# Patient Record
Sex: FEMALE | Race: WHITE | URBAN - METROPOLITAN AREA
[De-identification: names, ages, dates, MRNs, and addresses within clinical notes are randomized per-mention and may not be internally consistent; named-entity substitution may affect disease eponyms.]

---

## 2017-01-16 DIAGNOSIS — C81.90 HODGKIN DISEASE (H): Primary | ICD-10-CM

## 2017-01-16 DIAGNOSIS — Z86.2 PERSONAL HISTORY OF DISEASES OF BLOOD AND BLOOD-FORMING ORGANS: ICD-10-CM

## 2017-02-01 ENCOUNTER — APPOINTMENT (OUTPATIENT)
Dept: LAB | Facility: CLINIC | Age: 42
End: 2017-02-01
Attending: INTERNAL MEDICINE
Payer: COMMERCIAL

## 2017-02-01 ENCOUNTER — OFFICE VISIT (OUTPATIENT)
Dept: INTERVENTIONAL RADIOLOGY/VASCULAR | Facility: CLINIC | Age: 42
End: 2017-02-01
Attending: INTERNAL MEDICINE

## 2017-02-01 ENCOUNTER — HOSPITAL ENCOUNTER (OUTPATIENT)
Dept: NUCLEAR MEDICINE | Facility: CLINIC | Age: 42
Setting detail: NUCLEAR MEDICINE
End: 2017-02-01
Attending: INTERNAL MEDICINE
Payer: COMMERCIAL

## 2017-02-01 ENCOUNTER — HOSPITAL ENCOUNTER (OUTPATIENT)
Dept: GENERAL RADIOLOGY | Facility: CLINIC | Age: 42
Discharge: HOME | End: 2017-02-01
Attending: INTERNAL MEDICINE | Admitting: INTERNAL MEDICINE
Payer: COMMERCIAL

## 2017-02-01 ENCOUNTER — OFFICE VISIT (OUTPATIENT)
Dept: TRANSPLANT | Facility: CLINIC | Age: 42
End: 2017-02-01
Attending: INTERNAL MEDICINE
Payer: COMMERCIAL

## 2017-02-01 VITALS
SYSTOLIC BLOOD PRESSURE: 117 MMHG | TEMPERATURE: 98 F | DIASTOLIC BLOOD PRESSURE: 62 MMHG | WEIGHT: 179.68 LBS | OXYGEN SATURATION: 97 % | RESPIRATION RATE: 16 BRPM | BODY MASS INDEX: 31.84 KG/M2 | HEART RATE: 77 BPM | HEIGHT: 63 IN

## 2017-02-01 VITALS — OXYGEN SATURATION: 99 % | SYSTOLIC BLOOD PRESSURE: 124 MMHG | HEART RATE: 94 BPM | DIASTOLIC BLOOD PRESSURE: 86 MMHG

## 2017-02-01 DIAGNOSIS — C81.90 HODGKIN DISEASE (H): ICD-10-CM

## 2017-02-01 DIAGNOSIS — Z86.2 PERSONAL HISTORY OF DISEASES OF BLOOD AND BLOOD-FORMING ORGANS: ICD-10-CM

## 2017-02-01 DIAGNOSIS — C81.10 NODULAR SCLEROSING HODGKIN'S LYMPHOMA, UNSPECIFIED BODY REGION (H): Primary | ICD-10-CM

## 2017-02-01 DIAGNOSIS — C81.70 OTHER CLASSICAL HODGKIN LYMPHOMA: Primary | ICD-10-CM

## 2017-02-01 LAB
ABO + RH BLD: NORMAL
ABO + RH BLD: NORMAL
ALBUMIN SERPL-MCNC: 3.2 G/DL (ref 3.4–5)
ALP SERPL-CCNC: 138 U/L (ref 40–150)
ALT SERPL W P-5'-P-CCNC: 38 U/L (ref 0–50)
ANION GAP SERPL CALCULATED.3IONS-SCNC: 8 MMOL/L (ref 3–14)
APTT PPP: 34 SEC (ref 22–37)
AST SERPL W P-5'-P-CCNC: 31 U/L (ref 0–45)
B2 MICROGLOB SERPL-MCNC: 2 MG/L
BASOPHILS # BLD AUTO: 0 10E9/L (ref 0–0.2)
BASOPHILS NFR BLD AUTO: 1.3 %
BILIRUB SERPL-MCNC: 0.5 MG/DL (ref 0.2–1.3)
BLD GP AB SCN SERPL QL: NORMAL
BLOOD BANK CMNT PATIENT-IMP: NORMAL
BUN SERPL-MCNC: 8 MG/DL (ref 7–30)
CALCIUM SERPL-MCNC: 8.4 MG/DL (ref 8.5–10.1)
CHLORIDE SERPL-SCNC: 108 MMOL/L (ref 94–109)
CO2 SERPL-SCNC: 26 MMOL/L (ref 20–32)
CREAT SERPL-MCNC: 0.66 MG/DL (ref 0.52–1.04)
DIFFERENTIAL METHOD BLD: ABNORMAL
EBV VCA IGG SER QL IA: ABNORMAL AI (ref 0–0.8)
EOSINOPHIL # BLD AUTO: 0.1 10E9/L (ref 0–0.7)
EOSINOPHIL NFR BLD AUTO: 7.2 %
ERYTHROCYTE [DISTWIDTH] IN BLOOD BY AUTOMATED COUNT: 15.9 % (ref 10–15)
GFR SERPL CREATININE-BSD FRML MDRD: ABNORMAL ML/MIN/1.7M2
GLUCOSE SERPL-MCNC: 79 MG/DL (ref 70–99)
HCG SERPL QL: NEGATIVE
HCT VFR BLD AUTO: 34.2 % (ref 35–47)
HGB BLD-MCNC: 11.2 G/DL (ref 11.7–15.7)
HSV1 IGG SERPL QL IA: ABNORMAL AI (ref 0–0.8)
HSV2 IGG SERPL QL IA: 5.8 AI (ref 0–0.8)
IMM GRANULOCYTES # BLD: 0 10E9/L (ref 0–0.4)
IMM GRANULOCYTES NFR BLD: 0.7 %
INR PPP: 0.87 (ref 0.86–1.14)
LDH SERPL L TO P-CCNC: 215 U/L (ref 81–234)
LYMPHOCYTES # BLD AUTO: 0.4 10E9/L (ref 0.8–5.3)
LYMPHOCYTES NFR BLD AUTO: 27.5 %
MCH RBC QN AUTO: 32.9 PG (ref 26.5–33)
MCHC RBC AUTO-ENTMCNC: 32.7 G/DL (ref 31.5–36.5)
MCV RBC AUTO: 101 FL (ref 78–100)
MONOCYTES # BLD AUTO: 0.3 10E9/L (ref 0–1.3)
MONOCYTES NFR BLD AUTO: 18.3 %
NEUTROPHILS # BLD AUTO: 0.7 10E9/L (ref 1.6–8.3)
NEUTROPHILS NFR BLD AUTO: 45 %
NRBC # BLD AUTO: 0 10*3/UL
NRBC BLD AUTO-RTO: 0 /100
PHOSPHATE SERPL-MCNC: 2.8 MG/DL (ref 2.5–4.5)
PLATELET # BLD AUTO: 219 10E9/L (ref 150–450)
POTASSIUM SERPL-SCNC: 4.2 MMOL/L (ref 3.4–5.3)
PROT SERPL-MCNC: 7 G/DL (ref 6.8–8.8)
RBC # BLD AUTO: 3.4 10E12/L (ref 3.8–5.2)
SODIUM SERPL-SCNC: 142 MMOL/L (ref 133–144)
SPECIMEN EXP DATE BLD: NORMAL
T3FREE SERPL-MCNC: 2.4 PG/ML (ref 2.3–4.2)
T4 FREE SERPL-MCNC: 0.74 NG/DL (ref 0.76–1.46)
TSH SERPL DL<=0.05 MIU/L-ACNC: 2.07 MU/L (ref 0.4–4)
URATE SERPL-MCNC: 3.5 MG/DL (ref 2.6–6)
WBC # BLD AUTO: 1.5 10E9/L (ref 4–11)

## 2017-02-01 PROCEDURE — A9560 TC99M LABELED RBC: HCPCS | Performed by: INTERNAL MEDICINE

## 2017-02-01 PROCEDURE — 34300033 ZZH RX 343: Performed by: INTERNAL MEDICINE

## 2017-02-01 PROCEDURE — 78472 GATED HEART PLANAR SINGLE: CPT

## 2017-02-01 PROCEDURE — 25000128 H RX IP 250 OP 636: Performed by: INTERNAL MEDICINE

## 2017-02-01 RX ORDER — HEPARIN SODIUM (PORCINE) LOCK FLUSH IV SOLN 100 UNIT/ML 100 UNIT/ML
500 SOLUTION INTRAVENOUS ONCE
Status: COMPLETED | OUTPATIENT
Start: 2017-02-01 | End: 2017-02-01

## 2017-02-01 RX ADMIN — Medication 25.4 MCI.: at 14:30

## 2017-02-01 RX ADMIN — SODIUM CHLORIDE, PRESERVATIVE FREE 500 UNITS: 5 INJECTION INTRAVENOUS at 15:24

## 2017-02-01 NOTE — MR AVS SNAPSHOT
After Visit Summary   2/1/2017    Steph Agee    MRN: [id number]           Patient Information     Date Of Birth          1975        Visit Information        Provider Department      2/1/2017 11:00 AM Clair Collazo, MARIE [facility] Interventional Radiology        Today's Diagnoses     Hodgkin disease (H)         Personal history of diseases of blood and blood-forming organs           Care Instructions    Tunneled catheter placement instructions    1. Arrive in the Gold Waiting Room on the day of your procedure, 2nd floor of the hospital, located down the ng from the main lobby as instructed by the BMT clinic.  You will be coming 1.5 hours prior to the actual procedure time    2. No solid foods or milk products for 6 hours prior to the procedure    3. May have clear liquids up to 2 hours prior to the procedure (water, apple juice, broth, coffee or tea without milk or sugar, jell-o, white grape juice)    4. Anti-bacterial scrub  -Two times the day before your procedure, and once the day of your procedure  -Think of a big square:  mid chest to ear lobes, both sides of the chest and neck  -Use a clean washcloth each time.  Wet the washcloth.  Place a capful of the scrub on the wet washcloth and rub it in, wash the  area and leave on for 5 minutes    -After 5 minutes, rinse off the washcloth, then rinse off the skin and pat the skin dry.with a clean towel  -Or if you prefer, you may wash the scrub off in the shower  -No lotion or powders on the neck or chest area..  But you may use deodorant.        Follow-ups after your visit        Your next 10 appointments already scheduled     Feb 01, 2017  2:00 PM   NM HEART MUGA REST with UUNM1   [facility] [place], Nuclear Medicine ([facility])    [address] [address] [phone]           Please bring a list of your medicines to the exam. (Include vitamins, minerals and  over-the-counter drugs.) You should wear comfortable clothes. Leave your valuables at home. Please bring related prior results and films.  Tell your doctor:   If you are breastfeeding or may be pregnant.   If you have had a barium test within the past few days. Barium may change the results of certain exams.   If you think you may need sedation (medicine to help you relax).  You may eat and drink as normal.  Please call your Imaging Department at your exam site with any questions.            Feb 01, 2017  3:00 PM   XR CHEST 2 VIEWS with UUXR1   [facility],  Radiology ([facility])    [address] [address] [phone]           Please bring a list of your current medicines to your exam. (Include vitamins, minerals and over-thecounter medicines.) Leave your valuables at home.  Tell your doctor if there is a chance you may be pregnant.  You do not need to do anything special for this exam.            Feb 02, 2017  9:00 AM   PET ONCOLOGY WHOLE BODY with UUPET1   [facility] PET CT ([facility])    [address] [address] [phone]           Tell your doctor:   If there is any chance you may be pregnant or if you are breastfeeding.   If you have problems lying in small spaces (claustrophobia). If you do, your doctor may give you medicine to help you relax. If you have diabetes:   Have your exam early in the morning. Your blood glucose will go up as the day goes by.   Your glucose level must be 180 or less at the start of the exam. Please take any medicines you need to ensure this blood glucose level. 24 hours before your scan: Don t do any heavy exercise. (No jogging, aerobics or other workouts.) Exercise will make your pictures less accurate. 6 hours before your scan:   Stop all food and liquids (except water).   Do not chew gum or suck on mints.   If you need to  take medicine with food, you may take it with a few crackers.  Please call your Imaging Department at your exam site with any questions.            Feb 02, 2017  9:15 AM   CT SOFT TISSUE NECK W CONTRAST with UUCT3   [facility] [place] PET CT ([facility] [place])    [address] [address] [phone]           Please bring any scans or X-rays taken at other hospitals, if similar tests were done. Also bring a list of your medicines, including vitamins, minerals and over-the-counter drugs. It is safest to leave personal items at home.  Be sure to tell your doctor:   If you have any allergies.   If there s any chance you are pregnant.   If you are breastfeeding.   If you have any special needs.  You will have contrast for this exam. To prepare:   Do not eat or drink for 2 hours before your exam. If you need to take medicine, you may take it with small sips of water. (We may ask you to take liquid medicine as well.)   The day before your exam, drink extra fluids at least six 8-ounce glasses (unless your doctor tells you to restrict your fluids).  Patients over 70 or patients with diabetes or kidney problems:   If you haven t had a blood test (creatinine test) within the last 30 days, go to your clinic or Diagnostic Imaging Department for this test.  If you have diabetes:   If your kidney function is normal, continue taking your metformin (Avandamet, Glucophage, Glucovance, Metaglip) on the day of your exam.   If your kidney function is abnormal, wait 48 hours before restarting this medicine.  Please wear loose clothing, such as a sweat suit or jogging clothes. Avoid snaps, zippers and other metal. We may ask you to undress and put on a hospital gown.  If you have any questions, please call the Imaging Department where you will have your exam.            Feb 02, 2017  1:30 PM   CONSULT with Gregorio Serrano MD   Radiation Oncology Clinic ([facility])     [facility]  1st Floor  [address]  [facility] [address]   [phone]            Feb 02, 2017  3:30 PM   Central Venous Catheter - [address] with Katie Salcido, RN   [facility] [place], Patient Learning Center ([facility] [place])    3rd Floor  [address]  [address]  [facility] [address]              Appointment is located at [address] [place] [address]            Feb 03, 2017  1:00 PM   (Arrive by 12:45 PM)   BMT SW PSA with Kymberly Salmon  2 116 CONSULT RM   [facility] Blood and Marrow Transplant ([facility])    [address]  2nd Floor  [facility] [id number] [phone]            Feb 03, 2017  2:30 PM   Masonic Lab Draw with  MASONIC LAB DRAW   [facility] Masonic Lab Draw ([facility])    [address]  2nd Floor  [facility] [id number] [phone]            Feb 03, 2017  3:00 PM   Bone Marrow Biopsy with  BMT CECILIA #4   [facility]row Trans[facility] ([facility])    [address]  2nd Floor  [facility] [id number] [phone]              Future tests that were ordered for you today     Open Future Orders        Priority Expected Expires Ordered    CT Soft Tissue Neck w Contrast Routine  2/1/2018 2/1/2017            Who to contact     Please call your clinic at [phone] to:    Ask questions about your health    Make or cancel appointments    Discuss your medicines    Learn about your test results    Speak to your doctor   If you have compliments or concerns about an experience at your clinic, or if you wish to file a complaint, please contact [facility] Patient Relations at [phone] or email us at [email or web address]         Additional Information About Your Visit        MyChart Information     [employer] gives you  secure access to your electronic health record. If you see a primary care provider, you can also send messages to your care team and make appointments. If you have questions, please call your primary care clinic.  If you do not have a primary care provider, please call [phone] and they will assist you.      [employer] is an electronic gateway that provides easy, online access to your medical records. With [employer], you can request a clinic appointment, read your test results, renew a prescription or communicate with your care team.     To access your existing account, please contact your [facility] or call [phone] for assistance.        Care EveryWhere ID     This is your Care EveryWhere ID. This could be used by other organizations to access your [place] medical records  [id number]        Your Vitals Were     Pulse Pulse Oximetry                94 99%           Blood Pressure from Last 3 Encounters:   02/01/17 124/86   02/01/17 117/62   10/12/16 116/64    Weight from Last 3 Encounters:   02/01/17 81.5 kg (179 lb 10.8 oz)   10/12/16 79.425 kg (175 lb 1.6 oz)   10/10/16 79.3 kg (174 lb 13.2 oz)              We Performed the Following     Consult to [facility] for line placement        Primary Care Provider    Physician No Ref-Primary       No address on file        Thank you!     Thank you for choosing [facility] INTERVENTIONAL RADIOLOGY  for your care. Our goal is always to provide you with excellent care. Hearing back from our patients is one way we can continue to improve our services. Please take a few minutes to complete the written survey that you may receive in the mail after your visit with us. Thank you!             Your Updated Medication List - Protect others around you: Learn how to safely use, store and throw away your medicines at www.disposemymeds.org.          This list is accurate as of: 2/1/17 11:27 AM.  Always use your most recent med list.                    Brand Name Dispense Instructions for use    EFFEXOR PO      Take 150 mg by mouth daily       LORAZEPAM PO      Take 0.25 mg by mouth every 4 hours as needed for anxiety

## 2017-02-01 NOTE — PROGRESS NOTES
First Name: Steph  Age: 41 year old   Referring Physician: Dr. Chambers   REASON FOR REFERRAL: Education and evaluation for tunneled catheter placement  Patient is undergoing w/u for autologous BMT, using her own stem cells as the donor     HPI:  This is a patient who presented in 11/2014 with 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  She was found to have adenopathy confined above the diaphragm.  She had also lost 30 pounds of weight, and had night sweats.  She was staged at [facility].  A diagnostic biopsy was performed on a left axillary node.  She subsequently had 6 cycles of ABVD followed by external beam radiation to the areas of known disease in the chest, approximately 4000 cGy.  She did then well until late 05/2016, when she developed some heaviness in her chest and had recurrence of her night sweats.  A repeat PET/CT scan showed recurrence of disease.  It showed 4 areas of progressive lymphoma, most prominently involving destruction of manubrium and sternum, and extension superficially into the overlying soft tissue of the pectoralis muscles and internally into the anterosuperior mediastinum, and was associated with a small pleural effusion.  The anterosuperior mediastinal mass was 8 cm with a maximum SUV of 22.  There was also involvement along the lateral aspect of the aortic node with a mass that was 3.5 cm in diameter with an SUV of 14, and pericardium and medial pleural surface at the left lung overlying the left ventricular free wall, which was 3.3 cm with an SUV of 10.69, as well as the fat nestled between the intercostal space of the left 5th and 6th ribs anteriorly, and just adjacent to the left diaphragmatic leaflets, which was 1.6 cm in size with an SUV of 7.  A confirmatory fine needle aspirate was performed of a mass to the left of the manubrium, which showed cells compatible with recurrent Hodgkin's lymphoma.  She has subsequently gone on to receive 4 cycles of ICE chemotherapy, which she  has in general tolerated well.  She has numerous superficial vessels on her chest.  She has not had an central venous catheter placed.  They have only used her peripheral veins.  She underwent BMT work-up in October 2016 and was found to have a recurrence, biopsy proven. The patient received 3 cycles of brentuximab and repeated the PET/CT scan.  According to the patient all is good.    LINE HX:  1. October 2016:  Right internal jugular vein single lumen power port placed.  No problems with the functioning of the port.  PAST MEDICAL HISTORY:   Past Medical History   Diagnosis Date     Depression with anxiety      SVC syndrome 2014     Hodgkin disease (H) 2014, 2016     PAST SURGICAL HISTORY:   Past Surgical History   Procedure Laterality Date     Biopsy/excision lymph node(s), needle, superficial (cervical/inguinal/axillary) Left 2014     axillary     Biopsy of mass in the manubrium Left 2016     FAMILY HISTORY: No family history on file.  SOCIAL HISTORY:   Social History   Substance Use Topics     Smoking status: Former Smoker -- 1.00 packs/day     Types: Cigarettes     Start date: 01/01/1995     Quit date: 11/20/2015     Smokeless tobacco: Never Used     Alcohol Use: 0.0 oz/week     0 Standard drinks or equivalent per week      Comment: occasionally      PROBLEM LIST:   Patient Active Problem List    Diagnosis Date Noted     Hodgkin lymphoma, nodular sclerosis (H) 10/11/2016     Priority: Medium     Hodgkin's disease (H) 10/10/2016     Priority: Medium     MEDICATIONS:   Prescription Medications as of 2/1/2017             Venlafaxine HCl (EFFEXOR PO) Take 150 mg by mouth daily    LORAZEPAM PO Take 0.25 mg by mouth every 4 hours as needed for anxiety      Facility Administered Medications as of 2/1/2017             midazolam (VERSED) injection 0.5-1 mg Inject 0.5-1 mLs (0.5-1 mg) into the vein every 4 minutes as needed for sedation (If inadequate response may repeat 0.5 mg IV slowly Q 4 minutes PRN sedation until  desired response.)    fentaNYL Citrate (PF) (SUBLIMAZE) injection 25-50 mcg Inject 0.5-1 mLs (25-50 mcg) into the vein every 5 minutes as needed for severe pain (If inadequate response may repeat 25 mcg IV slowly Q 5 min PRN severe pain)        ALLERGIES:  No Known Allergies  VITALS: /86 mmHg  Pulse 94  SpO2 99%    ROS:  Skin: negative  Musculoskeletal: negative  Neurologic: negative  Psychiatric: negative    Physical Examination: Vital signs are reviewed and they are stable  Constitutional:  Pleasant, middle aged woman, in no acute physical distress, came with her cousin to the appt  Neck: Neck supple. No adenopathy.  Musculoskeletal: extremities normal- no gross deformities noted, gait normal and normal muscle tone  Skin: no suspicious lesions or rashes  Neurologic: negative  Psychiatric: affect normal/bright and mentation appears normal.    RESULTS:  BMP RESULTS:  Lab Results   Component Value Date     02/01/2017    POTASSIUM 4.2 02/01/2017    CHLORIDE 108 02/01/2017    CO2 26 02/01/2017    ANIONGAP 8 02/01/2017    GLC 79 02/01/2017    BUN 8 02/01/2017    CR 0.66 02/01/2017    GFRESTIMATED >90  Non  GFR Calc   02/01/2017    GFRESTBLACK >90   GFR Calc   02/01/2017    VALERIA 8.4* 02/01/2017        CBC RESULTS:  Lab Results   Component Value Date    WBC 1.5* 02/01/2017    RBC 3.40* 02/01/2017    HGB 11.2* 02/01/2017    HCT 34.2* 02/01/2017    * 02/01/2017    MCH 32.9 02/01/2017    MCHC 32.7 02/01/2017    RDW 15.9* 02/01/2017     02/01/2017       INR/PTT:  Lab Results   Component Value Date    INR 0.87 02/01/2017    PTT 34 02/01/2017         ASSESSMENT/PLAN:  NOTE:  Steph is scheduled for a bilat. Upper extremity venous duplex on Friday, February 3, 2017.  Type of catheter: Large bore double lumen tunneled apheresis capable catheter     Preferred Location: Left  Internal Jugular Vein     Platelet count is PLT      219   2/1/2017 , and coagulation factors are  INR     0.87   2/1/2017 ,  PTT       34   2/1/2017. The patient is at low risk for bleeding during the procedure.    PROVIDER NOTE:  The tunneled catheter placement procedure and its risks including but not limited to bleeding, infection, fibrin sheath formation and blood clots was explained to Steph and her cousin..    CONSENT: Affirmation of informed written consent was not obtained.     INSTRUCTIONS/GUIDELINES:   Eating and drinking restriction guidelines were reviewed., Anti-bacterial scrub was given and instructions for its use were reviewed to decrease the risk of infection on the day of the procedure., I explained the expected length of time the tunneled catheter could remain in place., I explained that when they went to the Patient Learning Center they would be taught about exit site dressing care, flushing of the lumens and how to care for the catheter when bathing., I explained that when the port a cath is now in use that it will need to be flushed once a month., The role of Interventional Radiology was reviewed. and The principles of infection control were reviewed.     25 minutes was spent with Steph and her cousin.  15 minutes was spent in counseling.  Clair Collazo MS, APRN, CNS  Clinical Nurse Specialist  Interventional Radiology  [phone] (voice mail)  [phone] (pager)  CC  Patient Care Team:  No Ref-Primary, Physician as PCP - Jd Barkley as Referring Physician (Hematology)  Koko Escamilla MD as MD (Internal Medicine-Hematology & Oncology)  MANUEL PICKETT

## 2017-02-01 NOTE — PROGRESS NOTES
BMT Teaching Flowsheet    Steph Agee is a 41 year old female  Diagnoses of Hodgkin disease (H) and Personal history of diseases of blood and blood-forming organs were pertinent to this visit.    Teaching Topic: Work Up Consents    Person(s) involved in teaching: Patient  Motivation Level  Asks Questions: Yes  Eager to Learn: Yes  Cooperative: Yes  Receptive (willing/able to accept information): Yes  Any cultural factors/Mormonism beliefs that may influence understanding or compliance? No    Patient demonstrates understanding of the following:  - Reason for the appointment, diagnosis and treatment plan: Yes  - Knowledge of proper use of medications and conditions for which they are ordered (with special attention to potential side effects or drug interactions): NA  - Which situations necessitate calling provider and whom to contact: Yes    Teaching concerns addressed: Vital signs taken, O2 sat taken, weight and height done, consents signed and explained, calendar reviewed.  Patient asks questions and care giver asks questions.  Patient and care giver both verbalize understanding of issues addressed.  Labs drawn through accessing Port a cath, placed in October 2016 at outside facility.  Patient tolerated well.  Port was de accessed prior to leaving.  Patient will need UA next visit.    Time spent with patient: 60 minutes

## 2017-02-01 NOTE — PATIENT INSTRUCTIONS
Tunneled catheter placement instructions    1. Arrive in the Gold Waiting Room on the day of your procedure, 2nd floor of the hospital, located down the ng from the main lobby as instructed by the BMT clinic.  You will be coming 1.5 hours prior to the actual procedure time    2. No solid foods or milk products for 6 hours prior to the procedure    3. May have clear liquids up to 2 hours prior to the procedure (water, apple juice, broth, coffee or tea without milk or sugar, jell-o, white grape juice)    4. Anti-bacterial scrub  -Two times the day before your procedure, and once the day of your procedure  -Think of a big square:  mid chest to ear lobes, both sides of the chest and neck  -Use a clean washcloth each time.  Wet the washcloth.  Place a capful of the scrub on the wet washcloth and rub it in, wash the  area and leave on for 5 minutes    -After 5 minutes, rinse off the washcloth, then rinse off the skin and pat the skin dry.with a clean towel  -Or if you prefer, you may wash the scrub off in the shower  -No lotion or powders on the neck or chest area..  But you may use deodorant.

## 2017-02-01 NOTE — Clinical Note
2/1/2017       RE: Steph Agee  [address] [facility] [address]     Dear Colleague,    Thank you for referring your patient, Steph Agee, to the [facility] INTERVENTIONAL RADIOLOGY at [facility] acute hospital. Please see a copy of my visit note below.    First Name: Steph  Age: 41 year old   Referring Physician: Dr. Chambers   REASON FOR REFERRAL: Education and evaluation for tunneled catheter placement  Patient is undergoing w/u for autologous BMT, using her own stem cells as the donor     HPI:  This is a patient who presented in 11/2014 with 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  She was found to have adenopathy confined above the diaphragm.  She had also lost 30 pounds of weight, and had night sweats.  She was staged at IIB.  A diagnostic biopsy was performed on a left axillary node.  She subsequently had 6 cycles of ABVD followed by external beam radiation to the areas of known disease in the chest, approximately 4000 cGy.  She did then well until late 05/2016, when she developed some heaviness in her chest and had recurrence of her night sweats.  A repeat PET/CT scan showed recurrence of disease.  It showed 4 areas of progressive lymphoma, most prominently involving destruction of manubrium and sternum, and extension superficially into the overlying soft tissue of the pectoralis muscles and internally into the anterosuperior mediastinum, and was associated with a small pleural effusion.  The anterosuperior mediastinal mass was 8 cm with a maximum SUV of 22.  There was also involvement along the lateral aspect of the aortic node with a mass that was 3.5 cm in diameter with an SUV of 14, and pericardium and medial pleural surface at the left lung overlying the left ventricular free wall, which was 3.3 cm with an SUV of 10.69, as well as the fat nestled between the intercostal space of the left 5th and 6th ribs anteriorly, and just adjacent to the left diaphragmatic  leaflets, which was 1.6 cm in size with an SUV of 7.  A confirmatory fine needle aspirate was performed of a mass to the left of the manubrium, which showed cells compatible with recurrent Hodgkin's lymphoma.  She has subsequently gone on to receive 4 cycles of ICE chemotherapy, which she has in general tolerated well.  She has numerous superficial vessels on her chest.  She has not had an central venous catheter placed.  They have only used her peripheral veins.  She underwent BMT work-up in October 2016 and was found to have a recurrence, biopsy proven. The patient received 3 cycles of brentuximab and repeated the PET/CT scan.  According to the patient all is good.    LINE HX:  1. October 2016:  Right internal jugular vein single lumen power port placed.  No problems with the functioning of the port.  PAST MEDICAL HISTORY:   Past Medical History   Diagnosis Date     Depression with anxiety      SVC syndrome 2014     Hodgkin disease (H) 2014, 2016     PAST SURGICAL HISTORY:   Past Surgical History   Procedure Laterality Date     Biopsy/excision lymph node(s), needle, superficial (cervical/inguinal/axillary) Left 2014     axillary     Biopsy of mass in the manubrium Left 2016     FAMILY HISTORY: No family history on file.  SOCIAL HISTORY:   Social History   Substance Use Topics     Smoking status: Former Smoker -- 1.00 packs/day     Types: Cigarettes     Start date: 01/01/1995     Quit date: 11/20/2015     Smokeless tobacco: Never Used     Alcohol Use: 0.0 oz/week     0 Standard drinks or equivalent per week      Comment: occasionally      PROBLEM LIST:   Patient Active Problem List    Diagnosis Date Noted     Hodgkin lymphoma, nodular sclerosis (H) 10/11/2016     Priority: Medium     Hodgkin's disease (H) 10/10/2016     Priority: Medium     MEDICATIONS:   Prescription Medications as of 2/1/2017             Venlafaxine HCl (EFFEXOR PO) Take 150 mg by mouth daily    LORAZEPAM PO Take 0.25 mg by mouth every 4 hours as  needed for anxiety      Facility Administered Medications as of 2/1/2017             midazolam (VERSED) injection 0.5-1 mg Inject 0.5-1 mLs (0.5-1 mg) into the vein every 4 minutes as needed for sedation (If inadequate response may repeat 0.5 mg IV slowly Q 4 minutes PRN sedation until desired response.)    fentaNYL Citrate (PF) (SUBLIMAZE) injection 25-50 mcg Inject 0.5-1 mLs (25-50 mcg) into the vein every 5 minutes as needed for severe pain (If inadequate response may repeat 25 mcg IV slowly Q 5 min PRN severe pain)        ALLERGIES:  No Known Allergies  VITALS: /86 mmHg  Pulse 94  SpO2 99%    ROS:  Skin: negative  Musculoskeletal: negative  Neurologic: negative  Psychiatric: negative    Physical Examination: Vital signs are reviewed and they are stable  Constitutional:  Pleasant, middle aged woman, in no acute physical distress, came with her cousin to the appt  Neck: Neck supple. No adenopathy.  Musculoskeletal: extremities normal- no gross deformities noted, gait normal and normal muscle tone  Skin: no suspicious lesions or rashes  Neurologic: negative  Psychiatric: affect normal/bright and mentation appears normal.    RESULTS:  BMP RESULTS:  Lab Results   Component Value Date     02/01/2017    POTASSIUM 4.2 02/01/2017    CHLORIDE 108 02/01/2017    CO2 26 02/01/2017    ANIONGAP 8 02/01/2017    GLC 79 02/01/2017    BUN 8 02/01/2017    CR 0.66 02/01/2017    GFRESTIMATED >90  Non  GFR Calc   02/01/2017    GFRESTBLACK >90   GFR Calc   02/01/2017    VALERIA 8.4* 02/01/2017        CBC RESULTS:  Lab Results   Component Value Date    WBC 1.5* 02/01/2017    RBC 3.40* 02/01/2017    HGB 11.2* 02/01/2017    HCT 34.2* 02/01/2017    * 02/01/2017    MCH 32.9 02/01/2017    MCHC 32.7 02/01/2017    RDW 15.9* 02/01/2017     02/01/2017       INR/PTT:  Lab Results   Component Value Date    INR 0.87 02/01/2017    PTT 34 02/01/2017         ASSESSMENT/PLAN:  NOTE:  Steph skinner  scheduled for a bilat. Upper extremity venous duplex on Friday, February 3, 2017.  Type of catheter: Large bore double lumen tunneled apheresis capable catheter     Preferred Location: Left  Internal Jugular Vein     Platelet count is PLT      219   2/1/2017 , and coagulation factors are INR     0.87   2/1/2017 ,  PTT       34   2/1/2017. The patient is at low risk for bleeding during the procedure.    PROVIDER NOTE:  The tunneled catheter placement procedure and its risks including but not limited to bleeding, infection, fibrin sheath formation and blood clots was explained to Steph and her cousin..    CONSENT: Affirmation of informed written consent was not obtained.     INSTRUCTIONS/GUIDELINES:   Eating and drinking restriction guidelines were reviewed., Anti-bacterial scrub was given and instructions for its use were reviewed to decrease the risk of infection on the day of the procedure., I explained the expected length of time the tunneled catheter could remain in place., I explained that when they went to the [facility] they would be taught about exit site dressing care, flushing of the lumens and how to care for the catheter when bathing., I explained that when the port a cath is now in use that it will need to be flushed once a month., The role of Interventional Radiology was reviewed. and The principles of infection control were reviewed.     25 minutes was spent with Steph and her cousin.  15 minutes was spent in counseling.  Clair Collazo MS, APRN, CNS  Clinical Nurse Specialist  Interventional Radiology  [phone] (voice mail)  [phone] (pager)    CC  Patient Care Team:  Jd Dukes as Referring Physician (Hematology)  Koko Escamilla MD as MD (Internal Medicine-Hematology & Oncology)  MANUEL PICKETT

## 2017-02-02 ENCOUNTER — HOSPITAL ENCOUNTER (OUTPATIENT)
Dept: EDUCATION SERVICES | Facility: CLINIC | Age: 42
End: 2017-02-02
Attending: INTERNAL MEDICINE
Payer: COMMERCIAL

## 2017-02-02 ENCOUNTER — MEDICAL CORRESPONDENCE (OUTPATIENT)
Dept: TRANSPLANT | Facility: CLINIC | Age: 42
End: 2017-02-02

## 2017-02-02 ENCOUNTER — HOSPITAL ENCOUNTER (OUTPATIENT)
Dept: PET IMAGING | Facility: CLINIC | Age: 42
End: 2017-02-02
Attending: INTERNAL MEDICINE
Payer: COMMERCIAL

## 2017-02-02 ENCOUNTER — TELEPHONE (OUTPATIENT)
Dept: CARDIOLOGY | Facility: CLINIC | Age: 42
End: 2017-02-02

## 2017-02-02 ENCOUNTER — OFFICE VISIT (OUTPATIENT)
Dept: RADIATION ONCOLOGY | Facility: CLINIC | Age: 42
End: 2017-02-02
Attending: RADIOLOGY
Payer: COMMERCIAL

## 2017-02-02 ENCOUNTER — HOSPITAL ENCOUNTER (OUTPATIENT)
Dept: PET IMAGING | Facility: CLINIC | Age: 42
Discharge: HOME | End: 2017-02-02
Attending: INTERNAL MEDICINE | Admitting: INTERNAL MEDICINE
Payer: COMMERCIAL

## 2017-02-02 VITALS
WEIGHT: 175 LBS | DIASTOLIC BLOOD PRESSURE: 60 MMHG | HEART RATE: 73 BPM | SYSTOLIC BLOOD PRESSURE: 98 MMHG | BODY MASS INDEX: 31.01 KG/M2

## 2017-02-02 DIAGNOSIS — Z86.2 PERSONAL HISTORY OF DISEASES OF BLOOD AND BLOOD-FORMING ORGANS: ICD-10-CM

## 2017-02-02 DIAGNOSIS — C81.70 OTHER CLASSICAL HODGKIN LYMPHOMA: Primary | ICD-10-CM

## 2017-02-02 DIAGNOSIS — C81.90 HODGKIN DISEASE (H): ICD-10-CM

## 2017-02-02 DIAGNOSIS — C81.12 NODULAR SCLEROSIS HODGKIN LYMPHOMA OF INTRATHORACIC LYMPH NODES (H): Primary | ICD-10-CM

## 2017-02-02 LAB
ALBUMIN SERPL ELPH-MCNC: 3.6 G/DL (ref 3.7–5.1)
ALPHA1 GLOB SERPL ELPH-MCNC: 0.3 G/DL (ref 0.2–0.4)
ALPHA2 GLOB SERPL ELPH-MCNC: 0.9 G/DL (ref 0.5–0.9)
B-GLOBULIN SERPL ELPH-MCNC: 0.9 G/DL (ref 0.6–1)
DONOR CYTOMEGALOVIRUS ABY: POSITIVE
DONOR HEP B CORE ABY: NONREACTIVE
DONOR HEP B SURF AGN: NONREACTIVE
DONOR HEPATITIS C ABY: NONREACTIVE
DONOR HTLV 1&2 ANTIBODY: NONREACTIVE
DONOR TREPONEMA PAL ABY: NONREACTIVE
GAMMA GLOB SERPL ELPH-MCNC: 1.1 G/DL (ref 0.7–1.6)
GLUCOSE BLDC GLUCOMTR-MCNC: 79 MG/DL (ref 70–99)
HIV1+2 AB SERPL QL IA: NONREACTIVE
IGA SERPL-MCNC: 294 MG/DL (ref 70–380)
IGG SERPL-MCNC: 1060 MG/DL (ref 695–1620)
IGM SERPL-MCNC: 37 MG/DL (ref 60–265)
IMMUNOFIXATION ELP: ABNORMAL
LAB SCANNED RESULT: NORMAL
M PROTEIN SERPL ELPH-MCNC: 0.1 G/DL
MPX SERIES: NORMAL
PROT PATTERN SERPL ELPH-IMP: ABNORMAL
T CRUZI AB SER DONR QL: ABNORMAL
WNV RNA SPEC QL NAA+PROBE: NORMAL

## 2017-02-02 PROCEDURE — 70491 CT SOFT TISSUE NECK W/DYE: CPT

## 2017-02-02 PROCEDURE — 82962 GLUCOSE BLOOD TEST: CPT

## 2017-02-02 PROCEDURE — 99212 OFFICE O/P EST SF 10 MIN: CPT | Performed by: RADIOLOGY

## 2017-02-02 PROCEDURE — 74177 CT ABD & PELVIS W/CONTRAST: CPT

## 2017-02-02 PROCEDURE — 34300033 ZZH RX 343: Performed by: INTERNAL MEDICINE

## 2017-02-02 PROCEDURE — 25000128 H RX IP 250 OP 636: Performed by: INTERNAL MEDICINE

## 2017-02-02 PROCEDURE — 71260 CT THORAX DX C+: CPT

## 2017-02-02 PROCEDURE — 25500064 ZZH RX 255 OP 636: Performed by: INTERNAL MEDICINE

## 2017-02-02 PROCEDURE — A9552 F18 FDG: HCPCS | Performed by: INTERNAL MEDICINE

## 2017-02-02 RX ORDER — IOPAMIDOL 755 MG/ML
45-150 INJECTION, SOLUTION INTRAVASCULAR ONCE
Status: COMPLETED | OUTPATIENT
Start: 2017-02-02 | End: 2017-02-02

## 2017-02-02 RX ORDER — HEPARIN SODIUM (PORCINE) LOCK FLUSH IV SOLN 100 UNIT/ML 100 UNIT/ML
500 SOLUTION INTRAVENOUS ONCE
Status: COMPLETED | OUTPATIENT
Start: 2017-02-02 | End: 2017-02-02

## 2017-02-02 RX ORDER — GABAPENTIN 600 MG/1
TABLET, FILM COATED ORAL
COMMUNITY
End: 2017-02-07

## 2017-02-02 RX ADMIN — FLUDEOXYGLUCOSE F-18 11.04 MCI.: 500 INJECTION, SOLUTION INTRAVENOUS at 09:15

## 2017-02-02 RX ADMIN — IOPAMIDOL 96 ML: 755 INJECTION, SOLUTION INTRAVENOUS at 09:54

## 2017-02-02 RX ADMIN — SODIUM CHLORIDE, PRESERVATIVE FREE 500 UNITS: 5 INJECTION INTRAVENOUS at 09:54

## 2017-02-02 NOTE — Clinical Note
2/2/2017       RE: Steph Agee  [address] [facility] [address]     Dear Colleague,    Thank you for referring your patient, Stpeh Agee, to the RADIATION ONCOLOGY CLINIC. Please see a copy of my visit note below.    Radiation Oncology Consult  Patient comes in for initial consultation in the Radiation Oncology Department at the request of Dr. Chambers.    History of Present Illness:  Steph is a pleasant 41 year old female in no acute distress and is accompanied by her cousin.  Patient was diagnosed in November 2014 when she presented with shortness of breath, chest pressure, superior vena cava syndrome and B symptoms (20lb weight loss over 3 month, night sweats, and severe alcohol intolerance).  She was found to have adenopathy and underwent axillary node biopsy on 11/14/16 showing classical Hodgkin's lymphoma.  CT scan on 11/13/14 demonstrated a 13 x 12 cm mass involving the anterior and superior mediastinum.  The mass invaded the sternum with some soft tissue evident in the pre-pectoral soft tissues of the anterior chest wall.  There was left axillary, left supraclavicular and possible right supraclavicular adenopathy, encompassing with right pulmonary arteries and narrowing of superior vena cava was noted.  PET on 11/18/14 showed uptake in areas of CT abnormalities with additional areas in left axilla and lymph nodes in left neck.  No disease below the diaphragm.  Bone marrow biopsy 11/19/14 was negative.  She was stage at IIB.    She had 4 rounds of ABVD and PET scan on 3/7/15 demonstrated residual uptake in 3 areas within the mediastinal mass with decrease in overall size of the mass.  She received 2 more cycles for a total of 6 ABVD with CT scan 5/8/15 showing further regression of the mass.  She then had radiation to mantle field to 3960 cGy completed 7/2/15.  She had esophagitis related to the radiation.  In May 2016, almost 1 year after radiation, she had night sweats but contributed that to  menopause type symptoms. 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  Biopsy of sternum showed recurrent Hodgkin's lymphoma.  PET on 6/14/16 showed disease in manubrium and sternum and into pectoral muscle.  She received 4 cycles of ICE starting 6/17/16.  PET scan in August showed a good response to treatment.    In October of 2016 she came here for transplant evaluation when she began having night sweats and PET scan confirmed new disease in left cardiophrenic lymph node and continued but decreased uptake on left mediastinum.  On 10/13/16 biopsy showed cardiophrenic node was + for Hodgkin's lymphoma.  She returned home and has now had 5 cycles of Brentuximab starting 10/19/16 and her last cycles was 1/11/17.   She no longer has night sweats.  PET scan on 1/10/17 showed resolution of previous areas of hypermetabolic focus, but there was an area in right lung that was hypermetabolic and presumed to be infection.  She was treated with antibiotics and improved. PET scan today 2/2/17 is just preliminary report, but on our review there is resolution of previous area of hypermetabolism and no new areas.  Patient appears to have a complete response.      Previous Radiation:  3960 cGy in 28 fractions to mantle field between 6/3/15-7/2/15 by Dr. Jimenez in [place], [place].  Records have been reviewed and are in our radiation system.    Previous Chemotherapy:  As above per HPI.    Implantable Cardiac Device (ICD):  NONE    Medications:  Current Outpatient Prescriptions   Medication     Venlafaxine HCl (EFFEXOR PO)     LORAZEPAM PO     No current facility-administered medications for this visit.     Facility-Administered Medications Ordered in Other Visits   Medication     midazolam (VERSED) injection  0.5-1 mg     fentaNYL Citrate (PF) (SUBLIMAZE) injection 25-50 mcg       Allergies:   No Known Allergies    Past Medical History:  Past Medical History   Diagnosis Date     Depression with anxiety      SVC syndrome 2014     Hodgkin disease (H) 2014, 2016       Past Surgical History:  Past Surgical History   Procedure Laterality Date     Biopsy/excision lymph node(s), needle, superficial (cervical/inguinal/axillary) Left 2014     axillary     Biopsy of mass in the manubrium Left 2016       Family History:  family history includes Substance Abuse in her mother.        Social History:  Patient is single and lives in [place], [place].  Employed as a .  Has 1 son.    Pain Assessment 0-10:  Patient rates pain at a 0.    Review of Symptoms:  Constitutional: Negative for fever, chills, weight loss and malaise/fatigue.   Overall patient feels well.  HENT: Negative for nosebleeds, congestion, sore throat and neck pain.   Respiratory: Negative for cough, hemoptysis, sputum production, shortness of breath and wheezing.   Cardiovascular: Negative for chest pain, palpitations, claudication, leg swelling and PND.   Gastrointestinal: Negative for heartburn, nausea, vomiting, abdominal pain, diarrhea, constipation and blood in stool.   Genitourinary: Negative for dysuria.   Musculoskeletal: Negative for myalgias and joint pain.   Skin: Negative for itching and rash.   Neurological: She does have neuropathy in her hands and slightly in her feet.  Endo/Heme/Allergies: Does not bruise/bleed easily.   Psychiatric/Behavioral: Negative for depression and suicidal ideas. The patient is not nervous/anxious.     Physical Exam:  GENERAL: Well-developed, well-nourished, globally oriented.   HEENT: Normocephalic, atraumatic. Oral cavity and oropharynx without mucosal lesions.   NECK: Supple, full range of motion, no palpable cervical or supraclavicular lymphadenopathy.   LUNGS: Clear to auscultation bilaterally.   CARDIOVASCULAR:  Regular rate and rhythm without murmur.   ABDOMEN: Soft, nondistended, nontender, no hepatosplenomegaly.  EXTREMITIES: Without cyanosis, clubbing or edema. .   LYMPHATICS: No palpable supraclavicular, axillary, inguinal, femoral adenopathy.   NEUROLOGIC: Alert and oriented.  Gait normal.     Labs:  CBC RESULTS:   Recent Labs   Lab Test  02/01/17   0952   WBC  1.5*   RBC  3.40*   HGB  11.2*   HCT  34.2*   MCV  101*   MCH  32.9   MCHC  32.7   RDW  15.9*   PLT  219       Pregnancy status:  No results found for: HCGQUANT    All pertinent labs, scans, and test results have been reviewed.      Assessment/Plan:      This is a 41 year old female with original presentation with Classical Hodgkin's Lymphoma, IIB. The most of her disease was above the diaphragm although she did have disease along the diaphragmatic lymph nodes.  She recurred in the chest area, very small and responded with salvage chemo. She preciously received consolidation radiation to mantle field to 3960 cGy completed 7/2/15. As of current disease status, there is no particular site to consider radiation. She can be re evaluated after BMT and on her follow up.    Thank you for letting us to participate on this patient care.    Gregorio Serrano MD  Pager [phone]

## 2017-02-02 NOTE — TELEPHONE ENCOUNTER
Type of call: Sooner Appointment     Sooner Appointment  New or Return patient: New  Does a prior appointment exist: No  Time frame requested: Sooner than 02/11/2017  Reason caller wants sooner appointment: Per BMT Clinic  Who to call back and call back number: please call patient at [phone]  [email or web address]

## 2017-02-02 NOTE — PROGRESS NOTES
Radiation Oncology Consult  Patient comes in for initial consultation in the Radiation Oncology Department at the request of Dr. Chambers.    History of Present Illness:  Steph is a pleasant 41 year old female in no acute distress and is accompanied by her cousin.  Patient was diagnosed in November 2014 when she presented with shortness of breath, chest pressure, superior vena cava syndrome and B symptoms (20lb weight loss over 3 month, night sweats, and severe alcohol intolerance).  She was found to have adenopathy and underwent axillary node biopsy on 11/14/16 showing classical Hodgkin's lymphoma.  CT scan on 11/13/14 demonstrated a 13 x 12 cm mass involving the anterior and superior mediastinum.  The mass invaded the sternum with some soft tissue evident in the pre-pectoral soft tissues of the anterior chest wall.  There was left axillary, left supraclavicular and possible right supraclavicular adenopathy, encompassing with right pulmonary arteries and narrowing of superior vena cava was noted.  PET on 11/18/14 showed uptake in areas of CT abnormalities with additional areas in left axilla and lymph nodes in left neck.  No disease below the diaphragm.  Bone marrow biopsy 11/19/14 was negative.  She was stage at IIB.    She had 4 rounds of ABVD and PET scan on 3/7/15 demonstrated residual uptake in 3 areas within the mediastinal mass with decrease in overall size of the mass.  She received 2 more cycles for a total of 6 ABVD with CT scan 5/8/15 showing further regression of the mass.  She then had radiation to mantle field to 3960 cGy completed 7/2/15.  She had esophagitis related to the radiation.  In May 2016, almost 1 year after radiation, she had night sweats but contributed that to menopause type symptoms. 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  Biopsy of sternum showed recurrent Hodgkin's lymphoma.  PET on 6/14/16 showed disease in manubrium and sternum and into pectoral muscle.  She received 4 cycles of ICE starting 6/17/16.  PET scan in August showed a good response to treatment.    In October of 2016 she came here for transplant evaluation when she began having night sweats and PET scan confirmed new disease in left cardiophrenic lymph node and continued but decreased uptake on left mediastinum.  On 10/13/16 biopsy showed cardiophrenic node was + for Hodgkin's lymphoma.  She returned home and has now had 5 cycles of Brentuximab starting 10/19/16 and her last cycles was 1/11/17.   She no longer has night sweats.  PET scan on 1/10/17 showed resolution of previous areas of hypermetabolic focus, but there was an area in right lung that was hypermetabolic and presumed to be infection.  She was treated with antibiotics and improved. PET scan today 2/2/17 is just preliminary report, but on our review there is resolution of previous area of hypermetabolism and no new areas.  Patient appears to have a complete response.      Previous Radiation:  3960 cGy in 28 fractions to mantle field between 6/3/15-7/2/15 by Dr. Jimenez in [place], [place].  Records have been reviewed and are in our radiation system.    Previous Chemotherapy:  As above per HPI.    Implantable Cardiac Device (ICD):  NONE    Medications:  Current Outpatient Prescriptions   Medication     Venlafaxine HCl (EFFEXOR PO)     LORAZEPAM PO     No current facility-administered medications for this visit.     Facility-Administered Medications Ordered in Other Visits   Medication     midazolam (VERSED) injection 0.5-1 mg     fentaNYL Citrate (PF) (SUBLIMAZE) injection 25-50 mcg       Allergies:   No Known Allergies    Past Medical History:  Past Medical History   Diagnosis Date     Depression with anxiety      SVC syndrome 2014      Hodgkin disease (H) 2014, 2016       Past Surgical History:  Past Surgical History   Procedure Laterality Date     Biopsy/excision lymph node(s), needle, superficial (cervical/inguinal/axillary) Left 2014     axillary     Biopsy of mass in the manubrium Left 2016       Family History:  family history includes Substance Abuse in her mother.        Social History:  Patient is single and lives in [place], [place].  Employed as a .  Has 1 son.    Pain Assessment 0-10:  Patient rates pain at a 0.    Review of Symptoms:  Constitutional: Negative for fever, chills, weight loss and malaise/fatigue.   Overall patient feels well.  HENT: Negative for nosebleeds, congestion, sore throat and neck pain.   Respiratory: Negative for cough, hemoptysis, sputum production, shortness of breath and wheezing.   Cardiovascular: Negative for chest pain, palpitations, claudication, leg swelling and PND.   Gastrointestinal: Negative for heartburn, nausea, vomiting, abdominal pain, diarrhea, constipation and blood in stool.   Genitourinary: Negative for dysuria.   Musculoskeletal: Negative for myalgias and joint pain.   Skin: Negative for itching and rash.   Neurological: She does have neuropathy in her hands and slightly in her feet.  Endo/Heme/Allergies: Does not bruise/bleed easily.   Psychiatric/Behavioral: Negative for depression and suicidal ideas. The patient is not nervous/anxious.     Physical Exam:  GENERAL: Well-developed, well-nourished, globally oriented.   HEENT: Normocephalic, atraumatic. Oral cavity and oropharynx without mucosal lesions.   NECK: Supple, full range of motion, no palpable cervical or supraclavicular lymphadenopathy.   LUNGS: Clear to auscultation bilaterally.   CARDIOVASCULAR: Regular rate and rhythm without murmur.   ABDOMEN: Soft, nondistended, nontender, no hepatosplenomegaly.  EXTREMITIES: Without cyanosis, clubbing or edema. .   LYMPHATICS: No palpable supraclavicular, axillary, inguinal,  femoral adenopathy.   NEUROLOGIC: Alert and oriented.  Gait normal.     Labs:  CBC RESULTS:   Recent Labs   Lab Test  02/01/17   0952   WBC  1.5*   RBC  3.40*   HGB  11.2*   HCT  34.2*   MCV  101*   MCH  32.9   MCHC  32.7   RDW  15.9*   PLT  219       Pregnancy status:  No results found for: HCGQUANT    All pertinent labs, scans, and test results have been reviewed.      Assessment/Plan:      This is a 41 year old female with original presentation with Classical Hodgkin's Lymphoma, IIB. The most of her disease was above the diaphragm although she did have disease along the diaphragmatic lymph nodes.  She recurred in the chest area, very small and responded with salvage chemo. She preciously received consolidation radiation to mantle field to 3960 cGy completed 7/2/15. As of current disease status, there is no particular site to consider radiation. She can be re evaluated after BMT and on her follow up.    Thank you for letting us to participate on this patient care.    Gregorio Serrano MD  Pager [phone]

## 2017-02-03 ENCOUNTER — OFFICE VISIT (OUTPATIENT)
Dept: TRANSPLANT | Facility: CLINIC | Age: 42
End: 2017-02-03
Attending: INTERNAL MEDICINE
Payer: COMMERCIAL

## 2017-02-03 ENCOUNTER — PRE VISIT (OUTPATIENT)
Dept: CARDIOLOGY | Facility: CLINIC | Age: 42
End: 2017-02-03

## 2017-02-03 VITALS
OXYGEN SATURATION: 99 % | HEART RATE: 85 BPM | TEMPERATURE: 97.6 F | BODY MASS INDEX: 32.46 KG/M2 | DIASTOLIC BLOOD PRESSURE: 71 MMHG | SYSTOLIC BLOOD PRESSURE: 104 MMHG | RESPIRATION RATE: 16 BRPM | WEIGHT: 183.2 LBS

## 2017-02-03 DIAGNOSIS — C81.90 HODGKIN DISEASE (H): ICD-10-CM

## 2017-02-03 DIAGNOSIS — C81.10 NODULAR SCLEROSING HODGKIN'S LYMPHOMA, UNSPECIFIED BODY REGION (H): Primary | ICD-10-CM

## 2017-02-03 DIAGNOSIS — Z86.2 PERSONAL HISTORY OF DISEASES OF BLOOD AND BLOOD-FORMING ORGANS: ICD-10-CM

## 2017-02-03 DIAGNOSIS — Z71.9 VISIT FOR COUNSELING: Primary | ICD-10-CM

## 2017-02-03 DIAGNOSIS — C81.90 HODGKIN'S DISEASE (H): Primary | ICD-10-CM

## 2017-02-03 LAB
BASOPHILS # BLD AUTO: 0 10E9/L (ref 0–0.2)
BASOPHILS NFR BLD AUTO: 0.9 %
DIFFERENTIAL METHOD BLD: ABNORMAL
DLCOCOR-%PRED-PRE: 78 %
DLCOCOR-%PRED-PRE: 78 %
DLCOCOR-PRE: 17.91 ML/MIN/MMHG
DLCOCOR-PRE: 17.91 ML/MIN/MMHG
DLCOUNC-%PRED-PRE: 72 %
DLCOUNC-%PRED-PRE: 72 %
DLCOUNC-PRE: 16.57 ML/MIN/MMHG
DLCOUNC-PRE: 16.57 ML/MIN/MMHG
DLCOUNC-PRED: 22.8 ML/MIN/MMHG
DLCOUNC-PRED: 22.8 ML/MIN/MMHG
EOSINOPHIL # BLD AUTO: 0.1 10E9/L (ref 0–0.7)
EOSINOPHIL NFR BLD AUTO: 4.4 %
ERV-%PRED-PRE: 85 %
ERV-%PRED-PRE: 85 %
ERV-PRE: 0.62 L
ERV-PRE: 0.62 L
ERV-PRED: 0.72 L
ERV-PRED: 0.72 L
ERYTHROCYTE [DISTWIDTH] IN BLOOD BY AUTOMATED COUNT: 16.1 % (ref 10–15)
EXPTIME-PRE: 6.68 SEC
EXPTIME-PRE: 6.68 SEC
FEF2575-%PRED-PRE: 80 %
FEF2575-%PRED-PRE: 80 %
FEF2575-PRE: 2.23 L/SEC
FEF2575-PRE: 2.23 L/SEC
FEF2575-PRED: 2.76 L/SEC
FEF2575-PRED: 2.76 L/SEC
FEFMAX-%PRED-PRE: 78 %
FEFMAX-%PRED-PRE: 78 %
FEFMAX-PRE: 5.23 L/SEC
FEFMAX-PRE: 5.23 L/SEC
FEFMAX-PRED: 6.67 L/SEC
FEFMAX-PRED: 6.67 L/SEC
FEV1-%PRED-PRE: 75 %
FEV1-%PRED-PRE: 75 %
FEV1-PRE: 1.95 L
FEV1-PRE: 1.95 L
FEV1FEV6-PRE: 85 %
FEV1FEV6-PRE: 85 %
FEV1FEV6-PRED: 83 %
FEV1FEV6-PRED: 83 %
FEV1FVC-PRE: 85 %
FEV1FVC-PRE: 85 %
FEV1FVC-PRED: 83 %
FEV1FVC-PRED: 83 %
FEV1SVC-PRE: 79 %
FEV1SVC-PRE: 79 %
FEV1SVC-PRED: 76 %
FEV1SVC-PRED: 76 %
FIFMAX-PRE: 4.49 L/SEC
FIFMAX-PRE: 4.49 L/SEC
FVC-%PRED-PRE: 73 %
FVC-%PRED-PRE: 73 %
FVC-PRE: 2.3 L
FVC-PRE: 2.3 L
FVC-PRED: 3.14 L
FVC-PRED: 3.14 L
HCT VFR BLD AUTO: 34.1 % (ref 35–47)
HGB BLD-MCNC: 11.2 G/DL (ref 11.7–15.7)
IC-%PRED-PRE: 68 %
IC-%PRED-PRE: 68 %
IC-PRE: 1.84 L
IC-PRE: 1.84 L
IC-PRED: 2.7 L
IC-PRED: 2.7 L
IMM GRANULOCYTES # BLD: 0 10E9/L (ref 0–0.4)
IMM GRANULOCYTES NFR BLD: 0 %
LYMPHOCYTES # BLD AUTO: 0.6 10E9/L (ref 0.8–5.3)
LYMPHOCYTES NFR BLD AUTO: 24.9 %
MCH RBC QN AUTO: 32.9 PG (ref 26.5–33)
MCHC RBC AUTO-ENTMCNC: 32.8 G/DL (ref 31.5–36.5)
MCV RBC AUTO: 100 FL (ref 78–100)
MONOCYTES # BLD AUTO: 0.4 10E9/L (ref 0–1.3)
MONOCYTES NFR BLD AUTO: 19.1 %
NEUTROPHILS # BLD AUTO: 1.1 10E9/L (ref 1.6–8.3)
NEUTROPHILS NFR BLD AUTO: 50.7 %
NRBC # BLD AUTO: 0 10*3/UL
NRBC BLD AUTO-RTO: 0 /100
PLATELET # BLD AUTO: 232 10E9/L (ref 150–450)
RBC # BLD AUTO: 3.4 10E12/L (ref 3.8–5.2)
VA-%PRED-PRE: 70 %
VA-%PRED-PRE: 70 %
VA-PRE: 3.4 L
VA-PRE: 3.4 L
VC-%PRED-PRE: 71 %
VC-%PRED-PRE: 71 %
VC-PRE: 2.46 L
VC-PRE: 2.46 L
VC-PRED: 3.42 L
VC-PRED: 3.42 L
WBC # BLD AUTO: 2.3 10E9/L (ref 4–11)

## 2017-02-03 PROCEDURE — 93010 ELECTROCARDIOGRAM REPORT: CPT | Mod: ZP | Performed by: INTERNAL MEDICINE

## 2017-02-03 PROCEDURE — 25000128 H RX IP 250 OP 636: Mod: ZF | Performed by: STUDENT IN AN ORGANIZED HEALTH CARE EDUCATION/TRAINING PROGRAM

## 2017-02-03 PROCEDURE — 36416 COLLJ CAPILLARY BLOOD SPEC: CPT | Performed by: INTERNAL MEDICINE

## 2017-02-03 PROCEDURE — 88305 TISSUE EXAM BY PATHOLOGIST: CPT | Performed by: INTERNAL MEDICINE

## 2017-02-03 PROCEDURE — G0364 BONE MARROW ASPIRATE &BIOPSY: HCPCS | Mod: ZF

## 2017-02-03 PROCEDURE — 00000381: Performed by: INTERNAL MEDICINE

## 2017-02-03 PROCEDURE — 40000611 ZZHCL STATISTIC MORPHOLOGY W/INTERP HEMEPATH TC 85060: Performed by: INTERNAL MEDICINE

## 2017-02-03 PROCEDURE — 88237 TISSUE CULTURE BONE MARROW: CPT | Performed by: INTERNAL MEDICINE

## 2017-02-03 PROCEDURE — 88271 CYTOGENETICS DNA PROBE: CPT | Performed by: INTERNAL MEDICINE

## 2017-02-03 PROCEDURE — 88275 CYTOGENETICS 100-300: CPT | Performed by: INTERNAL MEDICINE

## 2017-02-03 PROCEDURE — 40000951 ZZHCL STATISTIC BONE MARROW INTERP TC 85097: Performed by: INTERNAL MEDICINE

## 2017-02-03 PROCEDURE — 88311 DECALCIFY TISSUE: CPT | Performed by: INTERNAL MEDICINE

## 2017-02-03 PROCEDURE — 88280 CHROMOSOME KARYOTYPE STUDY: CPT | Performed by: INTERNAL MEDICINE

## 2017-02-03 PROCEDURE — 93005 ELECTROCARDIOGRAM TRACING: CPT

## 2017-02-03 PROCEDURE — 00000161 ZZHCL STATISTIC H-SPHEME PROCESS B/S: Performed by: INTERNAL MEDICINE

## 2017-02-03 PROCEDURE — 38221 DX BONE MARROW BIOPSIES: CPT | Mod: 50,ZF

## 2017-02-03 PROCEDURE — 00000058 ZZHCL STATISTIC BONE MARROW ASP PERF TC 38220: Performed by: INTERNAL MEDICINE

## 2017-02-03 PROCEDURE — 85025 COMPLETE CBC W/AUTO DIFF WBC: CPT | Performed by: INTERNAL MEDICINE

## 2017-02-03 PROCEDURE — 40000425 ZZHCL STATISTIC ADDL BM COR PERF TC 38221: Performed by: INTERNAL MEDICINE

## 2017-02-03 PROCEDURE — 40000424 ZZHCL STATISTIC BONE MARROW CORE PERF TC 38221: Performed by: INTERNAL MEDICINE

## 2017-02-03 PROCEDURE — 40000795 ZZHCL STATISTIC DNA PROCESS AND HOLD: Performed by: INTERNAL MEDICINE

## 2017-02-03 PROCEDURE — 88161 CYTOPATH SMEAR OTHER SOURCE: CPT | Performed by: INTERNAL MEDICINE

## 2017-02-03 PROCEDURE — 88264 CHROMOSOME ANALYSIS 20-25: CPT | Performed by: INTERNAL MEDICINE

## 2017-02-03 PROCEDURE — 25000128 H RX IP 250 OP 636: Mod: ZF | Performed by: INTERNAL MEDICINE

## 2017-02-03 RX ORDER — MORPHINE SULFATE 2 MG/ML
2 INJECTION, SOLUTION INTRAMUSCULAR; INTRAVENOUS ONCE
Status: COMPLETED | OUTPATIENT
Start: 2017-02-03 | End: 2017-02-03

## 2017-02-03 RX ORDER — HEPARIN SODIUM (PORCINE) LOCK FLUSH IV SOLN 100 UNIT/ML 100 UNIT/ML
5 SOLUTION INTRAVENOUS
Status: DISCONTINUED | OUTPATIENT
Start: 2017-02-03 | End: 2017-02-06 | Stop reason: HOSPADM

## 2017-02-03 RX ADMIN — MORPHINE SULFATE 2 MG: 2 INJECTION, SOLUTION INTRAMUSCULAR; INTRAVENOUS at 14:57

## 2017-02-03 RX ADMIN — SODIUM CHLORIDE, PRESERVATIVE FREE 5 ML: 5 INJECTION INTRAVENOUS at 15:57

## 2017-02-03 ASSESSMENT — PAIN SCALES - GENERAL: PAINLEVEL: NO PAIN (0)

## 2017-02-03 NOTE — NURSING NOTE
Patient monitored in supine position for 30 minutes following biopsy. Patients vitals are okay and dressing is dry and intact. Patient discharged ambulatory from clinic in the care of a family member.  MACIE ANDUJAR CMA

## 2017-02-03 NOTE — NURSING NOTE
EKG performed on patient without incident.  MACIE ANDUJAR CMA ([facility])

## 2017-02-03 NOTE — NURSING NOTE
BMT Heme Malignancy Rooming Note    Steph Agee - 2/3/2017 2:47 PM     Chief Complaint   Patient presents with     RECHECK     Pt here for Labs, EKG, and Workup BMBX.  Pt is Pre-BMT for Hodgkin's Lymphoma.          BP 98/68 mmHg  Pulse 77  Temp(Src) 97.6  F (36.4  C) (Oral)  Resp 16  Wt 83.1 kg (183 lb 3.2 oz)  SpO2 100%    No Pain (0)     Medications reviewed: Yes    Labs drawn: Yes    Drawn by: Clinic Staff  Via: [email or web address]  See Doc Flowsheet for details.      Dressing changed: Not applicable     Medications given: Yes - See MAR    Staff time:0    Additional information if applicable: Pt would like IV Premeds with BMBX today.  Pt has a  here with her.  Pt not taking any blood thinners.      Lu Hahn RN

## 2017-02-03 NOTE — TELEPHONE ENCOUNTER
Previsit nursing summary    HPI: Referral from Dr. Greco for cardiology evaluation of decreased EF.    Oncology history: She was originally diagnosed in 11/2014 with chest pressure and superior vena cava syndrome and when she was found to have adenopathy.  She had B symptoms.  She was staged at [facility].  The diagnostic biopsy was performed of the left axillary node.  She subsequently had 6 cycles of ABVD followed by external beam radiation to areas of known disease.  She then did well until 05/2016, when she developed heaviness in the chest and recurrence of night sweats.  A repeat PET/CT showed recurrence of disease predominantly in the manubrium and sternum with extension into the pectoralis muscle.  She subsequently had a confirmatory fine needle aspirate performed of the mass to the left of the manubrium which showed recurrent Hodgkin's disease.  She subsequently received a total of 4 cycles of ICE chemotherapy.  I saw her after 2 cycles which she tolerated well and recommended that we proceed to autologous transplant.  She has in general tolerated the ICE chemotherapy quite well.  She now is here for pre-transplant workup.       Procedures:    MUGA (2/1/2017)  IMPRESSION:  1. Low normal left ventricular ejection fraction at 52%.  2. Interval reduction in LV ejection fraction from 67% to 52%.    MUGA (10/5/2016)  IMPRESSION:   1. Normal left ventricular ejection fraction at 67%.  2. Normal chamber size and wall motion

## 2017-02-06 ENCOUNTER — OFFICE VISIT (OUTPATIENT)
Dept: CARDIOLOGY | Facility: CLINIC | Age: 42
End: 2017-02-06
Attending: INTERNAL MEDICINE
Payer: COMMERCIAL

## 2017-02-06 VITALS
HEIGHT: 64 IN | WEIGHT: 182.4 LBS | OXYGEN SATURATION: 99 % | HEART RATE: 79 BPM | SYSTOLIC BLOOD PRESSURE: 106 MMHG | BODY MASS INDEX: 31.14 KG/M2 | DIASTOLIC BLOOD PRESSURE: 71 MMHG

## 2017-02-06 DIAGNOSIS — Z91.89 AT RISK FOR CARDIOMYOPATHY: Primary | ICD-10-CM

## 2017-02-06 DIAGNOSIS — I50.9 CHF (NYHA CLASS I, ACC/AHA STAGE B) (H): ICD-10-CM

## 2017-02-06 DIAGNOSIS — C81.10 NODULAR SCLEROSING HODGKIN'S LYMPHOMA, UNSPECIFIED BODY REGION (H): ICD-10-CM

## 2017-02-06 LAB
COPATH REPORT: NORMAL
COPATH REPORT: NORMAL
INTERPRETATION ECG - MUSE: NORMAL

## 2017-02-06 PROCEDURE — 99213 OFFICE O/P EST LOW 20 MIN: CPT | Mod: ZF

## 2017-02-06 PROCEDURE — 99204 OFFICE O/P NEW MOD 45 MIN: CPT | Mod: GC | Performed by: INTERNAL MEDICINE

## 2017-02-06 ASSESSMENT — PAIN SCALES - GENERAL: PAINLEVEL: NO PAIN (0)

## 2017-02-06 NOTE — MR AVS SNAPSHOT
After Visit Summary   2/6/2017    Steph Agee    MRN: [id number]           Patient Information     Date Of Birth          1975        Visit Information        Provider Department      2/6/2017 9:00 AM Sharyn Mitchell MD [facility]        Today's Diagnoses     At risk for cardiomyopathy    -  1       Care Instructions    PATIENT INSTRUCTIONS:  1. Follow up with Dr. Mitchell with a phone call  2. Maintain a cardiac healthy diet  3. No new medication changes  4. ECHO or cardiac MRI       Debra Hoang RN  Nurse Coordinator  Phone number [phone]  Please use JoopLoop to communicate with your HealthCare Provider      If you have an urgent need after hours (8:00 am to 4:30 pm) please call [phone] and ask for the cardiology fellow on call.              Follow-ups after your visit        Follow-up notes from your care team     Return if symptoms worsen or fail to improve, for Dr. Mitchell for at risk for cardiomyopathy.      Your next 10 appointments already scheduled     Feb 07, 2017 10:00 AM   MR MYOCARDIUM W CONTRAST with UUMR4, UU CV MR NURSE   [facility], [place], [facility] ([facility], [facility])    [address] [place] [id number] [phone]           Take your medicines as usual, unless your doctor tells you not to. Bring a list of your current medicines to your exam (including vitamins, minerals and over-the-counter drugs).  You will be given intravenous contrast for this exam. To prepare:   The day before your exam, drink extra fluids at least six 8-ounce glasses (unless your doctor tells you to restrict your fluids).   Have a blood test (creatinine test) within 30 days of your exam. Go to your clinic or Diagnostic Imaging Department for this test.  The MRI machine uses a strong magnet. Please wear clothes without metal (snaps, zippers). A sweatsuit works well, or we may give you a hospital gown.  Please remove any body piercings and  hair extensions before you arrive. You will also remove watches, jewelry, hairpins, wallets, dentures, partial dental plates and hearing aids. You may wear contact lenses, and you may be able to wear your rings. We have a safe place to keep your personal items, but it is safer to leave them at home.   **IMPORTANT** THE INSTRUCTIONS BELOW ARE ONLY FOR THOSE PATIENTS WHO HAVE BEEN TOLD THEY WILL RECEIVE SEDATION OR GENERAL ANESTHESIA DURING THEIR MRI PROCEDURE:  IF YOU WILL RECEIVE SEDATION (take medicine to help you relax during your exam):   You must get the medicine from your doctor before you arrive. Bring the medicine to the exam. Do not take it at home.   Arrive one hour early. Bring someone who can take you home after the test. Your medicine will make you sleepy. After the exam, you may not drive, take a bus or take a taxi by yourself.   No eating 8 hours before your exam. You may have clear liquids up until 4 hours before your exam. (Clear liquids include water, clear tea, black coffee and fruit juice without pulp.)  IF YOU WILL RECEIVE ANESTHESIA (be asleep for your exam):   Arrive 1 1/2 hours early. Bring someone who can take you home after the test. You may not drive, take a bus or take a taxi by yourself.   No eating 8 hours before your exam. You may have clear liquids up until 4 hours before your exam. (Clear liquids include water, clear tea, black coffee and fruit juice without pulp.)  Please call the Imaging Department at your exam site with any questions.            Feb 07, 2017 12:30 PM   BMT NURSE COORD WITH ROOM with  Bmt Nurse Coordinator,  2 119 CONSULT [facility] and [facility]row Trans[facility] ([facility])    [address] [place] [id number] [phone]            Feb 07, 2017  2:00 PM   PHARM D CONSULT WITH ROOM with  Bmt Pharm D, CEDRIC,  2 119 CONSULT [facility]row [facility] ([facility])     [address] [address]   [phone]            Feb 07, 2017  3:00 PM   (Arrive by 2:45 PM)   Autologous Consultation with FRANCHESKA APHERESIS RN8, FRANCHESKA 2 161 APHERESIS CON   [facility] ([facility])    [address] [place] [id number]   [phone]            Feb 07, 2017  5:00 PM   (Arrive by 4:45 PM)   NEW LUNG NODULE with Clyde Montoya MD   [facility] ([facility])    [address] [place] [id number]   [phone]            Feb 08, 2017  9:30 AM   RESEARCH WITH ROOM with  Bmt Research Coordinator   [facility] Blood and Marrow Transplant ([facility])    [address] [place] [id number]   [phone]              Future tests that were ordered for you today     Open Future Orders        Priority Expected Expires Ordered    MRI Cardiac w/contrast Routine 2/7/2017 2/6/2018 2/6/2017    Echocardiogram Complete Routine  2/6/2018 2/6/2017            Who to contact     If you have questions or need follow up information about today's clinic visit or your schedule please contact [facility] directly at [phone].  Normal or non-critical lab and imaging results will be communicated to you by [employer], letter or phone within 4 business days after the clinic has received the results. If you do not hear from us within 7 days, please contact the clinic through [employer] or phone. If you have a critical or abnormal lab result, we will notify you by phone as soon as possible.  Submit refill requests through [employer] or call your pharmacy and they will forward the refill request to us. Please allow 3 business days for your refill to be completed.          Additional Information About Your Visit        [employer] Information     [employer] gives you secure access to your electronic health  "record. If you see a primary care provider, you can also send messages to your care team and make appointments. If you have questions, please call your primary care clinic.  If you do not have a primary care provider, please call [phone] and they will assist you.        Care EveryWhere ID     This is your Care EveryWhere ID. This could be used by other organizations to access your [facility] medical records  [id number]        Your Vitals Were     Pulse Height BMI (Body Mass Index) Pulse Oximetry          79 1.626 m (5' 4\") 31.29 kg/m2 99%         Blood Pressure from Last 3 Encounters:   02/06/17 106/71   02/03/17 104/71   02/02/17 98/60    Weight from Last 3 Encounters:   02/06/17 82.736 kg (182 lb 6.4 oz)   02/03/17 83.1 kg (183 lb 3.2 oz)   02/02/17 79.379 kg (175 lb)               Primary Care Provider    Physician No Ref-Primary       No address on file        Thank you!     Thank you for choosing [facility]  for your care. Our goal is always to provide you with excellent care. Hearing back from our patients is one way we can continue to improve our services. Please take a few minutes to complete the written survey that you may receive in the mail after your visit with us. Thank you!             Your Updated Medication List - Protect others around you: Learn how to safely use, store and throw away your medicines at www.disposemymeds.org.          This list is accurate as of: 2/6/17 10:02 AM.  Always use your most recent med list.                   Brand Name Dispense Instructions for use    EFFEXOR PO      Take 150 mg by mouth daily       gabapentin 600 MG 24 hr tablet    GRALISE     Take by mouth daily (with dinner)       LORAZEPAM PO      Take 0.25 mg by mouth every 4 hours as needed for anxiety         "

## 2017-02-06 NOTE — PROGRESS NOTES
BMT ONC Adult Bone Marrow Biopsy Procedure Note  February 6, 2017  /71 mmHg  Pulse 85  Temp(Src) 97.6  F (36.4  C) (Oral)  Resp 16  Wt 83.1 kg (183 lb 3.2 oz)  SpO2 99%     Learning needs assessment complete within 12 months? YES    DIAGNOSIS: Hodgkin's lymphoma      PROCEDURE: Bilateral Bone Marrow Biopsy and Unilateral Aspirate    LOCATION: [facility]    Patient s identification was positively verified by verbal identification and invasive procedure safety checklist was completed. Informed consent was obtained. Following the administration of Morphine as pre-medication, patient was placed in the prone position and prepped and draped in a sterile manner. Approximately 20 cc of 1% Lidocaine was used over the bilateral posterior iliac spine. Following this a 3 mm incision was made. Trephine bone marrow core(s) was (were) obtained from the [facility]. Bone marrow aspirates were obtained from the [facility]. Aspirates were sent for morphology, immunophenotyping, cytogenetics and molecular diagnostics RFLP. A total of approximately 20 ml of marrow was aspirated. Following this procedure a sterile dressing was applied to the bone marrow biopsy site(s). The patient was placed in the supine position to maintain pressure on the biopsy site. Post-procedure wound care instructions were given.     Complications: NO    Pre-procedural pain: 0 out of 10 on the numeric pain rating scale.     Procedural pain: 4 out of 10 on the numeric pain rating scale.     Post-procedural pain assessment: 0 out of 10 on the numeric pain rating scale.     Interventions: NO    Length of procedure:20 minutes or less      Procedure performed by: Viviane FARNSWORTH

## 2017-02-06 NOTE — NURSING NOTE
Chief Complaint   Patient presents with     New Patient     referral from BMT for cardiac evaluation/EKG

## 2017-02-06 NOTE — PATIENT INSTRUCTIONS
PATIENT INSTRUCTIONS:  1. Follow up with Dr. Mitchell with a phone call  2. Maintain a cardiac healthy diet  3. No new medication changes  4. ECHO or cardiac MRI       Debra Hoang RN  Nurse Coordinator  Phone number [phone]  Please use Tutamee to communicate with your HealthCare Provider      If you have an urgent need after hours (8:00 am to 4:30 pm) please call [phone] and ask for the cardiology fellow on call.

## 2017-02-06 NOTE — NURSING NOTE
Chief Complaint   Patient presents with     New Patient     referral from BMT for cardiac evaluation/EKG     1. Follow up with Dr. Mitchell with a phone call  2. Maintain a cardiac healthy diet  3. No new medication changes  4. ECHO or cardiac MRI

## 2017-02-06 NOTE — PROGRESS NOTES
Blood and Marrow Transplant   Psychosocial Assessment with   Clinical     Assessment completed on 2/3/17 of living situation, support system, financial status, functional status, coping, stressors and need for resources.   Social work intervention provided as needed.  Information for this assessment was provided by pt and caregiver (cousin, Chaparrita Donohue) report in addition to medical chart review and consultation with medical team.     Present at assessment: Patient, Steph Agee and her cousin, Chaparrita Donohue, were present for this assessment conducted by Kymberly Salmon, BMT .     Diagnosis: Hodgkin's Lymphoma    Date of Diagnosis: 11/14, relapse 5/2016    Transplant type: Autologous    Donor: Autologous     Physician: Obed Chambers MD    Nurse Coordinator: Mary Krishnamurthy RN    Permanent Address:   [address] [place] [id number]    Local Address:  Pt's cousin, Chaparrita Donohue's home:  [address] [place] [address]    Contact Information:  Pt Cell Phone: [phone]  Pt Mail: [email or web address]   Pt's caregiver Chaparrita Xiong cell:  [phone]    Presenting Information:  Steph is a 41 year old woman diagnosed with Hodgkin's Lymphoma who presents for evaluation for potential autologous transplant at the [facility] ([facility]). Pt was accompanied to today's visit by her cousin, Chaparrita Donohue.      Decision Making: Self    Health Care Directive:  Yes. Pt has completed a health care directive and will bring it when is admitted or when she returns to clinic.  The form needs to be notarized.      Relationship Status: Pt and pt's Awais WINTER, live together, they have been in a relationship for almost twelve years. Pt described their relationship as stable and supportive.     Special Needs: Pt will stay with her cousin, in [place], [place],  post-transplant.    Family/Support System: Pt endorsed a strong support system including family (her Awais WINTER Randy's mother and  family, pt's son) and close friends (who live in the [place] area) who will be available to support pt throughout transplant process.     SO: Awais  Children: Pt's son, Kamran, 22 y/o  Awais's children/pt's step-children:  Lo, 24 y/o and Marshall, 26 y/o  All of the children live in [place]    Grandchildren: 3 (on Awais's side) ages 8, 2 and 1   Parents: Mother, .  Father, living, 61 y/o  Siblings: one full sibling:  Earl, lives in [place], [place] and 2 half-brothers, Jian, lives in [place], [place] and Kayode, lives in [place], [place]   Friends: yes, many friends in [place], [place]     Caregiver: MILLY discussed with pt the caregiver role and expectation at length. Pt is agreeable to having a full time caregiver for a minimum of 30 days until cleared by the BMT physician. Pt and pt's cousin, April, confirmed understanding the caregiver requirement. Pt shared that  April will be her main caregiver, April's son is available as a back-up caregiver. April's neighbor and other mutual friends can also assist as needed.  Pt signed the caregiver contract which will be scanned into the EMR. Caregiver education and resources provided. No caregiver concerns identified.     Name & Numbers  Chaparrita Donohue, [phone]    Permanent Living Situation: Pt lives with her SOAwais. No concerns identified at this time.     Temporary Living Situation:  Pt will be staying with her cousin, April, in [place], [place].      Transportation Mode: Pt's cousin, April, will provide transportation to clinic. Pt is aware of driving restrictions post-BMT and the need for the caregiver is to drive until cleared to drive by the BMT physician. MILLY provided information on parking info and monthly parking pass options.     Insurance: Pt has employer group health insurance through [employer]. Pt denied specific insurance concerns at this time. MILLY reiterated information about the [employer]  should specific insurance questions arise as pt  moves through transplant process. Future Insurance questions referred to [employer] .  Pt reports that she has in , Nicholas Stratton, through [employer].      Sources of Income: Pt is supported by short-term disability (which began 2/1/17, she is paid 80% of her income and states that she also has LTD coverage, paid at 60% of her income). Pt denied anticipation of financial hardship related to BMT at this time. SW provided information on adalid options and encouraged pt to contact this SW for support should financial situation change.     Employment: Pt works full-time as a  with [employer].  She has worked for [employer] for nine months.  Her last day of work was 1/31/2017. Pt states that her job is very flexible and sometimes she is able to work from home.  She enjoys her work and hopes to get back to work as soon as possible.     Pt reports that her SO is not working at this time due to injuries.  He fell off a roof and broke his back in October, 2015 (he was working as a  at the time).  Pt reports that he was also electrocuted on the job and is still healing from both those injuries.  Pt states that Awais was not eligible for worker's comp as he was a contract employee at the time.      Mental Health: Pt reported a hx of anxiety and depression. Pt is currently taking medication (Effexor) and pt feels the medication is working well. She previously took Prozac but reports that it caused menopause symptoms so she was changed to Effexor.  She also takes Lorazepam, for sleep, as needed.  SW explained that it's not uncommon for patients going through transplant to experience symptoms of depression/anxiety. Pt believes that she would be able to identify symptoms of increased depression/anxiety throughout the transplant process. SW completed a PHQ 9 Assessment with the pt and pt scored a 0 which indicates no issues with depression.    Chemical Use: Pt uses marijuana  "daily, she primary use is in the morning.  She estimates using a quantity of one joint per day, she has used edible marijuana in the past.  Pt states that she has never had a prescription for medical marijuana but has used marijuana from [place] or [place].  Pt finds that marijuana use helps her combat chemotherapy related nausea and improves her appetite.  Pt plans to talk with Dr. Chambers, during her closing appointment, about other options.    Based on the information provided, there appear to be no specific risks or concerns identified at this time.     Trauma/Loss/Abuse History: Multiple losses associated with cancer diagnosis and treatment, including health, employment, changes to physical appearance, etc.     Spirituality: Pt identified as having no Voodoo affiliation. SW explained that there are Chaplains on the unit and pt can request to meet with a  at anytime.    Coping: Pt noted that she is currently feeling \"positive, prepared and ready to begin\". Pt shared that her main coping mechanisms are talking with family and friends, exercise, gathering educational information about her cancer diagnosis and keeping busy at work. Pt noted that she enjoys dance, camping, fishing, outdoor activities, spending time with her grand kids and social media. SW and pt discussed additional positive coping mechanisms that pt can utilize while in the hospital. While hospitalized, pt plans to relax, watch tv ([employer] and [employer]) and spend time coloring in coloring books using colored pencils. Pt said that her grandchildren have given her presents to open while she is in the hospital and she got a gift basket from her co-workers full of things that she can use to pass the time while she is hospitalized.      Pt said that she is worried that when she has her closing appointment with Dr. Chambers that she will not be able to move forward with transplant.  She noted that during her previous work-up week (in " "October, 2016) when she was told that her cancer had come back and that she wouldn't be able to proceed to transplant right away that she felt worse that when she was originally diagnosed (since she was not expecting that news).  She is happy that Dr. Chambers will have a plan for her next week and she is hopeful that the plan will include actually beginning treatment.     Caregiver Coping: Pt's caregiver noted that she is feeling \"happy, excited and ready to get things going\" at this time. Pt's caregiver noted that she juliane by talking with friends and family, exercise, taking naps, meditation and guided imagery, talking with a mental health professional and gathering information about cancer.     Education Provided: Transplant process expectations, Caregiver requirements, Caregiver self-care, Financial issues related to transplant, Financial resources/grants available, Common psychosocial stressors pre/post transplant, Support group(s) available, Hopsital resources available, Web site information and the Social work role.    Interventions Provided: Psychosocial support and education     Assessment and Recommendations for Team:  Pt is a 41 year old female diagnosed with Hodgkin's Lymophoma who is here undergoing preparation for a planned autologous transplant.     Pt is very pleasant, calm and able to articulate concerns/coping mechanisms in an appropriate manner. Pt feels comfortable communicating with the medical team. Pt has a strong supportive network of family and friends who are involved.     Pt has developed strong coping mechanisms, she may benefit from assistance in developing coping mechanisms.       Pt and pt's caregiver will benefit from ongoing psychosocial support in regards to coping with the adjustment to the BMT process.     Pt has a strong support system and a solid caregiver plan. Pt verbalizes understanding of the transplant process and wanting to proceed. SW provided contact information and " encouraged pt to contact SW with questions, concerns, resources and for support.      Important Information:     Pt is interested in having exercise equipment in her room.      Pt will need to have her advance directive notarized.     Pt's anxiety and depression are well controlled with medication.     Pt currently uses marijuana (daily) for relief from nausea and to help her with appetite.      Follow up Planned:   Psychosocial support  Support group information  Parking arrangements    VENKATA Schmitt, [facility]  Phone [phone]  Pager [phone]

## 2017-02-06 NOTE — Clinical Note
2/6/2017      RE: Steph Agee  [address] [facility]– [place] [address]       Dear Colleague,    Thank you for the opportunity to participate in the care of your patient, Steph Agee, at the [facility]. Please see a copy of my visit note below.    HPI:     Ms. Steph Agee is a 41-year old lady with a past medical history of Hodgkin's lymphoma who presented for evaluation of a decline in LVEF on MUGA.    Briefly, Mrs. Agee had her diagnosis in 11/2014. She then underwent ABVD x 6 cycles and mantle cell radiation x 3960 cGy. Ms. Agee had a good disease response until recurrence in 05/2016, which prompted initiation of  ICE chemotherapy (ifosfamide, carboplatin, etoposide). She has undergone two MUGA scans, first was done in October 2016 in preparation for SCT but since the PET scan showed residual disease, she did not undergo SCT. She then underwent more chemo and is now getting ready for her SCT and underwent her second MUGA scan in February 2017 which showed a mild decline in her LVEF from 67 to 52%. This prompted Cardiology referral prior to proposed autologous stem cell transplant.     In the interim, Ms. Agee has been functioning at a high level. She notes that she continued working as a  till six days ago. Ms. Agee is also able to walk over one mile on level ground without stopping. She does not have any chest pain on exertion, pre-syncope, syncope, palpitations or any PND/orthopnea.     PAST MEDICAL HISTORY:  Past Medical History   Diagnosis Date     Depression with anxiety      SVC syndrome 2014     Hodgkin disease (H) 2014, 2016       CURRENT MEDICATIONS:  Current Outpatient Prescriptions   Medication Sig Dispense Refill     gabapentin (GRALISE) 600 MG 24 hr tablet Take by mouth daily (with dinner)       Venlafaxine HCl (EFFEXOR PO) Take 150 mg by mouth daily       LORAZEPAM PO Take 0.25 mg by mouth every 4 hours as needed for  "anxiety         PAST SURGICAL HISTORY:  Past Surgical History   Procedure Laterality Date     Biopsy/excision lymph node(s), needle, superficial (cervical/inguinal/axillary) Left 2014     axillary     Biopsy of mass in the manubrium Left 2016       ALLERGIES   No Known Allergies    FAMILY HISTORY:  Family History   Problem Relation Age of Onset     Substance Abuse Mother        SOCIAL HISTORY:  Social History     Social History     Marital Status: Single     Spouse Name: N/A     Number of Children: N/A     Years of Education: N/A     Social History Main Topics     Smoking status: Former Smoker -- 1.00 packs/day     Types: Cigarettes     Start date: 01/01/1995     Quit date: 11/20/2015     Smokeless tobacco: Never Used     Alcohol Use: 0.0 oz/week     0 Standard drinks or equivalent per week      Comment: occasionally      Drug Use: Yes      Comment: Daily marijuana use. 1 joint daily      Sexual Activity: Not on file     Other Topics Concern     Not on file     Social History Narrative       ROS:   Constitutional: No fever, chills, or sweats. No weight gain/loss   ENT: No visual disturbance, ear ache, epistaxis, sore throat  Allergies/Immunologic: Negative.   Respiratory: No cough, hemoptysia  Cardiovascular: As per HPI  GI: No nausea, vomiting, hematemesis, melena, or hematochezia  : No urinary frequency, dysuria, or hematuria  Integument: Negative  Psychiatric: Negative  Neuro: Negative  Endocrinology: Negative   Musculoskeletal: Negative    EXAM:  /71 mmHg  Pulse 79  Ht 1.626 m (5' 4\")  Wt 82.736 kg (182 lb 6.4 oz)  BMI 31.29 kg/m2  SpO2 99%  In general, the patient is a pleasant female in no apparent distress.    HEENT: NC/AT.  PERRLA.  EOMI.  Sclerae white, not injected.  Nares clear.  Pharynx without erythema or exudate.  Dentition intact.    Neck: No adenopathy.  No thyromegaly. Carotids 2+ bilaterally without bruits.  No jugular venous distension.   Heart: RRR. Normal S1, S2. No murmur, rub, " click, or gallop. The PMI is in the 5th ICS in the midclavicular line. There is no heave.    Lungs: CTA.  No ronchi, wheezes, rales.  No dullness to percussion.   Abdomen: Soft, nontender, nondistended. No organomegaly.  No bruits.   Extremities: No clubbing, cyanosis, or edema.  The pulses are 2+ at the radial, DP sites bilaterally.  No bruits are noted.  Neurologic: Alert and oriented to person/place/time, normal speech, gait and affect  Skin: No petechiae, purpura or rash.    Labs:  LIPID RESULTS:  No results found for: CHOL, HDL, LDL, TRIG, CHOLHDLRATIO, NHDL    LIVER ENZYME RESULTS:  Lab Results   Component Value Date    AST 31 02/01/2017    ALT 38 02/01/2017       CBC RESULTS:  Lab Results   Component Value Date    WBC 2.3* 02/03/2017    RBC 3.40* 02/03/2017    HGB 11.2* 02/03/2017    HCT 34.1* 02/03/2017     02/03/2017    MCH 32.9 02/03/2017    MCHC 32.8 02/03/2017    RDW 16.1* 02/03/2017     02/03/2017       BMP RESULTS:  Lab Results   Component Value Date     02/01/2017    POTASSIUM 4.2 02/01/2017    CHLORIDE 108 02/01/2017    CO2 26 02/01/2017    ANIONGAP 8 02/01/2017    GLC 79 02/01/2017    BUN 8 02/01/2017    CR 0.66 02/01/2017    GFRESTIMATED >90  Non  GFR Calc   02/01/2017    GFRESTBLACK >90   GFR Calc   02/01/2017    VALERIA 8.4* 02/01/2017        A1C RESULTS:  No results found for: A1C    INR RESULTS:  Lab Results   Component Value Date    INR 0.87 02/01/2017    INR  10/13/2016     Canceled: Specimen improperly labeled. Laboratory value report is not on this   patient.  NOTIFIED MARQUITA INGRAM 10/13/16 EDEL  CORRECTED ON 10/13 AT 1450: PREVIOUSLY REPORTED AS 1.17         Procedures:  MUGA (2/1/2017)  1. Low normal left ventricular ejection fraction at 52%.  2. Interval reduction in LV ejection fraction from 67% to 52%.    MUGA (10/5/2016)   1. Normal left ventricular ejection fraction at 67%.  2. Normal chamber size and wall motion    EKG 02/06/2017  Rate  approx 80 bpm, NSR, nl axis, intervals. Has Qw in IIII. No ST changes or TW changes.     Assessment and Plan:   Mrs. Steph Agee is a 41-year old lady with a PMHx of Hodgkin's lymphoma which was originally diagnosed in 2014 and then relapsed in 2016. Following relapse, she is being worked up for a autologous stem cell transplant. Cardiology was consulted when this work-up showed a drop in her MUGA EF from 67-52%.     - Pre-stem cell transplant cardiac risk stratification  - AHA/ACC Stage A/B heart faliure   - We will confirm LV function with TTE or cMR     - We will also evaluate for pericardial and valvular heart disease given her prior radiation exposure   - If EF is confirmed to be low, we will consider starting lisinopril 2.5 mg q24h +/- a low dose beta blocker (if BP allows)   - We will follow up with patient on the results of LV function and arrange for follow up as needed   - patient currently has no clinical signs or symptoms of CHF or angina    Staffed with Dr. Mitchell.    Ede Marsh  Cardiology Fellow      ATTENDING ATTESTATION:  This patient has been seen and examined by me February 6, 2017 with Dr. Marsh. I have reviewed the vitals, laboratory and imaging data relevant to this patient's care. I have edited this note to reflect our joint assessment and plan, and discussed the plan with the patient.    Sharyn Mitchell MD, MS  Staff Cardiologist  Pager: [phone]      CC  Patient Care Team:  No Ref-Primary, Physician as PCP - Razia Barkleyn as Referring Physician (Hematology)  Koko Escamilla MD as MD (Internal Medicine-Hematology & Oncology)

## 2017-02-06 NOTE — PROGRESS NOTES
HPI:     Ms. Steph Agee is a 41-year old lady with a past medical history of Hodgkin's lymphoma who presented for evaluation of a decline in LVEF on MUGA.    Briefly, Mrs. Agee had her diagnosis in 11/2014. She then underwent ABVD x 6 cycles and mantle cell radiation x 3960 cGy. Ms. Agee had a good disease response until recurrence in 05/2016, which prompted initiation of  ICE chemotherapy (ifosfamide, carboplatin, etoposide). She has undergone two MUGA scans, first was done in October 2016 in preparation for SCT but since the PET scan showed residual disease, she did not undergo SCT. She then underwent more chemo and is now getting ready for her SCT and underwent her second MUGA scan in February 2017 which showed a mild decline in her LVEF from 67 to 52%. This prompted Cardiology referral prior to proposed autologous stem cell transplant.     In the interim, Ms. Agee has been functioning at a high level. She notes that she continued working as a  till six days ago. Ms. Agee is also able to walk over one mile on level ground without stopping. She does not have any chest pain on exertion, pre-syncope, syncope, palpitations or any PND/orthopnea.     PAST MEDICAL HISTORY:  Past Medical History   Diagnosis Date     Depression with anxiety      SVC syndrome 2014     Hodgkin disease (H) 2014, 2016       CURRENT MEDICATIONS:  Current Outpatient Prescriptions   Medication Sig Dispense Refill     gabapentin (GRALISE) 600 MG 24 hr tablet Take by mouth daily (with dinner)       Venlafaxine HCl (EFFEXOR PO) Take 150 mg by mouth daily       LORAZEPAM PO Take 0.25 mg by mouth every 4 hours as needed for anxiety         PAST SURGICAL HISTORY:  Past Surgical History   Procedure Laterality Date     Biopsy/excision lymph node(s), needle, superficial (cervical/inguinal/axillary) Left 2014     axillary     Biopsy of mass in the manubrium Left 2016       ALLERGIES   No Known Allergies    FAMILY HISTORY:  Family  "History   Problem Relation Age of Onset     Substance Abuse Mother        SOCIAL HISTORY:  Social History     Social History     Marital Status: Single     Spouse Name: N/A     Number of Children: N/A     Years of Education: N/A     Social History Main Topics     Smoking status: Former Smoker -- 1.00 packs/day     Types: Cigarettes     Start date: 01/01/1995     Quit date: 11/20/2015     Smokeless tobacco: Never Used     Alcohol Use: 0.0 oz/week     0 Standard drinks or equivalent per week      Comment: occasionally      Drug Use: Yes      Comment: Daily marijuana use. 1 joint daily      Sexual Activity: Not on file     Other Topics Concern     Not on file     Social History Narrative       ROS:   Constitutional: No fever, chills, or sweats. No weight gain/loss   ENT: No visual disturbance, ear ache, epistaxis, sore throat  Allergies/Immunologic: Negative.   Respiratory: No cough, hemoptysia  Cardiovascular: As per HPI  GI: No nausea, vomiting, hematemesis, melena, or hematochezia  : No urinary frequency, dysuria, or hematuria  Integument: Negative  Psychiatric: Negative  Neuro: Negative  Endocrinology: Negative   Musculoskeletal: Negative    EXAM:  /71 mmHg  Pulse 79  Ht 1.626 m (5' 4\")  Wt 82.736 kg (182 lb 6.4 oz)  BMI 31.29 kg/m2  SpO2 99%  In general, the patient is a pleasant female in no apparent distress.    HEENT: NC/AT.  PERRLA.  EOMI.  Sclerae white, not injected.  Nares clear.  Pharynx without erythema or exudate.  Dentition intact.    Neck: No adenopathy.  No thyromegaly. Carotids 2+ bilaterally without bruits.  No jugular venous distension.   Heart: RRR. Normal S1, S2. No murmur, rub, click, or gallop. The PMI is in the 5th ICS in the midclavicular line. There is no heave.    Lungs: CTA.  No ronchi, wheezes, rales.  No dullness to percussion.   Abdomen: Soft, nontender, nondistended. No organomegaly.  No bruits.   Extremities: No clubbing, cyanosis, or edema.  The pulses are 2+ at the " radial, DP sites bilaterally.  No bruits are noted.  Neurologic: Alert and oriented to person/place/time, normal speech, gait and affect  Skin: No petechiae, purpura or rash.    Labs:  LIPID RESULTS:  No results found for: CHOL, HDL, LDL, TRIG, CHOLHDLRATIO, NHDL    LIVER ENZYME RESULTS:  Lab Results   Component Value Date    AST 31 02/01/2017    ALT 38 02/01/2017       CBC RESULTS:  Lab Results   Component Value Date    WBC 2.3* 02/03/2017    RBC 3.40* 02/03/2017    HGB 11.2* 02/03/2017    HCT 34.1* 02/03/2017     02/03/2017    MCH 32.9 02/03/2017    MCHC 32.8 02/03/2017    RDW 16.1* 02/03/2017     02/03/2017       BMP RESULTS:  Lab Results   Component Value Date     02/01/2017    POTASSIUM 4.2 02/01/2017    CHLORIDE 108 02/01/2017    CO2 26 02/01/2017    ANIONGAP 8 02/01/2017    GLC 79 02/01/2017    BUN 8 02/01/2017    CR 0.66 02/01/2017    GFRESTIMATED >90  Non  GFR Calc   02/01/2017    GFRESTBLACK >90   GFR Calc   02/01/2017    VALERIA 8.4* 02/01/2017        A1C RESULTS:  No results found for: A1C    INR RESULTS:  Lab Results   Component Value Date    INR 0.87 02/01/2017    INR  10/13/2016     Canceled: Specimen improperly labeled. Laboratory value report is not on this   patient.  NOTIFIED MARQUITA INGRAM 10/13/16 RJ  CORRECTED ON 10/13 AT 1450: PREVIOUSLY REPORTED AS 1.17         Procedures:  MUGA (2/1/2017)  1. Low normal left ventricular ejection fraction at 52%.  2. Interval reduction in LV ejection fraction from 67% to 52%.    MUGA (10/5/2016)   1. Normal left ventricular ejection fraction at 67%.  2. Normal chamber size and wall motion    EKG 02/06/2017  Rate approx 80 bpm, NSR, nl axis, intervals. Has Qw in IIII. No ST changes or TW changes.     Assessment and Plan:   Mrs. Steph Agee is a 41-year old lady with a PMHx of Hodgkin's lymphoma which was originally diagnosed in 2014 and then relapsed in 2016. Following relapse, she is being worked up for a  autologous stem cell transplant. Cardiology was consulted when this work-up showed a drop in her MUGA EF from 67-52%.     - Pre-stem cell transplant cardiac risk stratification  - AHA/ACC Stage A/B heart faliure   - We will confirm LV function with TTE or cMR     - We will also evaluate for pericardial and valvular heart disease given her prior radiation exposure   - If EF is confirmed to be low, we will consider starting lisinopril 2.5 mg q24h +/- a low dose beta blocker (if BP allows)   - We will follow up with patient on the results of LV function and arrange for follow up as needed   - patient currently has no clinical signs or symptoms of CHF or angina    Staffed with Dr. Mitchell.    Ede Marsh  Cardiology Fellow      ATTENDING ATTESTATION:  This patient has been seen and examined by me February 6, 2017 with Dr. Marsh. I have reviewed the vitals, laboratory and imaging data relevant to this patient's care. I have edited this note to reflect our joint assessment and plan, and discussed the plan with the patient.    Sharyn Mitchell MD, MS  Staff Cardiologist  Pager: [phone]      CC  Patient Care Team:  No Ref-Primary, Physician as PCP - Jd Barkley as Referring Physician (Hematology)  Koko Escamilla MD as MD (Internal Medicine-Hematology & Oncology)  MELI SANCHEZ MD

## 2017-02-07 ENCOUNTER — HOSPITAL ENCOUNTER (OUTPATIENT)
Dept: LAB | Facility: CLINIC | Age: 42
Discharge: HOME | End: 2017-02-07
Attending: INTERNAL MEDICINE | Admitting: INTERNAL MEDICINE
Payer: COMMERCIAL

## 2017-02-07 ENCOUNTER — HOSPITAL ENCOUNTER (OUTPATIENT)
Dept: MRI IMAGING | Facility: CLINIC | Age: 42
End: 2017-02-07
Attending: INTERNAL MEDICINE
Payer: COMMERCIAL

## 2017-02-07 ENCOUNTER — OFFICE VISIT (OUTPATIENT)
Dept: TRANSPLANT | Facility: CLINIC | Age: 42
End: 2017-02-07
Attending: INTERNAL MEDICINE
Payer: COMMERCIAL

## 2017-02-07 ENCOUNTER — OFFICE VISIT (OUTPATIENT)
Dept: PULMONOLOGY | Facility: CLINIC | Age: 42
End: 2017-02-07
Attending: INTERNAL MEDICINE
Payer: COMMERCIAL

## 2017-02-07 VITALS
BODY MASS INDEX: 30.94 KG/M2 | RESPIRATION RATE: 16 BRPM | WEIGHT: 180.34 LBS | TEMPERATURE: 98.2 F | HEART RATE: 79 BPM | SYSTOLIC BLOOD PRESSURE: 108 MMHG | DIASTOLIC BLOOD PRESSURE: 72 MMHG

## 2017-02-07 VITALS
TEMPERATURE: 98.2 F | RESPIRATION RATE: 16 BRPM | DIASTOLIC BLOOD PRESSURE: 72 MMHG | SYSTOLIC BLOOD PRESSURE: 108 MMHG | HEART RATE: 79 BPM | WEIGHT: 180.34 LBS | BODY MASS INDEX: 30.94 KG/M2

## 2017-02-07 DIAGNOSIS — Z86.2 PERSONAL HISTORY OF DISEASES OF BLOOD AND BLOOD-FORMING ORGANS: ICD-10-CM

## 2017-02-07 DIAGNOSIS — C81.90 HODGKIN DISEASE (H): ICD-10-CM

## 2017-02-07 DIAGNOSIS — Z91.89 AT RISK FOR CARDIOMYOPATHY: ICD-10-CM

## 2017-02-07 DIAGNOSIS — R05.9 COUGH: Primary | ICD-10-CM

## 2017-02-07 PROCEDURE — 99213 OFFICE O/P EST LOW 20 MIN: CPT | Mod: ZF

## 2017-02-07 PROCEDURE — A9585 GADOBUTROL INJECTION: HCPCS | Performed by: INTERNAL MEDICINE

## 2017-02-07 PROCEDURE — 99213 OFFICE O/P EST LOW 20 MIN: CPT | Mod: 25

## 2017-02-07 PROCEDURE — 75561 CARDIAC MRI FOR MORPH W/DYE: CPT

## 2017-02-07 PROCEDURE — 25500064 ZZH RX 255 OP 636: Performed by: INTERNAL MEDICINE

## 2017-02-07 PROCEDURE — 99212 OFFICE O/P EST SF 10 MIN: CPT | Mod: ZF

## 2017-02-07 PROCEDURE — 99212 OFFICE O/P EST SF 10 MIN: CPT | Mod: 25,27

## 2017-02-07 PROCEDURE — 75561 CARDIAC MRI FOR MORPH W/DYE: CPT | Mod: 26 | Performed by: INTERNAL MEDICINE

## 2017-02-07 RX ORDER — GADOBUTROL 604.72 MG/ML
10 INJECTION INTRAVENOUS ONCE
Status: COMPLETED | OUTPATIENT
Start: 2017-02-07 | End: 2017-02-07

## 2017-02-07 RX ORDER — GABAPENTIN 100 MG/1
200 CAPSULE ORAL 3 TIMES DAILY
Qty: 180 CAPSULE | Refills: 0 | COMMUNITY
Start: 2017-02-07 | End: 2017-02-08

## 2017-02-07 RX ADMIN — GADOBUTROL 10 ML: 604.72 INJECTION INTRAVENOUS at 10:33

## 2017-02-07 ASSESSMENT — ENCOUNTER SYMPTOMS
TROUBLE SWALLOWING: 0
POSTURAL DYSPNEA: 0
NECK MASS: 0
HOARSE VOICE: 0
FEVER: 0
POLYDIPSIA: 0
ALTERED TEMPERATURE REGULATION: 0
WHEEZING: 0
SINUS CONGESTION: 1
INCREASED ENERGY: 0
SNORES LOUDLY: 0
COUGH DISTURBING SLEEP: 0
DECREASED APPETITE: 0
SMELL DISTURBANCE: 0
WEIGHT LOSS: 0
POLYPHAGIA: 0
SPUTUM PRODUCTION: 0
NIGHT SWEATS: 1
FATIGUE: 1
COUGH: 0
DYSPNEA ON EXERTION: 0
HEMOPTYSIS: 0
WEIGHT GAIN: 0
SINUS PAIN: 0
RESPIRATORY PAIN: 0
HALLUCINATIONS: 0
CHILLS: 0
SHORTNESS OF BREATH: 0
TASTE DISTURBANCE: 0
SORE THROAT: 0

## 2017-02-07 ASSESSMENT — PATIENT HEALTH QUESTIONNAIRE - PHQ9: SUM OF ALL RESPONSES TO PHQ QUESTIONS 1-9: 0

## 2017-02-07 NOTE — PROGRESS NOTES
Reason for Visit  Steph Agee is a 41 year old year old female who is being seen for New Patient    Pulmonary HPI  42 YO with history of Hodgkin's disease referred to the Pulmonary BMT clinic by Dr. Chambers for evaluation of an abnormal chest CT.  Diagnosed with Hodgkin's disease in 11-14 and treated with 6 cycles of ABVD. Tolerated chemotherapy well without any difficulties from a Pulmonary perspective.  Also underwent XRT to left shoulder, axilla and she thinks chest.  Had recurrence of disease in 5-16 in sternum and manubrium.  Underwent treatment with ICE x 4; no pulmonary complications.  Was being evaluated for autologous transplant when re-developed B symptoms.  Then treated with 3 cycles of Brituximab.  Had left sided thoracentesis for pleural efussion.  Developed flu like illness in January 2017 with cough and purulent sputum production, saw Oncologist, treated with Levofloxacin for 10 days with complete resolution of symptoms.  Recent PET-CT with RUL nodular infiltrate, per my reading this is improved compared to her study in January.  Episode of pneumonia 20 years ago.  No pulmonary illness as a child.  No tobacco x 6 years, 20 pack year history.      The patient was seen and examined by Clyde Montoya           Current Outpatient Prescriptions   Medication     gabapentin (NEURONTIN) 100 MG capsule     Venlafaxine HCl (EFFEXOR PO)     LORAZEPAM PO     No current facility-administered medications for this visit.     Facility-Administered Medications Ordered in Other Visits   Medication     midazolam (VERSED) injection 0.5-1 mg     fentaNYL Citrate (PF) (SUBLIMAZE) injection 25-50 mcg     No Known Allergies  Past Medical History   Diagnosis Date     Depression with anxiety      SVC syndrome 2014     Hodgkin disease (H) 2014, 2016       Past Surgical History   Procedure Laterality Date     Biopsy/excision lymph node(s), needle, superficial (cervical/inguinal/axillary) Left 2014     axillary     Biopsy  of mass in the manubrium Left 2016       Social History     Social History     Marital Status: Single     Spouse Name: N/A     Number of Children: N/A     Years of Education: N/A     Occupational History     Not on file.     Social History Main Topics     Smoking status: Former Smoker -- 1.00 packs/day     Types: Cigarettes     Start date: 01/01/1995     Quit date: 11/20/2015     Smokeless tobacco: Never Used     Alcohol Use: 0.0 oz/week     0 Standard drinks or equivalent per week      Comment: occasionally      Drug Use: Yes      Comment: Daily marijuana use. 1 joint daily      Sexual Activity: Not on file     Other Topics Concern     Not on file     Social History Narrative       FH:  MGM- Heart disease; Mother- EtOHic liver disease       ROS Pulmonary  A complete ROS was performed.  /72 mmHg  Pulse 79  Temp(Src) 98.2  F (36.8  C) (Oral)  Resp 16  Wt 81.8 kg (180 lb 5.4 oz)  Exam:   GENERAL APPEARANCE: Well developed, well nourished, alert, and in no apparent distress.  EYES: PERRL, EOMI  HENT: Nasal mucosa with no edema and no hyperemia. No nasal polyps.  EARS: Canals clear, TMs normal  MOUTH: Oral mucosa is moist, without any lesions, no tonsillar enlargement, no oropharyngeal exudate.  NECK: supple, no masses, no thyromegaly.  LYMPHATICS: No significant axillary, cervical, or supraclavicular nodes.  RESP: Good air flow throughout.  No crackles. No rhonchi. No wheezes.  CV: Normal S1, S2, regular rhythm, normal rate. No murmur.  No rub. No gallop. No LE edema.   ABDOMEN:  Bowel sounds normal, soft, nontender, no HSM or masses.   MS: extremities normal. No clubbing. No cyanosis.  SKIN: no rash on limited exam  NEURO: Mentation intact, speech normal, normal strength and tone, normal gait and stance  PSYCH: mentation appears normal. and affect normal/bright  Results:  Recent Results (from the past 168 hour(s))   INR    Collection Time: 02/01/17  9:52 AM   Result Value Ref Range    INR 0.87 0.86 - 1.14    Partial thromboplastin time    Collection Time: 02/01/17  9:52 AM   Result Value Ref Range    PTT 34 22 - 37 sec   Hemoglobin S    Collection Time: 02/01/17  9:52 AM   Result Value Ref Range    Lab Scanned Result HEMOGLOBIN S-Scanned    CBC with platelets differential    Collection Time: 02/01/17  9:52 AM   Result Value Ref Range    WBC 1.5 (L) 4.0 - 11.0 10e9/L    RBC Count 3.40 (L) 3.8 - 5.2 10e12/L    Hemoglobin 11.2 (L) 11.7 - 15.7 g/dL    Hematocrit 34.2 (L) 35.0 - 47.0 %     (H) 78 - 100 fl    MCH 32.9 26.5 - 33.0 pg    MCHC 32.7 31.5 - 36.5 g/dL    RDW 15.9 (H) 10.0 - 15.0 %    Platelet Count 219 150 - 450 10e9/L    Diff Method Automated Method     % Neutrophils 45.0 %    % Lymphocytes 27.5 %    % Monocytes 18.3 %    % Eosinophils 7.2 %    % Basophils 1.3 %    % Immature Granulocytes 0.7 %    Nucleated RBCs 0 0 /100    Absolute Neutrophil 0.7 (L) 1.6 - 8.3 10e9/L    Absolute Lymphocytes 0.4 (L) 0.8 - 5.3 10e9/L    Absolute Monocytes 0.3 0.0 - 1.3 10e9/L    Absolute Eosinophils 0.1 0.0 - 0.7 10e9/L    Absolute Basophils 0.0 0.0 - 0.2 10e9/L    Abs Immature Granulocytes 0.0 0 - 0.4 10e9/L    Absolute Nucleated RBC 0.0    Comprehensive metabolic panel    Collection Time: 02/01/17  9:52 AM   Result Value Ref Range    Sodium 142 133 - 144 mmol/L    Potassium 4.2 3.4 - 5.3 mmol/L    Chloride 108 94 - 109 mmol/L    Carbon Dioxide 26 20 - 32 mmol/L    Anion Gap 8 3 - 14 mmol/L    Glucose 79 70 - 99 mg/dL    Urea Nitrogen 8 7 - 30 mg/dL    Creatinine 0.66 0.52 - 1.04 mg/dL    GFR Estimate >90  Non  GFR Calc   >60 mL/min/1.7m2    GFR Estimate If Black >90   GFR Calc   >60 mL/min/1.7m2    Calcium 8.4 (L) 8.5 - 10.1 mg/dL    Bilirubin Total 0.5 0.2 - 1.3 mg/dL    Albumin 3.2 (L) 3.4 - 5.0 g/dL    Protein Total 7.0 6.8 - 8.8 g/dL    Alkaline Phosphatase 138 40 - 150 U/L    ALT 38 0 - 50 U/L    AST 31 0 - 45 U/L   Protein Immunofixation Serum    Collection Time: 02/01/17  9:52 AM    Result Value Ref Range    Immunofixation ELP       (Note)  One and possibly a second very small monoclonal IgG immunoglobulins  of kappa light chain type. Monoclonal antibody therapeutics  (e.g. Daratumumab) may appear as monoclonal proteins on serum  electrophoresis and immunofixation. Results should be interpreted  with caution if the patient is receiving monoclonal antibody therapy.  Pathological significance requires clinical correlation.  SHAY Jaimes M.D., Ph.D., Pathologist ([phone])        IGG 1060 695 - 1620 mg/dL     70 - 380 mg/dL    IGM 37 (L) 60 - 265 mg/dL   Uric acid    Collection Time: 02/01/17  9:52 AM   Result Value Ref Range    Uric Acid 3.5 2.6 - 6.0 mg/dL   Order if  female with child bearing potential: HCG qualitative    Collection Time: 02/01/17  9:52 AM   Result Value Ref Range    HCG Qualitative Serum Negative NEG   ABO/Rh type and screen    Collection Time: 02/01/17  9:52 AM   Result Value Ref Range    ABO O     RH(D)  Pos     Antibody Screen Neg     Test Valid Only At       [facility]    Specimen Expires 02/04/2017    Lactate Dehydrogenase    Collection Time: 02/01/17  9:52 AM   Result Value Ref Range    Lactate Dehydrogenase 215 81 - 234 U/L   Phosphorus    Collection Time: 02/01/17  9:52 AM   Result Value Ref Range    Phosphorus 2.8 2.5 - 4.5 mg/dL   TSH    Collection Time: 02/01/17  9:52 AM   Result Value Ref Range    TSH 2.07 0.40 - 4.00 mU/L   Beta 2 microglobuline    Collection Time: 02/01/17  9:52 AM   Result Value Ref Range    Beta-2-Microglobulin 2.0 <2.3 mg/L   T3 Free    Collection Time: 02/01/17  9:52 AM   Result Value Ref Range    Free T3 2.4 2.3 - 4.2 pg/mL   T4 free    Collection Time: 02/01/17  9:52 AM   Result Value Ref Range    T4 Free 0.74 (L) 0.76 - 1.46 ng/dL   EBV Capsid Antibody IgG    Collection Time: 02/01/17  9:52 AM   Result Value Ref Range    EBV Capsid Antibody IgG (H) 0.0 - 0.8 AI      >8.0  Positive, suggests recent or past exposure   Antibody index (AI) values reflect qualitative changes in antibody   concentration that cannot be directly associated with clinical condition or   disease state.     Herpes Simplex Virus 1 and 2 IgG    Collection Time: 02/01/17  9:52 AM   Result Value Ref Range    Herpes Simplex Virus Type 1 IgG  0.0 - 0.8 AI     <0.2  No HSV-1 IgG antibodies detected.   Antibody index (AI) values reflect qualitative changes in antibody   concentration that cannot be directly associated with clinical condition or   disease state.      Herpes Simplex Virus Type 2 IgG 5.8 (H) 0.0 - 0.8 AI   Order if 2004-24: Protein electrophoresis    Collection Time: 02/01/17  9:52 AM   Result Value Ref Range    Albumin Fraction 3.6 (L) 3.7 - 5.1 g/dL    Alpha 1 Fraction 0.3 0.2 - 0.4 g/dL    Alpha 2 Fraction 0.9 0.5 - 0.9 g/dL    Beta Fraction 0.9 0.6 - 1.0 g/dL    Gamma Fraction 1.1 0.7 - 1.6 g/dL    Monoclonal Peak 0.1 (H) 0.0 g/dL    ELP Interpretation:       One or possibly two small monoclonal proteins (each about 0.1 g/dL or less) seen   in the gamma fraction.  See immunofixation report on same specimen. Slight   hypoalbuminemia. Pathologic significance requires clinical correlation.  SHAY Jaimes M.D., Ph.D., Pathologist ().     HBV HCV HIV WNV by DIANA- SEND TO [facility]    Collection Time: 02/01/17  9:53 AM   Result Value Ref Range    MPX Series       Nonreactive   MPX Series: Failure to detect HIV, HCV or HBV by PCR does not rule out the   possibility of infection. This result should be evaluated in the context of the   individual's risk factors and clinical findings.      West Nile Virus by PCR       Nonreactive   Tests performed at [facility] [address]     BMT Infectious Disease Donor Panel- SEND TO [facility]    Collection Time: 02/01/17  9:53 AM   Result Value Ref Range    Donor Hep B Surf Agn Nonreactive NR    Donor Hep B Core  Citlalli Nonreactive NR    Donor Hepatitis C Citlalli Nonreactive NR    Donor HIV 1&2 Antibody Nonreactive NR    Donor HTLV 1&2 Antibody Nonreactive NR    Donor Cytomegalovirus Citlalli Positive (A) NR    Donor Treponema PAL CITLALLI Nonreactive     Trypanosoma Cruzi  NR     Nonreactive   [place] [address]     Glucose by meter    Collection Time: 02/02/17  8:56 AM   Result Value Ref Range    Glucose 79 70 - 99 mg/dL   PFT: If 2001-02, 2001-10, 2005-02, 2005-10, 2007-02, 2010-22, 2011-05, include DLCO, FEV1, and FVC or O2 Saturation    Collection Time: 02/03/17 11:00 AM   Result Value Ref Range    FVC-Pred 3.14 L    FVC-Pre 2.30 L    FVC-%Pred-Pre 73 %    FEV1-Pre 1.95 L    FEV1-%Pred-Pre 75 %    FEV1FVC-Pred 83 %    FEV1FVC-Pre 85 %    FEFMax-Pred 6.67 L/sec    FEFMax-Pre 5.23 L/sec    FEFMax-%Pred-Pre 78 %    FEF2575-Pred 2.76 L/sec    FEF2575-Pre 2.23 L/sec    [id number]-%Pred-Pre 80 %    ExpTime-Pre 6.68 sec    FIFMax-Pre 4.49 L/sec    VC-Pred 3.42 L    VC-Pre 2.46 L    VC-%Pred-Pre 71 %    IC-Pred 2.70 L    IC-Pre 1.84 L    IC-%Pred-Pre 68 %    ERV-Pred 0.72 L    ERV-Pre 0.62 L    ERV-%Pred-Pre 85 %    FEV1FEV6-Pred 83 %    FEV1FEV6-Pre 85 %    DLCOunc-Pred 22.80 ml/min/mmHg    DLCOunc-Pre 16.57 ml/min/mmHg    DLCOunc-%Pred-Pre 72 %    DLCOcor-Pre 17.91 ml/min/mmHg    DLCOcor-%Pred-Pre 78 %    VA-Pre 3.40 L    VA-%Pred-Pre 70 %    FEV1SVC-Pred 76 %    FEV1SVC-Pre 79 %   PFT: If 2001-02, 2001-10, 2005-02, 2005-10, 2007-02, 2010-22, 2011-05, include DLCO, FEV1, and FVC or O2 Saturation    Collection Time: 02/03/17 11:00 AM   Result Value Ref Range    FVC-Pred 3.14 L    FVC-Pre 2.30 L    FVC-%Pred-Pre 73 %    FEV1-Pre 1.95 L    FEV1-%Pred-Pre 75 %    FEV1FVC-Pred 83 %    FEV1FVC-Pre 85 %    FEFMax-Pred 6.67 L/sec    FEFMax-Pre 5.23 L/sec    FEFMax-%Pred-Pre 78 %    FEF2575-Pred 2.76 L/sec    FEF2575-Pre 2.23 L/sec    [id number]-%Pred-Pre 80 %    ExpTime-Pre 6.68 sec    FIFMax-Pre 4.49 L/sec     VC-Pred 3.42 L    VC-Pre 2.46 L    VC-%Pred-Pre 71 %    IC-Pred 2.70 L    IC-Pre 1.84 L    IC-%Pred-Pre 68 %    ERV-Pred 0.72 L    ERV-Pre 0.62 L    ERV-%Pred-Pre 85 %    FEV1FEV6-Pred 83 %    FEV1FEV6-Pre 85 %    DLCOunc-Pred 22.80 ml/min/mmHg    DLCOunc-Pre 16.57 ml/min/mmHg    DLCOunc-%Pred-Pre 72 %    DLCOcor-Pre 17.91 ml/min/mmHg    DLCOcor-%Pred-Pre 78 %    VA-Pre 3.40 L    VA-%Pred-Pre 70 %    FEV1SVC-Pred 76 %    FEV1SVC-Pre 79 %   CBC with platelets differential    Collection Time: 02/03/17  2:38 PM   Result Value Ref Range    WBC 2.3 (L) 4.0 - 11.0 10e9/L    RBC Count 3.40 (L) 3.8 - 5.2 10e12/L    Hemoglobin 11.2 (L) 11.7 - 15.7 g/dL    Hematocrit 34.1 (L) 35.0 - 47.0 %     78 - 100 fl    MCH 32.9 26.5 - 33.0 pg    MCHC 32.8 31.5 - 36.5 g/dL    RDW 16.1 (H) 10.0 - 15.0 %    Platelet Count 232 150 - 450 10e9/L    Diff Method Automated Method     % Neutrophils 50.7 %    % Lymphocytes 24.9 %    % Monocytes 19.1 %    % Eosinophils 4.4 %    % Basophils 0.9 %    % Immature Granulocytes 0.0 %    Nucleated RBCs 0 0 /100    Absolute Neutrophil 1.1 (L) 1.6 - 8.3 10e9/L    Absolute Lymphocytes 0.6 (L) 0.8 - 5.3 10e9/L    Absolute Monocytes 0.4 0.0 - 1.3 10e9/L    Absolute Eosinophils 0.1 0.0 - 0.7 10e9/L    Absolute Basophils 0.0 0.0 - 0.2 10e9/L    Abs Immature Granulocytes 0.0 0 - 0.4 10e9/L    Absolute Nucleated RBC 0.0    Bone marrow biopsy    Collection Time: 02/03/17  3:15 PM   Result Value Ref Range    Copath Report       Patient Name: DENIS LUNA  MR#: [id number]  Specimen #: [id number]  Collected: 2/3/2017  Received: 2/3/2017  Reported: 2/6/2017 16:24  Ordering Phy(s): MANUEL PICKETT  Additional Phy(s): Copy to Cytogenetics    For improved result formatting, select 'View Enhanced Report Format'  under Linked Documents section.    TEST(S):  A: Right Bone Marrow Biopsy  B: Left Bone Marrow Biopsy/Aspirate    FINAL DIAGNOSIS:  Bone marrow, posterior iliac crest, bilateral decalcified trephine  biopsy  and touch imprint; left aspirate clot, particle crush, direct  aspirate smear, and concentrated aspirate smear; and peripheral blood  smear:      - Slightly hypocellular bone marrow (20-30%) with trilineage  hematopoietic maturation    - No morphologic evidence of Hodgkin lymphoma    - See comment    - Peripheral blood showing slight normochromic, normocytic anemia;  moderate leukopenia with neutropenia and lymphopenia    I have personally reviewed all specimens and/or slides, including the  listed specia l stains, and used them with my medical judgment to  determine the final diagnosis.    Electronically signed out by:  Ramírez Willoughby M.D.,[facility]    Technical testing/processing performed at [place], [place]    CLINICAL HISTORY:  Per EPIC electronic medical records: 41-year-old  female with a history  of classical Hodgkin lymphoma (11/2014; [id number]) status post six  cycles of ABVD and radiation with recurrence, most recently in October 2016 ([id number]). She is currently post 3 cycles of brentuximab therapy.    REPORT:  Procedure/Gross Description  Aspirate(s) and trephine(s) procured by MARILY Quintanilla    Specimen sent for Special Studies:       Flow Cytometry (left aspirate)       Cytogenetics (left aspirate)       Molecular Diagnostics (left aspirate)    Biopsy aspiration site: Left posterior iliac crest                 (Reference Range)         Amount of aspirate              3      mL       Fat and P.V. cell layer            1      %     (1 - 3)       Particles                        1      %       Myeloid-erythroid layer          2      %     (5 - 8)         Clot Section: No    Trephine biopsy site: Bilateral    Designated left (B) posterior iliac crest is 1 cylinder of gritty  tissue, labeled with the patient's name and hospital number, having a  diameter of 2 mm and a length of 14 mm; entirely submitted in 1  cassette; acetic zinc formalin  fixed, decalcified, processed, and  stained for hematoxylin and eosin per laboratory protocol.    Designated right (A) posterior iliac crest is 1 cylinder of gritty  tissue, labeled with the patient's name and hospital number, having a  diameter of 2 mm and a length of 13 mm; entirely submitted in 1  cassette; acetic zinc formalin fixed, decalcified, processed, and  stained for hematoxylin and eosin per laboratory protocol.    MICROSCOPIC DESCRIPTION:  PERIPHERAL BLOOD DATA (Date: 02/03/2017)  Patient Value (Reference Range >18 year old female)  2.3     WBC (4 .0-11.0 x 10*9/L)  3.40     RBC (3.8-5.2 x 10*12/L)  11.2     HGB (11.7-15.7 g/dL)  34.1     HCT (35.0-47.0 %)  100     MCV (78-100fL)  32.9     MCH (26.5-33.0 pg)  32.8     MCHC (31.5-36.5 g/dL)  16.1     RDW (10.0-15.0 %)  232     PLT (150-450 x 10*9/L)    PERIPHERAL BLOOD DIFFERENTIAL (Automated)  (Reference ranges >18 year old)    Percent  50.7  Neutrophils, segmented and bands  24.9  Lymphocytes  19.1  Monocytes  4.4  Eosinophils  0.9  Basophils    Absolute  1.1   Neutrophils, segmented and bands  (1.6 - 8.3 x 10*9/L)  0.6   Lymphocytes  (0.8 - 5.3 x 10*9/L)  0.4   Monocytes  (0 -1.3 x 10*9/L)  0.1   Eosinophils  (0 - 0.7 x 10*9/L)  0.0   Basophils  (0 - 0.2 x 10*9/L)    The red cells appear normochromic.  Poikilocytosis includes occasional  codocytes. Polychromasia is not increased. Rouleaux formation is not  increased. The morphology of the platelets is normal. Lymphocytes are  polymorphous and neutrophils display normal cytoplasmic granularity and  unremarkable nuclear morpholo gy.    Bone marrow aspirates and touch imprints of bone biopsy are reviewed.    BONE MARROW DIFFERENTIAL (500 cells on direct aspirate smears):    Percent  Cell (reference range)  1.6       Blasts (0 - 1)  7.0       Neutrophil promyelocytes (2 - 4)  50.4       Neutrophils and precursors (54 - 63)  28.8       Erythroid precursors (18 - 24)  2.6       Monocytes (1 - 1.5)  1.0        Eosinophils (1 - 3)  0.4       Basophils (0 - 1)  7.8       Lymphocytes (8 - 12)  0.4       Plasma cells  (0 - 1.5)    Neutrophil maturation is complete and orderly. Erythroid maturation is  complete and orderly. Megakaryocytes are present.  There is no  morphologic evidence of lymphoma.    TREPHINE SECTIONS:  The marrow cellularity is 20-30%. The cellular composition reflects the  aspirate differential. Megakaryocytes are present. The number of  megakaryocytes appears consistent with the degree of marrow cellularity.  Atypical infiltrates are not seen.  A tiny reactive lymphoid aggregate  is present on one le wil.    Reporting fellow: Mercedes Salas DO    CPT Codes:  A: 33292-APUE.T, 87828-PGFNB, 63083-EAPR, HBM, 26076-OMAB  B: 41860-YHQOQ.T, 79342-ICZB, 12856-KDDHSB, HBM, 28437-MUVB,  90008-WUHYF, 80407-HGSQ.T, 06445-SXCSQ, 09499-SEU    TESTING LAB LOCATION:  [facility], [address]   [id number] [phone]    COLLECTION SITE:  Client:  [facility]  Location:  [facility] (B)     Leukemia Lymphoma Evaluation (Flow Cytometry)    Collection Time: 02/03/17  3:20 PM   Result Value Ref Range    Copath Report       Patient Name: DENIS LUNA  MR#: [id number]  Specimen #: [id number]  Collected: 2/3/2017 15:20  Received: 2/3/2017 16:23  Reported: 2/6/2017 18:52  Ordering Phy(s): MANUEL PICKETT    For improved result formatting, select 'View Enhanced Report Format'  under Linked Documents section.  _________________________________________    SPECIMEN(S):  Bone marrow, right, Canceled Test    INTERPRETATION:  Bone marrow, right  Canceled-Test Credited. The physician, Dr. Willoughby, notified/approved on  2/6/2017 at 18:00.    COMMENT:  ##    RESULTS:  ##    ANTIBODIES:    Eight and nine color analyses are performed for the following markers:  CD2, CD3, CD4, CD5, CD7, CD8, CD10, CD19, CD20, CD34, CD38,  CD45, CD56,  and kappa and lambda immunoglobulin light chains. Cells are gated to  isolate populations (CD45 versus side scatter and forward scatter versus  side scatter), to exclude debris (forward scatter versus side scatter)  and to exclude cell doublets (forward scatter height versus forward  scatter  width and side scatter height versus side scatter width).  Forward scatter varies with cell size. Side scatter varies with the  amount of cytoplasmic granules. Intensity for CD45 usually increases as  hematolymphoid cells mature.    CLINICAL HISTORY:  41 year old female with ##    I have personally reviewed all specimens and/or slides, including the  listed special stains, and used them with my medical judgment to  determine the final diagnosis.    Electronically signed out by:  [facility]     Analyte Specific Reagents are used in many laboratory tests necessary  for standard medical care and generally do not require FDA approval.  This test was developed and its performance characteristics determined  by [facility].  It has not been cleared or approved by the U.S. Food and  Drug Administration.    CPT Codes:   IFPREP    TESTING LAB LOCATION:  [place] [id number] [address] [id number] [phone]    COLLECTION SITE:  Client:  [facility]  Location:  [facility] (B)     EKG 12-lead complete w/read - Clinics    Collection Time: 02/03/17  3:41 PM   Result Value Ref Range    Interpretation ECG Click View Image link to view waveform and result        Assessment and plan:    I will review the CT images with Radiology in the morning.  Since Jan 2017 the WAYLON nodular infiltrate has resolved however the RUL nodular peribronchovasculzr infiltrate is difficult to determine if it is improved due to differences in imaging techniques.  Upon review with  Radiology, there was a consensus that the WAYLON and lingular nodular infiltrates were resolved and that the RUL infiltrates were improved. I would favor holding off on bronchoscopy at this point and repeat short interval CT chest to determine continued resolution of post-infection findings.  If abnormalities are still present, we can proceed with bronchoscopy at that time.    RTC prn based on symptoms and future imaging

## 2017-02-07 NOTE — CONSULTS
Transfusion Medicine Consultation    Steph Agee [id number]   YOB: 1975 Age: 41 year old   Date of Consult: 2/7/2017     Reason for consult: Autologous Hematopoietic Progenitor Cell (HPC) Collection           Assessment and Plan:   41 year old female presents for consultation for autologous HPC collection for Hodgkin's lymphoma.  The plan is to collect for 1 to 3 days or until the target goal is met.   A central line will be placed and will be used for access for the procedure.  Due to previous neuropathy we will also order a pre- iCa level.           Chief Complaint:   Transfusion medicine consultation.         History of Present Illness:   41 year old female presents for consultation for autologous HPC collection.  Her past medical history includes a diagnosis of classical Hodgkin's lymphoma, stage IV 11/2014 status post chemotherapy and radiation with relapse in 05/2016 and 10/2016.  She has received multiple cycles of chemotherapy with her most recent one 01/11/17.  She is currently well.  She does not have any pain, no bleeding, and no recent fever or infections.  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  The patient denies any back pain that would prevent her from tolerating the procedure and she has no allergies to latex.  The patient has not gotten a flu vaccination yet for this year.  The travel history is negative.  The patient has no identifiable risk factors for infectious disease.  The procedure, risks/benefits were discussed with the patient and all of her questions were answered.             Past Medical History:     Past Medical History   Diagnosis Date     Depression with anxiety      SVC syndrome 2014     Hodgkin disease (H) 2014, 2016             Past Surgical History:     Past Surgical History   Procedure Laterality Date     Biopsy/excision lymph node(s), needle, superficial (cervical/inguinal/axillary) Left 2014     axillary     Biopsy of mass in  the manubrium Left 2016              Social History:     Social History   Substance Use Topics     Smoking status: Former Smoker -- 1.00 packs/day     Types: Cigarettes     Start date: 01/01/1995     Quit date: 11/20/2015     Smokeless tobacco: Never Used     Alcohol Use: 0.0 oz/week     0 Standard drinks or equivalent per week      Comment: occasionally              Family History:     Family History   Problem Relation Age of Onset     Substance Abuse Mother              Immunizations:     There is no immunization history on file for this patient.          Allergies:   No Known Allergies          Medications:     Current Outpatient Prescriptions   Medication Sig     gabapentin (NEURONTIN) 100 MG capsule Take 2 capsules (200 mg) by mouth 3 times daily     Venlafaxine HCl (EFFEXOR PO) Take 150 mg by mouth daily     LORAZEPAM PO Take 0.25 mg by mouth every 4 hours as needed for anxiety     No current facility-administered medications for this encounter.     Facility-Administered Medications Ordered in Other Encounters   Medication     midazolam (VERSED) injection 0.5-1 mg     fentaNYL Citrate (PF) (SUBLIMAZE) injection 25-50 mcg             Review of Systems:   The Review of Systems is negative other than noted in the HPI           Vital Signs:   /72 mmHg  Pulse 79  Temp(Src) 98.2  F (36.8  C) (Oral)  Resp 16  Ht   Wt 81.8 kg (180 lb 5.4 oz)            Data:     CBC RESULTS:   Recent Labs   Lab Test  02/03/17   1438   WBC  2.3*   RBC  3.40*   HGB  11.2*   HCT  34.1*   MCV  100   MCH  32.9   MCHC  32.8   RDW  16.1*   PLT  232       Aurelia Oconnell MD  Transfusion Medicine Fellow  [phone]

## 2017-02-07 NOTE — NURSING NOTE
"Steph Agee is a 41 year old female who presents for:  Chief Complaint   Patient presents with     New Patient     patient here for provider visit - pre bmt work up with history of lung infections        Initial Vitals:  /72 mmHg  Pulse 79  Temp(Src) 98.2  F (36.8  C) (Oral)  Resp 16  Wt 81.8 kg (180 lb 5.4 oz) Estimated body mass index is 30.94 kg/(m^2) as calculated from the following:    Height as of 2/6/17: 1.626 m (5' 4\").    Weight as of this encounter: 81.8 kg (180 lb 5.4 oz).. There is no height on file to calculate BSA. BP completed using cuff size: NA (Not Taken)  Data Unavailable No LMP recorded. Allergies and medications reviewed.     Medications: Medication refills not needed today.  Pharmacy name entered into EPIC: Data Unavailable    Comments: vitals taken at other appointment    8 minutes for nursing intake (face to face time)   Vani Lopez MA          "

## 2017-02-07 NOTE — LETTER
2/7/2017       RE: Steph Agee  [address] [facility] [address]     Dear Colleague,    Thank you for referring your patient, Steph Agee, to the [facility] at [facility]. Please see a copy of my visit note below.    Reason for Visit  Steph Agee is a 41 year old year old female who is being seen for New Patient    Pulmonary HPI  40 YO with history of Hodgkin's disease referred to the Pulmonary BMT clinic by Dr. Chambers for evaluation of an abnormal chest CT.  Diagnosed with Hodgkin's disease in 11-14 and treated with 6 cycles of ABVD. Tolerated chemotherapy well without any difficulties from a Pulmonary perspective.  Also underwent XRT to left shoulder, axilla and she thinks chest.  Had recurrence of disease in 5-16 in sternum and manubrium.  Underwent treatment with ICE x 4; no pulmonary complications.  Was being evaluated for autologous transplant when re-developed B symptoms.  Then treated with 3 cycles of Brituximab.  Had left sided thoracentesis for pleural efussion.  Developed flu like illness in January 2017 with cough and purulent sputum production, saw Oncologist, treated with Levofloxacin for 10 days with complete resolution of symptoms.  Recent PET-CT with RUL nodular infiltrate, per my reading this is improved compared to her study in January.  Episode of pneumonia 20 years ago.  No pulmonary illness as a child.  No tobacco x 6 years, 20 pack year history.      The patient was seen and examined by Clyde Montoya           Current Outpatient Prescriptions   Medication     gabapentin (NEURONTIN) 100 MG capsule     Venlafaxine HCl (EFFEXOR PO)     LORAZEPAM PO     No current facility-administered medications for this visit.     Facility-Administered Medications Ordered in Other Visits   Medication     midazolam (VERSED) injection 0.5-1 mg     fentaNYL Citrate (PF) (SUBLIMAZE) injection 25-50 mcg     No Known Allergies  Past Medical  History   Diagnosis Date     Depression with anxiety      SVC syndrome 2014     Hodgkin disease (H) 2014, 2016       Past Surgical History   Procedure Laterality Date     Biopsy/excision lymph node(s), needle, superficial (cervical/inguinal/axillary) Left 2014     axillary     Biopsy of mass in the manubrium Left 2016       Social History     Social History     Marital Status: Single     Spouse Name: N/A     Number of Children: N/A     Years of Education: N/A     Occupational History     Not on file.     Social History Main Topics     Smoking status: Former Smoker -- 1.00 packs/day     Types: Cigarettes     Start date: 01/01/1995     Quit date: 11/20/2015     Smokeless tobacco: Never Used     Alcohol Use: 0.0 oz/week     0 Standard drinks or equivalent per week      Comment: occasionally      Drug Use: Yes      Comment: Daily marijuana use. 1 joint daily      Sexual Activity: Not on file     Other Topics Concern     Not on file     Social History Narrative       FH:  MGM- Heart disease; Mother- EtOHic liver disease       ROS Pulmonary  A complete ROS was performed.  /72 mmHg  Pulse 79  Temp(Src) 98.2  F (36.8  C) (Oral)  Resp 16  Wt 81.8 kg (180 lb 5.4 oz)  Exam:   GENERAL APPEARANCE: Well developed, well nourished, alert, and in no apparent distress.  EYES: PERRL, EOMI  HENT: Nasal mucosa with no edema and no hyperemia. No nasal polyps.  EARS: Canals clear, TMs normal  MOUTH: Oral mucosa is moist, without any lesions, no tonsillar enlargement, no oropharyngeal exudate.  NECK: supple, no masses, no thyromegaly.  LYMPHATICS: No significant axillary, cervical, or supraclavicular nodes.  RESP: Good air flow throughout.  No crackles. No rhonchi. No wheezes.  CV: Normal S1, S2, regular rhythm, normal rate. No murmur.  No rub. No gallop. No LE edema.   ABDOMEN:  Bowel sounds normal, soft, nontender, no HSM or masses.   MS: extremities normal. No clubbing. No cyanosis.  SKIN: no rash on limited exam  NEURO:  Mentation intact, speech normal, normal strength and tone, normal gait and stance  PSYCH: mentation appears normal. and affect normal/bright  Results:  Recent Results (from the past 168 hour(s))   INR    Collection Time: 02/01/17  9:52 AM   Result Value Ref Range    INR 0.87 0.86 - 1.14   Partial thromboplastin time    Collection Time: 02/01/17  9:52 AM   Result Value Ref Range    PTT 34 22 - 37 sec   Hemoglobin S    Collection Time: 02/01/17  9:52 AM   Result Value Ref Range    Lab Scanned Result HEMOGLOBIN S-Scanned    CBC with platelets differential    Collection Time: 02/01/17  9:52 AM   Result Value Ref Range    WBC 1.5 (L) 4.0 - 11.0 10e9/L    RBC Count 3.40 (L) 3.8 - 5.2 10e12/L    Hemoglobin 11.2 (L) 11.7 - 15.7 g/dL    Hematocrit 34.2 (L) 35.0 - 47.0 %     (H) 78 - 100 fl    MCH 32.9 26.5 - 33.0 pg    MCHC 32.7 31.5 - 36.5 g/dL    RDW 15.9 (H) 10.0 - 15.0 %    Platelet Count 219 150 - 450 10e9/L    Diff Method Automated Method     % Neutrophils 45.0 %    % Lymphocytes 27.5 %    % Monocytes 18.3 %    % Eosinophils 7.2 %    % Basophils 1.3 %    % Immature Granulocytes 0.7 %    Nucleated RBCs 0 0 /100    Absolute Neutrophil 0.7 (L) 1.6 - 8.3 10e9/L    Absolute Lymphocytes 0.4 (L) 0.8 - 5.3 10e9/L    Absolute Monocytes 0.3 0.0 - 1.3 10e9/L    Absolute Eosinophils 0.1 0.0 - 0.7 10e9/L    Absolute Basophils 0.0 0.0 - 0.2 10e9/L    Abs Immature Granulocytes 0.0 0 - 0.4 10e9/L    Absolute Nucleated RBC 0.0    Comprehensive metabolic panel    Collection Time: 02/01/17  9:52 AM   Result Value Ref Range    Sodium 142 133 - 144 mmol/L    Potassium 4.2 3.4 - 5.3 mmol/L    Chloride 108 94 - 109 mmol/L    Carbon Dioxide 26 20 - 32 mmol/L    Anion Gap 8 3 - 14 mmol/L    Glucose 79 70 - 99 mg/dL    Urea Nitrogen 8 7 - 30 mg/dL    Creatinine 0.66 0.52 - 1.04 mg/dL    GFR Estimate >90  Non  GFR Calc   >60 mL/min/1.7m2    GFR Estimate If Black >90   GFR Calc   >60 mL/min/1.7m2    Calcium  8.4 (L) 8.5 - 10.1 mg/dL    Bilirubin Total 0.5 0.2 - 1.3 mg/dL    Albumin 3.2 (L) 3.4 - 5.0 g/dL    Protein Total 7.0 6.8 - 8.8 g/dL    Alkaline Phosphatase 138 40 - 150 U/L    ALT 38 0 - 50 U/L    AST 31 0 - 45 U/L   Protein Immunofixation Serum    Collection Time: 02/01/17  9:52 AM   Result Value Ref Range    Immunofixation ELP       (Note)  One and possibly a second very small monoclonal IgG immunoglobulins  of kappa light chain type. Monoclonal antibody therapeutics  (e.g. Daratumumab) may appear as monoclonal proteins on serum  electrophoresis and immunofixation. Results should be interpreted  with caution if the patient is receiving monoclonal antibody therapy.  Pathological significance requires clinical correlation.  SHAY Jaimes M.D., Ph.D., Pathologist ([phone])        IGG 1060 695 - 1620 mg/dL     70 - 380 mg/dL    IGM 37 (L) 60 - 265 mg/dL   Uric acid    Collection Time: 02/01/17  9:52 AM   Result Value Ref Range    Uric Acid 3.5 2.6 - 6.0 mg/dL   Order if  female with child bearing potential: HCG qualitative    Collection Time: 02/01/17  9:52 AM   Result Value Ref Range    HCG Qualitative Serum Negative NEG   ABO/Rh type and screen    Collection Time: 02/01/17  9:52 AM   Result Value Ref Range    ABO O     RH(D)  Pos     Antibody Screen Neg     Test Valid Only At       [facility]    Specimen Expires 02/04/2017    Lactate Dehydrogenase    Collection Time: 02/01/17  9:52 AM   Result Value Ref Range    Lactate Dehydrogenase 215 81 - 234 U/L   Phosphorus    Collection Time: 02/01/17  9:52 AM   Result Value Ref Range    Phosphorus 2.8 2.5 - 4.5 mg/dL   TSH    Collection Time: 02/01/17  9:52 AM   Result Value Ref Range    TSH 2.07 0.40 - 4.00 mU/L   Beta 2 microglobuline    Collection Time: 02/01/17  9:52 AM   Result Value Ref Range    Beta-2-Microglobulin 2.0 <2.3 mg/L   T3 Free    Collection Time: 02/01/17  9:52 AM   Result Value Ref Range    Free  T3 2.4 2.3 - 4.2 pg/mL   T4 free    Collection Time: 02/01/17  9:52 AM   Result Value Ref Range    T4 Free 0.74 (L) 0.76 - 1.46 ng/dL   EBV Capsid Antibody IgG    Collection Time: 02/01/17  9:52 AM   Result Value Ref Range    EBV Capsid Antibody IgG (H) 0.0 - 0.8 AI     >8.0  Positive, suggests recent or past exposure   Antibody index (AI) values reflect qualitative changes in antibody   concentration that cannot be directly associated with clinical condition or   disease state.     Herpes Simplex Virus 1 and 2 IgG    Collection Time: 02/01/17  9:52 AM   Result Value Ref Range    Herpes Simplex Virus Type 1 IgG  0.0 - 0.8 AI     <0.2  No HSV-1 IgG antibodies detected.   Antibody index (AI) values reflect qualitative changes in antibody   concentration that cannot be directly associated with clinical condition or   disease state.      Herpes Simplex Virus Type 2 IgG 5.8 (H) 0.0 - 0.8 AI   Order if 2004-24: Protein electrophoresis    Collection Time: 02/01/17  9:52 AM   Result Value Ref Range    Albumin Fraction 3.6 (L) 3.7 - 5.1 g/dL    Alpha 1 Fraction 0.3 0.2 - 0.4 g/dL    Alpha 2 Fraction 0.9 0.5 - 0.9 g/dL    Beta Fraction 0.9 0.6 - 1.0 g/dL    Gamma Fraction 1.1 0.7 - 1.6 g/dL    Monoclonal Peak 0.1 (H) 0.0 g/dL    ELP Interpretation:       One or possibly two small monoclonal proteins (each about 0.1 g/dL or less) seen   in the gamma fraction.  See immunofixation report on same specimen. Slight   hypoalbuminemia. Pathologic significance requires clinical correlation.  SHAY Jaimes M.D., Ph.D., Pathologist ().     HBV HCV HIV WNV by DIANA- SEND TO [facility]    Collection Time: 02/01/17  9:53 AM   Result Value Ref Range    MPX Series       Nonreactive   MPX Series: Failure to detect HIV, HCV or HBV by PCR does not rule out the   possibility of infection. This result should be evaluated in the context of the   individual's risk factors and clinical findings.      West Nile Virus by PCR        Nonreactive   [place] [address]     BMT Infectious Disease Donor Panel- SEND TO [facility]    Collection Time: 02/01/17  9:53 AM   Result Value Ref Range    Donor Hep B Surf Agn Nonreactive NR    Donor Hep B Core Citlalli Nonreactive NR    Donor Hepatitis C Citlalli Nonreactive NR    Donor HIV 1&2 Antibody Nonreactive NR    Donor HTLV 1&2 Antibody Nonreactive NR    Donor Cytomegalovirus Citlalli Positive (A) NR    Donor Treponema PAL CITLALLI Nonreactive     Trypanosoma Cruzi  NR     Nonreactive   [place] [address]     Glucose by meter    Collection Time: 02/02/17  8:56 AM   Result Value Ref Range    Glucose 79 70 - 99 mg/dL   PFT: If 2001-02, 2001-10, 2005-02, 2005-10, 2007-02, 2010-22, 2011-05, include DLCO, FEV1, and FVC or O2 Saturation    Collection Time: 02/03/17 11:00 AM   Result Value Ref Range    FVC-Pred 3.14 L    FVC-Pre 2.30 L    FVC-%Pred-Pre 73 %    FEV1-Pre 1.95 L    FEV1-%Pred-Pre 75 %    FEV1FVC-Pred 83 %    FEV1FVC-Pre 85 %    FEFMax-Pred 6.67 L/sec    FEFMax-Pre 5.23 L/sec    FEFMax-%Pred-Pre 78 %    FEF2575-Pred 2.76 L/sec    FEF2575-Pre 2.23 L/sec    YIS4575-%Pred-Pre 80 %    ExpTime-Pre 6.68 sec    FIFMax-Pre 4.49 L/sec    VC-Pred 3.42 L    VC-Pre 2.46 L    VC-%Pred-Pre 71 %    IC-Pred 2.70 L    IC-Pre 1.84 L    IC-%Pred-Pre 68 %    ERV-Pred 0.72 L    ERV-Pre 0.62 L    ERV-%Pred-Pre 85 %    FEV1FEV6-Pred 83 %    FEV1FEV6-Pre 85 %    DLCOunc-Pred 22.80 ml/min/mmHg    DLCOunc-Pre 16.57 ml/min/mmHg    DLCOunc-%Pred-Pre 72 %    DLCOcor-Pre 17.91 ml/min/mmHg    DLCOcor-%Pred-Pre 78 %    VA-Pre 3.40 L    VA-%Pred-Pre 70 %    FEV1SVC-Pred 76 %    FEV1SVC-Pre 79 %   PFT: If 2001-02, 2001-10, 2005-02, 2005-10, 2007-02, 2010-22, 2011-05, include DLCO, FEV1, and FVC or O2 Saturation    Collection Time: 02/03/17 11:00 AM   Result Value Ref Range    FVC-Pred 3.14 L    FVC-Pre 2.30 L    FVC-%Pred-Pre 73 %    FEV1-Pre 1.95 L     FEV1-%Pred-Pre 75 %    FEV1FVC-Pred 83 %    FEV1FVC-Pre 85 %    FEFMax-Pred 6.67 L/sec    FEFMax-Pre 5.23 L/sec    FEFMax-%Pred-Pre 78 %    FEF2575-Pred 2.76 L/sec    FEF2575-Pre 2.23 L/sec    QHU1359-%Pred-Pre 80 %    ExpTime-Pre 6.68 sec    FIFMax-Pre 4.49 L/sec    VC-Pred 3.42 L    VC-Pre 2.46 L    VC-%Pred-Pre 71 %    IC-Pred 2.70 L    IC-Pre 1.84 L    IC-%Pred-Pre 68 %    ERV-Pred 0.72 L    ERV-Pre 0.62 L    ERV-%Pred-Pre 85 %    FEV1FEV6-Pred 83 %    FEV1FEV6-Pre 85 %    DLCOunc-Pred 22.80 ml/min/mmHg    DLCOunc-Pre 16.57 ml/min/mmHg    DLCOunc-%Pred-Pre 72 %    DLCOcor-Pre 17.91 ml/min/mmHg    DLCOcor-%Pred-Pre 78 %    VA-Pre 3.40 L    VA-%Pred-Pre 70 %    FEV1SVC-Pred 76 %    FEV1SVC-Pre 79 %   CBC with platelets differential    Collection Time: 02/03/17  2:38 PM   Result Value Ref Range    WBC 2.3 (L) 4.0 - 11.0 10e9/L    RBC Count 3.40 (L) 3.8 - 5.2 10e12/L    Hemoglobin 11.2 (L) 11.7 - 15.7 g/dL    Hematocrit 34.1 (L) 35.0 - 47.0 %     78 - 100 fl    MCH 32.9 26.5 - 33.0 pg    MCHC 32.8 31.5 - 36.5 g/dL    RDW 16.1 (H) 10.0 - 15.0 %    Platelet Count 232 150 - 450 10e9/L    Diff Method Automated Method     % Neutrophils 50.7 %    % Lymphocytes 24.9 %    % Monocytes 19.1 %    % Eosinophils 4.4 %    % Basophils 0.9 %    % Immature Granulocytes 0.0 %    Nucleated RBCs 0 0 /100    Absolute Neutrophil 1.1 (L) 1.6 - 8.3 10e9/L    Absolute Lymphocytes 0.6 (L) 0.8 - 5.3 10e9/L    Absolute Monocytes 0.4 0.0 - 1.3 10e9/L    Absolute Eosinophils 0.1 0.0 - 0.7 10e9/L    Absolute Basophils 0.0 0.0 - 0.2 10e9/L    Abs Immature Granulocytes 0.0 0 - 0.4 10e9/L    Absolute Nucleated RBC 0.0    Bone marrow biopsy    Collection Time: 02/03/17  3:15 PM   Result Value Ref Range    Copath Report       Patient Name: DENIS LUNA  MR#: [id number]  Specimen #: [id number]  Collected: 2/3/2017  Received: 2/3/2017  Reported: 2/6/2017 16:24  Ordering Phy(s): MANUEL PICKETT  Additional Phy(s): Copy to Cytogenetics    For  improved result formatting, select 'View Enhanced Report Format'  under Linked Documents section.    TEST(S):  A: Right Bone Marrow Biopsy  B: Left Bone Marrow Biopsy/Aspirate    FINAL DIAGNOSIS:  Bone marrow, posterior iliac crest, bilateral decalcified trephine  biopsy and touch imprint; left aspirate clot, particle crush, direct  aspirate smear, and concentrated aspirate smear; and peripheral blood  smear:      - Slightly hypocellular bone marrow (20-30%) with trilineage  hematopoietic maturation    - No morphologic evidence of Hodgkin lymphoma    - See comment    - Peripheral blood showing slight normochromic, normocytic anemia;  moderate leukopenia with neutropenia and lymphopenia    I have personally reviewed all specimens and/or slides, including the  listed specia l stains, and used them with my medical judgment to  determine the final diagnosis.    Electronically signed out by:  Ramírez Willoughby M.D.,[facility]    Technical testing/processing performed at [place], [place]    CLINICAL HISTORY:  Per EPIC electronic medical records: 41-year-old  female with a history  of classical Hodgkin lymphoma (11/2014; [id number]) status post six  cycles of ABVD and radiation with recurrence, most recently in October 2016 ([id number]). She is currently post 3 cycles of brentuximab therapy.    REPORT:  Procedure/Gross Description  Aspirate(s) and trephine(s) procured by MARILY Quintanilla    Specimen sent for Special Studies:       Flow Cytometry (left aspirate)       Cytogenetics (left aspirate)       Molecular Diagnostics (left aspirate)    Biopsy aspiration site: Left posterior iliac crest                 (Reference Range)         Amount of aspirate              3      mL       Fat and P.V. cell layer            1      %     (1 - 3)       Particles                        1      %       Myeloid-erythroid layer          2      %     (5 - 8)         Clot Section:  No    Trephine biopsy site: Bilateral    Designated left (B) posterior iliac crest is 1 cylinder of gritty  tissue, labeled with the patient's name and hospital number, having a  diameter of 2 mm and a length of 14 mm; entirely submitted in 1  cassette; acetic zinc formalin fixed, decalcified, processed, and  stained for hematoxylin and eosin per laboratory protocol.    Designated right (A) posterior iliac crest is 1 cylinder of gritty  tissue, labeled with the patient's name and hospital number, having a  diameter of 2 mm and a length of 13 mm; entirely submitted in 1  cassette; acetic zinc formalin fixed, decalcified, processed, and  stained for hematoxylin and eosin per laboratory protocol.    MICROSCOPIC DESCRIPTION:  PERIPHERAL BLOOD DATA (Date: 02/03/2017)  Patient Value (Reference Range >18 year old female)  2.3     WBC (4 .0-11.0 x 10*9/L)  3.40     RBC (3.8-5.2 x 10*12/L)  11.2     HGB (11.7-15.7 g/dL)  34.1     HCT (35.0-47.0 %)  100     MCV (78-100fL)  32.9     MCH (26.5-33.0 pg)  32.8     MCHC (31.5-36.5 g/dL)  16.1     RDW (10.0-15.0 %)  232     PLT (150-450 x 10*9/L)    PERIPHERAL BLOOD DIFFERENTIAL (Automated)  (Reference ranges >18 year old)    Percent  50.7  Neutrophils, segmented and bands  24.9  Lymphocytes  19.1  Monocytes  4.4  Eosinophils  0.9  Basophils    Absolute  1.1   Neutrophils, segmented and bands  (1.6 - 8.3 x 10*9/L)  0.6   Lymphocytes  (0.8 - 5.3 x 10*9/L)  0.4   Monocytes  (0 -1.3 x 10*9/L)  0.1   Eosinophils  (0 - 0.7 x 10*9/L)  0.0   Basophils  (0 - 0.2 x 10*9/L)    The red cells appear normochromic.  Poikilocytosis includes occasional  codocytes. Polychromasia is not increased. Rouleaux formation is not  increased. The morphology of the platelets is normal. Lymphocytes are  polymorphous and neutrophils display normal cytoplasmic granularity and  unremarkable nuclear morpholo gy.    Bone marrow aspirates and touch imprints of bone biopsy are reviewed.    BONE MARROW DIFFERENTIAL  (500 cells on direct aspirate smears):    Percent  Cell (reference range)  1.6       Blasts (0 - 1)  7.0       Neutrophil promyelocytes (2 - 4)  50.4       Neutrophils and precursors (54 - 63)  28.8       Erythroid precursors (18 - 24)  2.6       Monocytes (1 - 1.5)  1.0       Eosinophils (1 - 3)  0.4       Basophils (0 - 1)  7.8       Lymphocytes (8 - 12)  0.4       Plasma cells  (0 - 1.5)    Neutrophil maturation is complete and orderly. Erythroid maturation is  complete and orderly. Megakaryocytes are present.  There is no  morphologic evidence of lymphoma.    TREPHINE SECTIONS:  The marrow cellularity is 20-30%. The cellular composition reflects the  aspirate differential. Megakaryocytes are present. The number of  megakaryocytes appears consistent with the degree of marrow cellularity.  Atypical infiltrates are not seen.  A tiny reactive lymphoid aggregate  is present on one le wil.    Reporting fellow: Mercedes Salas DO    CPT Codes:  A: 37118-XUNF.T, 39091-GDQIW, 53416-OQPF, HBM, 27446-WVCP  B: 06418-SRQOV.T, 29399-PYCA, 20276-OMQUOV, HBM, 20624-BRQG,  99339-KPDAN, 08684-KUMJ.T, 68595-SNVMH, 66390-WIT    TESTING LAB LOCATION:  [facility], [address]   [address] [phone]    COLLECTION SITE:  Client:  [facility]  Location:  [facility] (B)     Leukemia Lymphoma Evaluation (Flow Cytometry)    Collection Time: 02/03/17  3:20 PM   Result Value Ref Range    Copath Report       Patient Name: DENIS LUNA  MR#: [id number]  Specimen #: [id number]  Collected: 2/3/2017 15:20  Received: 2/3/2017 16:23  Reported: 2/6/2017 18:52  Ordering Phy(s): MANUEL PICKETT    For improved result formatting, select 'View Enhanced Report Format'  under Linked Documents section.  _________________________________________    SPECIMEN(S):  Bone marrow, right, Canceled Test    INTERPRETATION:  Bone marrow,  right  Canceled-Test Credited. The physician, Dr. Willoughby, notified/approved on  2/6/2017 at 18:00.    COMMENT:  ##    RESULTS:  ##    ANTIBODIES:    Eight and nine color analyses are performed for the following markers:  CD2, CD3, CD4, CD5, CD7, CD8, CD10, CD19, CD20, CD34, CD38, CD45, CD56,  and kappa and lambda immunoglobulin light chains. Cells are gated to  isolate populations (CD45 versus side scatter and forward scatter versus  side scatter), to exclude debris (forward scatter versus side scatter)  and to exclude cell doublets (forward scatter height versus forward  scatter  width and side scatter height versus side scatter width).  Forward scatter varies with cell size. Side scatter varies with the  amount of cytoplasmic granules. Intensity for CD45 usually increases as  hematolymphoid cells mature.    CLINICAL HISTORY:  41 year old female with ##    I have personally reviewed all specimens and/or slides, including the  listed special stains, and used them with my medical judgment to  determine the final diagnosis.    Electronically signed out by:  IFC     Analyte Specific Reagents are used in many laboratory tests necessary  for standard medical care and generally do not require FDA approval.  This test was developed and its performance characteristics determined  by [facility].  It has not been cleared or approved by the U.S. Food and  Drug Administration.    CPT Codes:   IFPREP    TESTING LAB LOCATION:  [place] [id number] [address]  [phone]    COLLECTION SITE:  Client:  [facility]  Location:  [facility] (B)     EKG 12-lead complete w/read - Clinics    Collection Time: 02/03/17  3:41 PM   Result Value Ref Range    Interpretation ECG Click View Image link to view waveform and result        Assessment and plan:     I will review the CT images with Radiology in the morning.  Since Jan 2017 the WAYLON nodular infiltrate has resolved however the RUL nodular peribronchovasculzr infiltrate is difficult to determine if it is improved due to differences in imaging techniques.  Upon review with Radiology, there was a consensus that the WAYLON and lingular nodular infiltrates were resolved and that the RUL infiltrates were improved. I would favor holding off on bronchoscopy at this point and repeat short interval CT chest to determine continued resolution of post-infection findings.  If abnormalities are still present, we can proceed with bronchoscopy at that time.    RTC prn based on symptoms and future imaging            Again, thank you for allowing me to participate in the care of your patient.      Sincerely,    Clyde Montoya MD

## 2017-02-07 NOTE — MR AVS SNAPSHOT
After Visit Summary   2/7/2017    Steph Agee    MRN: [id number]           Patient Information     Date Of Birth          1975        Visit Information        Provider Department      2/7/2017 12:30 PM Coordinator,  Bmt Nurse;  2 119 CONSULT RM [facility] Blood and Marrow Transplant        Today's Diagnoses     Hodgkin disease (H)        Personal history of diseases of blood and blood-forming organs              [facility] ([facility])  [address] [place] [address]  Phone: [phone]  Clinic Hours:   Monday-Friday:    8am to 4:30pm   Weekends and holidays:    8am to noon (in general)  If your fever is 100.5  or greater,   call the clinic.  After hours call the   hospital at [phone] or   [phone]. Ask for the BMT   fellow for pediatric or adult patients            Follow-ups after your visit        Your next 10 appointments already scheduled     Aug 09, 2017 10:30 AM CDT   Masonic Lab Draw with  MASONIC LAB DRAW   [facility] Masonic Lab Draw ([facility])    [address] [place] [address] [phone]            Aug 09, 2017 11:00 AM CDT   Return with Obed Chambers MD   [facility] Blood and Marrow Transplant ([facility])    [address] [place] [address] [phone]              Who to contact     If you have questions or need follow up information about today's clinic visit or your schedule please contact [facility] BLOOD AND MARROW TRANSPLANT directly at [phone].  Normal or non-critical lab and imaging results will be communicated to you by MyChart, letter or phone within 4 business days after the clinic has received the results. If you do not hear from us within 7 days, please contact the clinic through MyChart or phone. If you have a critical or abnormal lab result, we will notify you by phone as soon as possible.  Submit refill requests  through Revokom or call your pharmacy and they will forward the refill request to us. Please allow 3 business days for your refill to be completed.          Additional Information About Your Visit        Revokom Information     Revokom gives you secure access to your electronic health record. If you see a primary care provider, you can also send messages to your care team and make appointments. If you have questions, please call your primary care clinic.  If you do not have a primary care provider, please call [phone] and they will assist you.        Care EveryWhere ID     This is your Care EveryWhere ID. This could be used by other organizations to access your [facility] medical records  [id number]         Blood Pressure from Last 3 Encounters:   02/20/17 (!) 99/34   02/19/17 103/60   02/18/17 108/59    Weight from Last 3 Encounters:   02/20/17 81.8 kg (180 lb 6.4 oz)   02/19/17 81.2 kg (179 lb 0.2 oz)   02/18/17 81.2 kg (179 lb 0.2 oz)              We Performed the Following     BMT Coordinator          Today's Medication Changes          These changes are accurate as of: 2/7/17 11:59 PM.  If you have any questions, ask your nurse or doctor.               Stop taking these medicines if you haven't already. Please contact your care team if you have questions.     gabapentin 600 MG 24 hr tablet   Commonly known as:  GRALISE   Stopped by:  Liane AGUSTIN Bmt Pharm, [facility]                    Recent Review Flowsheet Data     BMT Recent Results Latest Ref Rng & Units 2/3/2017 2/14/2017 2/15/2017 2/16/2017 2/17/2017 2/18/2017 2/19/2017    WBC 4.0 - 11.0 10e9/L 2.3(L) 22.6(H) 20.4(H) 40.8(H) 45.6(H) 45.8(H) 38.6(H)    Hemoglobin 11.7 - 15.7 g/dL 11.2(L) 11.2(L) 11.4(L) 10.8(L) 10.5(L) 9.6(L) 9.5(L)    Platelet Count 150 - 450 10e9/L 232 178 181 156 132(L) 106(L) 81(L)    Neutrophils (Absolute) 1.6 - 8.3 10e9/L 1.1(L) - 18.4(H) 36.5(H) 41.6(H) 41.6(H) 35.2(H)    INR 0.86 - 1.14 - 0.94 - - - - -    Sodium 133 - 144 mmol/L - - 142  141 - 143 144    Potassium 3.4 - 5.3 mmol/L - - 4.1 3.9 - 3.6 3.4    Chloride 94 - 109 mmol/L - - 108 107 - 106 106    Glucose 70 - 99 mg/dL - - 91 92 - 82 93    Urea Nitrogen 7 - 30 mg/dL - - 16 15 - 14 10    Creatinine 0.52 - 1.04 mg/dL - - 0.66 0.61 - 0.61 0.61    Calcium (Total) 8.5 - 10.1 mg/dL - - 8.2(L) 8.7 - 7.8(L) 8.0(L)    Protein (Total) 6.8 - 8.8 g/dL - - - - - - -    Albumin 3.4 - 5.0 g/dL - - - - - - -    Alkaline Phosphatase 40 - 150 U/L - - - - - - -    AST 0 - 45 U/L - - - - - - -    ALT 0 - 50 U/L - - - - - - -    MCV 78 - 100 fl 100 102(H) 102(H) 100 101(H) 102(H) 100               Primary Care Provider    Physician No Ref-Primary       No address on file        Thank you!     Thank you for choosing [facility] BLOOD AND MARROW TRANSPLANT  for your care. Our goal is always to provide you with excellent care. Hearing back from our patients is one way we can continue to improve our services. Please take a few minutes to complete the written survey that you may receive in the mail after your visit with us. Thank you!             Your Updated Medication List - Protect others around you: Learn how to safely use, store and throw away your medicines at www.disposemymeds.org.          This list is accurate as of: 2/7/17 11:59 PM.  Always use your most recent med list.                   Brand Name Dispense Instructions for use    LORAZEPAM PO      Take 0.25 mg by mouth every 4 hours as needed for anxiety

## 2017-02-07 NOTE — MR AVS SNAPSHOT
After Visit Summary   2/7/2017    Steph Agee    MRN: [id number]           Patient Information     Date Of Birth          1975        Visit Information        Provider Department      2/7/2017 5:00 PM Clyde Montoya MD [facility]        Today's Diagnoses     Cough    -  1       Follow-ups after your visit        Your next 10 appointments already scheduled     Feb 14, 2017 11:30 AM CST   Masonic Lab Draw with  MASONIC LAB DRAW   [facility] Masonic Lab Draw ([facility])    [address] [place] [address]   [phone]            Feb 14, 2017 12:00 PM CST   BMT Injection with  Bmt Nurse 1   [facility] Blood and Marrow Transplant ([facility])    [address] [place] [address]   [phone]            Feb 14, 2017   Procedure with Michael Sy PA-C   [facility] ([facility])    [address] [place] [address]   [phone]           Located in the Clinics and Surgery Center at [address] [place] [address].   parking is very convenient and highly recommended.  is a $6 flat rate fee; no tips accepted.  Both  and self parkers should enter the main arrival plaza from [facility]; parking attendants will direct you based on your parking preference.            Feb 14, 2017  2:30 PM CST   Return with UC BMT CECILIA #4   [facility] Blood and Marrow Transplant ([facility])    [address] [place] [address]   [phone]            Feb 15, 2017  8:00 AM CST   Return with  BMT CECILIA #1   [facility] Blood and Marrow Transplant ([facility])    [address] [place] [address]   [phone]            Feb 15, 2017  9:00 AM CST   (Arrive by 8:45 AM)   MNC Collection with   APHERESIS RN7,  37 ATC   [facility] Apheresis ([facility])    [address] [facility] [address] [phone]            Feb 16, 2017  8:00 AM CST   Return with  BMT CECILIA #1   [facility] Blood and Marrow Transplant ([facility])    [address]  [address] [facility] [address] [phone]            Feb 16, 2017  9:00 AM CST   (Arrive by 8:45 AM)   MNC Collection with  APHERESIS RN6,  37 ATC   [facility] Apheresis ([facility])    [address]  [address] [facility] [id number] [phone]              Future tests that were ordered for you today     Open Future Orders        Priority Expected Expires Ordered    CBC with platelets Routine  2/13/2018 2/13/2017    INR Routine  2/13/2018 2/13/2017    HCG, qualitative Routine  2/13/2018 2/13/2017            Who to contact     If you have questions or need follow up information about today's clinic visit or your schedule please contact [facility] directly at [phone].  Normal or non-critical lab and imaging results will be communicated to you by Codekkohart, letter or phone within 4 business days after the clinic has received the results. If you do not hear from us within 7 days, please contact the clinic through Codekkohart or phone. If you have a critical or abnormal lab result, we will notify you by phone as soon as possible.  Submit refill requests through StoredIQ or call your pharmacy and they will forward the refill request to us. Please allow 3 business days for your refill to be completed.          Additional Information About Your Visit        Codekkohart Information     StoredIQ gives you secure access to your electronic health record. If you see a primary care provider, you can also send messages to your care team and make appointments. If you have questions, please call  your primary care clinic.  If you do not have a primary care provider, please call [phone] and they will assist you.        Care EveryWhere ID     This is your Care EveryWhere ID. This could be used by other organizations to access your [place] medical records  [id number]        Your Vitals Were     Pulse Temperature Respirations BMI (Body Mass Index)          79 98.2  F (36.8  C) (Oral) 16 30.95 kg/m2         Blood Pressure from Last 3 Encounters:   02/13/17 106/62   02/12/17 102/68   02/11/17 102/47    Weight from Last 3 Encounters:   02/13/17 81.1 kg (178 lb 12.8 oz)   02/12/17 82.1 kg (180 lb 14.4 oz)   02/11/17 82.8 kg (182 lb 8 oz)              Today, you had the following     No orders found for display         Today's Medication Changes          These changes are accurate as of: 2/7/17 11:59 PM.  If you have any questions, ask your nurse or doctor.               Stop taking these medicines if you haven't already. Please contact your care team if you have questions.     gabapentin 600 MG 24 hr tablet   Commonly known as:  GRALISE   Stopped by:  Liane AGUSTIN Bmt Pharm, [facility]                    Primary Care Provider    Physician No Ref-Primary       No address on file        Thank you!     Thank you for choosing [facility]  for your care. Our goal is always to provide you with excellent care. Hearing back from our patients is one way we can continue to improve our services. Please take a few minutes to complete the written survey that you may receive in the mail after your visit with us. Thank you!             Your Updated Medication List - Protect others around you: Learn how to safely use, store and throw away your medicines at www.disposemymeds.org.          This list is accurate as of: 2/7/17 11:59 PM.  Always use your most recent med list.                   Brand Name Dispense Instructions for use    LORAZEPAM PO      Take 0.25 mg by mouth every 4 hours as needed for anxiety

## 2017-02-07 NOTE — CONSULTS
APHERESIS INITIAL CONSULT CHECKLIST    Current Encounter Information  Current Encounter Information: Reason for Visit, Allergies and Current Meds  Procedure Requested: MNC/PBSC Collection  History of: (Reason for Apheresis): hodkins lymphoma    Access Assessment  Access Assessment  Vein Assessment:  Veins are adequate: No  Needs a catheter placed for Apheresis?: Yes, transfusion medicine physician informed.    Vital Signs  Vital Signs  BP: 108/72 mmHg  Pulse: 79  Temp: 98.2  F (36.8  C)  Temp src: Oral  Resp: 16  Height:  (162.6cm)  Weight: 81.8 kg (180 lb 5.4 oz)    Reviewed   Review With Patient  Have you read the brochure Getting ready for Apheresis?: Yes  Have you had any invasive procedures, surgery, biopsy, bleeding in the last month?: No  Review medications and allergies: Yes  Have you ever been transfused?: Yes  Do you require pre-medication for blood products?: Yes (tylenol and benadryl)  Patient given tour of the unit: Yes    Additional Information  Notes, needs and time spent with patient  Patient has special need: no  Time spent: 20 minutes spent face to face for medical history review.

## 2017-02-07 NOTE — PROGRESS NOTES
Pharmacy Assessment - Pre-Stem Cell Transplant    Assessments & Recommendations:  1) Dosing weight adjustment calculations for the cyclophosphamide.  2) Long history of mariajuana use for chemotherapy related nausea. Consider voriconazole prophylaxis post chemotherapy until return of immune system, infection in immunocompromised hosts due to marijuana use has been described in case reports  3) Claritin for filgrastim induced bone pain  4) States having significant constipation with ondansetron. Would benefit from senna prophylaxis while on the antiemetics.       History of Present Illness:  Steph Agee is a 41 year old year old female diagnosed with HL.  She has been treated with ABVD x 6, ICE x 4, and brentuximab.  She is now being work up for autologous Stem Cell Transplant on protocol 2004-24, which utilizes BCV as a conditioning regimen.    Pertinent labs/tests:  Viral Testing:  CMV(+) / HSV(+) / EBV(+)  Ejection Fraction: 52% (2/1)  QTc: 439msec (2/3)    Weights:   Wt Readings from Last 3 Encounters:   02/06/17 82.736 kg (182 lb 6.4 oz)   02/03/17 83.1 kg (183 lb 3.2 oz)   02/02/17 79.379 kg (175 lb)   Ideal body weight: 52.382 kg (115 lb 7.7 oz)  Adjusted ideal body weight: 64.669 kg (142 lb 9.1 oz)    Primary BMT Physician: Dr Chambers  BMT RN Coordinator:  Mary Krishnamurthy    Past Medical History:  Past Medical History   Diagnosis Date     Depression with anxiety      SVC syndrome 2014     Hodgkin disease (H) 2014, 2016       Medication Allergies:  No Known Allergies    Current Medications (pre-admit):  Current Outpatient Prescriptions   Medication Sig Dispense Refill     gabapentin (GRALISE) 600 MG 24 hr tablet Take by mouth daily (with dinner)       Venlafaxine HCl (EFFEXOR PO) Take 150 mg by mouth daily       LORAZEPAM PO Take 0.25 mg by mouth every 4 hours as needed for anxiety         Herbal Medication/Nutritional Supplements:  Denies    Smoking/Past Drug Use:  Denies, quit years ago after long smoking  history    Nausea/Vomiting, Pain, or other issues:  States ondansetron, prochlorperazine , lorazepam and dexamethasone have worked well during chemotherapy. States marajuana works well for post chemotherapy nausea. C bands and meño may also help with her nausea issues. No pain issues but states Vicodin and oxycodone have worked well with mucositis pain. She does experience bone pain from filgrastim but has not tried Claritin prophylaxis    Summary:  I met with Steph Agee for approximately 20 minutes, she requested a brief meeting as she has met with a pharmacist during her previous workup.  We discussed her current and upcoming transplant medication list including the conditioning chemotherapy and immunosuppression, antiemetics, prophylactic antibiotics and vaccinations. Steph participated in our conversation and voiced her understanding of the topics discussed. Thank you for allowing me to participate in the care of this patient.    Alex Ly, PharmD

## 2017-02-08 ENCOUNTER — APPOINTMENT (OUTPATIENT)
Dept: TRANSPLANT | Facility: CLINIC | Age: 42
End: 2017-02-08
Attending: INTERNAL MEDICINE
Payer: COMMERCIAL

## 2017-02-08 VITALS
WEIGHT: 181.2 LBS | BODY MASS INDEX: 31.09 KG/M2 | DIASTOLIC BLOOD PRESSURE: 66 MMHG | SYSTOLIC BLOOD PRESSURE: 108 MMHG | OXYGEN SATURATION: 98 % | RESPIRATION RATE: 18 BRPM | TEMPERATURE: 97.8 F | HEART RATE: 72 BPM

## 2017-02-08 DIAGNOSIS — C81.98 HODGKIN'S DISEASE OF LYMPH NODES OF MULTIPLE SITES (H): Primary | ICD-10-CM

## 2017-02-08 DIAGNOSIS — C81.70 OTHER CLASSICAL HODGKIN LYMPHOMA: Primary | ICD-10-CM

## 2017-02-08 DIAGNOSIS — Z86.2 PERSONAL HISTORY OF DISEASES OF BLOOD AND BLOOD-FORMING ORGANS: ICD-10-CM

## 2017-02-08 PROBLEM — Z29.9 NEED FOR PROPHYLACTIC MEASURE: Status: ACTIVE | Noted: 2017-02-08

## 2017-02-08 LAB
COPATH REPORT: NORMAL
COPATH REPORT: NORMAL

## 2017-02-08 PROCEDURE — 99212 OFFICE O/P EST SF 10 MIN: CPT | Mod: ZF

## 2017-02-08 RX ORDER — VENLAFAXINE HYDROCHLORIDE 150 MG/1
150 TABLET, EXTENDED RELEASE ORAL DAILY
Qty: 30 TABLET | Refills: 3 | Status: SHIPPED | OUTPATIENT
Start: 2017-02-08 | End: 2017-02-10

## 2017-02-08 RX ORDER — LISINOPRIL 2.5 MG/1
2.5 TABLET ORAL DAILY
Qty: 30 TABLET | Refills: 3 | Status: SHIPPED | OUTPATIENT
Start: 2017-02-08 | End: 2017-02-14

## 2017-02-08 RX ORDER — GABAPENTIN 100 MG/1
200 CAPSULE ORAL 3 TIMES DAILY
Qty: 90 CAPSULE | Refills: 3 | Status: SHIPPED | OUTPATIENT
Start: 2017-02-08

## 2017-02-08 RX ORDER — VENLAFAXINE 50 MG/1
150 TABLET ORAL DAILY
Qty: 90 TABLET | Refills: 3 | Status: SHIPPED | OUTPATIENT
Start: 2017-02-08 | End: 2017-02-09 | Stop reason: DRUGHIGH

## 2017-02-08 ASSESSMENT — PAIN SCALES - GENERAL: PAINLEVEL: NO PAIN (0)

## 2017-02-08 NOTE — Clinical Note
2/8/2017      RE: Steph Agee  [address] [facility] [address]        Dictation on: 02/08/2017 11:20 AM by: MANUEL PICKETT [[id number]]         REASON FOR VISIT:  I saw Steph Agee today for a summary of the pre-transplant workup prior to planned autologous peripheral blood stem cell transplant for relapsed chemotherapy-responsive Hodgkin's disease.      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  She staged out as IIB with disease above the diaphragm.  Biopsy of a left axillary node confirmed that this was classical Hodgkin's.  She subsequently had 6 cycles of ABVD followed by external beam radiation.  She then did well until 05/2016, when she again developed heaviness in the chest and recurrence of night sweats.  PET/CT showed recurrence of disease predominantly in the manubrium and sternum with extension into the left pectoralis muscle.  A biopsy confirmed relapse.  She then underwent a total of 4 cycles of ICE chemotherapy, which she tolerated well.  She then came for pre-transplant evaluation in 10/2016.  Unfortunately, restaging labs showed a progression with a new 1-cm, hypermetabolic, left cardiophrenic lymph node.  This was biopsied by Interventional Radiology, and the biopsy confirmed recurrence of her Hodgkin's disease.  She subsequently has had 4 cycles of brentuximab as a bridge to transplant.  She now presents for restaging and pre-transplant evaluation.       REVIEW OF SYSTEMS:  Steph continues to have some mild night sweats that she attributes to having developed menopause secondary to the chemotherapy.  She has no fevers, chills, nausea, vomiting, diarrhea, cough, hemoptysis, shortness of breath, hematuria, dysuria, bruising or bleeding.  She does have some neuropathy involving predominantly the fingers and toes.  She takes gabapentin with good relief of these symptoms.  She has no problems with balance.  The remainder of the review  of systems is negative.       PHYSICAL EXAMINATION:   GENERAL:  She looked fit.   VITAL SIGNS:  Unremarkable.   HEENT:  Sclerae were anicteric and non-injected.  Buccal mucosa was intact.   LYMPH:  No supraclavicular, cervical, axillary or inguinal adenopathy.   LUNGS:  Clear to auscultation.   CARDIAC:  Without S3, rub or murmur.   ABDOMEN:  Nondistended, active bowel sounds, no organomegaly.   EXTREMITIES:  Trace pedal edema.   SKIN:  No skin rash.      LABORATORY DATA:  Her restaging labs include a PET/CT scan which showed no evidence of active disease but did show some infiltrates.  The patient did have influenza-like symptoms in January and developed a left pleural effusion at that time and required a 2-week course of antibiotics.  Her symptoms have now completely resolved.  She was seen by Dr. Montoya in Pulmonary and he is in the process of reviewing the CT scan before making a final recommendation about possible bronchoscopy.  She had a bone marrow aspirate and biopsy that had no morphologic evidence of Hodgkin's disease.  MUGA showed a 52% ejection fraction.  She was seen by Cardiology, who performed an MRI cardiac study which showed an ejection fraction of 47%.  They recommended starting lisinopril 2.5 mg.  A chest x-ray was normal.  Pulmonary function tests showed about 70% FEV1 and FVC.  DLCO was 78% of predicted value.  Thyroid function tests were normal.  White count was 1,500, hemoglobin 11.7 and platelets 219,000.  Electrolyte panel was normal.  Liver function tests were normal.  LDH was 215.  PTT and INR were within the normal range.       ASSESSMENT AND PLAN:  Chemotherapy-responsive classical Hodgkin's disease, now in apparent complete remission.  Pending resolution of the issue of whether she needs bronchoscopy prior to transplant or not, we will plan on having her doing growth factor mobilization with G-CSF and Mozobil given her extensive pre-treatment history.  I spent a total of 70 minutes  face-to-face with the patient, 60 of which were in counseling regarding the upcoming transplant.  We went through a typical transplant calendar.  I told her she would have roughly a week of chemotherapy during which the major complications would be nausea and vomiting.  Subsequently she will develop severe neutropenia sometime during the second week after transplant and there would be a 3-5% chance of dying from an overwhelming infection during this critical period.  It is highly likely she will also develop mucositis.  The major risk of the transplant is over when white count recovers.  There is an approximately 1% chance of toxicity to some other critical organ occurring such as the heart, liver or lungs.  There is, unfortunately, an approximately 50% chance of relapse given her chemo-refractory nature.  Because she is at high risk, I would recommend post-transplant maintenance with brentuximab for 16 cycles if possible, the neuropathy permitting.  She and her daughter asked a number of questions which I answered to the best of my abilities.  I am left with the impression that Steph has a good understanding of the risks and benefits of this procedure and is eager to get started.        Obed Chambers MD

## 2017-02-08 NOTE — NURSING NOTE
Chief Complaint   Patient presents with     RECHECK     Pre BMT for HL here for provider       Chayito Quiros CMA February 8, 2017 10:37 AM  Medications and allergies reviewed. Vitals done see flow sheets. Face to face time 6 minutes.

## 2017-02-08 NOTE — PROGRESS NOTES
REASON FOR VISIT:  I saw Steph Agee today for a summary of the pre-transplant workup prior to planned autologous peripheral blood stem cell transplant for relapsed chemotherapy-responsive Hodgkin's disease.      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  She staged out as IIB with disease above the diaphragm.  Biopsy of a left axillary node confirmed that this was classical Hodgkin's.  She subsequently had 6 cycles of ABVD followed by external beam radiation.  She then did well until 05/2016, when she again developed heaviness in the chest and recurrence of night sweats.  PET/CT showed recurrence of disease predominantly in the manubrium and sternum with extension into the left pectoralis muscle.  A biopsy confirmed relapse.  She then underwent a total of 4 cycles of ICE chemotherapy, which she tolerated well.  She then came for pre-transplant evaluation in 10/2016.  Unfortunately, restaging labs showed a progression with a new 1-cm, hypermetabolic, left cardiophrenic lymph node.  This was biopsied by Interventional Radiology, and the biopsy confirmed recurrence of her Hodgkin's disease.  She subsequently has had 4 cycles of brentuximab as a bridge to transplant.  She now presents for restaging and pre-transplant evaluation.       REVIEW OF SYSTEMS:  Steph continues to have some mild night sweats that she attributes to having developed menopause secondary to the chemotherapy.  She has no fevers, chills, nausea, vomiting, diarrhea, cough, hemoptysis, shortness of breath, hematuria, dysuria, bruising or bleeding.  She does have some neuropathy involving predominantly the fingers and toes.  She takes gabapentin with good relief of these symptoms.  She has no problems with balance.  The remainder of the review of systems is negative.       PHYSICAL EXAMINATION:   GENERAL:  She looked fit.   VITAL SIGNS:  Unremarkable.   HEENT:  Sclerae were anicteric  and non-injected.  Buccal mucosa was intact.   LYMPH:  No supraclavicular, cervical, axillary or inguinal adenopathy.   LUNGS:  Clear to auscultation.   CARDIAC:  Without S3, rub or murmur.   ABDOMEN:  Nondistended, active bowel sounds, no organomegaly.   EXTREMITIES:  Trace pedal edema.   SKIN:  No skin rash.      LABORATORY DATA:  Her restaging labs include a PET/CT scan which showed no evidence of active disease but did show some infiltrates.  The patient did have influenza-like symptoms in January and developed a left pleural effusion at that time and required a 2-week course of antibiotics.  Her symptoms have now completely resolved.  She was seen by Dr. Montoya in Pulmonary and he is in the process of reviewing the CT scan before making a final recommendation about possible bronchoscopy.  She had a bone marrow aspirate and biopsy that had no morphologic evidence of Hodgkin's disease.  MUGA showed a 52% ejection fraction.  She was seen by Cardiology, who performed an MRI cardiac study which showed an ejection fraction of 47%.  They recommended starting lisinopril 2.5 mg.  A chest x-ray was normal.  Pulmonary function tests showed about 70% FEV1 and FVC.  DLCO was 78% of predicted value.  Thyroid function tests were normal.  White count was 1,500, hemoglobin 11.7 and platelets 219,000.  Electrolyte panel was normal.  Liver function tests were normal.  LDH was 215.  PTT and INR were within the normal range.       ASSESSMENT AND PLAN:  Chemotherapy-responsive classical Hodgkin's disease, now in apparent complete remission.  Pending resolution of the issue of whether she needs bronchoscopy prior to transplant or not, we will plan on having her doing growth factor mobilization with G-CSF and Mozobil given her extensive pre-treatment history.  I spent a total of 70 minutes face-to-face with the patient, 60 of which were in counseling regarding the upcoming transplant.  We went through a typical transplant calendar.  I  told her she would have roughly a week of chemotherapy during which the major complications would be nausea and vomiting.  Subsequently she will develop severe neutropenia sometime during the second week after transplant and there would be a 3-5% chance of dying from an overwhelming infection during this critical period.  It is highly likely she will also develop mucositis.  The major risk of the transplant is over when white count recovers.  There is an approximately 1% chance of toxicity to some other critical organ occurring such as the heart, liver or lungs.  There is, unfortunately, an approximately 50% chance of relapse given her chemo-refractory nature.  Because she is at high risk, I would recommend post-transplant maintenance with brentuximab for 16 cycles if possible, the neuropathy permitting.  She and her daughter asked a number of questions which I answered to the best of my abilities.  I am left with the impression that Steph has a good understanding of the risks and benefits of this procedure and is eager to get started.

## 2017-02-08 NOTE — LETTER
2/8/2017       RE: Steph Agee  [address] [facility] [address]     Dear Colleague,    Thank you for referring your patient, Steph Agee, to the [facility] BLOOD AND MARROW TRANSPLANT at [facility]. Please see a copy of my visit note below.    REASON FOR VISIT:  I saw Steph Agee today for a summary of the pre-transplant workup prior to planned autologous peripheral blood stem cell transplant for relapsed chemotherapy-responsive Hodgkin's disease.      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  She staged out as IIB with disease above the diaphragm.  Biopsy of a left axillary node confirmed that this was classical Hodgkin's.  She subsequently had 6 cycles of ABVD followed by external beam radiation.  She then did well until 05/2016, when she again developed heaviness in the chest and recurrence of night sweats.  PET/CT showed recurrence of disease predominantly in the manubrium and sternum with extension into the left pectoralis muscle.  A biopsy confirmed relapse.  She then underwent a total of 4 cycles of ICE chemotherapy, which she tolerated well.  She then came for pre-transplant evaluation in 10/2016.  Unfortunately, restaging labs showed a progression with a new 1-cm, hypermetabolic, left cardiophrenic lymph node.  This was biopsied by Interventional Radiology, and the biopsy confirmed recurrence of her Hodgkin's disease.  She subsequently has had 4 cycles of brentuximab as a bridge to transplant.  She now presents for restaging and pre-transplant evaluation.       REVIEW OF SYSTEMS:  Steph continues to have some mild night sweats that she attributes to having developed menopause secondary to the chemotherapy.  She has no fevers, chills, nausea, vomiting, diarrhea, cough, hemoptysis, shortness of breath, hematuria, dysuria, bruising or bleeding.  She does have some neuropathy involving predominantly  the fingers and toes.  She takes gabapentin with good relief of these symptoms.  She has no problems with balance.  The remainder of the review of systems is negative.       PHYSICAL EXAMINATION:   GENERAL:  She looked fit.   VITAL SIGNS:  Unremarkable.   HEENT:  Sclerae were anicteric and non-injected.  Buccal mucosa was intact.   LYMPH:  No supraclavicular, cervical, axillary or inguinal adenopathy.   LUNGS:  Clear to auscultation.   CARDIAC:  Without S3, rub or murmur.   ABDOMEN:  Nondistended, active bowel sounds, no organomegaly.   EXTREMITIES:  Trace pedal edema.   SKIN:  No skin rash.      LABORATORY DATA:  Her restaging labs include a PET/CT scan which showed no evidence of active disease but did show some infiltrates.  The patient did have influenza-like symptoms in January and developed a left pleural effusion at that time and required a 2-week course of antibiotics.  Her symptoms have now completely resolved.  She was seen by Dr. Montoya in Pulmonary and he is in the process of reviewing the CT scan before making a final recommendation about possible bronchoscopy.  She had a bone marrow aspirate and biopsy that had no morphologic evidence of Hodgkin's disease.  MUGA showed a 52% ejection fraction.  She was seen by Cardiology, who performed an MRI cardiac study which showed an ejection fraction of 47%.  They recommended starting lisinopril 2.5 mg.  A chest x-ray was normal.  Pulmonary function tests showed about 70% FEV1 and FVC.  DLCO was 78% of predicted value.  Thyroid function tests were normal.  White count was 1,500, hemoglobin 11.7 and platelets 219,000.  Electrolyte panel was normal.  Liver function tests were normal.  LDH was 215.  PTT and INR were within the normal range.       ASSESSMENT AND PLAN:  Chemotherapy-responsive classical Hodgkin's disease, now in apparent complete remission.  Pending resolution of the issue of whether she needs bronchoscopy prior to transplant or not, we will plan on  having her doing growth factor mobilization with G-CSF and Mozobil given her extensive pre-treatment history.  I spent a total of 70 minutes face-to-face with the patient, 60 of which were in counseling regarding the upcoming transplant.  We went through a typical transplant calendar.  I told her she would have roughly a week of chemotherapy during which the major complications would be nausea and vomiting.  Subsequently she will develop severe neutropenia sometime during the second week after transplant and there would be a 3-5% chance of dying from an overwhelming infection during this critical period.  It is highly likely she will also develop mucositis.  The major risk of the transplant is over when white count recovers.  There is an approximately 1% chance of toxicity to some other critical organ occurring such as the heart, liver or lungs.  There is, unfortunately, an approximately 50% chance of relapse given her chemo-refractory nature.  Because she is at high risk, I would recommend post-transplant maintenance with brentuximab for 16 cycles if possible, the neuropathy permitting.  She and her daughter asked a number of questions which I answered to the best of my abilities.  I am left with the impression that Steph has a good understanding of the risks and benefits of this procedure and is eager to get started.         Again, thank you for allowing me to participate in the care of your patient.      Sincerely,    Obed Chambers MD

## 2017-02-09 DIAGNOSIS — Z86.2 PERSONAL HISTORY OF DISEASES OF BLOOD AND BLOOD-FORMING ORGANS: ICD-10-CM

## 2017-02-09 DIAGNOSIS — C81.90 HODGKIN DISEASE (H): Primary | ICD-10-CM

## 2017-02-09 RX ORDER — VENLAFAXINE HYDROCHLORIDE 75 MG/1
75 CAPSULE, EXTENDED RELEASE ORAL DAILY
Qty: 30 CAPSULE | Refills: 3 | Status: ON HOLD | OUTPATIENT
Start: 2017-02-09 | End: 2017-03-23

## 2017-02-09 NOTE — PROGRESS NOTES
Spoke with patient regarding upcoming appointments. Instructed patient to check in on the 2nd floor [facility] for growth factor at 9:15 on 2/11, 9:30 on 2/12, 9:45 on 2/13 and 11:30 on 2/14. Patient will have central line placed on 2/14 at 12:30 on 5th floor, instructed pt to remain NPO after midnight and wash with hibacleanse twice on 2/13 and once on 2/14. Patient will need a  on 2/14. Collections/growth factor will be at 8:00 am 2/15 until pt is done collecting. Reminded patient to take claritin for bone pain associated with growth factor. Admission for transplant will be sometime the week of 2/21. Patient verbalized understanding and had no further questions.

## 2017-02-11 ENCOUNTER — OFFICE VISIT (OUTPATIENT)
Dept: TRANSPLANT | Facility: CLINIC | Age: 42
End: 2017-02-11
Attending: INTERNAL MEDICINE
Payer: COMMERCIAL

## 2017-02-11 VITALS
OXYGEN SATURATION: 99 % | WEIGHT: 182.5 LBS | RESPIRATION RATE: 16 BRPM | TEMPERATURE: 96.5 F | HEART RATE: 78 BPM | BODY MASS INDEX: 31.31 KG/M2 | SYSTOLIC BLOOD PRESSURE: 102 MMHG | DIASTOLIC BLOOD PRESSURE: 47 MMHG

## 2017-02-11 DIAGNOSIS — Z29.9 NEED FOR PROPHYLACTIC MEASURE: Primary | ICD-10-CM

## 2017-02-11 DIAGNOSIS — C81.18 NODULAR SCLEROSIS HODGKIN LYMPHOMA OF LYMPH NODES OF MULTIPLE REGIONS (H): ICD-10-CM

## 2017-02-11 PROCEDURE — 25000128 H RX IP 250 OP 636: Mod: ZF | Performed by: INTERNAL MEDICINE

## 2017-02-11 PROCEDURE — 96372 THER/PROPH/DIAG INJ SC/IM: CPT

## 2017-02-11 RX ADMIN — FILGRASTIM 900 MCG: 300 INJECTION, SOLUTION INTRAVENOUS; SUBCUTANEOUS at 10:10

## 2017-02-11 ASSESSMENT — PAIN SCALES - GENERAL: PAINLEVEL: NO PAIN (0)

## 2017-02-11 NOTE — NURSING NOTE
Chief Complaint   Patient presents with     RECHECK     pt with lymophoma here for GCSF inj     Pt tolerated inj well, observed x 20 min no rxn, aware of future visits

## 2017-02-12 ENCOUNTER — OFFICE VISIT (OUTPATIENT)
Dept: TRANSPLANT | Facility: CLINIC | Age: 42
End: 2017-02-12
Attending: INTERNAL MEDICINE
Payer: COMMERCIAL

## 2017-02-12 VITALS
DIASTOLIC BLOOD PRESSURE: 68 MMHG | HEART RATE: 64 BPM | SYSTOLIC BLOOD PRESSURE: 102 MMHG | WEIGHT: 180.9 LBS | TEMPERATURE: 98.4 F | BODY MASS INDEX: 31.05 KG/M2 | OXYGEN SATURATION: 99 % | RESPIRATION RATE: 18 BRPM

## 2017-02-12 DIAGNOSIS — C81.18 NODULAR SCLEROSIS HODGKIN LYMPHOMA OF LYMPH NODES OF MULTIPLE REGIONS (H): ICD-10-CM

## 2017-02-12 DIAGNOSIS — Z29.9 NEED FOR PROPHYLACTIC MEASURE: Primary | ICD-10-CM

## 2017-02-12 PROCEDURE — 96372 THER/PROPH/DIAG INJ SC/IM: CPT

## 2017-02-12 PROCEDURE — 25000128 H RX IP 250 OP 636: Mod: ZF | Performed by: INTERNAL MEDICINE

## 2017-02-12 RX ADMIN — FILGRASTIM 900 MCG: 300 INJECTION, SOLUTION INTRAVENOUS; SUBCUTANEOUS at 10:02

## 2017-02-12 NOTE — NURSING NOTE
Chief Complaint   Patient presents with     Imm/Inj     pre bmt for hodgkin lymphoma - here for gcsf injection     Patient here for injection only - not first injection and tolerated well  0 minutes face to face time due to scheduled procedure today

## 2017-02-13 ENCOUNTER — OFFICE VISIT (OUTPATIENT)
Dept: TRANSPLANT | Facility: CLINIC | Age: 42
End: 2017-02-13
Attending: INTERNAL MEDICINE
Payer: COMMERCIAL

## 2017-02-13 ENCOUNTER — ANESTHESIA EVENT (OUTPATIENT)
Dept: SURGERY | Facility: AMBULATORY SURGERY CENTER | Age: 42
End: 2017-02-13

## 2017-02-13 VITALS
TEMPERATURE: 98.7 F | OXYGEN SATURATION: 97 % | BODY MASS INDEX: 30.69 KG/M2 | DIASTOLIC BLOOD PRESSURE: 62 MMHG | WEIGHT: 178.8 LBS | HEART RATE: 106 BPM | SYSTOLIC BLOOD PRESSURE: 106 MMHG | RESPIRATION RATE: 16 BRPM

## 2017-02-13 DIAGNOSIS — Z86.2 PERSONAL HISTORY OF DISEASES OF BLOOD AND BLOOD-FORMING ORGANS: Primary | ICD-10-CM

## 2017-02-13 DIAGNOSIS — Z29.9 NEED FOR PROPHYLACTIC MEASURE: ICD-10-CM

## 2017-02-13 DIAGNOSIS — C81.18 NODULAR SCLEROSIS HODGKIN LYMPHOMA OF LYMPH NODES OF MULTIPLE REGIONS (H): ICD-10-CM

## 2017-02-13 LAB — COPATH REPORT: NORMAL

## 2017-02-13 PROCEDURE — 25000128 H RX IP 250 OP 636: Mod: ZF | Performed by: INTERNAL MEDICINE

## 2017-02-13 PROCEDURE — 96372 THER/PROPH/DIAG INJ SC/IM: CPT

## 2017-02-13 RX ADMIN — FILGRASTIM 900 MCG: 300 INJECTION, SOLUTION INTRAVENOUS; SUBCUTANEOUS at 10:02

## 2017-02-13 NOTE — MR AVS SNAPSHOT
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  After hours call the   hospital at [phone] or   [phone]. Ask for the BMT   fellow for pediatric or adult patients            Follow-ups after your visit        Your next 10 appointments already scheduled     Feb 14, 2017 11:30 AM CST   [facility] Draw with  [facility] DRAW   [facility] Draw ([facility])    [address] [place] [id number] [phone]            Feb 14, 2017 12:00 PM CST   BMT Injection with  Bmt Nurse 1   [facility] Blood and Marrow Transplant ([facility])    [address] [place] [id number] [phone]            Feb 14, 2017   Procedure with Michael Sy PA-C   [facility] ([facility])    [address] [place] [address]  [address] [employer] [id number] [phone]           Located in the Clinics and Surgery Center at [address] [place] [address].   parking is very convenient and highly recommended.  is a $6 flat rate fee; no tips accepted.  Both  and self parkers should enter the main arrival plaza from [facility]; parking  attendants will direct you based on your parking preference.            Feb 14, 2017  2:30 PM CST   Return with UC BMT CECILIA #4   [facility] Blood and Marrow Transplant ([facility])    [address] [address]   [phone]            Feb 15, 2017  8:00 AM CST   Return with UC BMT CECILIA #1   [facility] Blood and Marrow Transplant ([facility])    [address] [address]   [phone]            Feb 15, 2017  9:00 AM CST   (Arrive by 8:45 AM)   MNC Collection with UC APHERESIS RN7, UC 37 ATC   [facility] ([facility])    [address] [address]   [phone]            Feb 16, 2017  8:00 AM CST   Return with UC BMT CECILIA #1   [facility] Blood and Marrow Transplant ([facility])    [address] [place] [address]   [phone]            Feb 16, 2017  9:00 AM CST   (Arrive by 8:45 AM)   MNC Collection with UC APHERESIS RN6, UC 37 ATC   [facility] ([facility])    [address] [place] [id number] [phone]              Who to contact     If you have questions or need follow up information about today's clinic visit or your schedule please contact [facility] BLOOD AND MARROW TRANSPLANT directly at [phone].  Normal or non-critical lab and imaging results will be communicated to you by MyChart, letter or phone within 4 business days after the clinic has received the results. If you do not hear from us within 7 days, please contact the clinic through [employer] or phone. If you have a critical or abnormal lab result, we will notify you by phone as soon as possible.  Submit refill requests through [employer] or call your pharmacy and they will forward the refill request to us. Please allow  3 business days for your refill to be completed.          Additional Information About Your Visit        [employer] Information     [employer] gives you secure access to your electronic health record. If you see a primary care provider, you can also send messages to your care team and make appointments. If you have questions, please call your primary care clinic.  If you do not have a primary care provider, please call [phone] and they will assist you.        Care EveryWhere ID     This is your Care EveryWhere ID. This could be used by other organizations to access your [facility] medical records  [id number]        Your Vitals Were     Pulse Temperature Respirations Pulse Oximetry BMI (Body Mass Index)       106 98.7  F (37.1  C) (Oral) 16 97% 30.69 kg/m2        Blood Pressure from Last 3 Encounters:   02/13/17 106/62   02/12/17 102/68   02/11/17 102/47    Weight from Last 3 Encounters:   02/13/17 81.1 kg (178 lb 12.8 oz)   02/12/17 82.1 kg (180 lb 14.4 oz)   02/11/17 82.8 kg (182 lb 8 oz)              We Performed the Following     CBC with platelets differential     CD34 Stem cell assay        Recent Review Flowsheet Data     BMT Recent Results Latest Ref Rng & Units 10/5/2016 10/6/2016 10/7/2016 10/13/2016 2/1/2017 2/2/2017 2/3/2017    WBC 4.0 - 11.0 10e9/L 2.9(L) - 3.3(L) - 1.5(L) - 2.3(L)    Hemoglobin 11.7 - 15.7 g/dL 7.2(L) 7.3(L) 8.2(L) - 11.2(L) - 11.2(L)    Platelet Count 150 - 450 10e9/L 161 - 239 - 219 - 232    Neutrophils (Absolute) 1.6 - 8.3 10e9/L 2.0 - 2.1 - 0.7(L) - 1.1(L)    INR 0.86 - 1.14 0.99 - - Canceled: Specimen improperly labeled. Laboratory value report is not on this  patient.  NOTIFIED MARQUITA INGRAM 10/13/16 RJ  CORRECTED ON 10/13 AT 1450: PREVIOUSLY REPORTED AS 1.17  0.87 - -    Sodium 133 - 144 mmol/L 143 - - - 142 - -    Potassium 3.4 - 5.3 mmol/L 4.3 - - - 4.2 - -    Chloride 94 - 109 mmol/L 110(H) - - - 108 - -    Glucose 70 - 99 mg/dL 78 98 - - 79 79 -    Urea Nitrogen 7 - 30 mg/dL 9  - - - 8 - -    Creatinine 0.52 - 1.04 mg/dL 0.69 - - - 0.66 - -    Calcium (Total) 8.5 - 10.1 mg/dL 8.2(L) - - - 8.4(L) - -    Protein (Total) 6.8 - 8.8 g/dL 6.4(L) - - - 7.0 - -    Albumin 3.4 - 5.0 g/dL 3.1(L) - - - 3.2(L) - -    Alkaline Phosphatase 40 - 150 U/L 138 - - - 138 - -    AST 0 - 45 U/L 23 - - - 31 - -    ALT 0 - 50 U/L 30 - - - 38 - -    MCV 78 - 100 fl 99 - 102(H) - 101(H) - 100               Primary Care Provider    Physician No Ref-Primary       No address on file        Thank you!     Thank you for choosing [facility] BLOOD AND MARROW TRANSPLANT  for your care. Our goal is always to provide you with excellent care. Hearing back from our patients is one way we can continue to improve our services. Please take a few minutes to complete the written survey that you may receive in the mail after your visit with us. Thank you!             Your Updated Medication List - Protect others around you: Learn how to safely use, store and throw away your medicines at www.disposemymeds.org.          This list is accurate as of: 2/13/17 10:06 AM.  Always use your most recent med list.                   Brand Name Dispense Instructions for use    gabapentin 100 MG capsule    NEURONTIN    90 capsule    Take 2 capsules (200 mg) by mouth 3 times daily       lisinopril 2.5 MG tablet    PRINIVIL/Zestril    30 tablet    Take 1 tablet (2.5 mg) by mouth daily       LORAZEPAM PO      Take 0.25 mg by mouth every 4 hours as needed for anxiety       venlafaxine 75 MG 24 hr capsule    EFFEXOR-XR    30 capsule    Take 1 capsule (75 mg) by mouth daily

## 2017-02-13 NOTE — NURSING NOTE
BMT Heme Malignancy Rooming Note    Steph LUNDBERG Anuel - 2/13/2017 9:50 AM     Chief Complaint   Patient presents with     RECHECK     GCSF- HODGKINS LYMPHOMA        There were no vitals taken for this visit.     Medications reviewed: Yes    Labs drawn: No    Dressing changed: No     Medications given: Yes - See MAR    Staff time:0    Additional information if applicable: n/a    MACIE ANDUJAR CMA

## 2017-02-14 ENCOUNTER — APPOINTMENT (OUTPATIENT)
Dept: LAB | Facility: CLINIC | Age: 42
End: 2017-02-14
Attending: INTERNAL MEDICINE
Payer: COMMERCIAL

## 2017-02-14 ENCOUNTER — HOSPITAL ENCOUNTER (OUTPATIENT)
Facility: AMBULATORY SURGERY CENTER | Age: 42
End: 2017-02-14
Attending: PHYSICIAN ASSISTANT

## 2017-02-14 ENCOUNTER — ANESTHESIA (OUTPATIENT)
Dept: SURGERY | Facility: AMBULATORY SURGERY CENTER | Age: 42
End: 2017-02-14

## 2017-02-14 ENCOUNTER — CARE COORDINATION (OUTPATIENT)
Dept: CARDIOLOGY | Facility: CLINIC | Age: 42
End: 2017-02-14

## 2017-02-14 ENCOUNTER — ONCOLOGY VISIT (OUTPATIENT)
Dept: TRANSPLANT | Facility: CLINIC | Age: 42
End: 2017-02-14
Attending: STUDENT IN AN ORGANIZED HEALTH CARE EDUCATION/TRAINING PROGRAM
Payer: COMMERCIAL

## 2017-02-14 ENCOUNTER — OFFICE VISIT (OUTPATIENT)
Dept: TRANSPLANT | Facility: CLINIC | Age: 42
End: 2017-02-14
Attending: INTERNAL MEDICINE
Payer: COMMERCIAL

## 2017-02-14 VITALS
BODY MASS INDEX: 30.55 KG/M2 | SYSTOLIC BLOOD PRESSURE: 99 MMHG | HEART RATE: 79 BPM | TEMPERATURE: 97.9 F | WEIGHT: 178 LBS | OXYGEN SATURATION: 96 % | DIASTOLIC BLOOD PRESSURE: 62 MMHG

## 2017-02-14 VITALS
OXYGEN SATURATION: 99 % | WEIGHT: 178 LBS | TEMPERATURE: 97.5 F | RESPIRATION RATE: 16 BRPM | HEIGHT: 64 IN | SYSTOLIC BLOOD PRESSURE: 101 MMHG | BODY MASS INDEX: 30.39 KG/M2 | DIASTOLIC BLOOD PRESSURE: 65 MMHG

## 2017-02-14 DIAGNOSIS — Z86.2 PERSONAL HISTORY OF DISEASES OF BLOOD AND BLOOD-FORMING ORGANS: ICD-10-CM

## 2017-02-14 DIAGNOSIS — C81.18 NODULAR SCLEROSIS HODGKIN LYMPHOMA OF LYMPH NODES OF MULTIPLE REGIONS (H): ICD-10-CM

## 2017-02-14 DIAGNOSIS — T45.1X5A CHEMOTHERAPY INDUCED CARDIOMYOPATHY (H): Primary | ICD-10-CM

## 2017-02-14 DIAGNOSIS — Z29.9 NEED FOR PROPHYLACTIC MEASURE: ICD-10-CM

## 2017-02-14 DIAGNOSIS — I42.7 CHEMOTHERAPY INDUCED CARDIOMYOPATHY (H): Primary | ICD-10-CM

## 2017-02-14 DIAGNOSIS — C81.99 HODGKIN'S DISEASE OF EXTRANODAL OR SOLID ORGAN SITE (H): Primary | ICD-10-CM

## 2017-02-14 LAB
ASR COMMENT: NORMAL
CD34 PROGEN CELLS: NORMAL %
CD34 STEM CELL ASSAY INTERP: NORMAL
CELL THERAPY PRODUCT NUMBER: NORMAL
ERYTHROCYTE [DISTWIDTH] IN BLOOD BY AUTOMATED COUNT: 17.2 % (ref 10–15)
HCG SERPL QL: NEGATIVE
HCT VFR BLD AUTO: 34.4 % (ref 35–47)
HGB BLD-MCNC: 11.2 G/DL (ref 11.7–15.7)
IFC SPECIMEN: NORMAL
INR PPP: 0.94 (ref 0.86–1.14)
MCH RBC QN AUTO: 33.1 PG (ref 26.5–33)
MCHC RBC AUTO-ENTMCNC: 32.6 G/DL (ref 31.5–36.5)
MCV RBC AUTO: 102 FL (ref 78–100)
PLATELET # BLD AUTO: 178 10E9/L (ref 150–450)
RBC # BLD AUTO: 3.38 10E12/L (ref 3.8–5.2)
WBC # BLD AUTO: 22.6 10E9/L (ref 4–11)

## 2017-02-14 PROCEDURE — 85027 COMPLETE CBC AUTOMATED: CPT | Performed by: PHYSICIAN ASSISTANT

## 2017-02-14 PROCEDURE — 96372 THER/PROPH/DIAG INJ SC/IM: CPT

## 2017-02-14 PROCEDURE — 85610 PROTHROMBIN TIME: CPT | Performed by: PHYSICIAN ASSISTANT

## 2017-02-14 PROCEDURE — 25000128 H RX IP 250 OP 636: Mod: ZF

## 2017-02-14 PROCEDURE — 84703 CHORIONIC GONADOTROPIN ASSAY: CPT | Performed by: PHYSICIAN ASSISTANT

## 2017-02-14 PROCEDURE — 88184 FLOWCYTOMETRY/ TC 1 MARKER: CPT | Performed by: STUDENT IN AN ORGANIZED HEALTH CARE EDUCATION/TRAINING PROGRAM

## 2017-02-14 PROCEDURE — 25000128 H RX IP 250 OP 636: Mod: ZF | Performed by: INTERNAL MEDICINE

## 2017-02-14 RX ORDER — ACETAMINOPHEN 325 MG/1
975 TABLET ORAL ONCE
Status: COMPLETED | OUTPATIENT
Start: 2017-02-14 | End: 2017-02-14

## 2017-02-14 RX ORDER — PROPOFOL 10 MG/ML
INJECTION, EMULSION INTRAVENOUS PRN
Status: DISCONTINUED | OUTPATIENT
Start: 2017-02-14 | End: 2017-02-14

## 2017-02-14 RX ORDER — HEPARIN SODIUM (PORCINE) LOCK FLUSH IV SOLN 100 UNIT/ML 100 UNIT/ML
3 SOLUTION INTRAVENOUS
Status: DISCONTINUED | OUTPATIENT
Start: 2017-02-14 | End: 2017-02-15 | Stop reason: HOSPADM

## 2017-02-14 RX ORDER — SODIUM CHLORIDE, SODIUM LACTATE, POTASSIUM CHLORIDE, CALCIUM CHLORIDE 600; 310; 30; 20 MG/100ML; MG/100ML; MG/100ML; MG/100ML
INJECTION, SOLUTION INTRAVENOUS CONTINUOUS
Status: DISCONTINUED | OUTPATIENT
Start: 2017-02-14 | End: 2017-02-15 | Stop reason: HOSPADM

## 2017-02-14 RX ORDER — HEPARIN SODIUM (PORCINE) LOCK FLUSH IV SOLN 100 UNIT/ML 100 UNIT/ML
SOLUTION INTRAVENOUS PRN
Status: DISCONTINUED | OUTPATIENT
Start: 2017-02-14 | End: 2017-02-14 | Stop reason: HOSPADM

## 2017-02-14 RX ORDER — HEPARIN SODIUM (PORCINE) LOCK FLUSH IV SOLN 100 UNIT/ML 100 UNIT/ML
3 SOLUTION INTRAVENOUS EVERY 24 HOURS
Status: DISCONTINUED | OUTPATIENT
Start: 2017-02-14 | End: 2017-02-15 | Stop reason: HOSPADM

## 2017-02-14 RX ORDER — LISINOPRIL 2.5 MG/1
2.5 TABLET ORAL DAILY
Qty: 30 TABLET | Refills: 3 | COMMUNITY
Start: 2017-02-14 | End: 2017-02-15

## 2017-02-14 RX ORDER — ONDANSETRON 2 MG/ML
4 INJECTION INTRAMUSCULAR; INTRAVENOUS EVERY 30 MIN PRN
Status: DISCONTINUED | OUTPATIENT
Start: 2017-02-14 | End: 2017-02-15 | Stop reason: HOSPADM

## 2017-02-14 RX ORDER — HEPARIN SODIUM (PORCINE) LOCK FLUSH IV SOLN 100 UNIT/ML 100 UNIT/ML
5 SOLUTION INTRAVENOUS EVERY 8 HOURS
Status: DISCONTINUED | OUTPATIENT
Start: 2017-02-14 | End: 2017-02-14 | Stop reason: HOSPADM

## 2017-02-14 RX ORDER — FENTANYL CITRATE 50 UG/ML
25-50 INJECTION, SOLUTION INTRAMUSCULAR; INTRAVENOUS
Status: DISCONTINUED | OUTPATIENT
Start: 2017-02-14 | End: 2017-02-15 | Stop reason: HOSPADM

## 2017-02-14 RX ORDER — HEPARIN SODIUM 1000 [USP'U]/ML
INJECTION, SOLUTION INTRAVENOUS; SUBCUTANEOUS PRN
Status: DISCONTINUED | OUTPATIENT
Start: 2017-02-14 | End: 2017-02-14 | Stop reason: HOSPADM

## 2017-02-14 RX ORDER — SODIUM CHLORIDE 9 MG/ML
INJECTION, SOLUTION INTRAVENOUS CONTINUOUS
Status: DISCONTINUED | OUTPATIENT
Start: 2017-02-14 | End: 2017-02-15 | Stop reason: HOSPADM

## 2017-02-14 RX ORDER — LIDOCAINE 40 MG/G
CREAM TOPICAL
Status: DISCONTINUED | OUTPATIENT
Start: 2017-02-14 | End: 2017-02-15 | Stop reason: HOSPADM

## 2017-02-14 RX ORDER — NALOXONE HYDROCHLORIDE 0.4 MG/ML
.1-.4 INJECTION, SOLUTION INTRAMUSCULAR; INTRAVENOUS; SUBCUTANEOUS
Status: DISCONTINUED | OUTPATIENT
Start: 2017-02-14 | End: 2017-02-15 | Stop reason: HOSPADM

## 2017-02-14 RX ORDER — KETOROLAC TROMETHAMINE 30 MG/ML
30 INJECTION, SOLUTION INTRAMUSCULAR; INTRAVENOUS EVERY 6 HOURS PRN
Status: DISCONTINUED | OUTPATIENT
Start: 2017-02-14 | End: 2017-02-15 | Stop reason: HOSPADM

## 2017-02-14 RX ORDER — MEPERIDINE HYDROCHLORIDE 25 MG/ML
12.5 INJECTION INTRAMUSCULAR; INTRAVENOUS; SUBCUTANEOUS
Status: DISCONTINUED | OUTPATIENT
Start: 2017-02-14 | End: 2017-02-15 | Stop reason: HOSPADM

## 2017-02-14 RX ORDER — SODIUM CHLORIDE, SODIUM LACTATE, POTASSIUM CHLORIDE, CALCIUM CHLORIDE 600; 310; 30; 20 MG/100ML; MG/100ML; MG/100ML; MG/100ML
INJECTION, SOLUTION INTRAVENOUS CONTINUOUS PRN
Status: DISCONTINUED | OUTPATIENT
Start: 2017-02-14 | End: 2017-02-14

## 2017-02-14 RX ORDER — ONDANSETRON 4 MG/1
4 TABLET, ORALLY DISINTEGRATING ORAL EVERY 30 MIN PRN
Status: DISCONTINUED | OUTPATIENT
Start: 2017-02-14 | End: 2017-02-15 | Stop reason: HOSPADM

## 2017-02-14 RX ADMIN — PROPOFOL 40 MG: 10 INJECTION, EMULSION INTRAVENOUS at 13:47

## 2017-02-14 RX ADMIN — PROPOFOL 30 MG: 10 INJECTION, EMULSION INTRAVENOUS at 13:50

## 2017-02-14 RX ADMIN — SODIUM CHLORIDE, PRESERVATIVE FREE 5 ML: 5 INJECTION INTRAVENOUS at 11:52

## 2017-02-14 RX ADMIN — PROPOFOL 40 MG: 10 INJECTION, EMULSION INTRAVENOUS at 14:03

## 2017-02-14 RX ADMIN — SODIUM CHLORIDE, SODIUM LACTATE, POTASSIUM CHLORIDE, CALCIUM CHLORIDE: 600; 310; 30; 20 INJECTION, SOLUTION INTRAVENOUS at 13:42

## 2017-02-14 RX ADMIN — ACETAMINOPHEN 975 MG: 325 TABLET ORAL at 13:35

## 2017-02-14 RX ADMIN — PROPOFOL 20 MG: 10 INJECTION, EMULSION INTRAVENOUS at 14:05

## 2017-02-14 RX ADMIN — PROPOFOL 20 MG: 10 INJECTION, EMULSION INTRAVENOUS at 14:13

## 2017-02-14 RX ADMIN — PROPOFOL 10 MG: 10 INJECTION, EMULSION INTRAVENOUS at 14:07

## 2017-02-14 RX ADMIN — PROPOFOL 40 MG: 10 INJECTION, EMULSION INTRAVENOUS at 14:08

## 2017-02-14 RX ADMIN — PROPOFOL 20 MG: 10 INJECTION, EMULSION INTRAVENOUS at 14:06

## 2017-02-14 RX ADMIN — PROPOFOL 20 MG: 10 INJECTION, EMULSION INTRAVENOUS at 14:16

## 2017-02-14 NOTE — PROGRESS NOTES
S-(situation): patient's EF on MRI was 47%, Dr. Mitchell wanted her started on a low dose ACE.Dr. Greco had ordered lisinopril 2.5 mg by mouth daily.  Spoke with patient today and she c/o of a dry cough, had thought she was getting a cold or that her cancer was coming back.     B-(background): Ms. Steph Agee is a 41-year old lady with a past medical history of Hodgkin's lymphoma who presented for evaluation of a decline in LVEF on MUGA.     A-(assessment): dry cough    R-(recommendations): will discuss with Dr. Mitchell changing ACE to ARB      February 15, 2017  Left message for Steph to call and leave a pharmacy name so losartan can be prescribed.  Date: 2/15/2017    Time of Call: 3:53 PM     Diagnosis:  At risk for cardiomyopathy     [ TORB ] Ordering provider: Dr. Sharyn Mitchell  Order: Losartan 12.5 mg by mouth daily, increase to 25 mg if she tolerates.     Order received by: Debra Hoang RN     Follow-up/additional notes: patient called back February 16, 2017 and is agreeable to the plan. Script sent to [facility] [facility]

## 2017-02-14 NOTE — PROGRESS NOTES
Chief Complaint   Patient presents with     Port Draw     Patient here today for labs to be drawn via her Port by RN. Vitals charted, and patient checked into her appt.     Marichuy Crespo RN

## 2017-02-14 NOTE — MR AVS SNAPSHOT
After Visit Summary   2/14/2017    Steph Agee    MRN: [id number]           Patient Information     Date Of Birth          1975        Visit Information        Provider Department      2/14/2017 2:30 PM  BMT CECILIA #4 [facility] Blood and Marrow Transplant        Today's Diagnoses     Personal history of diseases of blood and blood-forming organs              Clinics and Surgery Center ([facility])  [address] [place] [address]  Phone: [phone]  Clinic Hours:   Monday-Friday:    8am to 4:30pm   Weekends and holidays:    8am to noon (in general)  If your fever is 100.5  or greater,   call the clinic.  After hours call the   hospital at [phone] or   [phone]. Ask for the BMT   fellow for pediatric or adult patients            Follow-ups after your visit        Your next 10 appointments already scheduled     Feb 14, 2017   Procedure with Michael Sy PA-C   [facility] ([facility] [place] [facility])    [address] [place] [address]  5th [facility] [id number] [phone]           Located in the Clinics and Surgery Center at [address] [place] [address].   parking is very convenient and highly recommended.  is a $6 flat rate fee; no tips accepted.  Both  and self parkers should enter the main arrival plaza from [facility]; parking attendants will direct you based on your parking preference.            Feb 14, 2017  2:30 PM CST   Return with  BMT CECILIA #4   [facility] Marrow Transplant ([facility] [place] [facility])    [address] [place] [address]  2nd [facility] [address]   [phone]            Feb 15, 2017  8:00 AM CST   Return with  BMT CECILIA #1   [facility] Marrow Trans[facility] ([facility] [place] [facility])    [address] [place] [address] [phone]            Feb 15, 2017  9:00 AM CST   (Arrive by 8:45 AM)   MNC Collection with  UC APHERESIS RN7, UC 37 ATC   [facility] ([facility])    [address] [facility]  2nd Floor  [facility] [address]   [phone]            Feb 16, 2017  8:00 AM CST   Return with UC BMT CECILIA #1   [facility] Blood and Marrow Transplant ([facility])    [address] [facility]  2nd Floor  [facility] [address]   [phone]            Feb 16, 2017  9:00 AM CST   (Arrive by 8:45 AM)   MNC Collection with UC APHERESIS RN6, UC 37 ATC   [facility] ([facility])    [address] [facility]  2nd Floor  [facility] [address]   [phone]            Feb 17, 2017  8:00 AM CST   Return with UC BMT CECILIA #1   [facility] Blood and Marrow Transplant ([facility])    [address] [facility]  2nd [facility] [address]   [phone]            Feb 17, 2017  9:00 AM CST   (Arrive by 8:45 AM)   MNC Collection with UC APHERESIS RN6   [facility] ([facility])    [address] [facility]  2nd [facility] [address]   [phone]              Future tests that were ordered for you today     Open Future Orders        Priority Expected Expires Ordered    CBC with platelets differential Routine 2/15/2017 3/8/2017 2/14/2017    Basic metabolic panel Routine 2/15/2017 3/8/2017 2/14/2017    Magnesium Routine 2/15/2017 3/8/2017 2/14/2017            Who to contact     If you have questions or need follow up information about today's clinic visit or your schedule please contact [facility] directly at [phone].  Normal or non-critical lab and imaging results will be communicated to you by MyChart, letter or phone within 4 business days after the clinic has received the results. If you do not hear from us within 7 days, please contact the clinic through MyChart or phone. If you have a  critical or abnormal lab result, we will notify you by phone as soon as possible.  Submit refill requests through [employer] or call your pharmacy and they will forward the refill request to us. Please allow 3 business days for your refill to be completed.          Additional Information About Your Visit        [employer] Information     [employer] gives you secure access to your electronic health record. If you see a primary care provider, you can also send messages to your care team and make appointments. If you have questions, please call your primary care clinic.  If you do not have a primary care provider, please call [phone] and they will assist you.        Care EveryWhere ID     This is your Care EveryWhere ID. This could be used by other organizations to access your [place] medical records  [id number]         Blood Pressure from Last 3 Encounters:   02/14/17 106/67   02/14/17 99/62   02/13/17 106/62    Weight from Last 3 Encounters:   02/14/17 80.7 kg (178 lb)   02/14/17 80.7 kg (178 lb)   02/13/17 81.1 kg (178 lb 12.8 oz)              We Performed the Following     CD34 Stem cell assay        Recent Review Flowsheet Data     BMT Recent Results Latest Ref Rng & Units 10/6/2016 10/7/2016 10/13/2016 2/1/2017 2/2/2017 2/3/2017 2/14/2017    WBC 4.0 - 11.0 10e9/L - 3.3(L) - 1.5(L) - 2.3(L) 22.6(H)    Hemoglobin 11.7 - 15.7 g/dL 7.3(L) 8.2(L) - 11.2(L) - 11.2(L) 11.2(L)    Platelet Count 150 - 450 10e9/L - 239 - 219 - 232 178    Neutrophils (Absolute) 1.6 - 8.3 10e9/L - 2.1 - 0.7(L) - 1.1(L) -    INR 0.86 - 1.14 - - Canceled: Specimen improperly labeled. Laboratory value report is not on this  patient.  NOTIFIED MARQUITA INGRAM 10/13/16 RJ  CORRECTED ON 10/13 AT 1450: PREVIOUSLY REPORTED AS 1.17  0.87 - - 0.94    Sodium 133 - 144 mmol/L - - - 142 - - -    Potassium 3.4 - 5.3 mmol/L - - - 4.2 - - -    Chloride 94 - 109 mmol/L - - - 108 - - -    Glucose 70 - 99 mg/dL 98 - - 79 79 - -    Urea Nitrogen 7 - 30 mg/dL - - -  8 - - -    Creatinine 0.52 - 1.04 mg/dL - - - 0.66 - - -    Calcium (Total) 8.5 - 10.1 mg/dL - - - 8.4(L) - - -    Protein (Total) 6.8 - 8.8 g/dL - - - 7.0 - - -    Albumin 3.4 - 5.0 g/dL - - - 3.2(L) - - -    Alkaline Phosphatase 40 - 150 U/L - - - 138 - - -    AST 0 - 45 U/L - - - 31 - - -    ALT 0 - 50 U/L - - - 38 - - -    MCV 78 - 100 fl - 102(H) - 101(H) - 100 102(H)               Primary Care Provider    Physician No Ref-Primary       No address on file        Thank you!     Thank you for choosing [facility] BLOOD AND MARROW TRANSPLANT  for your care. Our goal is always to provide you with excellent care. Hearing back from our patients is one way we can continue to improve our services. Please take a few minutes to complete the written survey that you may receive in the mail after your visit with us. Thank you!             Your Updated Medication List - Protect others around you: Learn how to safely use, store and throw away your medicines at www.disposemymeds.org.          This list is accurate as of: 2/14/17  1:00 PM.  Always use your most recent med list.                   Brand Name Dispense Instructions for use    gabapentin 100 MG capsule    NEURONTIN    90 capsule    Take 2 capsules (200 mg) by mouth 3 times daily       lisinopril 2.5 MG tablet    PRINIVIL/Zestril    30 tablet    Take 1 tablet (2.5 mg) by mouth daily       LORAZEPAM PO      Take 0.25 mg by mouth every 4 hours as needed for anxiety       venlafaxine 75 MG 24 hr capsule    EFFEXOR-XR    30 capsule    Take 1 capsule (75 mg) by mouth daily

## 2017-02-14 NOTE — MR AVS SNAPSHOT
After Visit Summary   2/14/2017    Steph Agee    MRN: [id number]           Patient Information     Date Of Birth          1975        Visit Information        Provider Department      2/14/2017 12:00 PM 1, Liane Bmt Nurse 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  After hours call the   hospital at [phone] or   [phone]. Ask for the BMT   fellow for pediatric or adult patients            Follow-ups after your visit        Your next 10 appointments already scheduled     Feb 14, 2017   Procedure with Michael Sy PA-C   [facility] ([facility] [place])    [address] [place] [address]  5th [facility] [address] [phone]           Located in the Clinics [facility] Center at [address] [place] [address].   parking is very convenient and highly recommended.  is a $6 flat rate fee; no tips accepted.  Both  and self parkers should enter the main arrival plaza from [facility]; parking attendants will direct you based on your parking preference.            Feb 14, 2017  2:30 PM CST   Return with  BMT CECILIA #4   [facility] Blood and Marrow Transplant ([facility] [place])    [address] [place] [address]  2nd [facility] [id number] [phone]            Feb 15, 2017  8:00 AM CST   Return with  BMT CECILIA #1   [facility] Blood and Marrow Transplant ([facility])    [address] [place] [address]  [facility] [address]   [phone]            Feb 15, 2017  9:00 AM CST   (Arrive by 8:45 AM)   MNC Collection with UC APHERESIS RN7, UC 37 ATC   [facility] ([facility])    [address] [address]   [phone]            Feb 16, 2017  8:00 AM CST   Return with UC BMT CECILIA #1   [facility] Blood and Marrow Transplant ([facility])    [address] [address]   [phone]            Feb 16, 2017  9:00 AM CST   (Arrive by 8:45 AM)   MNC Collection with UC APHERESIS RN6, UC 37 ATC   [facility] ([facility])    [address] [address]   [phone]            Feb 17, 2017  8:00 AM CST   Return with UC BMT CECILIA #1   [facility] Blood and Marrow Transplant ([facility])    [address] [address]   [phone]            Feb 17, 2017  9:00 AM CST   (Arrive by 8:45 AM)   MNC Collection with UC APHERESIS RN6   [facility] ([facility])    [address] [place] [address]   [phone]              Who to contact     If you have questions or need follow up information about today's clinic visit or your schedule please contact [facility] BLOOD AND MARROW TRANSPLANT directly at [phone].  Normal or non-critical lab and imaging results will be communicated to you by MyChart, letter or phone within 4 business days after the clinic has received the results. If you do not hear from us within 7 days, please contact the clinic through MyChart or phone. If you have a critical or abnormal lab result, we will notify you by phone as soon as possible.  Submit refill requests through [employer] or call your pharmacy and they will forward the refill  request to us. Please allow 3 business days for your refill to be completed.          Additional Information About Your Visit        [employer] Information     [employer] gives you secure access to your electronic health record. If you see a primary care provider, you can also send messages to your care team and make appointments. If you have questions, please call your primary care clinic.  If you do not have a primary care provider, please call [phone] and they will assist you.        Care EveryWhere ID     This is your Care EveryWhere ID. This could be used by other organizations to access your [place] medical records  [id number]        Your Vitals Were     Pulse Temperature Pulse Oximetry BMI (Body Mass Index)          79 97.9  F (36.6  C) 96% 30.55 kg/m2         Blood Pressure from Last 3 Encounters:   02/14/17 99/62   02/13/17 106/62   02/12/17 102/68    Weight from Last 3 Encounters:   02/14/17 80.7 kg (178 lb)   02/13/17 81.1 kg (178 lb 12.8 oz)   02/12/17 82.1 kg (180 lb 14.4 oz)              We Performed the Following     CBC with platelets     HCG, qualitative     INR        Recent Review Flowsheet Data     BMT Recent Results Latest Ref Rng & Units 10/6/2016 10/7/2016 10/13/2016 2/1/2017 2/2/2017 2/3/2017 2/14/2017    WBC 4.0 - 11.0 10e9/L - 3.3(L) - 1.5(L) - 2.3(L) 22.6(H)    Hemoglobin 11.7 - 15.7 g/dL 7.3(L) 8.2(L) - 11.2(L) - 11.2(L) 11.2(L)    Platelet Count 150 - 450 10e9/L - 239 - 219 - 232 178    Neutrophils (Absolute) 1.6 - 8.3 10e9/L - 2.1 - 0.7(L) - 1.1(L) -    INR 0.86 - 1.14 - - Canceled: Specimen improperly labeled. Laboratory value report is not on this  patient.  NOTIFIED MARQUITA INGRAM 10/13/16 RJ  CORRECTED ON 10/13 AT 1450: PREVIOUSLY REPORTED AS 1.17  0.87 - - -    Sodium 133 - 144 mmol/L - - - 142 - - -    Potassium 3.4 - 5.3 mmol/L - - - 4.2 - - -    Chloride 94 - 109 mmol/L - - - 108 - - -    Glucose 70 - 99 mg/dL 98 - - 79 79 - -    Urea Nitrogen 7 - 30 mg/dL - - - 8 - - -     Creatinine 0.52 - 1.04 mg/dL - - - 0.66 - - -    Calcium (Total) 8.5 - 10.1 mg/dL - - - 8.4(L) - - -    Protein (Total) 6.8 - 8.8 g/dL - - - 7.0 - - -    Albumin 3.4 - 5.0 g/dL - - - 3.2(L) - - -    Alkaline Phosphatase 40 - 150 U/L - - - 138 - - -    AST 0 - 45 U/L - - - 31 - - -    ALT 0 - 50 U/L - - - 38 - - -    MCV 78 - 100 fl - 102(H) - 101(H) - 100 102(H)               Primary Care Provider    Physician No Ref-Primary       No address on file        Thank you!     Thank you for choosing [facility] BLOOD AND MARROW TRANSPLANT  for your care. Our goal is always to provide you with excellent care. Hearing back from our patients is one way we can continue to improve our services. Please take a few minutes to complete the written survey that you may receive in the mail after your visit with us. Thank you!             Your Updated Medication List - Protect others around you: Learn how to safely use, store and throw away your medicines at www.disposemymeds.org.          This list is accurate as of: 2/14/17 12:31 PM.  Always use your most recent med list.                   Brand Name Dispense Instructions for use    gabapentin 100 MG capsule    NEURONTIN    90 capsule    Take 2 capsules (200 mg) by mouth 3 times daily       lisinopril 2.5 MG tablet    PRINIVIL/Zestril    30 tablet    Take 1 tablet (2.5 mg) by mouth daily       LORAZEPAM PO      Take 0.25 mg by mouth every 4 hours as needed for anxiety       venlafaxine 75 MG 24 hr capsule    EFFEXOR-XR    30 capsule    Take 1 capsule (75 mg) by mouth daily

## 2017-02-14 NOTE — IP AVS SNAPSHOT
MRN:[id number]                      After Visit Summary   2/14/2017    Steph Agee    MRN: [id number]           Thank you!     Thank you for choosing [facility] for your care. Our goal is always to provide you with excellent care. Hearing back from our patients is one way we can continue to improve our services. Please take a few minutes to complete the written survey that you may receive in the mail after you visit with us. Thank you!        Patient Information     Date Of Birth          1975        About your hospital stay     You were admitted on:  February 14, 2017 You last received care in the:  [facility]    You were discharged on:  February 14, 2017       Who to Call     For medical emergencies, please call 911.  For non-urgent questions about your medical care, please call your primary care provider or clinic, None  For questions related to your surgery, please call your surgery clinic        Attending Provider     Provider Michael Mcbride PA-C Radiology       Primary Care Provider    Physician No Ref-Primary       No address on file        Your next 10 appointments already scheduled     Feb 15, 2017  8:00 AM CST   Return with  BMT CECILIA #1   [facility] ([facility])    [address] [place] [id number]   [phone]            Feb 15, 2017  9:00 AM CST   (Arrive by 8:45 AM)   MNC Collection with  APHERESIS RN7,  37 ATC   [facility] ([facility])    [address] [place] [address]   [phone]            Feb 16, 2017  8:00 AM CST   Return with  BMT CECILIA #1   [facility] ([facility])    [address] [place] [id number]   [phone]            Feb 16, 2017  9:00 AM CST   (Arrive by 8:45 AM)   MNC  Collection with FRANCHESKA APHERESIS RN6, UC 37 ATC   [facility] ([facility])    [address] [facility] [facility] [address]   [phone]            Feb 17, 2017  8:00 AM CST   Return with  BMT CECILIA #1   [facility] ([facility])    [address] [address]   [phone]            Feb 17, 2017  9:00 AM CST   (Arrive by 8:45 AM)   MNC Collection with  APHERESIS RN6, UC 38 ATC   [facility] ([facility])    [address] [address]   [phone]            Aug 09, 2017 10:30 AM CDT   [facility] Draw with  [facility] DRAW   [facility] ([facility])    [address] [address] [phone]              Further instructions from your care team       Discharge Instructions following Vascular Access Device Placement    Today you had a vascular access device placed. Derma Elizabeth (Skin Glue) was used to cover the incision(s) where the device was inserted into your skin. Absorbable sutures were also used. If after 10 days there are visible sutures, they may be trimmed at the infusion clinic or by your primary doctor. At your next visit to the infusion clinic, nursing staff will instruct you on the maintenance and care of your device. If you have questions prior to this appointment, contact the Infusion Care Coordinator at [phone].    Site Care:  - Do not apply any ointment over the site. The thin layer of glue will wear off in 7-10 days. If still present after 10 days, you may gently remove it with soap and water.   - Do not soak in a tub bath, hot tub, or swimming pool if you have a central venous catheter. If you have a port, wait for 14 days before you submerge yourself under water.  -It is okay to  shower and get the insertion site wet, but immediately pat dry after showering. If you have a central venous catheter, or your port is accessed, completely cover the dressing with plastic to keep it dry.  -Do not put Emla cream over the port area for 7 days.  -If there is any oozing or bleeding from the site, apply direct pressure for 5-10 minutes with a gauze pad.  -If bleeding continues after 10 minutes, call your primary doctor. If bleeding can not be controlled with direct pressure, call 911.    Call your Doctor if:  -Bleeding, as above  -Swelling in your neck or arm on the side of the port insertion  -Sudden shortness of breath, light headedness, or palpitations  -Fever greater than 100.5 degrees F.  -Other signs of infection such as redness, tenderness, or drainage from the site    Additional Instructions:  -You may resume a regular diet  -No heavy lifting greater than 10 pounds for three days  -You may use ice packs 3-4 times per day for 15 minutes for minor swelling or pain  -If you are on Coumadin, restart tonight. Follow up with your Coumadin Clinic or [facility] MD to have your INR rechecked.      DID YOU RECEIVE SEDATION TODAY?  Yes    If you received sedation please follow these additional safety measures:  Sedation medicine, if given, may remain active for many hours. It is important for the next 24 hours that you do not:  -Drive a car  -Operate machines or power tools  -Consume alcohol  -Sign any important papers or legal documents  -You must have a responsible adult take you home and stay with you for a least 6 hours.    To contact a Doctor, call:  [phone] Monday thru Friday 8:00 am to 4:30 pm,   or Toll Free:  [phone]                            After hours:  dial [phone] and ask for the resident on call for:  Interventional Radiology  For emergency care, call the: [place]:  [phone] (TTY for hearing impaired: [phone])        Pending Results     Date and Time Order Name  "Status Description    2/14/2017 1327 XR SURGERY ESTIVEN FLUORO LESS THAN 5 MIN W STILLS In process     2/14/2017 1256 CD34 STEM CELL ASSAY In process             Admission Information     Date & Time Provider Department Dept. Phone    2/14/2017 Michael Sy PA-C [facility] [phone]      Your Vitals Were     Blood Pressure Temperature Respirations Height Weight Pulse Oximetry    101/65 97.5  F (36.4  C) (Oral) 16 1.626 m (5' 4\") 80.7 kg (178 lb) 99%    BMI (Body Mass Index)                   30.55 kg/m2           [facility]OR[facility] Information     [facility] gives you secure access to your electronic health record. If you see a primary care provider, you can also send messages to your care team and make appointments. If you have questions, please call your primary care clinic.  If you do not have a primary care provider, please call [phone] and they will assist you.      [facility]OR[facility] is an electronic gateway that provides easy, online access to your medical records. With [facility]OR[facility], you can request a clinic appointment, read your test results, renew a prescription or communicate with your care team.     To access your existing account, please contact your [facility] or call [phone] for assistance.        Care EveryWhere ID     This is your Care EveryWhere ID. This could be used by other organizations to access your [facility] medical records  [id number]           Review of your medicines      CONTINUE these medicines which have NOT CHANGED        Dose / Directions    gabapentin 100 MG capsule   Commonly known as:  NEURONTIN   Used for:  Personal history of diseases of blood and blood-forming organs        Dose:  200 mg   Take 2 capsules (200 mg) by mouth 3 times daily   Quantity:  90 capsule   Refills:  3       lisinopril 2.5 MG tablet   Commonly known as:  PRINIVIL/Zestril   Used for:  Personal history of diseases of blood and blood-forming organs        " Dose:  2.5 mg   Take 1 tablet (2.5 mg) by mouth daily   Quantity:  30 tablet   Refills:  3       LORAZEPAM PO        Dose:  0.25 mg   Take 0.25 mg by mouth every 4 hours as needed for anxiety   Refills:  0       venlafaxine 75 MG 24 hr capsule   Commonly known as:  EFFEXOR-XR   Used for:  Hodgkin disease (H), Personal history of diseases of blood and blood-forming organs        Dose:  75 mg   Take 1 capsule (75 mg) by mouth daily   Quantity:  30 capsule   Refills:  3                Protect others around you: Learn how to safely use, store and throw away your medicines at www.disposemymeds.org.             Medication List: This is a list of all your medications and when to take them. Check marks below indicate your daily home schedule. Keep this list as a reference.      Medications           Morning Afternoon Evening Bedtime As Needed    gabapentin 100 MG capsule   Commonly known as:  NEURONTIN   Take 2 capsules (200 mg) by mouth 3 times daily                                lisinopril 2.5 MG tablet   Commonly known as:  PRINIVIL/Zestril   Take 1 tablet (2.5 mg) by mouth daily                                LORAZEPAM PO   Take 0.25 mg by mouth every 4 hours as needed for anxiety                                venlafaxine 75 MG 24 hr capsule   Commonly known as:  EFFEXOR-XR   Take 1 capsule (75 mg) by mouth daily

## 2017-02-14 NOTE — PROGRESS NOTES
"BMT Daily Progress Note      Chief Complaint: Dry cough    HPI: Pt presents for day 4 gcsf. Will present to OR for line placement this afternoon. Only new issue is a cough that started a few days after lisinopril. Was concerned her cancer had come back, heard from pharmacist it may be related to \"that new med\". She has no other congestion. States the cough is sometimes slightly wet but no lee sputum. Has some shoulder and upper arm bone pain since gcsf injections started. No fevers, chills, no rashes bruises or bleeding.     ROS: 10-point ROS conducted with pertinent positive and negatives in HPI    Physical Exam:      Vital Signs: T 97.9, P 79, BP 99/62, SpO2 96%, Wt 80 kg  General Appearance: healthy, alert and no distress  Eyes: PERRL, conjunctiva and lids normal, sclera nonicteric  Ears/Nose/M/Throat: Oral mucosa and posterior oropharynx normal, moist mucous membranes  Cardio/Vascular:regular rate and rhythm, normal S1 and S2, no murmur  Resp Effort And Auscultation: Normal - Clear to auscultation without rales, rhonchi, or wheezing.  GI: soft, nontender, bowel sounds +, no hepatosplenomegaly  Edema: no LE edema  Skin: Skin color, texture. No rashes or lesions.  Neurologic: negative, Gait normal. Reflexes normal and symmetric. Sensation grossly WNL.      Labs  CBC RESULTS:   Recent Labs   Lab Test  02/14/17   1157   WBC  22.6*   RBC  3.38*   HGB  11.2*   HCT  34.4*   MCV  102*   MCH  33.1*   MCHC  32.6   RDW  17.2*   PLT  178         Assessment Plans:     BMT/Engraftment: Day 4 of gcsf, planned for collections tomorrow. No CD34 ordered, will add on    Hematology: No transfusions required     Infectious Disease: Afebrile  - Dry cough, may be related to ACE Inhibitor as no other infectious symptoms. With low pressures will hold lisinopril for the time being    CV: Hold lisinopril with soft pressures and new cough since starting    Other: Present to line placement, RTC tomorrow for day 1 collections    Active " Problems:     Patient Active Problem List   Diagnosis     Hodgkin's disease (H)     Hodgkin lymphoma, nodular sclerosis (H)     Need for prophylactic measure       Viviane Beltran [facility]  [phone]

## 2017-02-14 NOTE — IP AVS SNAPSHOT
[facility]    [address] [place] [address]    Phone:  [phone]    Fax:  [phone]                                       After Visit Summary   2/14/2017    Steph Agee    MRN: [id number]           After Visit Summary Signature Page     I have received my discharge instructions, and my questions have been answered. I have discussed any challenges I see with this plan with the nurse or doctor.    ..........................................................................................................................................  Patient/Patient Representative Signature      ..........................................................................................................................................  Patient Representative Print Name and Relationship to Patient    ..................................................               ................................................  Date                                            Time    ..........................................................................................................................................  Reviewed by Signature/Title    ...................................................              ..............................................  Date                                                            Time

## 2017-02-14 NOTE — ANESTHESIA CARE TRANSFER NOTE
Patient: Steph Agee    Procedure(s):  Double Lumen Central Venous Catherter Tunnel Placement right - Wound Class: I-Clean    Diagnosis: Hodgkins Disease, Personal History of Diseases of the Blood and Blood Forming Organs  Diagnosis Additional Information: No value filed.    Anesthesia Type:   MAC     Note:  Airway :Room Air  Patient transferred to:Phase II  Comments: To Procedure Center Phase 2 recovery.  VSS   Awake and oriented.   Report to Yulissa CRUZ      Vitals: (Last set prior to Anesthesia Care Transfer)    CRNA VITALS  2/14/2017 1406 - 2/14/2017 1438      2/14/2017             Pulse: 81    SpO2: 97 %    Resp Rate (observed): 15    Resp Rate (set): 10    EKG: Sinus rhythm                Electronically Signed By: MARIE Hemphill CRNA  February 14, 2017  2:38 PM

## 2017-02-14 NOTE — ANESTHESIA POSTPROCEDURE EVALUATION
Patient: Steph Agee    Procedure(s):  Double Lumen Central Venous Catherter Tunnel Placement right - Wound Class: I-Clean    Diagnosis:Hodgkins Disease, Personal History of Diseases of the Blood and Blood Forming Organs  Diagnosis Additional Information: No value filed.    Anesthesia Type:  MAC    Note:  Anesthesia Post Evaluation    Last vitals:  Vitals:    02/14/17 1259 02/14/17 1440 02/14/17 1447   BP: 106/67 100/64 101/65   Resp: 16 14 16   Temp: 36.4  C (97.5  F)     SpO2: 97% 99% 99%         Electronically Signed By: Caleb Heredia MD  February 14, 2017  3:31 PM

## 2017-02-14 NOTE — DISCHARGE INSTRUCTIONS
Discharge Instructions following Vascular Access Device Placement    Today you had a vascular access device placed. Derma Elizabeth (Skin Glue) was used to cover the incision(s) where the device was inserted into your skin. Absorbable sutures were also used under your skin. If after 10 days there are visible sutures, they may be trimmed at the infusion clinic or by your primary doctor. At your next visit to the infusion clinic, nursing staff will instruct you on the maintenance and care of your device. If you have questions prior to this appointment, contact the Infusion Care Coordinator at [phone].    Site Care:  - Do not apply any ointment over the port site or insertion site. The thin layer of glue will wear off in 7-10 days. If still present after 10 days, you may gently remove it with soap and water.   - Do not submerge in a tub bath, hot tub, or swimming pool if you have a central venous catheter. If you have a port, wait for 14 days before you submerge yourself under water.  -It is okay to shower and get the insertion site wet, but immediately pat dry after showering. If you have a central venous catheter, or your port is accessed, completely cover the dressing with plastic to keep it dry when showering.  -Do not put Emla cream over the port area for 7 days.  -If there is any oozing or bleeding from the site, apply direct pressure for 5-10 minutes with a gauze pad.  -If bleeding continues after 10 minutes, call your primary doctor. If bleeding can not be controlled with direct pressure, call 911.    Call your Doctor if:  -Bleeding, as above  -Swelling in your neck or arm on the side of the port insertion  -Sudden shortness of breath, light headedness, or palpitations  -Fever greater than 100.5 degrees F.  -Other signs of infection such as redness, tenderness, or drainage from the site    Additional Instructions:  -You may resume a regular diet  -No heavy lifting greater than 10 pounds for three days  -You may  use ice packs 3-4 times per day for 15 minutes for minor swelling or pain  -If you are on Coumadin, restart tonight. Follow up with your Coumadin Clinic or [facility] MD to have your INR rechecked.      DID YOU RECEIVE SEDATION TODAY?  Yes    If you received sedation please follow these additional safety measures:  Sedation medicine, if given, may remain active for many hours. It is important for the next 24 hours that you do not:  -Drive a car  -Operate machines or power tools  -Consume alcohol  -Sign any important papers or legal documents  -You must have a responsible adult take you home and stay with you for a least 6 hours.    To contact a Doctor, call:  [phone] Monday thru Friday 8:00 am to 4:30 pm,   or Toll Free:  [phone]                            After hours:  dial [phone] and ask for the resident on call for:  Interventional Radiology  For emergency care, call the: [place]:  [phone] (TTY for hearing impaired: [phone])

## 2017-02-14 NOTE — ANESTHESIA PREPROCEDURE EVALUATION
Anesthesia Evaluation     .        ROS/MED HX    ENT/Pulmonary:       Neurologic:     (+)neuropathy     Cardiovascular: Comment: SVC syndrome        METS/Exercise Tolerance:     Hematologic:         Musculoskeletal:         GI/Hepatic:         Renal/Genitourinary:         Endo:         Psychiatric:     (+) psychiatric history depression      Infectious Disease:         Malignancy:         Other:               Physical Exam  Normal systems: pulmonary and dental    Airway   Mallampati: II  TM distance: >3 FB  Neck ROM: full    Dental     Cardiovascular   Rhythm and rate: regular      Pulmonary    breath sounds clear to auscultation                    Anesthesia Plan      History & Physical Review  History and physical reviewed and following examination; no interval change.    ASA Status:  3 .    NPO Status:  > 8 hours    Plan for MAC with Intravenous induction. Maintenance will be Other.  Reason for MAC:  Deep or markedly invasive procedure (G8)  PONV prophylaxis:  Ondansetron (or other 5HT-3)       Postoperative Care  Postoperative pain management:  IV analgesics.      Consents  Anesthetic plan, risks, benefits and alternatives discussed with:  Patient..          Anesthesia Pre-op Note    Steph Agee is a 41 year old female with past medical as described below is scheduled for Procedure(s):  Double Lumen Central Venous Catherter Tunnel Placement - Wound Class: I-Clean    Past Medical History   Diagnosis Date     Depression with anxiety      History of blood transfusion      Hodgkin disease (H) 2014, 2016     Neuropathy (H)      SVC syndrome 2014     Past Surgical History   Procedure Laterality Date     Biopsy/excision lymph node(s), needle, superficial (cervical/inguinal/axillary) Left 2014     axillary     Biopsy of mass in the manubrium Left 2016     Family History   Problem Relation Age of Onset     Substance Abuse Mother      Social History     Social History     Marital status: Single     Spouse name: N/A      Number of children: N/A     Years of education: N/A     Occupational History     Not on file.     Social History Main Topics     Smoking status: Former Smoker     Packs/day: 1.00     Types: Cigarettes     Start date: 1/1/1995     Quit date: 11/20/2015     Smokeless tobacco: Never Used     Alcohol use 0.0 oz/week     0 Standard drinks or equivalent per week      Comment: occasionally      Drug use: Yes      Comment: Daily marijuana use. 1 joint daily      Sexual activity: Not on file     Other Topics Concern     Not on file     Social History Narrative     Current Outpatient Prescriptions   Medication Sig Dispense Refill     lisinopril (PRINIVIL/ZESTRIL) 2.5 MG tablet Take 1 tablet (2.5 mg) by mouth daily 30 tablet 3     venlafaxine (EFFEXOR-XR) 75 MG 24 hr capsule Take 1 capsule (75 mg) by mouth daily 30 capsule 3     gabapentin (NEURONTIN) 100 MG capsule Take 2 capsules (200 mg) by mouth 3 times daily 90 capsule 3     LORAZEPAM PO Take 0.25 mg by mouth every 4 hours as needed for anxiety       [DISCONTINUED] lisinopril (PRINIVIL/ZESTRIL) 2.5 MG tablet Take 1 tablet (2.5 mg) by mouth daily 30 tablet 3     Current Outpatient Prescriptions   Medication     lisinopril (PRINIVIL/ZESTRIL) 2.5 MG tablet     venlafaxine (EFFEXOR-XR) 75 MG 24 hr capsule     gabapentin (NEURONTIN) 100 MG capsule     LORAZEPAM PO     [DISCONTINUED] lisinopril (PRINIVIL/ZESTRIL) 2.5 MG tablet     Current Facility-Administered Medications   Medication     Medication Instructions     lidocaine 1 % 1 mL     lidocaine (LMX4) kit     lidocaine 1 % 1 mL     lidocaine (LMX4) kit     sodium chloride (PF) 0.9% PF flush 3 mL     sodium chloride (PF) 0.9% PF flush 3 mL     0.9% sodium chloride infusion     ceFAZolin (ANCEF) intermittent infusion 2 g in dextrose PREMIX     acetaminophen (TYLENOL) tablet 975 mg     lidocaine BUFFERED 1 % injection 20 mL     heparin 5,000 units in 0.9% sodium chloride 1000 mL     Facility-Administered Medications  Ordered in Other Encounters   Medication     midazolam (VERSED) injection 0.5-1 mg     fentaNYL Citrate (PF) (SUBLIMAZE) injection 25-50 mcg      No Known Allergies      Lab:     CBC RESULTS:   Recent Labs   Lab Test  02/14/17   1157   WBC  22.6*   RBC  3.38*   HGB  11.2*   HCT  34.4*   MCV  102*   MCH  33.1*   MCHC  32.6   RDW  17.2*   PLT  178     Recent Labs   Lab Test  02/01/17   0952  10/05/16   1022   NA  142  143   POTASSIUM  4.2  4.3   CHLORIDE  108  110*   CO2  26  26   ANIONGAP  8  7   GLC  79  78   BUN  8  9   CR  0.66  0.69   VALERIA  8.4*  8.2*     The laboratory has evaluated your INR and PTT and the results are as listed below.    Lab Results   Component Value Date    INR 0.94 02/14/2017     Lab Results   Component Value Date    PTT 34 02/01/2017

## 2017-02-14 NOTE — PROCEDURES
Interventional Radiology Brief Post Procedure Note    Procedure: @FVRISFRMTLINK([id number])@    Proceduralist: Michael Sy PA-C    Assistant: None    Time Out: Prior to the start of the procedure and with procedural staff participation, I verbally confirmed the patient s identity using two indicators, relevant allergies, that the procedure was appropriate and matched the consent or emergent situation, and that the correct equipment/implants were available. Immediately prior to starting the procedure I conducted the Time Out with the procedural staff and re-confirmed the patient s name, procedure, and site/side. (The Joint Commission universal protocol was followed.)  Yes    Sedation: Monitored Anesthesia Care (MAC) administered and documented by Anesthesia Care Provider    Findings: Completed placement of 14.5 Korean 23 cm dual lumen, Palindrome brand, tunneled central venous access catheter via RIJV. Tip lying in the right atrium. Catheter okay to use immediately. Dx: Personal history of diseases of blood and blood-forming organs; Lymphoma. Yossi. 0.25    Estimated Blood Loss: Minimal    Fluoroscopy Time: Less than 1 minute    SPECIMENS: None    Complications: 1. None     Condition: Stable    Plan:     Comments: See dictated procedure note for full details.    Michael Sy PA-C

## 2017-02-15 ENCOUNTER — INFUSION THERAPY VISIT (OUTPATIENT)
Dept: TRANSPLANT | Facility: CLINIC | Age: 42
End: 2017-02-15
Attending: PHYSICIAN ASSISTANT
Payer: COMMERCIAL

## 2017-02-15 VITALS
SYSTOLIC BLOOD PRESSURE: 108 MMHG | RESPIRATION RATE: 16 BRPM | TEMPERATURE: 98 F | HEART RATE: 115 BPM | OXYGEN SATURATION: 98 % | DIASTOLIC BLOOD PRESSURE: 68 MMHG

## 2017-02-15 DIAGNOSIS — Z29.9 NEED FOR PROPHYLACTIC MEASURE: ICD-10-CM

## 2017-02-15 DIAGNOSIS — C81.10 NODULAR SCLEROSING HODGKIN'S LYMPHOMA, UNSPECIFIED BODY REGION (H): ICD-10-CM

## 2017-02-15 DIAGNOSIS — C81.18 NODULAR SCLEROSIS HODGKIN LYMPHOMA OF LYMPH NODES OF MULTIPLE REGIONS (H): ICD-10-CM

## 2017-02-15 DIAGNOSIS — C81.99 HODGKIN'S DISEASE OF EXTRANODAL OR SOLID ORGAN SITE (H): Primary | ICD-10-CM

## 2017-02-15 DIAGNOSIS — Z52.011 AUTOLOGOUS DONOR, STEM CELLS: ICD-10-CM

## 2017-02-15 DIAGNOSIS — Z86.2 PERSONAL HISTORY OF DISEASES OF BLOOD AND BLOOD-FORMING ORGANS: Primary | ICD-10-CM

## 2017-02-15 DIAGNOSIS — C81.10 NODULAR SCLEROSING HODGKIN'S LYMPHOMA, UNSPECIFIED BODY REGION (H): Primary | ICD-10-CM

## 2017-02-15 LAB
ANION GAP SERPL CALCULATED.3IONS-SCNC: 9 MMOL/L (ref 3–14)
ANISOCYTOSIS BLD QL SMEAR: SLIGHT
ASR COMMENT: NORMAL
BASOPHILS # BLD AUTO: 0 10E9/L (ref 0–0.2)
BASOPHILS NFR BLD AUTO: 0 %
BUN SERPL-MCNC: 16 MG/DL (ref 7–30)
CALCIUM SERPL-MCNC: 8.2 MG/DL (ref 8.5–10.1)
CD34 PROGEN CELLS: NORMAL %
CD34 STEM CELL ASSAY INTERP: NORMAL
CELL THERAPY PRODUCT NUMBER: NORMAL
CHLORIDE SERPL-SCNC: 108 MMOL/L (ref 94–109)
CO2 SERPL-SCNC: 25 MMOL/L (ref 20–32)
CREAT SERPL-MCNC: 0.66 MG/DL (ref 0.52–1.04)
DIFFERENTIAL METHOD BLD: ABNORMAL
EOSINOPHIL # BLD AUTO: 0 10E9/L (ref 0–0.7)
EOSINOPHIL NFR BLD AUTO: 0 %
ERYTHROCYTE [DISTWIDTH] IN BLOOD BY AUTOMATED COUNT: 17.2 % (ref 10–15)
GFR SERPL CREATININE-BSD FRML MDRD: ABNORMAL ML/MIN/1.7M2
GLUCOSE SERPL-MCNC: 91 MG/DL (ref 70–99)
HCT VFR BLD AUTO: 34.5 % (ref 35–47)
HGB BLD-MCNC: 11.4 G/DL (ref 11.7–15.7)
IFC SPECIMEN: NORMAL
LYMPHOCYTES # BLD AUTO: 0.2 10E9/L (ref 0.8–5.3)
LYMPHOCYTES NFR BLD AUTO: 1 %
MAGNESIUM SERPL-MCNC: 2 MG/DL (ref 1.6–2.3)
MCH RBC QN AUTO: 33.7 PG (ref 26.5–33)
MCHC RBC AUTO-ENTMCNC: 33 G/DL (ref 31.5–36.5)
MCV RBC AUTO: 102 FL (ref 78–100)
MONOCYTES # BLD AUTO: 1.8 10E9/L (ref 0–1.3)
MONOCYTES NFR BLD AUTO: 9 %
NEUTROPHILS # BLD AUTO: 18.4 10E9/L (ref 1.6–8.3)
NEUTROPHILS NFR BLD AUTO: 90 %
PLATELET # BLD AUTO: 181 10E9/L (ref 150–450)
POTASSIUM SERPL-SCNC: 4.1 MMOL/L (ref 3.4–5.3)
RBC # BLD AUTO: 3.38 10E12/L (ref 3.8–5.2)
SODIUM SERPL-SCNC: 142 MMOL/L (ref 133–144)
WBC # BLD AUTO: 20.4 10E9/L (ref 4–11)

## 2017-02-15 PROCEDURE — 96372 THER/PROPH/DIAG INJ SC/IM: CPT

## 2017-02-15 PROCEDURE — 25000128 H RX IP 250 OP 636: Mod: ZF | Performed by: INTERNAL MEDICINE

## 2017-02-15 PROCEDURE — 25000125 ZZHC RX 250: Mod: ZF | Performed by: PHYSICIAN ASSISTANT

## 2017-02-15 PROCEDURE — 83735 ASSAY OF MAGNESIUM: CPT | Performed by: PHYSICIAN ASSISTANT

## 2017-02-15 PROCEDURE — 88184 FLOWCYTOMETRY/ TC 1 MARKER: CPT | Performed by: PHYSICIAN ASSISTANT

## 2017-02-15 PROCEDURE — 80048 BASIC METABOLIC PNL TOTAL CA: CPT | Performed by: PHYSICIAN ASSISTANT

## 2017-02-15 PROCEDURE — 85025 COMPLETE CBC W/AUTO DIFF WBC: CPT | Performed by: PHYSICIAN ASSISTANT

## 2017-02-15 RX ORDER — HEPARIN SODIUM,PORCINE 10 UNIT/ML
5 VIAL (ML) INTRAVENOUS
Status: DISCONTINUED | OUTPATIENT
Start: 2017-02-15 | End: 2017-02-15 | Stop reason: HOSPADM

## 2017-02-15 RX ORDER — HEPARIN SODIUM (PORCINE) LOCK FLUSH IV SOLN 100 UNIT/ML 100 UNIT/ML
3 SOLUTION INTRAVENOUS
Status: CANCELLED | OUTPATIENT
Start: 2017-02-15

## 2017-02-15 RX ORDER — HEPARIN SODIUM,PORCINE 10 UNIT/ML
5 VIAL (ML) INTRAVENOUS
Status: CANCELLED | OUTPATIENT
Start: 2017-02-16

## 2017-02-15 RX ORDER — PLERIXAFOR 24 MG/1.2ML
0.24 SOLUTION SUBCUTANEOUS DAILY
Status: CANCELLED
Start: 2017-02-16

## 2017-02-15 RX ORDER — PLERIXAFOR 24 MG/1.2ML
0.24 SOLUTION SUBCUTANEOUS DAILY
Status: CANCELLED
Start: 2017-02-15

## 2017-02-15 RX ORDER — PLERIXAFOR 24 MG/1.2ML
0.24 SOLUTION SUBCUTANEOUS DAILY
Status: DISCONTINUED | OUTPATIENT
Start: 2017-02-15 | End: 2017-02-15 | Stop reason: HOSPADM

## 2017-02-15 RX ADMIN — FILGRASTIM 900 MCG: 300 INJECTION, SOLUTION INTRAVENOUS; SUBCUTANEOUS at 08:21

## 2017-02-15 RX ADMIN — SODIUM CHLORIDE, PRESERVATIVE FREE 5 ML: 5 INJECTION INTRAVENOUS at 08:21

## 2017-02-15 RX ADMIN — PLERIXAFOR 19.4 MG: 24 SOLUTION SUBCUTANEOUS at 16:33

## 2017-02-15 RX ADMIN — SODIUM CHLORIDE, PRESERVATIVE FREE 5 ML: 5 INJECTION INTRAVENOUS at 08:22

## 2017-02-15 ASSESSMENT — PAIN SCALES - GENERAL: PAINLEVEL: NO PAIN (0)

## 2017-02-15 NOTE — PROGRESS NOTES
BMT Daily Progress Note      Chief Complaint: f/u about stem cell collections    HPI: Pt presents for day 5 gcsf for planned auto stem cell collections.  CD34+ on 2/14 was undetectable (resulted at 4pm); as the patient had already left the complex for [place], she did not receive mozobil on 2/14.  We will begin mozobil this afternoon at 4pm.  Donor center is aware and patient understands anticipated schedule.      Patient notes her cough has entirely resolved since stopping lisinopril.  She had discussed this issue with the cardiology nurse Charlene Hoang, yesterday.  Per her note, she is planning to discuss using an ARB with Dr. Mitchell.  As they are already managing this, I did not make any changes, today.  No chest pain.     No fevers, chills, no rashes bruises or bleeding.     Intake a little low yesterday due to fatigue, but generally eating and drinking all right.  No GI complaints.      ROS: 10-point ROS conducted with pertinent positive and negatives in HPI    Physical Exam:      Blood pressure 108/68, pulse 115, temperature 98  F (36.7  C), resp. rate 16, SpO2 98 %.    General Appearance: healthy, alert and no distress  Eyes: PERRL, conjunctiva and lids normal, sclera nonicteric  Ears/Nose/M/Throat: Oral mucosa and posterior oropharynx normal, moist mucous membranes  Cardio/Vascular:regular rate and rhythm, normal S1 and S2, no murmur  Resp Effort And Auscultation: Normal effort - Clear to auscultation without rales, rhonchi, or wheezing.  GI: soft, nontender, bowel sounds +  Edema: no LE edema  Skin: No rashes or lesions. Pale.   Neurologic: grossly negative, Gait normal.        Labs:  Lab Results   Component Value Date    WBC 22.6 (H) 02/14/2017    ANEU 1.1 (L) 02/03/2017    HGB 11.2 (L) 02/14/2017    HCT 34.4 (L) 02/14/2017     02/14/2017     02/01/2017    POTASSIUM 4.2 02/01/2017    CHLORIDE 108 02/01/2017    CO2 26 02/01/2017    GLC 79 02/01/2017    BUN 8 02/01/2017    CR 0.66 02/01/2017     INR 0.94 02/14/2017     2/15 labs ordered after visit and pending.     Assessment Plans:     BMT/Engraftment: Day 5 of gcsf; add mozobil 2/15.  Possible first day of collections 2/16.     Hematology: No transfusions required     Infectious Disease: Afebrile, no active issues or concerns    CV: Cardiac MRI with LVEF of 47%; lisinopril added by cardiology, but not tolerated due to cough.  Cough has resolved since stopping med.  Cardiology following, planning to f/u regarding possible ARB to replace ACEi.       Active Problems:     Patient Active Problem List   Diagnosis     Hodgkin's disease (H)     Hodgkin lymphoma, nodular sclerosis (H)     Need for prophylactic measure       Lluvia Javier  2/15/2017

## 2017-02-15 NOTE — MR AVS SNAPSHOT
After Visit Summary   2/15/2017    Steph Agee    MRN: [id number]           Patient Information     Date Of Birth          1975        Visit Information        Provider Department      2/15/2017 8:00 AM  BMT CECILIA #1 [facility] Blood and Marrow Transplant        Today's Diagnoses     Personal history of diseases of blood and blood-forming organs    -  1    Need for prophylactic measure        Nodular sclerosis Hodgkin lymphoma of lymph nodes of multiple regions (H)        Nodular sclerosing Hodgkin's lymphoma, unspecified body region (H)              [facility] Surgery Center ([facility])  [address] [place] [address]  Phone: [phone]  Clinic Hours:   Monday-Friday:    8am to 4:30pm   Weekends and holidays:    8am to noon (in general)  If your fever is 100.5  or greater,   call the clinic.  After hours call the   hospital at [phone] or   [phone]. Ask for the BMT   fellow for pediatric or adult patients            Follow-ups after your visit        Your next 10 appointments already scheduled     Feb 15, 2017  4:00 PM CST   Infusion 30 with FRANCHESKA BMT INFUSION, UC 1 ATC   [facility] Blood and Marrow Transplant ([facility])    [address] [place] [id number] [phone]            Feb 16, 2017  8:00 AM CST   Return with  BMT CECILIA #1   [facility] Blood and Marrow Transplant ([facility])    [address] [place] [id number] [phone]            Feb 16, 2017  9:00 AM CST   (Arrive by 8:45 AM)   MNC Collection with  APHERESIS RN6, UC 37 ATC   [facility] Apheresis ([facility])    [address] [place] [id number] [phone]            Feb 17, 2017  8:00 AM CST   Return with  BMT CECILIA #1   [facility] Blood and Marrow Transplant ([facility])    [address] [place] [address]  [facility] [address]   [phone]            Feb 17, 2017  9:00 AM CST   (Arrive by 8:45 AM)   MNC Collection with FRANCHESKA APHERESIS RN6, UC 38 ATC   [facility] Apheresis ([facility])    [address] [place] [address]   [phone]            Aug 09, 2017 10:30 AM CDT   Masonic Lab Draw with  MASONIC LAB DRAW   [facility] Masonic Lab Draw ([facility])    [address] [place] [address]   [phone]            Aug 09, 2017 11:00 AM CDT   Return with Obed Chambers MD   [facility] Blood and Marrow Transplant ([facility])    [address] [place] [address]   [phone]              Who to contact     If you have questions or need follow up information about today's clinic visit or your schedule please contact [facility] BLOOD AND MARROW TRANSPLANT directly at [phone].  Normal or non-critical lab and imaging results will be communicated to you by Local [employer], letter or phone within 4 business days after the clinic has received the results. If you do not hear from us within 7 days, please contact the clinic through Azubut or phone. If you have a critical or abnormal lab result, we will notify you by phone as soon as possible.  Submit refill requests through Skycure or call your pharmacy and they will forward the refill request to us. Please allow 3 business days for your refill to be completed.          Additional Information About Your Visit        Local [employer] Information     Skycure gives you secure access to your electronic health record. If you see a primary care provider, you can also send messages to your care team and make appointments. If you have questions, please call your primary care clinic.  If you do not have a primary care provider, please call [phone] and they will assist you.        Care EveryWhere ID     This is  your Care EveryWhere ID. This could be used by other organizations to access your [place] medical records  [id number]        Your Vitals Were     Pulse Temperature Respirations Pulse Oximetry          115 98  F (36.7  C) 16 98%         Blood Pressure from Last 3 Encounters:   02/15/17 108/68   02/14/17 101/65   02/14/17 99/62    Weight from Last 3 Encounters:   02/14/17 80.7 kg (178 lb)   02/14/17 80.7 kg (178 lb)   02/13/17 81.1 kg (178 lb 12.8 oz)              We Performed the Following     Basic metabolic panel     CBC with platelets differential     CD34 Stem cell assay     Magnesium        Recent Review Flowsheet Data     BMT Recent Results Latest Ref Rng & Units 10/7/2016 10/13/2016 2/1/2017 2/2/2017 2/3/2017 2/14/2017 2/15/2017    WBC 4.0 - 11.0 10e9/L 3.3(L) - 1.5(L) - 2.3(L) 22.6(H) Pending    Hemoglobin 11.7 - 15.7 g/dL 8.2(L) - 11.2(L) - 11.2(L) 11.2(L) 11.4(L)    Platelet Count 150 - 450 10e9/L 239 - 219 - 232 178 181    Neutrophils (Absolute) 1.6 - 8.3 10e9/L 2.1 - 0.7(L) - 1.1(L) - -    INR 0.86 - 1.14 - Canceled: Specimen improperly labeled. Laboratory value report is not on this  patient.  NOTIFIED MARQUITA INGRAM 10/13/16 RJ  CORRECTED ON 10/13 AT 1450: PREVIOUSLY REPORTED AS 1.17  0.87 - - 0.94 -    Sodium 133 - 144 mmol/L - - 142 - - - -    Potassium 3.4 - 5.3 mmol/L - - 4.2 - - - -    Chloride 94 - 109 mmol/L - - 108 - - - -    Glucose 70 - 99 mg/dL - - 79 79 - - -    Urea Nitrogen 7 - 30 mg/dL - - 8 - - - -    Creatinine 0.52 - 1.04 mg/dL - - 0.66 - - - -    Calcium (Total) 8.5 - 10.1 mg/dL - - 8.4(L) - - - -    Protein (Total) 6.8 - 8.8 g/dL - - 7.0 - - - -    Albumin 3.4 - 5.0 g/dL - - 3.2(L) - - - -    Alkaline Phosphatase 40 - 150 U/L - - 138 - - - -    AST 0 - 45 U/L - - 31 - - - -    ALT 0 - 50 U/L - - 38 - - - -    MCV 78 - 100 fl 102(H) - 101(H) - 100 102(H) 102(H)               Primary Care Provider    Physician No Ref-Primary       No address on file        Thank you!     Thank you for  [facility] BLOOD AND MARROW TRANSPLANT  for your care. Our goal is always to provide you with excellent care. Hearing back from our patients is one way we can continue to improve our services. Please take a few minutes to complete the written survey that you may receive in the mail after your visit with us. Thank you!             Your Updated Medication List - Protect others around you: Learn how to safely use, store and throw away your medicines at www.disposemymeds.org.          This list is accurate as of: 2/15/17  9:00 AM.  Always use your most recent med list.                   Brand Name Dispense Instructions for use    gabapentin 100 MG capsule    NEURONTIN    90 capsule    Take 2 capsules (200 mg) by mouth 3 times daily       lisinopril 2.5 MG tablet    PRINIVIL/Zestril    30 tablet    Take 1 tablet (2.5 mg) by mouth daily       LORAZEPAM PO      Take 0.25 mg by mouth every 4 hours as needed for anxiety       venlafaxine 75 MG 24 hr capsule    EFFEXOR-XR    30 capsule    Take 1 capsule (75 mg) by mouth daily

## 2017-02-15 NOTE — PROGRESS NOTES
Infusion Nursing Note:  Steph Agee presents today for scheduled injection.    Patient seen by provider today: Yes: Lluvia Javier   present during visit today: Not Applicable.    Note: Mozobil teaching was completed with Patient and caregiver, both verbalized complete understanding.        Treatment Conditions:  Patient received scheduled Mozobil injection in her abdomen.  This was her first dose.  Patient was monitored for 30 minutes post injection.      Post Infusion Assessment:  Patient tolerated injection without incident.    Discharge Plan:   Patient discharged in stable condition accompanied by: caregiver.    JERONIMO RUELAS RN

## 2017-02-15 NOTE — NURSING NOTE
BMT Heme Malignancy Rooming Note    Steph Agee - 2/15/2017 8:00 AM     Chief Complaint   Patient presents with     RECHECK     Patient here for labs, provider visit r/t Hodgkins pre transplant.         /68  Pulse 115  Temp 98  F (36.7  C)  Resp 16  SpO2 98%     Medications reviewed: Yes    Labs drawn: Yes, from apheresis catheter by RN.     Dressing changed: No     Medications given: Yes - See MAR    Staff time:0    Additional information if applicable: TONNY Farias RN

## 2017-02-15 NOTE — MR AVS SNAPSHOT
After Visit Summary   2/15/2017    Steph Agee    MRN: [id number]           Patient Information     Date Of Birth          1975        Visit Information        Provider Department      2/15/2017 4:00 PM UC 1 ATC; UC BMT INFUSION [facility] Blood and Marrow Transplant        Today's Diagnoses     Nodular sclerosing Hodgkin's lymphoma, unspecified body region (H)    -  1    Autologous donor, stem cells              Clinics and Surgery Center ([facility])  [address] [place] [address]  Phone: [phone]  Clinic Hours:   Monday-Friday:    8am to 4:30pm   Weekends and holidays:    8am to noon (in general)  If your fever is 100.5  or greater,   call the clinic.  After hours call the   hospital at [phone] or   [phone]. Ask for the BMT   fellow for pediatric or adult patients            Follow-ups after your visit        Your next 10 appointments already scheduled     Feb 16, 2017  8:00 AM CST   Return with  BMT CECILIA #1   [facility] Blood and Marrow Transplant ([facility])    [address] [place] [address]   [phone]            Feb 16, 2017  9:00 AM CST   (Arrive by 8:45 AM)   MNC Collection with  APHERESIS RN6, UC 37 ATC   [facility] Apheresis ([facility])    [address] [place] [address]   [phone]            Feb 17, 2017  8:00 AM CST   Return with  BMT CECILIA #1   [facility] Blood and Marrow Transplant ([facility])    [address] [place] [address]   [phone]            Feb 17, 2017  9:00 AM CST   (Arrive by 8:45 AM)   MNC Collection with  APHERESIS RN6, UC 38 ATC   [facility] Apheresis ([facility])    [address] [place] [address]   [phone]            Aug 09, 2017 10:30 AM CDT   Masonic Lab Draw with FRANCHESKA  MASONIC LAB DRAW   [facility] Masonic Lab Draw ([facility])    [address] [address] [phone]            Aug 09, 2017 11:00 AM CDT   Return with Obed Chambers MD   [facility] Blood and Marrow Transplant ([facility])    [address] [address] [phone]              Who to contact     If you have questions or need follow up information about today's clinic visit or your schedule please contact [facility] BLOOD AND MARROW TRANSPLANT directly at [phone].  Normal or non-critical lab and imaging results will be communicated to you by I-CAN Systemshart, letter or phone within 4 business days after the clinic has received the results. If you do not hear from us within 7 days, please contact the clinic through Peerlystt or phone. If you have a critical or abnormal lab result, we will notify you by phone as soon as possible.  Submit refill requests through SleepOut or call your pharmacy and they will forward the refill request to us. Please allow 3 business days for your refill to be completed.          Additional Information About Your Visit        SleepOut Information     SleepOut gives you secure access to your electronic health record. If you see a primary care provider, you can also send messages to your care team and make appointments. If you have questions, please call your primary care clinic.  If you do not have a primary care provider, please call [phone] and they will assist you.        Care EveryWhere ID     This is your Care EveryWhere ID. This could be used by other organizations to access your [place] medical records  [id number]         Blood Pressure from Last 3 Encounters:   02/15/17 108/68   02/14/17 101/65   02/14/17 99/62    Weight from Last 3 Encounters:   02/14/17 80.7 kg (178 lb)   02/14/17 80.7 kg (178 lb)   02/13/17 81.1 kg (178 lb 12.8 oz)              Today, you had the  following     No orders found for display       Recent Review Flowsheet Data     BMT Recent Results Latest Ref Rng & Units 10/7/2016 10/13/2016 2/1/2017 2/2/2017 2/3/2017 2/14/2017 2/15/2017    WBC 4.0 - 11.0 10e9/L 3.3(L) - 1.5(L) - 2.3(L) 22.6(H) 20.4(H)    Hemoglobin 11.7 - 15.7 g/dL 8.2(L) - 11.2(L) - 11.2(L) 11.2(L) 11.4(L)    Platelet Count 150 - 450 10e9/L 239 - 219 - 232 178 181    Neutrophils (Absolute) 1.6 - 8.3 10e9/L 2.1 - 0.7(L) - 1.1(L) - 18.4(H)    INR 0.86 - 1.14 - Canceled: Specimen improperly labeled. Laboratory value report is not on this  patient.  NOTIFIED MARQUITA INGRAM 10/13/16 RJ  CORRECTED ON 10/13 AT 1450: PREVIOUSLY REPORTED AS 1.17  0.87 - - 0.94 -    Sodium 133 - 144 mmol/L - - 142 - - - 142    Potassium 3.4 - 5.3 mmol/L - - 4.2 - - - 4.1    Chloride 94 - 109 mmol/L - - 108 - - - 108    Glucose 70 - 99 mg/dL - - 79 79 - - 91    Urea Nitrogen 7 - 30 mg/dL - - 8 - - - 16    Creatinine 0.52 - 1.04 mg/dL - - 0.66 - - - 0.66    Calcium (Total) 8.5 - 10.1 mg/dL - - 8.4(L) - - - 8.2(L)    Protein (Total) 6.8 - 8.8 g/dL - - 7.0 - - - -    Albumin 3.4 - 5.0 g/dL - - 3.2(L) - - - -    Alkaline Phosphatase 40 - 150 U/L - - 138 - - - -    AST 0 - 45 U/L - - 31 - - - -    ALT 0 - 50 U/L - - 38 - - - -    MCV 78 - 100 fl 102(H) - 101(H) - 100 102(H) 102(H)               Primary Care Provider    Physician No Ref-Primary       No address on file        Thank you!     Thank you for choosing [facility] BLOOD AND MARROW TRANSPLANT  for your care. Our goal is always to provide you with excellent care. Hearing back from our patients is one way we can continue to improve our services. Please take a few minutes to complete the written survey that you may receive in the mail after your visit with us. Thank you!             Your Updated Medication List - Protect others around you: Learn how to safely use, store and throw away your medicines at www.disposemymeds.org.          This list is accurate as of: 2/15/17   4:50 PM.  Always use your most recent med list.                   Brand Name Dispense Instructions for use    gabapentin 100 MG capsule    NEURONTIN    90 capsule    Take 2 capsules (200 mg) by mouth 3 times daily       LORAZEPAM PO      Take 0.25 mg by mouth every 4 hours as needed for anxiety       venlafaxine 75 MG 24 hr capsule    EFFEXOR-XR    30 capsule    Take 1 capsule (75 mg) by mouth daily

## 2017-02-16 ENCOUNTER — INFUSION THERAPY VISIT (OUTPATIENT)
Dept: TRANSPLANT | Facility: CLINIC | Age: 42
End: 2017-02-16
Attending: INTERNAL MEDICINE
Payer: COMMERCIAL

## 2017-02-16 ENCOUNTER — ONCOLOGY VISIT (OUTPATIENT)
Dept: TRANSPLANT | Facility: CLINIC | Age: 42
End: 2017-02-16
Attending: NURSE PRACTITIONER
Payer: COMMERCIAL

## 2017-02-16 ENCOUNTER — APPOINTMENT (OUTPATIENT)
Dept: LAB | Facility: CLINIC | Age: 42
End: 2017-02-16
Attending: INTERNAL MEDICINE
Payer: COMMERCIAL

## 2017-02-16 VITALS
OXYGEN SATURATION: 94 % | SYSTOLIC BLOOD PRESSURE: 120 MMHG | BODY MASS INDEX: 30.61 KG/M2 | TEMPERATURE: 98.1 F | DIASTOLIC BLOOD PRESSURE: 78 MMHG | HEART RATE: 125 BPM | WEIGHT: 178.3 LBS | RESPIRATION RATE: 18 BRPM

## 2017-02-16 DIAGNOSIS — Z52.011 AUTOLOGOUS DONOR, STEM CELLS: Primary | ICD-10-CM

## 2017-02-16 DIAGNOSIS — C81.10 NODULAR SCLEROSING HODGKIN'S LYMPHOMA, UNSPECIFIED BODY REGION (H): ICD-10-CM

## 2017-02-16 DIAGNOSIS — C81.18 NODULAR SCLEROSIS HODGKIN LYMPHOMA OF LYMPH NODES OF MULTIPLE REGIONS (H): ICD-10-CM

## 2017-02-16 DIAGNOSIS — Z29.9 NEED FOR PROPHYLACTIC MEASURE: ICD-10-CM

## 2017-02-16 LAB
ANION GAP SERPL CALCULATED.3IONS-SCNC: 8 MMOL/L (ref 3–14)
ANISOCYTOSIS BLD QL SMEAR: ABNORMAL
ASR COMMENT: NORMAL
BASOPHILS # BLD AUTO: 0 10E9/L (ref 0–0.2)
BASOPHILS NFR BLD AUTO: 0 %
BUN SERPL-MCNC: 15 MG/DL (ref 7–30)
CALCIUM SERPL-MCNC: 8.7 MG/DL (ref 8.5–10.1)
CD34 PROGEN CELLS: NORMAL %
CD34 STEM CELL ASSAY INTERP: NORMAL
CELL THERAPY PRODUCT NUMBER: NORMAL
CHLORIDE SERPL-SCNC: 107 MMOL/L (ref 94–109)
CO2 SERPL-SCNC: 27 MMOL/L (ref 20–32)
CREAT SERPL-MCNC: 0.61 MG/DL (ref 0.52–1.04)
DIFFERENTIAL METHOD BLD: ABNORMAL
EOSINOPHIL # BLD AUTO: 0 10E9/L (ref 0–0.7)
EOSINOPHIL NFR BLD AUTO: 0 %
ERYTHROCYTE [DISTWIDTH] IN BLOOD BY AUTOMATED COUNT: 17.2 % (ref 10–15)
GFR SERPL CREATININE-BSD FRML MDRD: NORMAL ML/MIN/1.7M2
GLUCOSE SERPL-MCNC: 92 MG/DL (ref 70–99)
HCT VFR BLD AUTO: 32.7 % (ref 35–47)
HGB BLD-MCNC: 10.8 G/DL (ref 11.7–15.7)
IFC SPECIMEN: NORMAL
LYMPHOCYTES # BLD AUTO: 0.7 10E9/L (ref 0.8–5.3)
LYMPHOCYTES NFR BLD AUTO: 1.8 %
MACROCYTES BLD QL SMEAR: PRESENT
MCH RBC QN AUTO: 32.9 PG (ref 26.5–33)
MCHC RBC AUTO-ENTMCNC: 33 G/DL (ref 31.5–36.5)
MCV RBC AUTO: 100 FL (ref 78–100)
MONOCYTES # BLD AUTO: 3.6 10E9/L (ref 0–1.3)
MONOCYTES NFR BLD AUTO: 8.8 %
NEUTROPHILS # BLD AUTO: 36.5 10E9/L (ref 1.6–8.3)
NEUTROPHILS NFR BLD AUTO: 89.4 %
OVALOCYTES BLD QL SMEAR: SLIGHT
PLATELET # BLD AUTO: 156 10E9/L (ref 150–450)
POIKILOCYTOSIS BLD QL SMEAR: SLIGHT
POTASSIUM SERPL-SCNC: 3.9 MMOL/L (ref 3.4–5.3)
RBC # BLD AUTO: 3.28 10E12/L (ref 3.8–5.2)
SODIUM SERPL-SCNC: 141 MMOL/L (ref 133–144)
WBC # BLD AUTO: 40.8 10E9/L (ref 4–11)

## 2017-02-16 PROCEDURE — 96372 THER/PROPH/DIAG INJ SC/IM: CPT

## 2017-02-16 PROCEDURE — 25000128 H RX IP 250 OP 636: Mod: ZF | Performed by: PHYSICIAN ASSISTANT

## 2017-02-16 RX ORDER — PLERIXAFOR 24 MG/1.2ML
0.24 SOLUTION SUBCUTANEOUS DAILY
Status: DISCONTINUED | OUTPATIENT
Start: 2017-02-16 | End: 2017-02-16 | Stop reason: HOSPADM

## 2017-02-16 RX ORDER — LOSARTAN POTASSIUM 25 MG/1
12.5 TABLET ORAL DAILY
Qty: 45 TABLET | Refills: 0 | Status: SHIPPED | OUTPATIENT
Start: 2017-02-16 | End: 2017-03-24

## 2017-02-16 RX ORDER — HEPARIN SODIUM,PORCINE 10 UNIT/ML
5 VIAL (ML) INTRAVENOUS
Status: CANCELLED | OUTPATIENT
Start: 2017-02-17

## 2017-02-16 RX ORDER — HEPARIN SODIUM,PORCINE 10 UNIT/ML
5 VIAL (ML) INTRAVENOUS
Status: DISCONTINUED | OUTPATIENT
Start: 2017-02-16 | End: 2017-02-16 | Stop reason: HOSPADM

## 2017-02-16 RX ORDER — PLERIXAFOR 24 MG/1.2ML
0.24 SOLUTION SUBCUTANEOUS DAILY
Status: CANCELLED
Start: 2017-02-17

## 2017-02-16 RX ADMIN — SODIUM CHLORIDE, PRESERVATIVE FREE 5 ML: 5 INJECTION INTRAVENOUS at 08:18

## 2017-02-16 RX ADMIN — FILGRASTIM 900 MCG: 300 INJECTION, SOLUTION INTRAVENOUS; SUBCUTANEOUS at 09:02

## 2017-02-16 RX ADMIN — SODIUM CHLORIDE, PRESERVATIVE FREE 5 ML: 5 INJECTION INTRAVENOUS at 08:19

## 2017-02-16 RX ADMIN — PLERIXAFOR 19.4 MG: 24 SOLUTION SUBCUTANEOUS at 16:27

## 2017-02-16 ASSESSMENT — PAIN SCALES - GENERAL: PAINLEVEL: NO PAIN (0)

## 2017-02-16 NOTE — MR AVS SNAPSHOT
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  After hours call the   hospital at [phone] or   [phone]. Ask for the BMT   fellow for pediatric or adult patients            Follow-ups after your visit        Your next 10 appointments already scheduled     Feb 17, 2017  8:00 AM CST   Return with  BMT CECILIA #1   [facility] Blood and Marrow Transplant ([facility])    [address] [place] [address]   [phone]            Feb 17, 2017  9:00 AM CST   (Arrive by 8:45 AM)   MNC Collection with  APHERESIS RN6, UC 38 ATC   [facility] Apheresis ([facility])    [address] [place] [address]   [phone]            Aug 09, 2017 10:30 AM CDT   Masonic Lab Draw with  MASONIC LAB DRAW   [facility] Masonic Lab Draw ([facility])    [address] [place] [address]   [phone]            Aug 09, 2017 11:00 AM CDT   Return with Obed Chambers MD   [facility] Blood and Marrow Transplant ([facility])    [address] [place] [id number] [phone]               Future tests that were ordered for you today     Open Future Orders        Priority Expected Expires Ordered    CBC with platelets differential Routine 2/17/2017 2/25/2017 2/16/2017    CD34 Stem cell assay Routine 2/17/2017 2/25/2017 2/16/2017            Who to contact     If you have questions or need follow up information about today's clinic visit or your schedule please contact [facility] BLOOD AND MARROW TRANSPLANT directly at [phone].  Normal or non-critical lab and imaging results will be communicated to you by Trans[employer]agen Biopharmaceuticalshart, letter or phone within 4 business days after the clinic has received the results. If you do not hear from us within 7 days, please contact the clinic through BroadSoft or phone. If you have a critical or abnormal lab result, we will notify you by phone as soon as possible.  Submit refill requests through BroadSoft or call your pharmacy and they will forward the refill request to us. Please allow 3 business days for your refill to be completed.          Additional Information About Your Visit        BroadSoft Information     BroadSoft gives you secure access to your electronic health record. If you see a primary care provider, you can also send messages to your care team and make appointments. If you have questions, please call your primary care clinic.  If you do not have a primary care provider, please call [phone] and they will assist you.        Care EveryWhere ID     This is your Care EveryWhere ID. This could be used by other organizations to access your [place] medical records  [id number]        Your Vitals Were     Pulse Temperature Respirations Pulse Oximetry BMI (Body Mass Index)       125 98.1  F (36.7  C) (Oral) 18 94% 30.61 kg/m2        Blood Pressure from Last 3 Encounters:   02/16/17 120/78   02/15/17 108/68   02/14/17 101/65    Weight from Last 3 Encounters:   02/16/17 80.9 kg (178 lb 4.8 oz)   02/14/17 80.7 kg (178 lb)   02/14/17 80.7 kg (178 lb)              We  Performed the Following     Basic metabolic panel - NOW     CBC with platelets differential - NOW     CD34 Stem cell assay        Recent Review Flowsheet Data     BMT Recent Results Latest Ref Rng & Units 10/13/2016 2/1/2017 2/2/2017 2/3/2017 2/14/2017 2/15/2017 2/16/2017    WBC 4.0 - 11.0 10e9/L - 1.5(L) - 2.3(L) 22.6(H) 20.4(H) 40.8(H)    Hemoglobin 11.7 - 15.7 g/dL - 11.2(L) - 11.2(L) 11.2(L) 11.4(L) 10.8(L)    Platelet Count 150 - 450 10e9/L - 219 - 232 178 181 156    Neutrophils (Absolute) 1.6 - 8.3 10e9/L - 0.7(L) - 1.1(L) - 18.4(H) 36.5(H)    INR 0.86 - 1.14 Canceled: Specimen improperly labeled. Laboratory value report is not on this  patient.  NOTIFIED MARQUITA INGRAM 10/13/16 RJ  CORRECTED ON 10/13 AT 1450: PREVIOUSLY REPORTED AS 1.17  0.87 - - 0.94 - -    Sodium 133 - 144 mmol/L - 142 - - - 142 141    Potassium 3.4 - 5.3 mmol/L - 4.2 - - - 4.1 3.9    Chloride 94 - 109 mmol/L - 108 - - - 108 107    Glucose 70 - 99 mg/dL - 79 79 - - 91 92    Urea Nitrogen 7 - 30 mg/dL - 8 - - - 16 15    Creatinine 0.52 - 1.04 mg/dL - 0.66 - - - 0.66 0.61    Calcium (Total) 8.5 - 10.1 mg/dL - 8.4(L) - - - 8.2(L) 8.7    Protein (Total) 6.8 - 8.8 g/dL - 7.0 - - - - -    Albumin 3.4 - 5.0 g/dL - 3.2(L) - - - - -    Alkaline Phosphatase 40 - 150 U/L - 138 - - - - -    AST 0 - 45 U/L - 31 - - - - -    ALT 0 - 50 U/L - 38 - - - - -    MCV 78 - 100 fl - 101(H) - 100 102(H) 102(H) 100               Primary Care Provider    Physician No Ref-Primary       No address on file        Thank you!     Thank you for choosing [facility] BLOOD AND MARROW TRANSPLANT  for your care. Our goal is always to provide you with excellent care. Hearing back from our patients is one way we can continue to improve our services. Please take a few minutes to complete the written survey that you may receive in the mail after your visit with us. Thank you!             Your Updated Medication List - Protect others around you: Learn how to safely use, store and  throw away your medicines at www.disposemymeds.org.          This list is accurate as of: 2/16/17 10:36 AM.  Always use your most recent med list.                   Brand Name Dispense Instructions for use    gabapentin 100 MG capsule    NEURONTIN    90 capsule    Take 2 capsules (200 mg) by mouth 3 times daily       LORAZEPAM PO      Take 0.25 mg by mouth every 4 hours as needed for anxiety       venlafaxine 75 MG 24 hr capsule    EFFEXOR-XR    30 capsule    Take 1 capsule (75 mg) by mouth daily

## 2017-02-16 NOTE — PROGRESS NOTES
BMT Daily Progress Note      Chief Complaint: f/u about stem cell collections    HPI: Pt presents for day 6 gcsf for planned auto stem cell collections.  CD34+ on 2/14 was undetectable (resulted at 4pm); as the patient had already left the complex for [place], she did not receive mozobil on 2/14.    1st dose of mozobil given on 2/15. Tolerated mozobil without diarrhea or other side effects. She was tired last evening and just went to bed. No new complaints today.    ROS: 10-point ROS conducted with pertinent positive and negatives in HPI    Physical Exam:      Blood pressure 120/78, pulse 125, temperature 98.1  F (36.7  C), temperature source Oral, resp. rate 18, weight 80.9 kg (178 lb 4.8 oz), SpO2 94 %.    General Appearance: healthy, alert and no distress  Eyes: PERRL, conjunctiva and lids normal, sclera nonicteric  Ears/Nose/M/Throat: Oral mucosa and posterior oropharynx normal, moist mucous membranes  Cardio/Vascular:regular rate and rhythm, normal S1 and S2, no murmur  Resp Effort And Auscultation: Normal effort - Clear to auscultation without rales, rhonchi, or wheezing.  GI: soft, nontender, bowel sounds +  Edema: no LE edema  Skin: No rashes or lesions. Pale.   Neurologic: grossly negative, Gait normal.        Labs:  Lab Results   Component Value Date    WBC 40.8 (H) 02/16/2017    ANEU 36.5 (H) 02/16/2017    HGB 10.8 (L) 02/16/2017    HCT 32.7 (L) 02/16/2017     02/16/2017     02/16/2017    POTASSIUM 3.9 02/16/2017    CHLORIDE 107 02/16/2017    CO2 27 02/16/2017    GLC 92 02/16/2017    BUN 15 02/16/2017    CR 0.61 02/16/2017    MAG 2.0 02/15/2017    INR 0.94 02/14/2017       Assessment Plans:     BMT/Engraftment: Day 5 of gcsf; add mozobil 2/15.  Wbc 40.8, CD34 0.09. Will not collect today but will return this evening for 2nd dose of mozobil.     Hematology: No transfusions required     Infectious Disease: Afebrile, no active issues or concerns    CV: Cardiac MRI with LVEF of 47%; lisinopril  added by cardiology, but not tolerated due to cough.  Cough has resolved since stopping med.  Cardiology following, planning to f/u regarding possible ARB to replace ACEi.     Debra Black NP  [phone]      Active Problems:     Patient Active Problem List   Diagnosis     Hodgkin's disease (H)     Hodgkin lymphoma, nodular sclerosis (H)     Need for prophylactic measure     Autologous donor, stem cells

## 2017-02-16 NOTE — NURSING NOTE
Chief Complaint   Patient presents with     Lab Only     blood draw   Vitals done and labs drawn via central line and flushed with heparin by RN

## 2017-02-16 NOTE — NURSING NOTE
Chief Complaint   Patient presents with     Lab Only     labs drawn from CVC, heparin locked, vitals checked     RECHECK     Post BMT for HL here for labs and provider and injection       Chayito Quiros,AURELIANO February 16, 2017 8:41 AM  Medications and allergies reviewed. Face to face time 0 minutes.

## 2017-02-16 NOTE — MR AVS SNAPSHOT
After Visit Summary   2/16/2017    Steph Agee    MRN: [id number]           Patient Information     Date Of Birth          1975        Visit Information        Provider Department      2/16/2017 3:00 PM UC 2 ATC; UC BMT INFUSION [facility] Blood and Marrow Transplant        Today's Diagnoses     Autologous donor, stem cells    -  1    Nodular sclerosing Hodgkin's lymphoma, unspecified body region (H)              [facility] ([facility])  [address] [place] [address]  Phone: [phone]  Clinic Hours:   Monday-Friday:    8am to 4:30pm   Weekends and holidays:    8am to noon (in general)  If your fever is 100.5  or greater,   call the clinic.  After hours call the   hospital at [phone] or   [phone]. Ask for the BMT   fellow for pediatric or adult patients            Follow-ups after your visit        Your next 10 appointments already scheduled     Feb 17, 2017  8:00 AM CST   Return with  BMT CECILIA #1   [facility] Blood and Marrow Transplant ([facility])    [address] [place] [id number] [phone]            Feb 17, 2017  9:00 AM CST   (Arrive by 8:45 AM)   MNC Collection with  APHERESIS RN6, UC 38 ATC   [facility] ([facility])    [address] [place] [id number] [phone]            Aug 09, 2017 10:30 AM CDT   Masonic Lab Draw with  MASONIC LAB DRAW   [facility] Masonic Lab Draw ([facility])    [address] [place] [id number] [phone]            Aug 09, 2017 11:00 AM CDT   Return with Obed Chambers MD   [facility] Blood and Marrow Transplant ([facility])    [address] [place] [id number] [phone]              Who to contact     If you have questions or need follow up information about today's clinic  visit or your schedule please contact [facility] BLOOD AND MARROW TRANSPLANT directly at [phone].  Normal or non-critical lab and imaging results will be communicated to you by NewCross [employer]hart, letter or phone within 4 business days after the clinic has received the results. If you do not hear from us within 7 days, please contact the clinic through Osmetech[employer] or phone. If you have a critical or abnormal lab result, we will notify you by phone as soon as possible.  Submit refill requests through Spot formerly [employer] or call your pharmacy and they will forward the refill request to us. Please allow 3 business days for your refill to be completed.          Additional Information About Your Visit        [employer] Information     Spot [employer] gives you secure access to your electronic health record. If you see a primary care provider, you can also send messages to your care team and make appointments. If you have questions, please call your primary care clinic.  If you do not have a primary care provider, please call [phone] and they will assist you.        Care EveryWhere ID     This is your Care EveryWhere ID. This could be used by other organizations to access your [place] medical records  [id number]         Blood Pressure from Last 3 Encounters:   02/16/17 120/78   02/15/17 108/68   02/14/17 101/65    Weight from Last 3 Encounters:   02/16/17 80.9 kg (178 lb 4.8 oz)   02/14/17 80.7 kg (178 lb)   02/14/17 80.7 kg (178 lb)              Today, you had the following     No orders found for display       Recent Review Flowsheet Data     BMT Recent Results Latest Ref Rng & Units 10/13/2016 2/1/2017 2/2/2017 2/3/2017 2/14/2017 2/15/2017 2/16/2017    WBC 4.0 - 11.0 10e9/L - 1.5(L) - 2.3(L) 22.6(H) 20.4(H) 40.8(H)    Hemoglobin 11.7 - 15.7 g/dL - 11.2(L) - 11.2(L) 11.2(L) 11.4(L) 10.8(L)    Platelet Count 150 - 450 10e9/L - 219 - 232 178 181 156    Neutrophils (Absolute) 1.6 - 8.3 10e9/L - 0.7(L) - 1.1(L) - 18.4(H) 36.5(H)    INR 0.86 - 1.14  Canceled: Specimen improperly labeled. Laboratory value report is not on this  patient.  NOTIFIED MARQUITA INGRAM 10/13/16 RJ  CORRECTED ON 10/13 AT 1450: PREVIOUSLY REPORTED AS 1.17  0.87 - - 0.94 - -    Sodium 133 - 144 mmol/L - 142 - - - 142 141    Potassium 3.4 - 5.3 mmol/L - 4.2 - - - 4.1 3.9    Chloride 94 - 109 mmol/L - 108 - - - 108 107    Glucose 70 - 99 mg/dL - 79 79 - - 91 92    Urea Nitrogen 7 - 30 mg/dL - 8 - - - 16 15    Creatinine 0.52 - 1.04 mg/dL - 0.66 - - - 0.66 0.61    Calcium (Total) 8.5 - 10.1 mg/dL - 8.4(L) - - - 8.2(L) 8.7    Protein (Total) 6.8 - 8.8 g/dL - 7.0 - - - - -    Albumin 3.4 - 5.0 g/dL - 3.2(L) - - - - -    Alkaline Phosphatase 40 - 150 U/L - 138 - - - - -    AST 0 - 45 U/L - 31 - - - - -    ALT 0 - 50 U/L - 38 - - - - -    MCV 78 - 100 fl - 101(H) - 100 102(H) 102(H) 100               Primary Care Provider    Physician No Ref-Primary       No address on file        Thank you!     Thank you for choosing [facility] BLOOD AND MARROW TRANSPLANT  for your care. Our goal is always to provide you with excellent care. Hearing back from our patients is one way we can continue to improve our services. Please take a few minutes to complete the written survey that you may receive in the mail after your visit with us. Thank you!             Your Updated Medication List - Protect others around you: Learn how to safely use, store and throw away your medicines at www.disposemymeds.org.          This list is accurate as of: 2/16/17  4:52 PM.  Always use your most recent med list.                   Brand Name Dispense Instructions for use    gabapentin 100 MG capsule    NEURONTIN    90 capsule    Take 2 capsules (200 mg) by mouth 3 times daily       LORAZEPAM PO      Take 0.25 mg by mouth every 4 hours as needed for anxiety       losartan 25 MG tablet    COZAAR    45 tablet    Take 0.5 tablets (12.5 mg) by mouth daily       venlafaxine 75 MG 24 hr capsule    EFFEXOR-XR    30 capsule    Take 1 capsule  (75 mg) by mouth daily

## 2017-02-17 ENCOUNTER — APPOINTMENT (OUTPATIENT)
Dept: LAB | Facility: CLINIC | Age: 42
End: 2017-02-17
Attending: NURSE PRACTITIONER
Payer: COMMERCIAL

## 2017-02-17 ENCOUNTER — ONCOLOGY VISIT (OUTPATIENT)
Dept: TRANSPLANT | Facility: CLINIC | Age: 42
End: 2017-02-17
Attending: NURSE PRACTITIONER
Payer: COMMERCIAL

## 2017-02-17 VITALS
HEART RATE: 127 BPM | RESPIRATION RATE: 16 BRPM | OXYGEN SATURATION: 96 % | DIASTOLIC BLOOD PRESSURE: 78 MMHG | TEMPERATURE: 98.9 F | WEIGHT: 179.1 LBS | SYSTOLIC BLOOD PRESSURE: 131 MMHG | BODY MASS INDEX: 30.74 KG/M2

## 2017-02-17 VITALS — DIASTOLIC BLOOD PRESSURE: 63 MMHG | HEART RATE: 113 BPM | TEMPERATURE: 97 F | SYSTOLIC BLOOD PRESSURE: 112 MMHG

## 2017-02-17 DIAGNOSIS — C81.10 NODULAR SCLEROSING HODGKIN'S LYMPHOMA, UNSPECIFIED BODY REGION (H): ICD-10-CM

## 2017-02-17 DIAGNOSIS — C81.99 HODGKIN'S DISEASE OF EXTRANODAL OR SOLID ORGAN SITE (H): Primary | ICD-10-CM

## 2017-02-17 DIAGNOSIS — Z52.011 AUTOLOGOUS DONOR, STEM CELLS: Primary | ICD-10-CM

## 2017-02-17 DIAGNOSIS — Z29.9 NEED FOR PROPHYLACTIC MEASURE: ICD-10-CM

## 2017-02-17 LAB
ANISOCYTOSIS BLD QL SMEAR: SLIGHT
ASR COMMENT: NORMAL
BASOPHILS # BLD AUTO: 0 10E9/L (ref 0–0.2)
BASOPHILS NFR BLD AUTO: 0 %
CD34 PROGEN CELLS: 0.11 %
CD34 STEM CELL ASSAY INTERP: NORMAL
CELL THERAPY PRODUCT NUMBER: NORMAL
DIFFERENTIAL METHOD BLD: ABNORMAL
EOSINOPHIL # BLD AUTO: 0.4 10E9/L (ref 0–0.7)
EOSINOPHIL NFR BLD AUTO: 0.9 %
ERYTHROCYTE [DISTWIDTH] IN BLOOD BY AUTOMATED COUNT: 17.4 % (ref 10–15)
HCT VFR BLD AUTO: 31.5 % (ref 35–47)
HGB BLD-MCNC: 10.5 G/DL (ref 11.7–15.7)
IFC SPECIMEN: NORMAL
LYMPHOCYTES # BLD AUTO: 0.8 10E9/L (ref 0.8–5.3)
LYMPHOCYTES NFR BLD AUTO: 1.8 %
MCH RBC QN AUTO: 33.5 PG (ref 26.5–33)
MCHC RBC AUTO-ENTMCNC: 33.3 G/DL (ref 31.5–36.5)
MCV RBC AUTO: 101 FL (ref 78–100)
MONOCYTES # BLD AUTO: 2.8 10E9/L (ref 0–1.3)
MONOCYTES NFR BLD AUTO: 6.1 %
NEUTROPHILS # BLD AUTO: 41.6 10E9/L (ref 1.6–8.3)
NEUTROPHILS NFR BLD AUTO: 91.2 %
PLATELET # BLD AUTO: 132 10E9/L (ref 150–450)
RBC # BLD AUTO: 3.13 10E12/L (ref 3.8–5.2)
WBC # BLD AUTO: 45.6 10E9/L (ref 4–11)

## 2017-02-17 PROCEDURE — 88184 FLOWCYTOMETRY/ TC 1 MARKER: CPT | Performed by: NURSE PRACTITIONER

## 2017-02-17 PROCEDURE — 25000128 H RX IP 250 OP 636: Mod: ZF | Performed by: NURSE PRACTITIONER

## 2017-02-17 PROCEDURE — 85025 COMPLETE CBC W/AUTO DIFF WBC: CPT | Performed by: NURSE PRACTITIONER

## 2017-02-17 PROCEDURE — 25000125 ZZHC RX 250: Mod: ZF | Performed by: PHYSICIAN ASSISTANT

## 2017-02-17 RX ORDER — PLERIXAFOR 24 MG/1.2ML
0.24 SOLUTION SUBCUTANEOUS DAILY
Status: DISCONTINUED | OUTPATIENT
Start: 2017-02-17 | End: 2017-02-17 | Stop reason: HOSPADM

## 2017-02-17 RX ORDER — PLERIXAFOR 24 MG/1.2ML
0.24 SOLUTION SUBCUTANEOUS DAILY
Status: CANCELLED
Start: 2017-02-18

## 2017-02-17 RX ORDER — HEPARIN SODIUM (PORCINE) LOCK FLUSH IV SOLN 100 UNIT/ML 100 UNIT/ML
3 SOLUTION INTRAVENOUS
Status: CANCELLED | OUTPATIENT
Start: 2017-02-17

## 2017-02-17 RX ORDER — HEPARIN SODIUM,PORCINE 10 UNIT/ML
5 VIAL (ML) INTRAVENOUS
Status: DISCONTINUED | OUTPATIENT
Start: 2017-02-17 | End: 2017-02-17 | Stop reason: HOSPADM

## 2017-02-17 RX ORDER — HEPARIN SODIUM,PORCINE 10 UNIT/ML
5 VIAL (ML) INTRAVENOUS
Status: CANCELLED | OUTPATIENT
Start: 2017-02-18

## 2017-02-17 RX ADMIN — FILGRASTIM 900 MCG: 300 INJECTION, SOLUTION INTRAVENOUS; SUBCUTANEOUS at 10:46

## 2017-02-17 RX ADMIN — SODIUM CHLORIDE, PRESERVATIVE FREE 5 ML: 5 INJECTION INTRAVENOUS at 08:24

## 2017-02-17 RX ADMIN — SODIUM CHLORIDE, PRESERVATIVE FREE 5 ML: 5 INJECTION INTRAVENOUS at 08:25

## 2017-02-17 RX ADMIN — PLERIXAFOR 19.4 MG: 24 SOLUTION SUBCUTANEOUS at 15:55

## 2017-02-17 ASSESSMENT — PAIN SCALES - GENERAL: PAINLEVEL: NO PAIN (0)

## 2017-02-17 NOTE — PROGRESS NOTES
BMT Daily  Note      Chief Complaint: f/u about stem cell collections    HPI: Pt presents for day 7 gcsf for planned auto stem cell collections.    CD34+ on 2/14 was undetectable (resulted at 4pm); as the patient had already left the complex for [place], she did not receive mozobil on 2/14.    1st dose of mozobil given on 2/15.  Today is 3rd day of mozobil.    Labs:  Lab Results   Component Value Date    WBC 45.6 (H) 02/17/2017    ANEU 41.6 (H) 02/17/2017    HGB 10.5 (L) 02/17/2017    HCT 31.5 (L) 02/17/2017     (L) 02/17/2017     02/16/2017    POTASSIUM 3.9 02/16/2017    CHLORIDE 107 02/16/2017    CO2 27 02/16/2017    GLC 92 02/16/2017    BUN 15 02/16/2017    CR 0.61 02/16/2017    MAG 2.0 02/15/2017    INR 0.94 02/14/2017       Assessment Plans:     BMT/Engraftment: Day 7 of gcsf; added mozobil 2/15.    2/15: 1st dose of mozobil  2/16: WBC 40.8 CD34 0.09=3. Added mozobil dose #2  2/17: WBC 45.6 CD34 0.11=5.016. Add mozobil dose #3 tonight and f/u in AM with CD34.    Discussed with Mary Krishnamurthy that this patient might fail g/mozobil priming and need to discuss plan NICK Black NP  [phone]

## 2017-02-17 NOTE — MR AVS SNAPSHOT
After Visit Summary   2/17/2017    Steph Agee    MRN: [id number]           Patient Information     Date Of Birth          1975        Visit Information        Provider Department      2/17/2017 8:00 AM  BMT CECILIA #1 [facility] Blood and Marrow Transplant        Today's Diagnoses     Autologous donor, stem cells    -  1    Nodular sclerosing Hodgkin's lymphoma, unspecified body region (H)        Need for prophylactic measure              [facility] ([facility])  [address] [place] [address]  Phone: [phone]  Clinic Hours:   Monday-Friday:    8am to 4:30pm   Weekends and holidays:    8am to noon (in general)  If your fever is 100.5  or greater,   call the clinic.  After hours call the   hospital at [phone] or   [phone]. Ask for the BMT   fellow for pediatric or adult patients            Follow-ups after your visit        Your next 10 appointments already scheduled     Feb 18, 2017  8:00 AM CST   (Arrive by 7:45 AM)   MNC Collection with  APHERESIS RN2,  34 ATC   [facility] Apheres[facility] ([facility])    [address] [place] [phone]   [phone]            Feb 19, 2017  8:00 AM CST   (Arrive by 7:45 AM)   MNC Collection with  APHERESIS RN1, UC 33 ATC   [facility]heres[facility] ([facility])    [address] [place] [phone]   [phone]            Aug 09, 2017 10:30 AM CDT   Masonic Lab Draw with  MASONIC LAB DRAW   [facility] Masonic Lab Draw ([facility])    [address] [place] [phone]   [phone]            Aug 09, 2017 11:00 AM CDT   Return with Obed Chambers MD   [facility] Blood and Marrow Transplant ([facility])    [address] [place] [phone]              Future tests that  were ordered for you today     Open Future Orders        Priority Expected Expires Ordered    CBC with platelets differential Routine 2/18/2017 2/19/2017 2/17/2017    CD34 Stem cell assay Routine 2/18/2017 2/19/2017 2/17/2017            Who to contact     If you have questions or need follow up information about today's clinic visit or your schedule please contact [facility] BLOOD AND MARROW TRANSPLANT directly at [phone].  Normal or non-critical lab and imaging results will be communicated to you by GenerationOnehart, letter or phone within 4 business days after the clinic has received the results. If you do not hear from us within 7 days, please contact the clinic through [employer] or phone. If you have a critical or abnormal lab result, we will notify you by phone as soon as possible.  Submit refill requests through [employer] or call your pharmacy and they will forward the refill request to us. Please allow 3 business days for your refill to be completed.          Additional Information About Your Visit        [employer] Information     [employer] gives you secure access to your electronic health record. If you see a primary care provider, you can also send messages to your care team and make appointments. If you have questions, please call your primary care clinic.  If you do not have a primary care provider, please call [phone] and they will assist you.        Care EveryWhere ID     This is your Care EveryWhere ID. This could be used by other organizations to access your [facility] medical records  [id number]        Your Vitals Were     Pulse Temperature Respirations Pulse Oximetry BMI (Body Mass Index)       127 98.9  F (37.2  C) 16 96% 30.74 kg/m2        Blood Pressure from Last 3 Encounters:   02/17/17 131/78   02/16/17 120/78   02/15/17 108/68    Weight from Last 3 Encounters:   02/17/17 81.2 kg (179 lb 1.6 oz)   02/16/17 80.9 kg (178 lb 4.8 oz)   02/14/17 80.7 kg (178 lb)              We Performed the Following      CBC with platelets differential     CD34 Stem cell assay        Recent Review Flowsheet Data     BMT Recent Results Latest Ref Rng & Units 2/1/2017 2/2/2017 2/3/2017 2/14/2017 2/15/2017 2/16/2017 2/17/2017    WBC 4.0 - 11.0 10e9/L 1.5(L) - 2.3(L) 22.6(H) 20.4(H) 40.8(H) 45.6(H)    Hemoglobin 11.7 - 15.7 g/dL 11.2(L) - 11.2(L) 11.2(L) 11.4(L) 10.8(L) 10.5(L)    Platelet Count 150 - 450 10e9/L 219 - 232 178 181 156 132(L)    Neutrophils (Absolute) 1.6 - 8.3 10e9/L 0.7(L) - 1.1(L) - 18.4(H) 36.5(H) 41.6(H)    INR 0.86 - 1.14 0.87 - - 0.94 - - -    Sodium 133 - 144 mmol/L 142 - - - 142 141 -    Potassium 3.4 - 5.3 mmol/L 4.2 - - - 4.1 3.9 -    Chloride 94 - 109 mmol/L 108 - - - 108 107 -    Glucose 70 - 99 mg/dL 79 79 - - 91 92 -    Urea Nitrogen 7 - 30 mg/dL 8 - - - 16 15 -    Creatinine 0.52 - 1.04 mg/dL 0.66 - - - 0.66 0.61 -    Calcium (Total) 8.5 - 10.1 mg/dL 8.4(L) - - - 8.2(L) 8.7 -    Protein (Total) 6.8 - 8.8 g/dL 7.0 - - - - - -    Albumin 3.4 - 5.0 g/dL 3.2(L) - - - - - -    Alkaline Phosphatase 40 - 150 U/L 138 - - - - - -    AST 0 - 45 U/L 31 - - - - - -    ALT 0 - 50 U/L 38 - - - - - -    MCV 78 - 100 fl 101(H) - 100 102(H) 102(H) 100 101(H)               Primary Care Provider    Physician No Ref-Primary       No address on file        Thank you!     Thank you for choosing [facility] BLOOD AND MARROW TRANSPLANT  for your care. Our goal is always to provide you with excellent care. Hearing back from our patients is one way we can continue to improve our services. Please take a few minutes to complete the written survey that you may receive in the mail after your visit with us. Thank you!             Your Updated Medication List - Protect others around you: Learn how to safely use, store and throw away your medicines at www.disposemymeds.org.          This list is accurate as of: 2/17/17 12:42 PM.  Always use your most recent med list.                   Brand Name Dispense Instructions for use    gabapentin  100 MG capsule    NEURONTIN    90 capsule    Take 2 capsules (200 mg) by mouth 3 times daily       LORAZEPAM PO      Take 0.25 mg by mouth every 4 hours as needed for anxiety       losartan 25 MG tablet    COZAAR    45 tablet    Take 0.5 tablets (12.5 mg) by mouth daily       venlafaxine 75 MG 24 hr capsule    EFFEXOR-XR    30 capsule    Take 1 capsule (75 mg) by mouth daily

## 2017-02-17 NOTE — MR AVS SNAPSHOT
After Visit Summary   2/17/2017    Steph Agee    MRN: [id number]           Patient Information     Date Of Birth          1975        Visit Information        Provider Department      2/17/2017 3:30 PM UC 2 ATC; UC BMT INFUSION [facility] Blood and Marrow Transplant        Today's Diagnoses     Autologous donor, stem cells    -  1    Nodular sclerosing Hodgkin's lymphoma, unspecified body region (H)              [facility]  [address] [place] [address]  Phone: [phone]  Clinic Hours:   Monday-Friday:    8am to 4:30pm   Weekends and holidays:    8am to noon (in general)  If your fever is 100.5  or greater,   call the clinic.  After hours call the   hospital at [phone] or   [phone]. Ask for the BMT   fellow for pediatric or adult patients            Follow-ups after your visit        Your next 10 appointments already scheduled     Feb 18, 2017  8:00 AM CST   (Arrive by 7:45 AM)   MNC Collection with  APHERESIS RN2, UC 34 ATC   [facility] Apheresis ([facility])    [address] [place] [phone]   [phone]            Feb 19, 2017  8:00 AM CST   (Arrive by 7:45 AM)   MNC Collection with  APHERESIS RN1, UC 33 ATC   [facility]heres[facility] ([facility])    [address] [place] [phone]   [phone]            Aug 09, 2017 10:30 AM CDT   Masonic Lab Draw with  MASONIC LAB DRAW   [facility] Masonic Lab Draw ([facility])    [address] [place] [phone]   [phone]            Aug 09, 2017 11:00 AM CDT   Return with Obed Chambers MD   [facility] Blood and Marrow Transplant ([facility])    [address] [place] [phone]              Future tests that were ordered for you  today     Open Future Orders        Priority Expected Expires Ordered    CBC with platelets differential Routine 2/18/2017 2/19/2017 2/17/2017    CD34 Stem cell assay Routine 2/18/2017 2/19/2017 2/17/2017            Who to contact     If you have questions or need follow up information about today's clinic visit or your schedule please contact [facility] BLOOD AND MARROW TRANSPLANT directly at [phone].  Normal or non-critical lab and imaging results will be communicated to you by Pulse Electronicshart, letter or phone within 4 business days after the clinic has received the results. If you do not hear from us within 7 days, please contact the clinic through [employer] or phone. If you have a critical or abnormal lab result, we will notify you by phone as soon as possible.  Submit refill requests through Oceen or call your pharmacy and they will forward the refill request to us. Please allow 3 business days for your refill to be completed.          Additional Information About Your Visit        [employer] Interactive Information     [employer] gives you secure access to your electronic health record. If you see a primary care provider, you can also send messages to your care team and make appointments. If you have questions, please call your primary care clinic.  If you do not have a primary care provider, please call [phone] and they will assist you.        Care EveryWhere ID     This is your Care EveryWhere ID. This could be used by other organizations to access your [place] medical records  [id number]        Your Vitals Were     Pulse Temperature                113 97  F (36.1  C)           Blood Pressure from Last 3 Encounters:   02/17/17 112/63   02/17/17 131/78   02/16/17 120/78    Weight from Last 3 Encounters:   02/17/17 81.2 kg (179 lb 1.6 oz)   02/16/17 80.9 kg (178 lb 4.8 oz)   02/14/17 80.7 kg (178 lb)              Today, you had the following     No orders found for display       Recent Review Flowsheet Data     BMT Recent  Results Latest Ref Rng & Units 2/1/2017 2/2/2017 2/3/2017 2/14/2017 2/15/2017 2/16/2017 2/17/2017    WBC 4.0 - 11.0 10e9/L 1.5(L) - 2.3(L) 22.6(H) 20.4(H) 40.8(H) 45.6(H)    Hemoglobin 11.7 - 15.7 g/dL 11.2(L) - 11.2(L) 11.2(L) 11.4(L) 10.8(L) 10.5(L)    Platelet Count 150 - 450 10e9/L 219 - 232 178 181 156 132(L)    Neutrophils (Absolute) 1.6 - 8.3 10e9/L 0.7(L) - 1.1(L) - 18.4(H) 36.5(H) 41.6(H)    INR 0.86 - 1.14 0.87 - - 0.94 - - -    Sodium 133 - 144 mmol/L 142 - - - 142 141 -    Potassium 3.4 - 5.3 mmol/L 4.2 - - - 4.1 3.9 -    Chloride 94 - 109 mmol/L 108 - - - 108 107 -    Glucose 70 - 99 mg/dL 79 79 - - 91 92 -    Urea Nitrogen 7 - 30 mg/dL 8 - - - 16 15 -    Creatinine 0.52 - 1.04 mg/dL 0.66 - - - 0.66 0.61 -    Calcium (Total) 8.5 - 10.1 mg/dL 8.4(L) - - - 8.2(L) 8.7 -    Protein (Total) 6.8 - 8.8 g/dL 7.0 - - - - - -    Albumin 3.4 - 5.0 g/dL 3.2(L) - - - - - -    Alkaline Phosphatase 40 - 150 U/L 138 - - - - - -    AST 0 - 45 U/L 31 - - - - - -    ALT 0 - 50 U/L 38 - - - - - -    MCV 78 - 100 fl 101(H) - 100 102(H) 102(H) 100 101(H)               Primary Care Provider    Physician No Ref-Primary       No address on file        Thank you!     Thank you for choosing [facility] BLOOD AND MARROW TRANSPLANT  for your care. Our goal is always to provide you with excellent care. Hearing back from our patients is one way we can continue to improve our services. Please take a few minutes to complete the written survey that you may receive in the mail after your visit with us. Thank you!             Your Updated Medication List - Protect others around you: Learn how to safely use, store and throw away your medicines at www.disposemymeds.org.          This list is accurate as of: 2/17/17  4:00 PM.  Always use your most recent med list.                   Brand Name Dispense Instructions for use    gabapentin 100 MG capsule    NEURONTIN    90 capsule    Take 2 capsules (200 mg) by mouth 3 times daily       LORAZEPAM  PO      Take 0.25 mg by mouth every 4 hours as needed for anxiety       losartan 25 MG tablet    COZAAR    45 tablet    Take 0.5 tablets (12.5 mg) by mouth daily       venlafaxine 75 MG 24 hr capsule    EFFEXOR-XR    30 capsule    Take 1 capsule (75 mg) by mouth daily

## 2017-02-17 NOTE — PROGRESS NOTES
BMT Teaching Flowsheet    Steph Agee is a 41 year old female  Diagnoses of Hodgkin disease (H) and Personal history of diseases of blood and blood-forming organs were pertinent to this visit.    Teaching Topic: Autologous PBSCT    Person(s) involved in teaching: Patient  Motivation Level  Asks Questions: Yes  Eager to Learn: Yes  Cooperative: Yes  Receptive (willing/able to accept information): Yes  Any cultural factors/[employer] beliefs that may influence understanding or compliance? No    Patient demonstrates understanding of the following:  - Reason for the appointment, diagnosis and treatment plan: Yes  - Knowledge of proper use of medications and conditions for which they are ordered (with special attention to potential side effects or drug interactions): Yes  - Which situations necessitate calling provider and whom to contact: Yes    Teaching concerns addressed: Reviewed collections and transplant calender, consents, medications, side effects, clinic flow and routine, limitations after transplant, caregiver role after transplant, and when to call.      Proper use and care of (medical equipment, care aids, etc.) NA  Pain management techniques: Yes  Patient instructed on hand hygiene: Yes  How and/when to access community resources: Yes    Infection Control:  Patient demonstrates understanding of the following:  Surgical procedure site care taught NA  Signs and symptoms of infection taught Yes  Wound care taught NA  Central venous catheter care taught NA    Instructional Materials Used/Given: BMT teaching binder    Time spent with patient: 90 minutes.    Specific Concerns: No, explain: patient verbalized understanding of provided material via teach back method. No further questions or concerns at this time.

## 2017-02-17 NOTE — NURSING NOTE
BMT Heme Malignancy Rooming Note    Steph LUNDBERG Anuel - 2/17/2017 10:36 AM     Chief Complaint   Patient presents with     Labs Only     crawn from CVC, heparin locked, vitals checked     RECHECK     pre bmt for hodgkin lymphoma - here for provider visit and gcsf        /78 (BP Location: Right arm, Patient Position: Chair, Cuff Size: Adult Regular)  Pulse 127  Temp 98.9  F (37.2  C)  Resp 16  Wt 81.2 kg (179 lb 1.6 oz)  SpO2 96%  BMI 30.74 kg/m2     Medications reviewed: Yes    Labs drawn: No    Dressing changed: No     Medications given: Yes - See MAR    Staff time:0 minutes    Additional information if applicable: TONNY Lopez MA

## 2017-02-17 NOTE — NURSING NOTE
Chief Complaint   Patient presents with     Labs Only     crawn from CVC, heparin locked, vitals checked

## 2017-02-18 ENCOUNTER — ONCOLOGY VISIT (OUTPATIENT)
Dept: TRANSPLANT | Facility: CLINIC | Age: 42
End: 2017-02-18
Attending: INTERNAL MEDICINE
Payer: COMMERCIAL

## 2017-02-18 ENCOUNTER — HOSPITAL ENCOUNTER (OUTPATIENT)
Dept: LAB | Facility: CLINIC | Age: 42
Discharge: HOME | End: 2017-02-18
Attending: INTERNAL MEDICINE | Admitting: INTERNAL MEDICINE
Payer: COMMERCIAL

## 2017-02-18 VITALS
HEIGHT: 64 IN | HEART RATE: 93 BPM | SYSTOLIC BLOOD PRESSURE: 108 MMHG | TEMPERATURE: 98.3 F | DIASTOLIC BLOOD PRESSURE: 59 MMHG | BODY MASS INDEX: 30.56 KG/M2 | RESPIRATION RATE: 18 BRPM | WEIGHT: 179.01 LBS

## 2017-02-18 DIAGNOSIS — C81.10 NODULAR SCLEROSING HODGKIN'S LYMPHOMA, UNSPECIFIED BODY REGION (H): ICD-10-CM

## 2017-02-18 DIAGNOSIS — Z29.9 NEED FOR PROPHYLACTIC MEASURE: ICD-10-CM

## 2017-02-18 DIAGNOSIS — Z52.011 AUTOLOGOUS DONOR, STEM CELLS: Primary | ICD-10-CM

## 2017-02-18 LAB
ANION GAP SERPL CALCULATED.3IONS-SCNC: 8 MMOL/L (ref 3–14)
ANISOCYTOSIS BLD QL SMEAR: SLIGHT
ASR COMMENT: NORMAL
BASOPHILS # BLD AUTO: 0 10E9/L (ref 0–0.2)
BASOPHILS NFR BLD AUTO: 0 %
BUN SERPL-MCNC: 14 MG/DL (ref 7–30)
CA-I SERPL ISE-MCNC: 4.6 MG/DL (ref 4.4–5.2)
CALCIUM SERPL-MCNC: 7.8 MG/DL (ref 8.5–10.1)
CD34 PROGEN CELLS: 0.14 %
CD34 STEM CELL ASSAY INTERP: NORMAL
CELL THERAPY PRODUCT NUMBER: NORMAL
CHLORIDE SERPL-SCNC: 106 MMOL/L (ref 94–109)
CO2 SERPL-SCNC: 28 MMOL/L (ref 20–32)
CREAT SERPL-MCNC: 0.61 MG/DL (ref 0.52–1.04)
DIFFERENTIAL METHOD BLD: ABNORMAL
EOSINOPHIL # BLD AUTO: 0 10E9/L (ref 0–0.7)
EOSINOPHIL NFR BLD AUTO: 0 %
ERYTHROCYTE [DISTWIDTH] IN BLOOD BY AUTOMATED COUNT: 17.6 % (ref 10–15)
GFR SERPL CREATININE-BSD FRML MDRD: ABNORMAL ML/MIN/1.7M2
GLUCOSE SERPL-MCNC: 82 MG/DL (ref 70–99)
HCT VFR BLD AUTO: 28.8 % (ref 35–47)
HGB BLD-MCNC: 9.6 G/DL (ref 11.7–15.7)
IFC SPECIMEN: NORMAL
LYMPHOCYTES # BLD AUTO: 1.3 10E9/L (ref 0.8–5.3)
LYMPHOCYTES NFR BLD AUTO: 2.8 %
MACROCYTES BLD QL SMEAR: PRESENT
MAGNESIUM SERPL-MCNC: 1.9 MG/DL (ref 1.6–2.3)
MCH RBC QN AUTO: 33.9 PG (ref 26.5–33)
MCHC RBC AUTO-ENTMCNC: 33.3 G/DL (ref 31.5–36.5)
MCV RBC AUTO: 102 FL (ref 78–100)
MONOCYTES # BLD AUTO: 2.5 10E9/L (ref 0–1.3)
MONOCYTES NFR BLD AUTO: 5.5 %
MYELOCYTES # BLD: 0.4 10E9/L
MYELOCYTES NFR BLD MANUAL: 0.9 %
NEUTROPHILS # BLD AUTO: 41.6 10E9/L (ref 1.6–8.3)
NEUTROPHILS NFR BLD AUTO: 90.8 %
PLATELET # BLD AUTO: 106 10E9/L (ref 150–450)
POTASSIUM SERPL-SCNC: 3.6 MMOL/L (ref 3.4–5.3)
RBC # BLD AUTO: 2.83 10E12/L (ref 3.8–5.2)
SODIUM SERPL-SCNC: 143 MMOL/L (ref 133–144)
WBC # BLD AUTO: 45.8 10E9/L (ref 4–11)

## 2017-02-18 PROCEDURE — 96372 THER/PROPH/DIAG INJ SC/IM: CPT

## 2017-02-18 PROCEDURE — 38206 HARVEST AUTO STEM CELLS: CPT | Mod: ZF

## 2017-02-18 PROCEDURE — 25000128 H RX IP 250 OP 636: Mod: ZF | Performed by: PATHOLOGY

## 2017-02-18 PROCEDURE — 25000128 H RX IP 250 OP 636: Mod: ZF | Performed by: PHYSICIAN ASSISTANT

## 2017-02-18 PROCEDURE — 25000125 ZZHC RX 250: Mod: ZF | Performed by: PATHOLOGY

## 2017-02-18 PROCEDURE — 80048 BASIC METABOLIC PNL TOTAL CA: CPT | Performed by: PATHOLOGY

## 2017-02-18 PROCEDURE — 82330 ASSAY OF CALCIUM: CPT | Performed by: PATHOLOGY

## 2017-02-18 PROCEDURE — 25000128 H RX IP 250 OP 636: Mod: ZF | Performed by: NURSE PRACTITIONER

## 2017-02-18 PROCEDURE — 38207 CRYOPRESERVE STEM CELLS: CPT | Performed by: INTERNAL MEDICINE

## 2017-02-18 PROCEDURE — 83735 ASSAY OF MAGNESIUM: CPT | Performed by: PATHOLOGY

## 2017-02-18 PROCEDURE — 88184 FLOWCYTOMETRY/ TC 1 MARKER: CPT | Performed by: PATHOLOGY

## 2017-02-18 PROCEDURE — 85025 COMPLETE CBC W/AUTO DIFF WBC: CPT | Performed by: PATHOLOGY

## 2017-02-18 RX ORDER — HEPARIN SODIUM (PORCINE) LOCK FLUSH IV SOLN 100 UNIT/ML 100 UNIT/ML
3 SOLUTION INTRAVENOUS
Status: COMPLETED | OUTPATIENT
Start: 2017-02-18 | End: 2017-02-18

## 2017-02-18 RX ORDER — HEPARIN SODIUM,PORCINE 10 UNIT/ML
5 VIAL (ML) INTRAVENOUS
Status: CANCELLED | OUTPATIENT
Start: 2017-02-19

## 2017-02-18 RX ORDER — PLERIXAFOR 24 MG/1.2ML
0.24 SOLUTION SUBCUTANEOUS DAILY
Status: CANCELLED
Start: 2017-02-19

## 2017-02-18 RX ORDER — PLERIXAFOR 24 MG/1.2ML
0.24 SOLUTION SUBCUTANEOUS DAILY
Status: DISCONTINUED | OUTPATIENT
Start: 2017-02-18 | End: 2017-02-19 | Stop reason: HOSPADM

## 2017-02-18 RX ADMIN — PLERIXAFOR 19.4 MG: 24 SOLUTION SUBCUTANEOUS at 14:13

## 2017-02-18 RX ADMIN — CALCIUM GLUCONATE 1624 MG/HR: 94 INJECTION, SOLUTION INTRAVENOUS at 10:18

## 2017-02-18 RX ADMIN — SODIUM CHLORIDE, PRESERVATIVE FREE 3 ML: 5 INJECTION INTRAVENOUS at 10:20

## 2017-02-18 RX ADMIN — ANTICOAGULANT CITRATE DEXTROSE SOLUTION FORMULA A 1400 ML: 12.25; 11; 3.65 SOLUTION INTRAVENOUS at 10:21

## 2017-02-18 RX ADMIN — FILGRASTIM 900 MCG: 300 INJECTION, SOLUTION INTRAVENOUS; SUBCUTANEOUS at 07:54

## 2017-02-18 NOTE — IP AVS SNAPSHOT
[facility] APHERESIS: [phone]                 INTERAGENCY TRANSFER FORM - NOTES (H&P, Discharge Summary, Consults, Procedures, Therapies)   2017                    Hospital Admission Date: 2017  DENIS AGEE   : 1975  Sex: Female        Patient PCP Information     Provider PCP Type    Physician No Ref-Primary General      History & Physicals     No notes of this type exist for this encounter.      Discharge Summaries     No notes of this type exist for this encounter.         Consult Notes      Consults by Aurelia Oconnell MD at 2017  5:46 PM     Author:  Aurelia Oconnell MD Service:  Pathology Author Type:  Fellow    Filed:  2017  5:57 PM Date of Service:  2017  5:46 PM Note Created:  2017  5:46 PM    Status:  Attested :  Aurelia Oconnell MD (Fellow)    Cosigner:  Dipika Perez MD at 2017  8:18 PM        Attestation signed by Dipika Perez MD at 2017  8:18 PM        Attestation:  I personally saw and evaluated the patient with the Transfusion Medicine fellow, Dr. Aurelia Oconnell. I agree with the comments in her note.    Dipika Perez M.D., Ph.D.  Attending Physician  Division of Transfusion Medicine  Department of Laboratory Medicine [place] [address]  Pager [phone]                                   Transfusion Medicine Consultation    Denis Agee [id number]   YOB: 1975 Age: 41 year old   Date of Consult: 2017     Reason for consult: Autologous Hematopoietic Progenitor Cell (HPC) Collection           Assessment and Plan:   41 year old female presents for consultation for autologous HPC collection for Hodgkin's lymphoma.  The plan is to collect for 1 to 3 days or until the target goal is met.   A central line will be placed and will be used for access for the procedure.  Due to previous neuropathy we will also  order a pre- iCa level.           Chief Complaint:   Transfusion medicine consultation.         History of Present Illness:   41 year old female presents for consultation for autologous HPC collection.  Her past medical history includes a diagnosis of classical Hodgkin's lymphoma, stage IV 11/2014 status post chemotherapy and radiation with relapse in 05/2016 and 10/2016.  She has received multiple cycles of chemotherapy with her most recent one 01/11/17.  She is currently well.  She does not have any pain, no bleeding, and no recent fever or infections.  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  The patient denies any back pain that would prevent her from tolerating the procedure and she has no allergies to latex.  The patient has not gotten a flu vaccination yet for this year.  The travel history is negative.  The patient has no identifiable risk factors for infectious disease.  The procedure, risks/benefits were discussed with the patient and all of her questions were answered.             Past Medical History:     Past Medical History   Diagnosis Date     Depression with anxiety      SVC syndrome 2014     Hodgkin disease (H) 2014, 2016             Past Surgical History:     Past Surgical History   Procedure Laterality Date     Biopsy/excision lymph node(s), needle, superficial (cervical/inguinal/axillary) Left 2014     axillary     Biopsy of mass in the manubrium Left 2016              Social History:     Social History   Substance Use Topics     Smoking status: Former Smoker -- 1.00 packs/day     Types: Cigarettes     Start date: 01/01/1995     Quit date: 11/20/2015     Smokeless tobacco: Never Used     Alcohol Use: 0.0 oz/week     0 Standard drinks or equivalent per week      Comment: occasionally              Family History:     Family History   Problem Relation Age of Onset     Substance Abuse Mother              Immunizations:     There is no immunization history on file for  this patient.          Allergies:   No Known Allergies          Medications:     Current Outpatient Prescriptions   Medication Sig     gabapentin (NEURONTIN) 100 MG capsule Take 2 capsules (200 mg) by mouth 3 times daily     Venlafaxine HCl (EFFEXOR PO) Take 150 mg by mouth daily     LORAZEPAM PO Take 0.25 mg by mouth every 4 hours as needed for anxiety     No current facility-administered medications for this encounter.     Facility-Administered Medications Ordered in Other Encounters   Medication     midazolam (VERSED) injection 0.5-1 mg     fentaNYL Citrate (PF) (SUBLIMAZE) injection 25-50 mcg             Review of Systems:   The Review of Systems is negative other than noted in the HPI           Vital Signs:   /72 mmHg  Pulse 79  Temp(Src) 98.2  F (36.8  C) (Oral)  Resp 16  Ht   Wt 81.8 kg (180 lb 5.4 oz)            Data:     CBC RESULTS:   Recent Labs   Lab Test  02/03/17   1438   WBC  2.3*   RBC  3.40*   HGB  11.2*   HCT  34.1*   MCV  100   MCH  32.9   MCHC  32.8   RDW  16.1*   PLT  232       Aurelia Oconnell MD  Transfusion Medicine Fellow  [phone]               Revision History        User Key Date/Time User Provider Type Action    > [N/A] 2/7/2017  5:57 PM Aurelia Oconnell MD Fellow Sign            Consults by Hattie Restrepo RN at 2/7/2017  3:16 PM     Author:  Hattie Restrepo RN Service:  Apheresis Author Type:  Registered Nurse    Filed:  2/7/2017  3:17 PM Date of Service:  2/7/2017  3:16 PM Note Created:  2/7/2017  3:16 PM    Status:  Signed :  Hattie Restrepo RN (Registered Nurse)         APHERESIS INITIAL CONSULT CHECKLIST    Current Encounter Information  Current Encounter Information: Reason for Visit, Allergies and Current Meds  Procedure Requested: MNC/PBSC Collection  History of: (Reason for Apheresis): hodkins lymphoma    Access Assessment  Access Assessment  Vein Assessment:  Veins are adequate: No  Needs a catheter placed for Apheresis?: Yes, transfusion medicine  physician informed.    Vital Signs  Vital Signs  BP: 108/72 mmHg  Pulse: 79  Temp: 98.2  F (36.8  C)  Temp src: Oral  Resp: 16  Height:  (162.6cm)  Weight: 81.8 kg (180 lb 5.4 oz)    Reviewed   Review With Patient  Have you read the brochure Getting ready for Apheresis?: Yes  Have you had any invasive procedures, surgery, biopsy, bleeding in the last month?: No  Review medications and allergies: Yes  Have you ever been transfused?: Yes  Do you require pre-medication for blood products?: Yes (tylenol and benadryl)  Patient given tour of the unit: Yes    Additional Information  Notes, needs and time spent with patient  Patient has special need: no  Time spent: 20 minutes spent face to face for medical history review.           Revision History        User Key Date/Time User Provider Type Action    > [N/A] 2/7/2017  3:17 PM Hattie Restrepo, RN Registered Nurse Sign            Consults by Aurelia Oconnell MD at 10/10/2016  2:05 PM     Author:  Aurelia Oconnell MD Service:  Pathology Author Type:  Fellow    Filed:  10/10/2016  2:14 PM Date of Service:  10/10/2016  2:05 PM Note Created:  10/10/2016  2:05 PM    Status:  Attested :  Aurelia Oconnell MD (Fellow)    Cosigner:  Quirino Feliciano MD at 10/10/2016  3:14 PM        Attestation signed by Quirino Feliciano MD at 10/10/2016  3:14 PM        I have discussed this patient with the fellow (Dr. Aurelia Oconnell), reviewed the relevant lab and clinical data, and I agree with the assessment and plan as outlined in Dr. Oconnell's note.    Quirino Feliciano M.D.  Professor  Transfusion Medicine  Laboratory Medicine & Pathology  Pager: [phone]                                   Transfusion Medicine Consultation    Stehp Agee [id number]   YOB: 1975 Age: 41 year old   Date of Consult: 10/10/2016     Reason for consult: Autologous Hematopoietic Progenitor Cell (HPC) Collection           Assessment and Plan:   41 year  old female presents for consultation for autologous HPC collection for relapsed Hodgkin's Lymphoma.  The plan is to collect for 1 to 3 days until the target goal is met.   A central line will be placed and will be used for access for the procedure.          Chief Complaint:   Transfusion medicine consultation.         History of Present Illness:   41 year old female presents for consultation for autologous HPC collection.  Her past medical history includes a diagnosis of classical Hodgkin's lymphoma, stage IV 11/2014 status post chemotherapy and radiation with relapse in 05/2016.  She has received 2 cycles of chemotherapy.  She is currently well.  The patient denies any back pain that would prevent her from tolerating the procedure and she has no allergies to latex.  The patient has not gotten a flu vaccination yet for this year.  The travel history is negative.  The patient has no identifiable risk factors for infectious disease.  The procedure, risks/benefits were discussed with the patient and all of her questions were answered.             Past Medical History:     Past Medical History   Diagnosis Date     Depression with anxiety              Past Surgical History:   No past surgical history on file.           Social History:   She will be staying with her cousin, April, during her collection.      Social History   Substance Use Topics     Smoking status: Former Smoker -- 1.00 packs/day     Types: Cigarettes     Start date: 01/01/1995     Quit date: 11/20/2015     Smokeless tobacco: Never Used     Alcohol Use: 0.0 oz/week     0 Standard drinks or equivalent per week      Comment: occasionally              Family History:   No family history on file.          Immunizations:     There is no immunization history on file for this patient.          Allergies:   No Known Allergies          Medications:     Current Outpatient Prescriptions   Medication Sig     Venlafaxine HCl (EFFEXOR PO) Take 150 mg by mouth daily      LORAZEPAM PO Take 0.25 mg by mouth every 4 hours as needed for anxiety     No current facility-administered medications for this encounter.             Review of Systems:   C: NEGATIVE for fever, chills, night sweats  E/M: NEGATIVE for ear, mouth and throat problems  R: NEGATIVE for significant cough or SOB  CV: NEGATIVE for chest pain, palpitations or peripheral edema  GI: POSITIVE for occasional nausea, NEGATIVE for abdominal pain, heartburn, or change in bowel habits  MUSCULOSKELETAL: NEGATIVE for significant arthralgias or myalgia           Vital Signs:   BP 91/53 mmHg  Pulse 81  Temp(Src) 97.9  F (36.6  C) (Oral)  Resp 20  Wt 79.3 kg (174 lb 13.2 oz)            Data:     CBC RESULTS:   Recent Labs   Lab Test  10/07/16   1134   WBC  3.3*   RBC  2.50*   HGB  8.2*   HCT  25.4*   MCV  102*   MCH  32.8   MCHC  32.3   RDW  19.0*   PLT  239       Aurelia Oconnell MD  Transfusion Medicine Fellow  [phone]                 Revision History        Date/Time User Provider Type Action    > 10/10/2016  2:14 PM Aurelia Oconnell MD Fellow Sign    Attribution information within the note text is not available.            Consults by Prabha Bishop RN at 10/10/2016 10:59 AM     Author:  Prabha Bishop RN Service:  Apheresis Author Type:  Registered Nurse    Filed:  10/10/2016 11:02 AM Date of Service:  10/10/2016 10:59 AM Note Created:  10/10/2016 10:59 AM    Status:  Signed :  Prabha Bishop RN (Registered Nurse)         APHERESIS INITIAL CONSULT CHECKLIST    Current Encounter Information  Current Encounter Information: Reason for Visit, Allergies and Current Meds  Procedure Requested: MNC/PBSC Collection  History of: (Reason for Apheresis): HODKINS    Access Assessment  Access Assessment  Needs a catheter placed for Apheresis?: Yes, transfusion medicine physician informed.    Vital Signs  Vital Signs  BP: 91/53 mmHg  Pulse: 81  Temp: 97.9  F (36.6  C)  Temp src: Oral  Resp: 20  Weight: 79.3 kg (174 lb 13.2  jamari)    Reviewed   Review With Patient  Have you read the brochure Getting ready for Apheresis?: Yes  Have you had any invasive procedures, surgery, biopsy, bleeding in the last month?: No  Review medications and allergies: Yes  Have you ever been transfused?: Yes  Do you require pre-medication for blood products?: Yes  Patient given tour of the unit: Yes    Additional Information  Notes, needs and time spent with patient  Explain procedure, side effects or reactions, instructions: Yes  Patient has special need?: No  Time spent: 20 MINUTES   Spent 20 minutes face to face time assessing patient. Reviewed the need to follow a low fat diet from the evening before collection until collection is complete. Explained procedure and answered questions. Prabha Bishop RN           Revision History        Date/Time User Provider Type Action    > 10/10/2016 11:02 AM Prabha Bishop RN Registered Nurse Sign    Attribution information within the note text is not available.                     Progress Notes - Physician (Notes from 02/15/17 through 02/18/17)      Progress Notes by Kiesha Hopkins RN at 2/17/2017  3:30 PM     Author:  Kiesha Hopkins RN Service:  (none) Author Type:  Registered Nurse    Filed:  2/17/2017  4:00 PM Encounter Date:  2/17/2017 Status:  Signed    :  Kiesha Hopkins RN (Registered Nurse)           Pt received sq Mozobil in abdomen today.  No provider visit, no labs today.[DF1.1]     Revision History        User Key Date/Time User Provider Type Action    > DF1.1 2/17/2017  4:00 PM Kiesha Hopkins RN Registered Nurse Sign            Progress Notes by Charlene Black APRN CNP at 2/17/2017  8:00 AM     Author:  Charlene Black APRN CNP Service:  (none) Author Type:  Nurse Practitioner    Filed:  2/17/2017 12:42 PM Encounter Date:  2/17/2017 Status:  Signed    :  Charlene Black APRN CNP (Nurse Practitioner)           BMT Daily  Note      Chief Complaint: f/u about stem cell  collections    HPI: Pt presents for day 7 gcsf for planned auto stem cell collections.    CD34+ on 2/14 was undetectable (resulted at 4pm); as the patient had already left the complex for [place], she did not receive mozobil on 2/14.    1st dose of mozobil given on 2/15.  Today is 3rd day of mozobil.    Labs:[ET1.1]  Lab Results   Component Value Date    WBC 45.6 (H) 02/17/2017    ANEU 41.6 (H) 02/17/2017    HGB 10.5 (L) 02/17/2017    HCT 31.5 (L) 02/17/2017     (L) 02/17/2017     02/16/2017    POTASSIUM 3.9 02/16/2017    CHLORIDE 107 02/16/2017    CO2 27 02/16/2017    GLC 92 02/16/2017    BUN 15 02/16/2017    CR 0.61 02/16/2017    MAG 2.0 02/15/2017    INR 0.94 02/14/2017[ET1.2]       Assessment Plans:     BMT/Engraftment: Day 7 of gcsf; added mozobil 2/15.    2/15: 1st dose of mozobil  2/16: WBC 40.8 CD34 0.09=3. Added mozobil dose #2  2/17: WBC 45.6 CD34 0.11=5.016. Add mozobil dose #3 tonight and f/u in AM with CD34.    Discussed with Mary Krishnamurthy that this patient might fail g/mozobil priming and need to discuss plan B.       Debra Black NP  [phone][ET1.1]              Revision History        User Key Date/Time User Provider Type Action    > ET1.2 2/17/2017 12:42 PM Charlene Black APRN CNP Nurse Practitioner Sign     ET1.1 2/17/2017  8:53 AM Charlene Black APRN CNP Nurse Practitioner             Progress Notes by Emma Roach RN at 2/16/2017  3:00 PM     Author:  Emma Roach, RN Service:  (none) Author Type:  Registered Nurse    Filed:  2/16/2017  4:52 PM Encounter Date:  2/16/2017 Status:  Signed    :  Emma Roach RN (Registered Nurse)           Pt received 2nd dose of Mozobil SQ in left upper arm.[KT1.1]     Revision History        User Key Date/Time User Provider Type Action    > KT1.1 2/16/2017  4:52 PM Emma Roach, RN Registered Nurse Sign            Progress Notes by Charlene Hoang RN at 2/14/2017  9:43 AM     Author:  Charlene Hoang  RN Service:  (none) Author Type:  Registered Nurse    Filed:  2/16/2017 11:21 AM Encounter Date:  2/14/2017 Status:  Signed    :  Charlene Hoang RN (Registered Nurse)           S-(situation): patient's EF on MRI was 47%, Dr. Mitchell wanted her started on a low dose ACE.Dr. Greco had ordered lisinopril 2.5 mg by mouth daily.  Spoke with patient today and she c/o of a dry cough, had thought she was getting a cold or that her cancer was coming back.     B-(background): Ms. Steph Agee is a 41-year old lady with a past medical history of Hodgkin's lymphoma who presented for evaluation of a decline in LVEF on MUGA.     A-(assessment): dry cough    R-(recommendations): will discuss with Dr. Mitchell changing ACE to ARB[ET1.1]      February 15, 2017  Left message for Steph to call and leave a pharmacy name so losartan can be prescribed.  Date: 2/15/2017    Time of Call: 3:53 PM     Diagnosis:  At risk for cardiomyopathy     [ TORB ] Ordering provider: Dr. Sharyn Mitchell  Order: Losartan 12.5 mg by mouth jhon[ET1.2]ly, increase to 25 mg if she tolerates.[ET1.3]     Order received by: Debra Hoang RN     Follow-up/additional notes:[ET1.2] patient called back[ET1.3] February 16, 2017[ET1.4] and is agreeable to the plan. Script sent to [facility][ET1.3]             Revision History        User Key Date/Time User Provider Type Action    > ET1.4 2/16/2017 11:21 AM Charlene Hoang RN Registered Nurse Sign     ET1.3 2/16/2017 11:18 AM Charlene Hoang RN Registered Nurse      ET1.2 2/15/2017  3:52 PM Charlene Hoang RN Registered Nurse      ET1.1 2/14/2017  9:43 AM Charlene Hoang RN Registered Nurse             Progress Notes by Charlene Black APRN CNP at 2/16/2017  8:00 AM     Author:  Charlene Black APRN CNP Service:  (none) Author Type:  Nurse Practitioner    Filed:  2/16/2017 10:36 AM Encounter Date:  2/16/2017 Status:  Signed    :  Charlene Black APRN CNP (Nurse  Practitioner)           BMT Daily Progress Note      Chief Complaint: f/u about stem cell collections    HPI: Pt presents for day 6 gcsf for planned auto stem cell collections.  CD34+ on 2/14 was undetectable (resulted at 4pm); as the patient had already left the complex for [place], she did not receive mozobil on 2/14.    1st dose of mozobil given on 2/15. Tolerated mozobil without diarrhea or other side effects. She was tired last evening and just went to bed. No new complaints today.    ROS: 10-point ROS conducted with pertinent positive and negatives in HPI    Physical Exam:[ET1.1]      Blood pressure 120/78, pulse 125, temperature 98.1  F (36.7  C), temperature source Oral, resp. rate 18, weight 80.9 kg (178 lb 4.8 oz), SpO2 94 %.[ET1.2]    General Appearance: healthy, alert and no distress  Eyes: PERRL, conjunctiva and lids normal, sclera nonicteric  Ears/Nose/M/Throat: Oral mucosa and posterior oropharynx normal, moist mucous membranes  Cardio/Vascular:regular rate and rhythm, normal S1 and S2, no murmur  Resp Effort And Auscultation: Normal effort - Clear to auscultation without rales, rhonchi, or wheezing.  GI: soft, nontender, bowel sounds +  Edema: no LE edema  Skin: No rashes or lesions. Pale.   Neurologic: grossly negative, Gait normal.        Labs:[ET1.1]  Lab Results   Component Value Date    WBC 40.8 (H) 02/16/2017    ANEU 36.5 (H) 02/16/2017    HGB 10.8 (L) 02/16/2017    HCT 32.7 (L) 02/16/2017     02/16/2017     02/16/2017    POTASSIUM 3.9 02/16/2017    CHLORIDE 107 02/16/2017    CO2 27 02/16/2017    GLC 92 02/16/2017    BUN 15 02/16/2017    CR 0.61 02/16/2017    MAG 2.0 02/15/2017    INR 0.94 02/14/2017[ET1.3]       Assessment Plans:     BMT/Engraftment: Day 5 of gcsf; add mozobil 2/15.[ET1.1]  Wbc 40.8, CD34 0.09. Will not collect today but will return this evening for 2nd dose of mozobil.[ET1.4]     Hematology: No transfusions required     Infectious Disease: Afebrile, no active  issues or concerns    CV: Cardiac MRI with LVEF of 47%; lisinopril added by cardiology, but not tolerated due to cough.  Cough has resolved since stopping med.  Cardiology following, planning to f/u regarding possible ARB to replace ACEi.[ET1.1]     Debra Black NP  [phone][ET1.4]      Active Problems:[ET1.1]     Patient Active Problem List   Diagnosis     Hodgkin's disease (H)     Hodgkin lymphoma, nodular sclerosis (H)     Need for prophylactic measure     Autologous donor, stem cells[ET1.2]            Revision History        User Key Date/Time User Provider Type Action    > ET1.3 2/16/2017 10:36 AM Charlene Black APRN CNP Nurse Practitioner Sign     ET1.4 2/16/2017 10:28 AM Charlene Black APRN CNP Nurse Practitioner      ET1.2 2/16/2017  8:38 AM Charlene Black APRN CNP Nurse Practitioner      ET1.1 2/16/2017  7:50 AM Charlene Black APRN CNP Nurse Practitioner             Progress Notes by Rona Ruelas RN at 2/15/2017  4:00 PM     Author:  Rona Ruelas RN Service:  (none) Author Type:  Registered Nurse    Filed:  2/15/2017  4:50 PM Encounter Date:  2/15/2017 Status:  Signed    :  Rona Ruelas RN (Registered Nurse)           Infusion Nursing Note:  Steph Agee presents today for scheduled injection.    Patient seen by provider today: Yes: Lluvia Javier   present during visit today: Not Applicable.    Note: Mozobil teaching was completed with Patient and caregiver, both verbalized complete understanding.        Treatment Conditions:  Patient received scheduled Mozobil injection in her abdomen.  This was her first dose.  Patient was monitored for 30 minutes post injection.      Post Infusion Assessment:  Patient tolerated injection without incident.    Discharge Plan:   Patient discharged in stable condition accompanied by: caregiver.    RONA RUELAS RN[PH1.1]                           Revision History        User Key Date/Time User Provider  Type Action    > PH1.1 2/15/2017  4:50 PM Rona Meek, RN Registered Nurse Sign            Progress Notes by Lluvia Javier PA-C at 2/15/2017  8:00 AM     Author:  Lluvia Javier PA-C Service:  (none) Author Type:  Physician Assistant    Filed:  2/15/2017  9:00 AM Encounter Date:  2/15/2017 Status:  Signed    :  Lluvia Javier PA-C (Physician Assistant)           BMT Daily Progress Note      Chief Complaint: f/u about stem cell collections    HPI: Pt presents for day 5 gcsf for planned auto stem cell collections.  CD34+ on 2/14 was undetectable (resulted at 4pm); as the patient had already left the complex for [place], she did not receive mozobil on 2/14.  We will begin mozobil this afternoon at 4pm.  Donor center is aware and patient understands anticipated schedule.      Patient notes her cough has entirely resolved since stopping lisinopril.  She had discussed this issue with the cardiology nurse Charlene Hoang, yesterday.  Per her note, she is planning to discuss using an ARB with Dr. Mitchell.  As they are already managing this, I did not make any changes, today.  No chest pain.     No fevers, chills, no rashes bruises or bleeding.     Intake a little low yesterday due to fatigue, but generally eating and drinking all right.  No GI complaints.      ROS: 10-point ROS conducted with pertinent positive and negatives in HPI    Physical Exam:[CC1.1]      Blood pressure 108/68, pulse 115, temperature 98  F (36.7  C), resp. rate 16, SpO2 98 %.[CC1.2]    General Appearance: healthy, alert and no distress  Eyes: PERRL, conjunctiva and lids normal, sclera nonicteric  Ears/Nose/M/Throat: Oral mucosa and posterior oropharynx normal, moist mucous membranes  Cardio/Vascular:regular rate and rhythm, normal S1 and S2, no murmur  Resp Effort And Auscultation: Normal effort - Clear to auscultation without rales, rhonchi, or wheezing.  GI: soft, nontender, bowel sounds +  Edema: no LE edema  Skin: No rashes  or lesions. Pale.   Neurologic: grossly negative, Gait normal.        Labs:[CC1.1]  Lab Results   Component Value Date    WBC 22.6 (H) 02/14/2017    ANEU 1.1 (L) 02/03/2017    HGB 11.2 (L) 02/14/2017    HCT 34.4 (L) 02/14/2017     02/14/2017     02/01/2017    POTASSIUM 4.2 02/01/2017    CHLORIDE 108 02/01/2017    CO2 26 02/01/2017    GLC 79 02/01/2017    BUN 8 02/01/2017    CR 0.66 02/01/2017    INR 0.94 02/14/2017[CC1.3]     2/15 labs ordered after visit and pending.     Assessment Plans:     BMT/Engraftment: Day 5 of gcsf; add mozobil 2/15.  Possible first day of collections 2/16.     Hematology: No transfusions required     Infectious Disease: Afebrile, no active issues or concerns    CV: Cardiac MRI with LVEF of 47%; lisinopril added by cardiology, but not tolerated due to cough.  Cough has resolved since stopping med.  Cardiology following, planning to f/u regarding possible ARB to replace ACEi.       Active Problems:     Patient Active Problem List   Diagnosis     Hodgkin's disease (H)     Hodgkin lymphoma, nodular sclerosis (H)     Need for prophylactic measure[CC1.1]       Lluvia AKBAR Carrier  2/15/2017[CC1.4]       Revision History        User Key Date/Time User Provider Type Action    > CC1.4 2/15/2017  9:00 AM Lluvia Javier PA-C Physician Assistant Sign     CC1.3 2/15/2017  8:25 AM Lluvia Javier PA-C Physician Assistant      CC1.2 2/15/2017  8:24 AM Lluvia Javier PA-C Physician Assistant      CC1.1 2/15/2017  8:18 AM Lluvia Javier PA-C Physician Assistant                      Procedure Notes      Procedures by Michael Sy PA-C at 2/14/2017  2:48 PM     Author:  Michael Sy PA-C Service:  Interventional Radiology Author Type:  Physician Assistant    Filed:  2/14/2017  2:48 PM Date of Service:  2/14/2017  2:48 PM Note Created:  2/14/2017  2:46 PM    Status:  Signed :  Michael Sy PA-C (Physician Assistant)     Pre-procedure  Diagnoses:    1. Personal history of diseases of blood and blood-forming organs [Z86.2]           Post-procedure Diagnoses:    1. Personal history of diseases of blood and blood-forming organs [Z86.2]           Procedures:    1. IR CVC TUNNEL PLACEMENT > 5 YRS OF AGE [[id number]]               Interventional Radiology Brief Post Procedure Note    Procedure: @KENDELLRISFRMTLINK([id number])@    Proceduralist: Michael Sy PA-C    Assistant: None    Time Out: Prior to the start of the procedure and with procedural staff participation, I verbally confirmed the patient s identity using two indicators, relevant allergies, that the procedure was appropriate and matched the consent or emergent situation, and that the correct equipment/implants were available. Immediately prior to starting the procedure I conducted the Time Out with the procedural staff and re-confirmed the patient s name, procedure, and site/side. (The Joint Commission universal protocol was followed.)  Yes    Sedation: Monitored Anesthesia Care (MAC) administered and documented by Anesthesia Care Provider    Findings: Completed placement of 14.5 Japanese 23 cm dual lumen, Palindrome brand, tunneled central venous access catheter via RIJV. Tip lying in the right atrium. Catheter okay to use immediately. Dx: Personal history of diseases of blood and blood-forming organs; Lymphoma. Yossi. 0.25    Estimated Blood Loss: Minimal    Fluoroscopy Time: Less than 1 minute    SPECIMENS: None    Complications: 1. None     Condition: Stable    Plan:     Comments: See dictated procedure note for full details.[CG1.1]    Michael Sy PA-C[CG1.2]             Revision History        User Key Date/Time User Provider Type Action    > CG1.2 2/14/2017  2:48 PM Michael Sy PA-C Physician Assistant Sign     CG1.1 2/14/2017  2:46 PM Michael Sy PA-C Physician Assistant                   Progress Notes - Therapies (Notes from 02/15/17  through 02/18/17)     No notes of this type exist for this encounter.

## 2017-02-18 NOTE — IP AVS SNAPSHOT
MRN:[id number]                      After Visit Summary   2/18/2017    Steph Agee    MRN: [id number]           Thank you!     Thank you for choosing [facility] for your care. Our goal is always to provide you with excellent care. Hearing back from our patients is one way we can continue to improve our services. Please take a few minutes to complete the written survey that you may receive in the mail after you visit with us. Thank you!        Patient Information     Date Of Birth          1975        About your hospital stay     You were admitted on:  February 18, 2017 You last received care in the:  [facility]    You were discharged on:  February 18, 2017       Who to Call     For medical emergencies, please call 911.  For non-urgent questions about your medical care, please call your primary care provider or clinic, None          Attending Provider     Provider Specialty    Jayden Rudolph MD Hematology       Primary Care Provider    Physician No Ref-Primary       No address on file        Your next 10 appointments already scheduled     Feb 19, 2017  8:00 AM CST   (Arrive by 7:45 AM)   MNC Collection with  APHERESIS RN1, UC 33 ATC   [facility] ([facility])    [address] [place] [address]   [phone]            Aug 09, 2017 10:30 AM CDT   Masonic Lab Draw with  MASONIC LAB DRAW   [facility] Masonic Lab Draw ([facility])    [address] [place] [address]   [phone]            Aug 09, 2017 11:00 AM CDT   Return with Obed Chambers MD   [facility] Blood and Marrow Transplant ([facility])    [address] [place] [address]   [phone]              Further instructions from your care team       Autologous HPC/MNC Collection:   In most cases, the cell dose  "report will be available tomorrow morning.   The Bone Marrow Transplant (BMT) clinic staff looks at your report, and a decision is made if you will need another collection.   Remember it is important to follow a low fat diet during the collection process. Sometimes following the procedure, your blood platelet count may be low.  If you are told your platelet count is low, you need to avoid taking aspirin/aspirin containing products and avoid heavy physical activity and activities that may result in bruising or traumatic injury.  To contact the BMT fellow or attending physician after 5 p.m. call [phone].        Pending Results     Date and Time Order Name Status Description    2/18/2017 0818 Flow Cytometry/Imunophenotyping (CD34) In process             Admission Information     Date & Time Provider Department Dept. Phone    2/18/2017 Jayden Rudolph MD [facility] [phone]      Your Vitals Were     Blood Pressure Pulse Temperature Respirations Height Weight    102/70 108 97.8  F (36.6  C) (Oral) 16 1.626 m (5' 4.02\") 81.2 kg (179 lb 0.2 oz)    BMI (Body Mass Index)                   30.71 kg/m2           [employer] Information     [employer] gives you secure access to your electronic health record. If you see a primary care provider, you can also send messages to your care team and make appointments. If you have questions, please call your primary care clinic.  If you do not have a primary care provider, please call [phone] and they will assist you.        Care EveryWhere ID     This is your Care EveryWhere ID. This could be used by other organizations to access your [place] medical records  [id number]           Review of your medicines      UNREVIEWED medicines. Ask your doctor about these medicines        Dose / Directions    gabapentin 100 MG capsule   Commonly known as:  NEURONTIN   Used for:  Personal history of diseases of blood and blood-forming organs        " Dose:  200 mg   Take 2 capsules (200 mg) by mouth 3 times daily   Quantity:  90 capsule   Refills:  3       LORAZEPAM PO        Dose:  0.25 mg   Take 0.25 mg by mouth every 4 hours as needed for anxiety   Refills:  0       losartan 25 MG tablet   Commonly known as:  COZAAR   Used for:  Chemotherapy induced cardiomyopathy (H)        Dose:  12.5 mg   Take 0.5 tablets (12.5 mg) by mouth daily   Quantity:  45 tablet   Refills:  0       venlafaxine 75 MG 24 hr capsule   Commonly known as:  EFFEXOR-XR   Used for:  Hodgkin disease (H), Personal history of diseases of blood and blood-forming organs        Dose:  75 mg   Take 1 capsule (75 mg) by mouth daily   Quantity:  30 capsule   Refills:  3                Protect others around you: Learn how to safely use, store and throw away your medicines at www.disposemymeds.org.             Medication List: This is a list of all your medications and when to take them. Check marks below indicate your daily home schedule. Keep this list as a reference.      Medications           Morning Afternoon Evening Bedtime As Needed    gabapentin 100 MG capsule   Commonly known as:  NEURONTIN   Take 2 capsules (200 mg) by mouth 3 times daily                                LORAZEPAM PO   Take 0.25 mg by mouth every 4 hours as needed for anxiety                                losartan 25 MG tablet   Commonly known as:  COZAAR   Take 0.5 tablets (12.5 mg) by mouth daily                                venlafaxine 75 MG 24 hr capsule   Commonly known as:  EFFEXOR-XR   Take 1 capsule (75 mg) by mouth daily

## 2017-02-18 NOTE — PROCEDURES
Laboratory Medicine and Pathology  Transfusion Medicine - Apheresis Procedure Note    Steph Agee MRN# [id number]   YOB: 1975 Age: 41 year old   Date of Procedure: 2/18/2017    Procedure:  Autologous PBSC Collection  Reason for Procedure:Hodgkin's Disease                      Assessment and Plan:   Steph Agee is a 41 year old female with history of Hodgkin's disease undergoing collection for autologous PBSC transplant. She is on our schedule for tomorrow     Attestation:   This patient has been seen and evaluated by me, Quirino Gaming.              History of Present Illness   Steph Agee is a 41 year old female with history of Hodgkin's disease referred to the Pulmonary BMT clinic by Dr. Chambers for evaluation of an abnormal chest CT.  Diagnosed with Hodgkin's disease in 11-14 and treated with 6 cycles of ABVD. Tolerated chemotherapy well without any difficulties from a pulmonary perspective.  Also underwent XRT to left shoulder, axilla and she thinks chest.  Had recurrence of disease in 5-16 in sternum and manubrium.  Underwent treatment with ICE x 4; no pulmonary complications.  Was being evaluated for autologous transplant when re-developed B symptoms.  Then treated with 3 cycles of Brituximab.  Had left sided thoracentesis for pleural effusion.  Developed flu like illness in January 2017 with cough and purulent sputum production, saw Oncologist, treated with Levofloxacin for 10 days with complete resolution of symptoms.  Recent PET-CT with RUL nodular infiltrate, per my reading this is improved compared to her study in January.  Episode of pneumonia 20 years ago.  Now undergoing collection for autologous PBSC transplant          Past Medical History:     Past Medical History   Diagnosis Date     Depression with anxiety      History of blood transfusion      Hodgkin disease (H) 2014, 2016     Neuropathy (H)      SVC syndrome 2014             Past Surgical History:     Past  "Surgical History   Procedure Laterality Date     Biopsy/excision lymph node(s), needle, superficial (cervical/inguinal/axillary) Left 2014     axillary     Biopsy of mass in the manubrium Left 2016     Insert port vascular access Right 2/14/2017     Procedure: INSERT PORT VASCULAR ACCESS;  Surgeon: Michael Sy PA-C;  Location:  OR              Social History:     Social History   Substance Use Topics     Smoking status: Former Smoker     Packs/day: 1.00     Types: Cigarettes     Start date: 1/1/1995     Quit date: 11/20/2015     Smokeless tobacco: Never Used     Alcohol use 0.0 oz/week     0 Standard drinks or equivalent per week      Comment: occasionally             Allergies:     Allergies   Allergen Reactions     Lisinopril Cough             Medications:     Current Outpatient Prescriptions   Medication Sig Dispense Refill     losartan (COZAAR) 25 MG tablet Take 0.5 tablets (12.5 mg) by mouth daily 45 tablet 0     venlafaxine (EFFEXOR-XR) 75 MG 24 hr capsule Take 1 capsule (75 mg) by mouth daily 30 capsule 3     gabapentin (NEURONTIN) 100 MG capsule Take 2 capsules (200 mg) by mouth 3 times daily 90 capsule 3     LORAZEPAM PO Take 0.25 mg by mouth every 4 hours as needed for anxiety                 Abbreviated Physical Exam:   /70  Pulse 108  Temp 97.8  F (36.6  C) (Oral)  Resp 16  Ht 1.626 m (5' 4.02\")  Wt 81.2 kg (179 lb 0.2 oz)  BMI 30.71 kg/m2  Patient Alert & Oriented and in No Acute Distress         Laboratory Data:     BMP  Recent Labs  Lab 02/18/17  0821 02/16/17  0819 02/15/17  0845    141 142   POTASSIUM 3.6 3.9 4.1   CHLORIDE 106 107 108   VALERIA 7.8* 8.7 8.2*   CO2 28 27 25   BUN 14 15 16   CR 0.61 0.61 0.66   GLC 82 92 91     CBC  Recent Labs  Lab 02/18/17  0821 02/17/17  0822 02/16/17  0819 02/15/17  0845   WBC 45.8* 45.6* 40.8* 20.4*   RBC 2.83* 3.13* 3.28* 3.38*   HGB 9.6* 10.5* 10.8* 11.4*   HCT 28.8* 31.5* 32.7* 34.5*   * 101* 100 102*   MCH 33.9* 33.5* 32.9 " 33.7*   MCHC 33.3 33.3 33.0 33.0   RDW 17.6* 17.4* 17.2* 17.2*   * 132* 156 181     INR  Recent Labs  Lab 02/14/17  1157   INR 0.94     Results for orders placed or performed during the hospital encounter of 02/18/17 (from the past 24 hour(s))   Flow Cytometry/Imunophenotyping (CD34)   Result Value Ref Range    IFC Specimen Blood     Cell Therapy Product Number PRE COLLECTION     CD34 Progen Cells 0.14 %    CD34 Stem Cell Assay Interp       Flow cytometric assay of hematopoietic progenitor cells was performed using   ISHAGE protocol.  The analysis of CD34+ cells includes low side scatter (SSC)   and CD45 antigen expression as gating parameters.  The calculated percentage of   CD34+ cells is based on mononuclear cells.      ASR Comment       Analyte Specific Reagents (ASRs) are used in many laboratory tests necessary for   standard medical care and generally do not require FDA approval.  This test was   developed and its performance characteristics determined by [facility].  It has not been cleared or approved by   the US Food and Drug Administration.     CBC with platelets differential   Result Value Ref Range    WBC 45.8 (H) 4.0 - 11.0 10e9/L    RBC Count 2.83 (L) 3.8 - 5.2 10e12/L    Hemoglobin 9.6 (L) 11.7 - 15.7 g/dL    Hematocrit 28.8 (L) 35.0 - 47.0 %     (H) 78 - 100 fl    MCH 33.9 (H) 26.5 - 33.0 pg    MCHC 33.3 31.5 - 36.5 g/dL    RDW 17.6 (H) 10.0 - 15.0 %    Platelet Count 106 (L) 150 - 450 10e9/L    Diff Method Manual Differential     % Neutrophils 90.8 %    % Lymphocytes 2.8 %    % Monocytes 5.5 %    % Eosinophils 0.0 %    % Basophils 0.0 %    % Myelocytes 0.9 %    Absolute Neutrophil 41.6 (H) 1.6 - 8.3 10e9/L    Absolute Lymphocytes 1.3 0.8 - 5.3 10e9/L    Absolute Monocytes 2.5 (H) 0.0 - 1.3 10e9/L    Absolute Eosinophils 0.0 0.0 - 0.7 10e9/L    Absolute Basophils 0.0 0.0 - 0.2 10e9/L    Absolute Myelocytes 0.4 (H) 0 10e9/L    Anisocytosis  Slight     Macrocytes Present    Calcium ionized   Result Value Ref Range    Calcium Ionized 4.6 4.4 - 5.2 mg/dL   Magnesium   Result Value Ref Range    Magnesium 1.9 1.6 - 2.3 mg/dL   Basic metabolic panel   Result Value Ref Range    Sodium 143 133 - 144 mmol/L    Potassium 3.6 3.4 - 5.3 mmol/L    Chloride 106 94 - 109 mmol/L    Carbon Dioxide 28 20 - 32 mmol/L    Anion Gap 8 3 - 14 mmol/L    Glucose 82 70 - 99 mg/dL    Urea Nitrogen 14 7 - 30 mg/dL    Creatinine 0.61 0.52 - 1.04 mg/dL    GFR Estimate >90  Non  GFR Calc   >60 mL/min/1.7m2    GFR Estimate If Black >90   GFR Calc   >60 mL/min/1.7m2    Calcium 7.8 (L) 8.5 - 10.1 mg/dL              Procedure Summary:   An ~ 5 hour PBSC collection  is currently being  performed. The  R chest catheter  was used for access. ACD-A was used  for anticoagulation. To offset the effects of the citrate, calcium gluconate was given in the return line. The patient's vital signs are stable and  is tolerating the procedure well.      Attestation:   This patient has been seen and evaluated by Quirino barlow.  Quirino Gaming   Division of Transfusion Medicine   Department [place] [address]   Pager: [phone] or [phone]

## 2017-02-18 NOTE — IP AVS SNAPSHOT
[facility]    [address] [address]    Phone:  [phone]    Fax:  [phone]                                       After Visit Summary   2/18/2017    Steph Agee    MRN: [id number]           After Visit Summary Signature Page     I have received my discharge instructions, and my questions have been answered. I have discussed any challenges I see with this plan with the nurse or doctor.    ..........................................................................................................................................  Patient/Patient Representative Signature      ..........................................................................................................................................  Patient Representative Print Name and Relationship to Patient    ..................................................               ................................................  Date                                            Time    ..........................................................................................................................................  Reviewed by Signature/Title    ...................................................              ..............................................  Date                                                            Time

## 2017-02-18 NOTE — DISCHARGE INSTRUCTIONS
Autologous HPC/MNC Collection:   In most cases, the cell dose report will be available tomorrow morning.   The Bone Marrow Transplant (BMT) clinic staff looks at your report, and a decision is made if you will need another collection.   Remember it is important to follow a low fat diet during the collection process. Sometimes following the procedure, your blood platelet count may be low.  If you are told your platelet count is low, you need to avoid taking aspirin/aspirin containing products and avoid heavy physical activity and activities that may result in bruising or traumatic injury.  To contact the BMT fellow or attending physician after 5 p.m. call [phone].

## 2017-02-18 NOTE — PROGRESS NOTES
BMT Daily  Note      ID:  41 female undergoing stem cell collection prior to auto HCT for HL.  Will receive Mozobil #4 today.      EXAM:  VSS.  Exam unremarkable.      Labs:  Lab Results   Component Value Date    WBC 45.8 (H) 02/18/2017    ANEU 41.6 (H) 02/18/2017    HGB 9.6 (L) 02/18/2017    HCT 28.8 (L) 02/18/2017     (L) 02/18/2017     02/18/2017    POTASSIUM 3.6 02/18/2017    CHLORIDE 106 02/18/2017    CO2 28 02/18/2017    GLC 82 02/18/2017    BUN 14 02/18/2017    CR 0.61 02/18/2017    MAG 1.9 02/18/2017    INR 0.94 02/14/2017       Assessment Plans:     PLAN:  Mozobil #4 today, collect again tomorrow.

## 2017-02-18 NOTE — MR AVS SNAPSHOT
After Visit Summary   2/18/2017    Steph Agee    MRN: [id number]           Patient Information     Date Of Birth          1975        Visit Information        Provider Department      2/18/2017 10:30 AM  BMT DOM [facility] Blood [facility]        Today's Diagnoses     Autologous donor, stem cells    -  1    Nodular sclerosing Hodgkin's lymphoma, unspecified body region (H)              [facility] ([facility])  [address] [place] [address]  Phone: [phone]  Clinic Hours:   Monday-Friday:    8am to 4:30pm   Weekends and holidays:    8am to noon (in general)  If your fever is 100.5  or greater,   call the clinic.  After hours call the   hospital at [phone] or   [phone]. Ask for the BMT   fellow for pediatric or adult patients            Follow-ups after your visit        Your next 10 appointments already scheduled     Aug 09, 2017 10:30 AM CDT   Masonic Lab Draw with  MASONIC LAB DRAW   [facility] Masonic Lab Draw ([facility])    [address] [place] [id number] [phone]            Aug 09, 2017 11:00 AM CDT   Return with Obed Chambers MD   [facility] Blood and Marrow Transplant ([facility])    [address] [place] [address] [phone]              Future tests that were ordered for you today     Open Standing Orders        Priority Remaining Interval Expires Ordered    Calcium ionized Routine 9/10 CONDITIONAL (SPECIFY)  2/19/2017    Magnesium Routine 9/10 CONDITIONAL (SPECIFY)  2/19/2017            Who to contact     If you have questions or need follow up information about today's clinic visit or your schedule please contact [facility] BLOOD [facility] MARROW TRANSPL[facility] directly at [phone].  Normal or non-critical lab and imaging results will be communicated to you by MyChart, letter or phone within 4 business days after the clinic has  received the results. If you do not hear from us within 7 days, please contact the clinic through [employer] or phone. If you have a critical or abnormal lab result, we will notify you by phone as soon as possible.  Submit refill requests through [employer] or call your pharmacy and they will forward the refill request to us. Please allow 3 business days for your refill to be completed.          Additional Information About Your Visit        [employer] Information     [employer] gives you secure access to your electronic health record. If you see a primary care provider, you can also send messages to your care team and make appointments. If you have questions, please call your primary care clinic.  If you do not have a primary care provider, please call [phone] and they will assist you.        Care EveryWhere ID     This is your Care EveryWhere ID. This could be used by other organizations to access your [place] medical records  [id number]         Blood Pressure from Last 3 Encounters:   02/19/17 102/59   02/18/17 108/59   02/17/17 112/63    Weight from Last 3 Encounters:   02/19/17 81.2 kg (179 lb 0.2 oz)   02/18/17 81.2 kg (179 lb 0.2 oz)   02/17/17 81.2 kg (179 lb 1.6 oz)              Today, you had the following     No orders found for display       Recent Review Flowsheet Data     BMT Recent Results Latest Ref Rng & Units 2/3/2017 2/14/2017 2/15/2017 2/16/2017 2/17/2017 2/18/2017 2/19/2017    WBC 4.0 - 11.0 10e9/L 2.3(L) 22.6(H) 20.4(H) 40.8(H) 45.6(H) 45.8(H) 38.6(H)    Hemoglobin 11.7 - 15.7 g/dL 11.2(L) 11.2(L) 11.4(L) 10.8(L) 10.5(L) 9.6(L) 9.5(L)    Platelet Count 150 - 450 10e9/L 232 178 181 156 132(L) 106(L) 81(L)    Neutrophils (Absolute) 1.6 - 8.3 10e9/L 1.1(L) - 18.4(H) 36.5(H) 41.6(H) 41.6(H) 35.2(H)    INR 0.86 - 1.14 - 0.94 - - - - -    Sodium 133 - 144 mmol/L - - 142 141 - 143 144    Potassium 3.4 - 5.3 mmol/L - - 4.1 3.9 - 3.6 3.4    Chloride 94 - 109 mmol/L - - 108 107 - 106 106    Glucose 70 - 99  mg/dL - - 91 92 - 82 93    Urea Nitrogen 7 - 30 mg/dL - - 16 15 - 14 10    Creatinine 0.52 - 1.04 mg/dL - - 0.66 0.61 - 0.61 0.61    Calcium (Total) 8.5 - 10.1 mg/dL - - 8.2(L) 8.7 - 7.8(L) 8.0(L)    Protein (Total) 6.8 - 8.8 g/dL - - - - - - -    Albumin 3.4 - 5.0 g/dL - - - - - - -    Alkaline Phosphatase 40 - 150 U/L - - - - - - -    AST 0 - 45 U/L - - - - - - -    ALT 0 - 50 U/L - - - - - - -    MCV 78 - 100 fl 100 102(H) 102(H) 100 101(H) 102(H) 100               Primary Care Provider    Physician No Ref-Primary       No address on file        Thank you!     Thank you for choosing [facility] BLOOD AND MARROW TRANSPLANT  for your care. Our goal is always to provide you with excellent care. Hearing back from our patients is one way we can continue to improve our services. Please take a few minutes to complete the written survey that you may receive in the mail after your visit with us. Thank you!             Your Updated Medication List - Protect others around you: Learn how to safely use, store and throw away your medicines at www.disposemymeds.org.          This list is accurate as of: 2/18/17 11:59 PM.  Always use your most recent med list.                   Brand Name Dispense Instructions for use    gabapentin 100 MG capsule    NEURONTIN    90 capsule    Take 2 capsules (200 mg) by mouth 3 times daily       LORAZEPAM PO      Take 0.25 mg by mouth every 4 hours as needed for anxiety       losartan 25 MG tablet    COZAAR    45 tablet    Take 0.5 tablets (12.5 mg) by mouth daily       venlafaxine 75 MG 24 hr capsule    EFFEXOR-XR    30 capsule    Take 1 capsule (75 mg) by mouth daily

## 2017-02-19 ENCOUNTER — HOSPITAL ENCOUNTER (OUTPATIENT)
Dept: LAB | Facility: CLINIC | Age: 42
Discharge: HOME | End: 2017-02-19
Attending: INTERNAL MEDICINE | Admitting: INTERNAL MEDICINE
Payer: COMMERCIAL

## 2017-02-19 ENCOUNTER — ONCOLOGY VISIT (OUTPATIENT)
Dept: TRANSPLANT | Facility: CLINIC | Age: 42
End: 2017-02-19
Attending: INTERNAL MEDICINE
Payer: COMMERCIAL

## 2017-02-19 VITALS
BODY MASS INDEX: 30.56 KG/M2 | HEART RATE: 96 BPM | SYSTOLIC BLOOD PRESSURE: 103 MMHG | DIASTOLIC BLOOD PRESSURE: 60 MMHG | HEIGHT: 64 IN | RESPIRATION RATE: 18 BRPM | TEMPERATURE: 98.4 F | WEIGHT: 179.01 LBS

## 2017-02-19 DIAGNOSIS — Z29.9 NEED FOR PROPHYLACTIC MEASURE: ICD-10-CM

## 2017-02-19 DIAGNOSIS — Z52.011 AUTOLOGOUS DONOR, STEM CELLS: Primary | ICD-10-CM

## 2017-02-19 LAB
ANION GAP SERPL CALCULATED.3IONS-SCNC: 7 MMOL/L (ref 3–14)
ANISOCYTOSIS BLD QL SMEAR: SLIGHT
BASOPHILS # BLD AUTO: 0.3 10E9/L (ref 0–0.2)
BASOPHILS NFR BLD AUTO: 0.9 %
BUN SERPL-MCNC: 10 MG/DL (ref 7–30)
CA-I SERPL ISE-MCNC: 4.6 MG/DL (ref 4.4–5.2)
CALCIUM SERPL-MCNC: 8 MG/DL (ref 8.5–10.1)
CHLORIDE SERPL-SCNC: 106 MMOL/L (ref 94–109)
CO2 SERPL-SCNC: 32 MMOL/L (ref 20–32)
CREAT SERPL-MCNC: 0.61 MG/DL (ref 0.52–1.04)
DIFFERENTIAL METHOD BLD: ABNORMAL
EOSINOPHIL # BLD AUTO: 1 10E9/L (ref 0–0.7)
EOSINOPHIL NFR BLD AUTO: 2.6 %
ERYTHROCYTE [DISTWIDTH] IN BLOOD BY AUTOMATED COUNT: 17.6 % (ref 10–15)
GFR SERPL CREATININE-BSD FRML MDRD: ABNORMAL ML/MIN/1.7M2
GLUCOSE SERPL-MCNC: 93 MG/DL (ref 70–99)
HCT VFR BLD AUTO: 28.4 % (ref 35–47)
HGB BLD-MCNC: 9.5 G/DL (ref 11.7–15.7)
LYMPHOCYTES # BLD AUTO: 1 10E9/L (ref 0.8–5.3)
LYMPHOCYTES NFR BLD AUTO: 2.6 %
MACROCYTES BLD QL SMEAR: PRESENT
MAGNESIUM SERPL-MCNC: 1.6 MG/DL (ref 1.6–2.3)
MCH RBC QN AUTO: 33.6 PG (ref 26.5–33)
MCHC RBC AUTO-ENTMCNC: 33.5 G/DL (ref 31.5–36.5)
MCV RBC AUTO: 100 FL (ref 78–100)
METAMYELOCYTES # BLD: 0.3 10E9/L
METAMYELOCYTES NFR BLD MANUAL: 0.9 %
MONOCYTES # BLD AUTO: 0.7 10E9/L (ref 0–1.3)
MONOCYTES NFR BLD AUTO: 1.7 %
NEUTROPHILS # BLD AUTO: 35.2 10E9/L (ref 1.6–8.3)
NEUTROPHILS NFR BLD AUTO: 91.3 %
PLATELET # BLD AUTO: 81 10E9/L (ref 150–450)
POTASSIUM SERPL-SCNC: 3.4 MMOL/L (ref 3.4–5.3)
RBC # BLD AUTO: 2.83 10E12/L (ref 3.8–5.2)
SODIUM SERPL-SCNC: 144 MMOL/L (ref 133–144)
WBC # BLD AUTO: 38.6 10E9/L (ref 4–11)

## 2017-02-19 PROCEDURE — 85025 COMPLETE CBC W/AUTO DIFF WBC: CPT | Performed by: PATHOLOGY

## 2017-02-19 PROCEDURE — 83735 ASSAY OF MAGNESIUM: CPT | Performed by: PATHOLOGY

## 2017-02-19 PROCEDURE — 82330 ASSAY OF CALCIUM: CPT | Performed by: PATHOLOGY

## 2017-02-19 PROCEDURE — 88184 FLOWCYTOMETRY/ TC 1 MARKER: CPT | Performed by: PATHOLOGY

## 2017-02-19 PROCEDURE — 25000125 ZZHC RX 250: Mod: ZF | Performed by: PATHOLOGY

## 2017-02-19 PROCEDURE — 38206 HARVEST AUTO STEM CELLS: CPT | Mod: ZF

## 2017-02-19 PROCEDURE — 96372 THER/PROPH/DIAG INJ SC/IM: CPT | Mod: XS

## 2017-02-19 PROCEDURE — 25000128 H RX IP 250 OP 636: Mod: ZF | Performed by: NURSE PRACTITIONER

## 2017-02-19 PROCEDURE — 38207 CRYOPRESERVE STEM CELLS: CPT | Performed by: INTERNAL MEDICINE

## 2017-02-19 PROCEDURE — 25000128 H RX IP 250 OP 636: Mod: ZF | Performed by: PATHOLOGY

## 2017-02-19 PROCEDURE — 80048 BASIC METABOLIC PNL TOTAL CA: CPT | Performed by: PATHOLOGY

## 2017-02-19 RX ORDER — HEPARIN SODIUM (PORCINE) LOCK FLUSH IV SOLN 100 UNIT/ML 100 UNIT/ML
3 SOLUTION INTRAVENOUS
Status: COMPLETED | OUTPATIENT
Start: 2017-02-19 | End: 2017-02-19

## 2017-02-19 RX ORDER — HEPARIN SODIUM,PORCINE 10 UNIT/ML
5 VIAL (ML) INTRAVENOUS
Status: CANCELLED | OUTPATIENT
Start: 2017-02-20

## 2017-02-19 RX ORDER — PLERIXAFOR 24 MG/1.2ML
0.24 SOLUTION SUBCUTANEOUS DAILY
Status: CANCELLED
Start: 2017-02-20

## 2017-02-19 RX ADMIN — SODIUM CHLORIDE, PRESERVATIVE FREE 3 ML: 5 INJECTION INTRAVENOUS at 13:48

## 2017-02-19 RX ADMIN — ANTICOAGULANT CITRATE DEXTROSE SOLUTION FORMULA A 1375 ML: 12.25; 11; 3.65 SOLUTION INTRAVENOUS at 08:56

## 2017-02-19 RX ADMIN — CALCIUM GLUCONATE 1624 MG/HR: 94 INJECTION, SOLUTION INTRAVENOUS at 08:57

## 2017-02-19 RX ADMIN — FILGRASTIM 900 MCG: 300 INJECTION, SOLUTION INTRAVENOUS; SUBCUTANEOUS at 08:49

## 2017-02-19 NOTE — PROGRESS NOTES
"BMT Daily  Note      ID:  [id number] female undergoing stem cell collection prior to auto HCT for HL.  Received Mozobil #4 yesterday.  First day of collection 02/18/17.      HPI:  Only c/o is nasal \"stuffiness\".  No cough, SINGLETON, SOB, pain, NVD.     EXAM:  VSS.  Coarse breath sounds.  Oropharynx clear.  Exam otherwise unremarkable.      Labs:  Lab Results   Component Value Date    WBC 38.6 (H) 02/19/2017    ANEU 35.2 (H) 02/19/2017    HGB 9.5 (L) 02/19/2017    HCT 28.4 (L) 02/19/2017    PLT 81 (L) 02/19/2017     02/19/2017    POTASSIUM 3.4 02/19/2017    CHLORIDE 106 02/19/2017    CO2 32 02/19/2017    GLC 93 02/19/2017    BUN 10 02/19/2017    CR 0.61 02/19/2017    MAG 1.6 02/19/2017    INR 0.94 02/14/2017       Assessment Plans:     ASSESS/PLAN:      - Mozobil #4 and first collection 02/18/17.  Yield 0.39 X 10e6 CD34+/kg.  - Third collection tomorrow.  Then determine need for further pheresis vs harvest.                   "

## 2017-02-19 NOTE — DISCHARGE INSTRUCTIONS
Apheresis Blood Donor Center Post Instructions  You may feel tired after your procedure today.   Please call your doctor if you have:  bleeding that doesn t stop, fever, pain where a needle or tube (catheter) was placed, seizures, trouble breathing, red urine, nausea or vomiting, other health concerns.     If your symptoms are severe, call 271.    If you have a Central Venous Catheter:  Notify your doctor if you have had a fever, chills, shaking  or redness, warmth, swelling, drainage at the exit-site.  This could be a sign of infection.    The Apheresis/Blood Donor Center is open Monday-Friday 7:30 a.m. to 5 p.m.  The phone number is [phone].  A Transfusion Medicine physician can be reached after 5:00 p.m. weekdays and on weekends /Holidays by calling [phone], and asking for the physician on call.      {APH PI:[id number]}Autologous HPC/MNC Collection:   In most cases, the cell dose report will be available tomorrow morning.   The Bone Marrow Transplant (BMT) clinic staff looks at your report, and a decision is made if you will need another collection.   Remember it is important to follow a low fat diet during the collection process. Sometimes following the procedure, your blood platelet count may be low.  If you are told your platelet count is low, you need to avoid taking aspirin/aspirin containing products and avoid heavy physical activity and activities that may result in bruising or traumatic injury.  To contact the BMT fellow or attending physician after 5 p.m. call [phone].

## 2017-02-19 NOTE — IP AVS SNAPSHOT
"[facility] TREATMENT [place] APHERESIS: [phone]                                              INTERAGENCY TRANSFER FORM - LAB / IMAGING / EKG / EMG RESULTS   2017                    Hospital Admission Date: 2017  DENIS LUNA   : 1975  Sex: Female        Attending Provider: Jayden Rudolph MD     Allergies:  Lisinopril    Infection:  None   Service:  None    Ht:  1.626 m (5' 4.02\")   Wt:  81.2 kg (179 lb 0.2 oz)   Admission Wt:  81.2 kg (179 lb 0.2 oz)    BMI:  30.71 kg/m 2   BSA:  1.92 m 2            Patient PCP Information     Provider PCP Type    Physician No Ref-Primary General         Lab Results - 3 Days      CBC with platelets differential [[id number]] (Abnormal)  Resulted: 17 0857, Result status: Final result    Ordering provider: Aurelia Oconnell MD  17 0820 Resulting lab: [facility] AND SURGERY [place]    Specimen Information    Type Source Collected On   Blood  17 0810          Components       Value Reference Range Flag Lab   WBC 38.6 4.0 - 11.0 10e9/L H 1740   RBC Count 2.83 3.8 - 5.2 10e12/L L 1740   Hemoglobin 9.5 11.7 - 15.7 g/dL L 1740   Hematocrit 28.4 35.0 - 47.0 % L 1740    78 - 100 fl  1740   MCH 33.6 26.5 - 33.0 pg H 1740   MCHC 33.5 31.5 - 36.5 g/dL  1740   RDW 17.6 10.0 - 15.0 % H 1740   Platelet Count 81 150 - 450 10e9/L L 1740   Diff Method Manual Differential   1740   % Neutrophils 91.3 %  1740   % Lymphocytes 2.6 %  1740   % Monocytes 1.7 %  1740   % Eosinophils 2.6 %  1740   % Basophils 0.9 %  1740   % Metamyelocytes 0.9 %  1740   Absolute Neutrophil 35.2 1.6 - 8.3 10e9/L H 1740   Absolute Lymphocytes 1.0 0.8 - 5.3 10e9/L  1740   Absolute Monocytes 0.7 0.0 - 1.3 10e9/L  1740   Absolute Eosinophils 1.0 0.0 - 0.7 10e9/L H 1740   Absolute Basophils 0.3 0.0 - 0.2 10e9/L H 1740   Absolute Metamyelocytes 0.3 0 10e9/L H 1740   Anisocytosis Slight   1740   Macrocytes Present   1740          "   Magnesium [[id number]]  Resulted: 02/19/17 0848, Result status: Final result    Ordering provider: Aurelia Oconnell MD  02/19/17 0821 Resulting lab: [facility]    Specimen Information    Type Source Collected On   Blood  02/19/17 0810          Components       Value Reference Range Flag Lab   Magnesium 1.6 1.6 - 2.3 mg/dL  1740            Basic metabolic panel [[id number]] (Abnormal)  Resulted: 02/19/17 0848, Result status: Final result    Ordering provider: Quirino Gaming MD  02/19/17 0822 Resulting lab: [facility]    Specimen Information    Type Source Collected On   Blood  02/19/17 0810          Components       Value Reference Range Flag Lab   Sodium 144 133 - 144 mmol/L  1740   Potassium 3.4 3.4 - 5.3 mmol/L  1740   Chloride 106 94 - 109 mmol/L  1740   Carbon Dioxide 32 20 - 32 mmol/L  1740   Anion Gap 7 3 - 14 mmol/L  1740   Glucose 93 70 - 99 mg/dL  1740   Urea Nitrogen 10 7 - 30 mg/dL  1740   Creatinine 0.61 0.52 - 1.04 mg/dL  1740   GFR Estimate -- >60 mL/min/1.7m2  1740   Result:         >90  Non  GFR Calc     GFR Estimate If Black -- >60 mL/min/1.7m2  1740   Result:         >90   GFR Calc     Calcium 8.0 8.5 - 10.1 mg/dL L 1740   Result:              Calcium ionized [[id number]]  Resulted: 02/19/17 0838, Result status: Final result    Ordering provider: Aurelia Oconnell MD  02/19/17 0821 Resulting lab: [facility]    Specimen Information    Type Source Collected On   Blood  02/19/17 0810          Components       Value Reference Range Flag Lab   Calcium Ionized 4.6 4.4 - 5.2 mg/dL  1740            Flow Cytometry/Imunophenotyping (CD34) [[id number]]  Resulted: 02/19/17 0832, Result status: In process    Ordering provider: Aurelia Oconnell MD  02/19/17 0820 Resulting lab: [facility]    Specimen Information    Type  Source Collected On   Blood  02/19/17 0810            LAB RESULT - HIM SCAN [[id number]]  Resulted: 02/18/17 1730, Result status: Final result    Specimen Information    Type Source Collected On     02/18/17 1730            Flow Cytometry/Imunophenotyping (CD34) [[id number]]  Resulted: 02/18/17 1027, Result status: Final result    Ordering provider: Aurelia Oconnell MD  02/18/17 0818 Resulting lab: [facility]    Specimen Information    Type Source Collected On   Blood  02/18/17 0821          Components       Value Reference Range Flag Lab   IFC Specimen Blood   51   Cell Therapy Product Number PRE COLLECTION   51   CD34 Progen Cells 0.14 %  51   CD34 Stem Cell Assay Interp --   51   Result:         Flow cytometric assay of hematopoietic progenitor cells was performed using   ISHAGE protocol.  The analysis of CD34+ cells includes low side scatter (SSC)   and CD45 antigen expression as gating parameters.  The calculated percentage of   CD34+ cells is based on mononuclear cells.     ASR Comment --   51   Result:         Analyte Specific Reagents (ASRs) are used in many laboratory tests necessary for   standard medical care and generally do not require FDA approval.  This test was   developed and its performance characteristics determined by [facility].  It has not been cleared or approved by   the US Food and Drug Administration.              CBC with platelets differential [[id number]] (Abnormal)  Resulted: 02/18/17 0859, Result status: Final result    Ordering provider: Aurelia Oconnell MD  02/18/17 0818 Resulting lab: [facility]    Specimen Information    Type Source Collected On   Blood  02/18/17 0821          Components       Value Reference Range Flag Lab   WBC 45.8 4.0 - 11.0 10e9/L H 1740   RBC Count 2.83 3.8 - 5.2 10e12/L L 1740   Hemoglobin 9.6 11.7 - 15.7 g/dL L 1740    Hematocrit 28.8 35.0 - 47.0 % L 1740    78 - 100 fl H 1740   MCH 33.9 26.5 - 33.0 pg H 1740   MCHC 33.3 31.5 - 36.5 g/dL  1740   RDW 17.6 10.0 - 15.0 % H 1740   Platelet Count 106 150 - 450 10e9/L L 1740   Diff Method Manual Differential   1740   % Neutrophils 90.8 %  1740   % Lymphocytes 2.8 %  1740   % Monocytes 5.5 %  1740   % Eosinophils 0.0 %  1740   % Basophils 0.0 %  1740   % Myelocytes 0.9 %  1740   Absolute Neutrophil 41.6 1.6 - 8.3 10e9/L H 1740   Absolute Lymphocytes 1.3 0.8 - 5.3 10e9/L  1740   Absolute Monocytes 2.5 0.0 - 1.3 10e9/L H 1740   Absolute Eosinophils 0.0 0.0 - 0.7 10e9/L  1740   Absolute Basophils 0.0 0.0 - 0.2 10e9/L  1740   Absolute Myelocytes 0.4 0 10e9/L H 1740   Anisocytosis Slight   1740   Macrocytes Present   1740            Magnesium [[id number]]  Resulted: 02/18/17 0847, Result status: Final result    Ordering provider: Aurelia Oconnell MD  02/18/17 0818 Resulting lab: [facility]    Specimen Information    Type Source Collected On   Blood  02/18/17 0821          Components       Value Reference Range Flag Lab   Magnesium 1.9 1.6 - 2.3 mg/dL  1740            Basic metabolic panel [[id number]] (Abnormal)  Resulted: 02/18/17 0847, Result status: Final result    Ordering provider: Aurelia Oconnell MD  02/18/17 0821 Resulting lab: [facility]    Specimen Information    Type Source Collected On     02/18/17 0821          Components       Value Reference Range Flag Lab   Sodium 143 133 - 144 mmol/L  1740   Potassium 3.6 3.4 - 5.3 mmol/L  1740   Chloride 106 94 - 109 mmol/L  1740   Carbon Dioxide 28 20 - 32 mmol/L  1740   Anion Gap 8 3 - 14 mmol/L  1740   Glucose 82 70 - 99 mg/dL  1740   Urea Nitrogen 14 7 - 30 mg/dL  1740   Creatinine 0.61 0.52 - 1.04 mg/dL  1740   GFR Estimate -- >60 mL/min/1.7m2  1740   Result:         >90  Non  GFR Calc     GFR Estimate  If Black -- >60 mL/min/1.7m2  1740   Result:         >90   GFR Calc     Calcium 7.8 8.5 - 10.1 mg/dL L 1740   Result:              Calcium ionized [[id number]]  Resulted: 02/18/17 0847, Result status: Final result    Ordering provider: Aurelia Oconnell MD  02/18/17 0818 Resulting lab: [facility]    Specimen Information    Type Source Collected On   Blood  02/18/17 0821          Components       Value Reference Range Flag Lab   Calcium Ionized 4.6 4.4 - 5.2 mg/dL  1740            CD34 Stem cell assay [[id number]]  Resulted: 02/17/17 1027, Result status: Final result    Resulting lab: [facility]     Specimen Information    Type Source Collected On   Blood  02/17/17 0822          Components       Value Reference Range Flag Lab   IFC Specimen Blood   51   Cell Therapy Product Number PRE COLLECTION   51   CD34 Progen Cells 0.11 %  51   CD34 Stem Cell Assay Interp --   51   Result:         Flow cytometric assay of hematopoietic progenitor cells was performed using   ISHAGE protocol.  The analysis of CD34+ cells includes low side scatter (SSC)   and CD45 antigen expression as gating parameters.  The calculated percentage of   CD34+ cells is based on mononuclear cells.     ASR Comment --   51   Result:         Analyte Specific Reagents (ASRs) are used in many laboratory tests necessary for   standard medical care and generally do not require FDA approval.  This test was   developed and its performance characteristics determined by [facility].  It has not been cleared or approved by   the US Food and Drug Administration.              CBC with platelets differential [[id number]] (Abnormal)  Resulted: 02/17/17 0853, Result status: Final result    Resulting lab: [facility]     Specimen Information    Type Source Collected On   Blood   02/17/17 0822          Components       Value Reference Range Flag Lab   WBC 45.6 4.0 - 11.0 10e9/L H 1740   RBC Count 3.13 3.8 - 5.2 10e12/L L 1740   Hemoglobin 10.5 11.7 - 15.7 g/dL L 1740   Hematocrit 31.5 35.0 - 47.0 % L 1740    78 - 100 fl H 1740   MCH 33.5 26.5 - 33.0 pg H 1740   MCHC 33.3 31.5 - 36.5 g/dL  1740   RDW 17.4 10.0 - 15.0 % H 1740   Platelet Count 132 150 - 450 10e9/L L 1740   Diff Method Manual Differential   1740   % Neutrophils 91.2 %  1740   % Lymphocytes 1.8 %  1740   % Monocytes 6.1 %  1740   % Eosinophils 0.9 %  1740   % Basophils 0.0 %  1740   Absolute Neutrophil 41.6 1.6 - 8.3 10e9/L H 1740   Absolute Lymphocytes 0.8 0.8 - 5.3 10e9/L  1740   Absolute Monocytes 2.8 0.0 - 1.3 10e9/L H 1740   Absolute Eosinophils 0.4 0.0 - 0.7 10e9/L  1740   Absolute Basophils 0.0 0.0 - 0.2 10e9/L  1740   Anisocytosis Slight   1740            CD34 Stem cell assay [[id number]]  Resulted: 02/16/17 1010, Result status: Final result    Resulting lab: [facility]     Specimen Information    Type Source Collected On   Blood  02/16/17 0819          Components       Value Reference Range Flag Lab   IFC Specimen Blood   51   Cell Therapy Product Number Pre collection   51   CD34 Progen Cells -- %  51   Result:         0.09%. The percentage of CD34 positive cells is based on less than 100 CD34   positive cells found in acquisition of over 544,000 cells.     CD34 Stem Cell Assay Interp --   51   Result:         Flow cytometric assay of hematopoietic progenitor cells was performed using   ISHAGE protocol.  The analysis of CD34+ cells includes low side scatter (SSC)   and CD45 antigen expression as gating parameters.  The calculated percentage of   CD34+ cells is based on mononuclear cells.     ASR Comment --   51   Result:         Analyte Specific Reagents (ASRs) are used in many laboratory tests necessary for   standard medical care and generally do not require FDA approval.  This  test was   developed and its performance characteristics determined by [facility].  It has not been cleared or approved by   the [place] Food and Drug Administration.              CBC with platelets differential - NOW [[id number]] (Abnormal)  Resulted: 02/16/17 0910, Result status: Final result    Resulting lab: [facility]     Specimen Information    Type Source Collected On   Blood  02/16/17 0819          Components       Value Reference Range Flag Lab   WBC 40.8 4.0 - 11.0 10e9/L H 1740   RBC Count 3.28 3.8 - 5.2 10e12/L L 1740   Hemoglobin 10.8 11.7 - 15.7 g/dL L 1740   Hematocrit 32.7 35.0 - 47.0 % L 1740    78 - 100 fl  1740   MCH 32.9 26.5 - 33.0 pg  1740   MCHC 33.0 31.5 - 36.5 g/dL  1740   RDW 17.2 10.0 - 15.0 % H 1740   Platelet Count 156 150 - 450 10e9/L  1740   Diff Method Manual Differential   1740   % Neutrophils 89.4 %  1740   % Lymphocytes 1.8 %  1740   % Monocytes 8.8 %  1740   % Eosinophils 0.0 %  1740   % Basophils 0.0 %  1740   Absolute Neutrophil 36.5 1.6 - 8.3 10e9/L H 1740   Absolute Lymphocytes 0.7 0.8 - 5.3 10e9/L L 1740   Absolute Monocytes 3.6 0.0 - 1.3 10e9/L H 1740   Absolute Eosinophils 0.0 0.0 - 0.7 10e9/L  1740   Absolute Basophils 0.0 0.0 - 0.2 10e9/L  1740   Anisocytosis Moderate   1740   Poikilocytosis Slight   1740   Ovalocytes Slight   1740   Macrocytes Present   1740            Basic metabolic panel - NOW [[id number]]  Resulted: 02/16/17 0851, Result status: Final result    Resulting lab: [facility]     Specimen Information    Type Source Collected On   Blood  02/16/17 0819          Components       Value Reference Range Flag Lab   Sodium 141 133 - 144 mmol/L  1740   Potassium 3.9 3.4 - 5.3 mmol/L  1740   Chloride 107 94 - 109 mmol/L  1740   Carbon Dioxide 27 20 - 32 mmol/L  1740   Anion Gap 8 3 - 14 mmol/L  1740   Glucose 92 70 - 99 mg/dL   1740   Urea Nitrogen 15 7 - 30 mg/dL  1740   Creatinine 0.61 0.52 - 1.04 mg/dL  1740   GFR Estimate -- >60 mL/min/1.7m2  1740   Result:         >90  Non  GFR Calc     GFR Estimate If Black -- >60 mL/min/1.7m2  1740   Result:         >90   GFR Calc     Calcium 8.7 8.5 - 10.1 mg/dL  1740   Result:              Testing Performed By     Lab - Abbreviation Name Director Address Valid Date Range    45 - [id number] [facility] Unknown Unknown 01/28/02 0000 - Present    51 - Unknown [facility] Unknown [address] [id number] 12/31/14 1010 - Present    1740 - Unknown [facility] Unknown [address] [id number] 02/15/16 1113 - Present            Unresulted Labs (24h ago through future)    Start       Ordered    Unscheduled  Calcium ionized  CONDITIONAL (SPECIFY),   Routine     Comments:  Intra-Apheresis. 30 minutes after each change in Dose.    02/19/17 0820    Unscheduled  Magnesium  CONDITIONAL (SPECIFY),   Routine     Comments:  Intra-Apheresis.  IF patient reports symptoms such as  tingling of feet, hands or mouth.    02/19/17 0820      Encounter-Level Documents:     There are no encounter-level documents.      Order-Level Documents:     There are no order-level documents.

## 2017-02-19 NOTE — IP AVS SNAPSHOT
MRN:[id number]                      After Visit Summary   2/19/2017    Steph Agee    MRN: [id number]           Thank you!     Thank you for choosing [facility] for your care. Our goal is always to provide you with excellent care. Hearing back from our patients is one way we can continue to improve our services. Please take a few minutes to complete the written survey that you may receive in the mail after you visit with us. Thank you!        Patient Information     Date Of Birth          1975        About your hospital stay     You were admitted on:  February 19, 2017 You last received care in the:  [facility] Apheresis    You were discharged on:  February 19, 2017       Who to Call     For medical emergencies, please call 911.  For non-urgent questions about your medical care, please call your primary care provider or clinic, None          Attending Provider     Provider Specialty    Jayden Rudolph MD Hematology       Primary Care Provider    Physician No Ref-Primary       No address on file        Your next 10 appointments already scheduled     Aug 09, 2017 10:30 AM CDT   [facility] with  [facility]   [facility] Draw ([facility])    [address] [place] [id number]   [phone]            Aug 09, 2017 11:00 AM CDT   Return with Obed Chambers MD   [facility] Blood and Marrow Transplant ([facility])    [address] [place] [id number]   [phone]              Further instructions from your care team       Apheresis Blood Donor Center Post Instructions  You may feel tired after your procedure today.   Please call your doctor if you have:  bleeding that doesn t stop, fever, pain where a needle or tube (catheter) was placed, seizures, trouble breathing, red urine, nausea or vomiting, other health concerns.     If your symptoms are  "severe, call 911.    If you have a Central Venous Catheter:  Notify your doctor if you have had a fever, chills, shaking  or redness, warmth, swelling, drainage at the exit-site.  This could be a sign of infection.    The Apheresis/Blood Donor Center is open Monday-Friday 7:30 a.m. to 5 p.m.  The phone number is [phone].  A Transfusion Medicine physician can be reached after 5:00 p.m. weekdays and on weekends /Holidays by calling [phone], and asking for the physician on call.      {APH PI:[id number]}Autologous HPC/MNC Collection:   In most cases, the cell dose report will be available tomorrow morning.   The Bone Marrow Transplant (BMT) clinic staff looks at your report, and a decision is made if you will need another collection.   Remember it is important to follow a low fat diet during the collection process. Sometimes following the procedure, your blood platelet count may be low.  If you are told your platelet count is low, you need to avoid taking aspirin/aspirin containing products and avoid heavy physical activity and activities that may result in bruising or traumatic injury.  To contact the BMT fellow or attending physician after 5 p.m. call [phone].        Pending Results     Date and Time Order Name Status Description    2/19/2017 0820 Flow Cytometry/Imunophenotyping (CD34) In process             Admission Information     Date & Time Provider Department Dept. Phone    2/19/2017 Jayden Rudolph MD [facility] [place] Apheresis [phone]      Your Vitals Were     Blood Pressure Pulse Temperature Respirations Height Weight    102/59 99 98.7  F (37.1  C) (Oral) 16 1.626 m (5' 4.02\") 81.2 kg (179 lb 0.2 oz)    BMI (Body Mass Index)                   30.71 kg/m2           [employer] [facility] [employer] Information     [employer] gives you secure access to your electronic health record. If you see a primary care provider, you can also send messages to your care team and make appointments. If you " have questions, please call your primary care clinic.  If you do not have a primary care provider, please call [phone] and they will assist you.        Care EveryWhere ID     This is your Care EveryWhere ID. This could be used by other organizations to access your [facility] medical records  [id number]           Review of your medicines      UNREVIEWED medicines. Ask your doctor about these medicines        Dose / Directions    gabapentin 100 MG capsule   Commonly known as:  NEURONTIN   Used for:  Personal history of diseases of blood and blood-forming organs        Dose:  200 mg   Take 2 capsules (200 mg) by mouth 3 times daily   Quantity:  90 capsule   Refills:  3       LORAZEPAM PO        Dose:  0.25 mg   Take 0.25 mg by mouth every 4 hours as needed for anxiety   Refills:  0       losartan 25 MG tablet   Commonly known as:  COZAAR   Used for:  Chemotherapy induced cardiomyopathy (H)        Dose:  12.5 mg   Take 0.5 tablets (12.5 mg) by mouth daily   Quantity:  45 tablet   Refills:  0       venlafaxine 75 MG 24 hr capsule   Commonly known as:  EFFEXOR-XR   Used for:  Hodgkin disease (H), Personal history of diseases of blood and blood-forming organs        Dose:  75 mg   Take 1 capsule (75 mg) by mouth daily   Quantity:  30 capsule   Refills:  3                Protect others around you: Learn how to safely use, store and throw away your medicines at www.disposemymeds.org.             Medication List: This is a list of all your medications and when to take them. Check marks below indicate your daily home schedule. Keep this list as a reference.      Medications           Morning Afternoon Evening Bedtime As Needed    gabapentin 100 MG capsule   Commonly known as:  NEURONTIN   Take 2 capsules (200 mg) by mouth 3 times daily                                LORAZEPAM PO   Take 0.25 mg by mouth every 4 hours as needed for anxiety                                losartan 25 MG tablet   Commonly known as:  COZAAR    Take 0.5 tablets (12.5 mg) by mouth daily                                venlafaxine 75 MG 24 hr capsule   Commonly known as:  EFFEXOR-XR   Take 1 capsule (75 mg) by mouth daily

## 2017-02-19 NOTE — MR AVS SNAPSHOT
After Visit Summary   2/19/2017    Steph Agee    MRN: [id number]           Patient Information     Date Of Birth          1975        Visit Information        Provider Department      2/19/2017 10:00 AM  BMT DOM [facility] Blood and Marrow Transplant        Today's Diagnoses     Autologous donor, stem cells    -  1          [facility] ([facility] [place])  [address] [place] [address]  Phone: [phone]  Clinic Hours:   Monday-Friday:    8am to 4:30pm   Weekends and holidays:    8am to noon (in general)  If your fever is 100.5  or greater,   call the clinic.  After hours call the   hospital at [phone] or   [phone]. Ask for the BMT   fellow for pediatric or adult patients            Follow-ups after your visit        Your next 10 appointments already scheduled     Aug 09, 2017 10:30 AM CDT   Masonic Lab Draw with  MASONIC LAB DRAW   [facility] Masonic Lab Draw ([facility])    [address] [place] [address] [phone]            Aug 09, 2017 11:00 AM CDT   Return with Obed Chambers MD   [facility] Blood and Marrow Transplant ([facility])    [address] [place] [address] [phone]              Future tests that were ordered for you today     Open Standing Orders        Priority Remaining Interval Expires Ordered    Calcium ionized Routine 9/10 CONDITIONAL (SPECIFY)  2/19/2017    Magnesium Routine 9/10 CONDITIONAL (SPECIFY)  2/19/2017            Who to contact     If you have questions or need follow up information about today's clinic visit or your schedule please contact [facility] BLOOD AND MARROW TRANSPLANT directly at [phone].  Normal or non-critical lab and imaging results will be communicated to you by MyChart, letter or phone within 4 business days after the clinic has received the results. If you do not hear from us within 7 days, please  contact the clinic through Pedius or phone. If you have a critical or abnormal lab result, we will notify you by phone as soon as possible.  Submit refill requests through Pedius or call your pharmacy and they will forward the refill request to us. Please allow 3 business days for your refill to be completed.          Additional Information About Your Visit        [employer] Information     Pedius gives you secure access to your electronic health record. If you see a primary care provider, you can also send messages to your care team and make appointments. If you have questions, please call your primary care clinic.  If you do not have a primary care provider, please call [phone] and they will assist you.        Care EveryWhere ID     This is your Care EveryWhere ID. This could be used by other organizations to access your [place] medical records  [id number]         Blood Pressure from Last 3 Encounters:   02/19/17 102/59   02/18/17 108/59   02/17/17 112/63    Weight from Last 3 Encounters:   02/19/17 81.2 kg (179 lb 0.2 oz)   02/18/17 81.2 kg (179 lb 0.2 oz)   02/17/17 81.2 kg (179 lb 1.6 oz)              We Performed the Following     CBC with platelets differential     CD34 Stem cell assay     Comprehensive metabolic panel        Recent Review Flowsheet Data     BMT Recent Results Latest Ref Rng & Units 2/3/2017 2/14/2017 2/15/2017 2/16/2017 2/17/2017 2/18/2017 2/19/2017    WBC 4.0 - 11.0 10e9/L 2.3(L) 22.6(H) 20.4(H) 40.8(H) 45.6(H) 45.8(H) 38.6(H)    Hemoglobin 11.7 - 15.7 g/dL 11.2(L) 11.2(L) 11.4(L) 10.8(L) 10.5(L) 9.6(L) 9.5(L)    Platelet Count 150 - 450 10e9/L 232 178 181 156 132(L) 106(L) 81(L)    Neutrophils (Absolute) 1.6 - 8.3 10e9/L 1.1(L) - 18.4(H) 36.5(H) 41.6(H) 41.6(H) 35.2(H)    INR 0.86 - 1.14 - 0.94 - - - - -    Sodium 133 - 144 mmol/L - - 142 141 - 143 144    Potassium 3.4 - 5.3 mmol/L - - 4.1 3.9 - 3.6 3.4    Chloride 94 - 109 mmol/L - - 108 107 - 106 106    Glucose 70 - 99 mg/dL - -  91 92 - 82 93    Urea Nitrogen 7 - 30 mg/dL - - 16 15 - 14 10    Creatinine 0.52 - 1.04 mg/dL - - 0.66 0.61 - 0.61 0.61    Calcium (Total) 8.5 - 10.1 mg/dL - - 8.2(L) 8.7 - 7.8(L) 8.0(L)    Protein (Total) 6.8 - 8.8 g/dL - - - - - - -    Albumin 3.4 - 5.0 g/dL - - - - - - -    Alkaline Phosphatase 40 - 150 U/L - - - - - - -    AST 0 - 45 U/L - - - - - - -    ALT 0 - 50 U/L - - - - - - -    MCV 78 - 100 fl 100 102(H) 102(H) 100 101(H) 102(H) 100               Primary Care Provider    Physician No Ref-Primary       No address on file        Thank you!     Thank you for choosing [facility] BLOOD AND MARROW TRANSPLANT  for your care. Our goal is always to provide you with excellent care. Hearing back from our patients is one way we can continue to improve our services. Please take a few minutes to complete the written survey that you may receive in the mail after your visit with us. Thank you!             Your Updated Medication List - Protect others around you: Learn how to safely use, store and throw away your medicines at www.disposemymeds.org.          This list is accurate as of: 2/19/17 10:37 AM.  Always use your most recent med list.                   Brand Name Dispense Instructions for use    gabapentin 100 MG capsule    NEURONTIN    90 capsule    Take 2 capsules (200 mg) by mouth 3 times daily       LORAZEPAM PO      Take 0.25 mg by mouth every 4 hours as needed for anxiety       losartan 25 MG tablet    COZAAR    45 tablet    Take 0.5 tablets (12.5 mg) by mouth daily       venlafaxine 75 MG 24 hr capsule    EFFEXOR-XR    30 capsule    Take 1 capsule (75 mg) by mouth daily

## 2017-02-19 NOTE — IP AVS SNAPSHOT
[facility]    [address] [address]    Phone:  [phone]    Fax:  [phone]                                       After Visit Summary   2/19/2017    Steph Agee    MRN: [id number]           After Visit Summary Signature Page     I have received my discharge instructions, and my questions have been answered. I have discussed any challenges I see with this plan with the nurse or doctor.    ..........................................................................................................................................  Patient/Patient Representative Signature      ..........................................................................................................................................  Patient Representative Print Name and Relationship to Patient    ..................................................               ................................................  Date                                            Time    ..........................................................................................................................................  Reviewed by Signature/Title    ...................................................              ..............................................  Date                                                            Time

## 2017-02-20 ENCOUNTER — ONCOLOGY VISIT (OUTPATIENT)
Dept: TRANSPLANT | Facility: CLINIC | Age: 42
End: 2017-02-20
Attending: INTERNAL MEDICINE
Payer: COMMERCIAL

## 2017-02-20 ENCOUNTER — CARE COORDINATION (OUTPATIENT)
Dept: TRANSPLANT | Facility: CLINIC | Age: 42
End: 2017-02-20

## 2017-02-20 ENCOUNTER — APPOINTMENT (OUTPATIENT)
Dept: LAB | Facility: CLINIC | Age: 42
End: 2017-02-20
Attending: INTERNAL MEDICINE
Payer: COMMERCIAL

## 2017-02-20 VITALS
TEMPERATURE: 96.8 F | OXYGEN SATURATION: 93 % | HEART RATE: 130 BPM | RESPIRATION RATE: 16 BRPM | DIASTOLIC BLOOD PRESSURE: 34 MMHG | WEIGHT: 180.4 LBS | BODY MASS INDEX: 30.95 KG/M2 | SYSTOLIC BLOOD PRESSURE: 99 MMHG

## 2017-02-20 DIAGNOSIS — C81.90 HODGKIN'S DISEASE (H): Primary | ICD-10-CM

## 2017-02-20 DIAGNOSIS — C81.10 NODULAR SCLEROSING HODGKIN'S LYMPHOMA, UNSPECIFIED BODY REGION (H): Primary | ICD-10-CM

## 2017-02-20 LAB
ASR COMMENT: NORMAL
CD34 PROGEN CELLS: NORMAL %
CD34 STEM CELL ASSAY INTERP: NORMAL
CELL THERAPY PRODUCT NUMBER: NORMAL
FLUAV+FLUBV RNA SPEC QL NAA+PROBE: NORMAL
FLUAV+FLUBV RNA SPEC QL NAA+PROBE: NORMAL
IFC SPECIMEN: NORMAL
RSV RNA SPEC NAA+PROBE: NORMAL
SPECIMEN SOURCE: NORMAL

## 2017-02-20 PROCEDURE — 87631 RESP VIRUS 3-5 TARGETS: CPT | Performed by: INTERNAL MEDICINE

## 2017-02-20 PROCEDURE — 25000125 ZZHC RX 250: Mod: ZF | Performed by: PHYSICIAN ASSISTANT

## 2017-02-20 PROCEDURE — 87653 STREP B DNA AMP PROBE: CPT | Performed by: INTERNAL MEDICINE

## 2017-02-20 PROCEDURE — 99212 OFFICE O/P EST SF 10 MIN: CPT

## 2017-02-20 PROCEDURE — 36592 COLLECT BLOOD FROM PICC: CPT

## 2017-02-20 RX ORDER — PLERIXAFOR 24 MG/1.2ML
0.24 SOLUTION SUBCUTANEOUS DAILY
Status: CANCELLED
Start: 2017-02-21

## 2017-02-20 RX ORDER — HEPARIN SODIUM,PORCINE 10 UNIT/ML
5 VIAL (ML) INTRAVENOUS
Status: CANCELLED | OUTPATIENT
Start: 2017-02-21

## 2017-02-20 RX ORDER — HEPARIN SODIUM,PORCINE 10 UNIT/ML
5 VIAL (ML) INTRAVENOUS
Status: DISCONTINUED | OUTPATIENT
Start: 2017-02-20 | End: 2017-02-20 | Stop reason: HOSPADM

## 2017-02-20 RX ADMIN — SODIUM CHLORIDE, PRESERVATIVE FREE 5 ML: 5 INJECTION INTRAVENOUS at 14:02

## 2017-02-20 RX ADMIN — SODIUM CHLORIDE, PRESERVATIVE FREE 5 ML: 5 INJECTION INTRAVENOUS at 14:04

## 2017-02-20 ASSESSMENT — PAIN SCALES - GENERAL: PAINLEVEL: NO PAIN (0)

## 2017-02-20 NOTE — NURSING NOTE
Chief Complaint   Patient presents with     Labs Only     drawn from CVC, heparin locked, vitals checked

## 2017-02-20 NOTE — PROGRESS NOTES
"BMT Clinic Daily Progress Note    ID:  40 y/o female with classical HL.  S/p ABVD, RT, RICE, brentuximab.  Now has had GF/Mozobil priming and two PB collections with low yield.      HPI:  Feels like she has a \"cold\" with stuffy nose, dry cough.  SINGLETON with moderate exertion.  No fevers, sweats, NVD, rash.      ROS: Hx low EF at w/u (47% on MR) with subsequent LD Lisinopril Rx.  ROS otherwise unremarkable other than what is noted in the HPI.     Current Outpatient Prescriptions   Medication Sig Dispense Refill     losartan (COZAAR) 25 MG tablet Take 0.5 tablets (12.5 mg) by mouth daily 45 tablet 0     venlafaxine (EFFEXOR-XR) 75 MG 24 hr capsule Take 1 capsule (75 mg) by mouth daily 30 capsule 3     gabapentin (NEURONTIN) 100 MG capsule Take 2 capsules (200 mg) by mouth 3 times daily 90 capsule 3     LORAZEPAM PO Take 0.25 mg by mouth every 4 hours as needed for anxiety         Physical Exam:   BP (!) 99/34  Pulse 130  Temp 96.8  F (36  C)  Resp 16  Wt 81.8 kg (180 lb 6.4 oz)  SpO2 93%  BMI 30.95 kg/m2  - General:              A&O x3, appropriate, NAD   - Head:                 NC/AT   - Eyes:                 PERRL.  Sclera anicteric.   - Nose/Mouth/Throat:   Moist, no erythema or open lesions  - Neck:                 Supple.   - Lungs:                CTAB.  - Cardiovascular:       RRR, no m/r/g  - Abdominal/Rectal:     +BS, soft, NT, ND, no HSM.   - Lymphatics:           No edema.  - Musculoskeletal: Full strength.   - Skin:                 No rashes or petechaie.   - Neuro:                Non-focal   - Additional Findings:  Duvol site NT, no erythema/exudate.     Laboratory Data:     Lab Results   Component Value Date    WBC 38.6 (H) 02/19/2017    ANEU 35.2 (H) 02/19/2017    HGB 9.5 (L) 02/19/2017    HCT 28.4 (L) 02/19/2017    PLT 81 (L) 02/19/2017     02/19/2017    POTASSIUM 3.4 02/19/2017    CHLORIDE 106 02/19/2017    CO2 32 02/19/2017    GLC 93 02/19/2017    BUN 10 02/19/2017    CR 0.61 02/19/2017    MAG 1.6 " "02/19/2017    INR 0.94 02/14/2017    BILITOTAL 0.5 02/01/2017    AST 31 02/01/2017    ALT 38 02/01/2017    ALKPHOS 138 02/01/2017    PROTTOTAL 7.0 02/01/2017    ALBUMIN 3.2 (L) 02/01/2017     Assessment and Plan:     # Classical Hodgkin Lymphoma/BMT.  Now s/p numerous chemotherapy regimens and RT (cf above).  Has only collected 0.66 X 10e6 CD34+/kg after GF/Mozobil and two phereses.  Discussed with primary MD, Dr. Chambers.  Will d/c phereses attempts and start BM harvest protocol.  Pt understands and agrees.      #  URI.  Has \"sniffles\", non-productive cough, SINGLETON.  Tachy on exam with 02 sat 93% on RA.  Lungs clear on exam.  RSV, Strep B screen pending.  If not much improved, will obtain CXR in am.    "

## 2017-02-20 NOTE — PROGRESS NOTES
BMT Heme Malignancy Rooming Note    Steph Agee - 2/20/2017 2:32 PM     Chief Complaint   Patient presents with     Labs Only     drawn from CVC, heparin locked, vitals checked     RECHECK     Pre BMT for Hodgkins-here for labs and to see MD        BP (!) 99/34  Pulse 130  Temp 96.8  F (36  C)  Resp 16  Wt 81.8 kg (180 lb 6.4 oz)  SpO2 93%  BMI 30.95 kg/m2    No Pain (0)     Medications reviewed: Yes    Labs drawn: Yes    Drawn by: Clinic Staff  Via: central venous catheter  See Doc Flowsheet for details.      Dressing changed: No     Medications given: No    Staff time:6    Additional information if applicable: PT is here for labs and to see Provdier  I did a nasal swab and a throat swab.    Nidhi Chun MA

## 2017-02-20 NOTE — PROGRESS NOTES
Spoke with patient over the phone regarding upcoming appts/plan. Patient will have a bone marrow biopsy on Monday 2/27 to check cellularity and return to BMT clinic on 3/1 for results and to make plan for GM-CSF and bone marrow harvest. Message sent to schedulers to assist with these appts. Patient verbalized understanding and had no further questions.

## 2017-02-21 ENCOUNTER — ONCOLOGY VISIT (OUTPATIENT)
Dept: TRANSPLANT | Facility: CLINIC | Age: 42
End: 2017-02-21
Attending: INTERNAL MEDICINE
Payer: COMMERCIAL

## 2017-02-21 ENCOUNTER — CARE COORDINATION (OUTPATIENT)
Dept: TRANSPLANT | Facility: CLINIC | Age: 42
End: 2017-02-21

## 2017-02-21 VITALS
RESPIRATION RATE: 18 BRPM | DIASTOLIC BLOOD PRESSURE: 72 MMHG | BODY MASS INDEX: 30.8 KG/M2 | TEMPERATURE: 98.9 F | HEART RATE: 108 BPM | OXYGEN SATURATION: 97 % | SYSTOLIC BLOOD PRESSURE: 121 MMHG | WEIGHT: 179.5 LBS

## 2017-02-21 DIAGNOSIS — C81.70 OTHER CLASSICAL HODGKIN LYMPHOMA: Primary | ICD-10-CM

## 2017-02-21 LAB
GP B STREP DNA SPEC QL NAA+PROBE: NORMAL
SPECIMEN SOURCE: NORMAL

## 2017-02-21 PROCEDURE — 99212 OFFICE O/P EST SF 10 MIN: CPT | Mod: ZF

## 2017-02-21 ASSESSMENT — PAIN SCALES - GENERAL: PAINLEVEL: NO PAIN (0)

## 2017-02-21 NOTE — NURSING NOTE
BMT Heme Malignancy Rooming Note    Steph LUNDBERG Anuel - 2/21/2017 9:04 AM     Chief Complaint   Patient presents with     RECHECK     Patient with Hodgkin lymphoma here for provider visit         /72 (BP Location: Left arm, Patient Position: Chair, Cuff Size: Adult Regular)  Pulse 108  Temp 98.9  F (37.2  C) (Oral)  Resp 18  Wt 81.4 kg (179 lb 8 oz)  SpO2 97%  BMI 30.8 kg/m2     Medications reviewed: Yes    Dressing changed: No     Medications given: No    Staff time:5 minutes     Additional information if applicable:      Edith Dumont CMA

## 2017-02-21 NOTE — MR AVS SNAPSHOT
After Visit Summary   2/21/2017    Steph Agee    MRN: [id number]           Patient Information     Date Of Birth          1975        Visit Information        Provider Department      2/21/2017 9:00 AM UC BMT CECILIA #2 [facility] Blood and Marrow Transplant        Today's Diagnoses     Other classical Hodgkin lymphoma (H)    -  1          [facility] [place] [place]  [address] [place] [address]  Phone: [phone]  Clinic Hours:   Monday-Friday:    8am to 4:30pm   Weekends and holidays:    8am to noon (in general)  If your fever is 100.5  or greater,   call the clinic.  After hours call the   hospital at [phone] or   [phone]. Ask for the BMT   fellow for pediatric or adult patients            Follow-ups after your visit        Your next 10 appointments already scheduled     Feb 27, 2017  9:30 AM CST   Masonic Lab Draw with UC [facility]ONIC LAB DRAW   [facility] Masonic Lab Draw ([facility])    [address] [place] [address]   [phone]            Feb 27, 2017 10:00 AM CST   Bone Marrow Biopsy with  BMT CECILIA #2, UU BONE MARROW BIOPSY   [facility] Blood and Marrow Transplant ([facility])    [address] [place] [address]   [phone]            Mar 01, 2017 10:00 AM CST   Masonic Lab Draw with UC [facility]ONIC LAB DRAW   [facility] Masonic Lab Draw ([facility])    [address] [place] [address]   [phone]            Mar 01, 2017 10:30 AM CST   Return with UC BMT DOM   [facility] Blood and Marrow Transplant ([facility])    [address] [place] [address]   [phone]            Aug 09, 2017 10:30 AM CDT   Masonic Lab Draw with UC MASONIC LAB DRAW   [facility] Masonic Lab Draw ([facility])    [address] [place] [address]  [id number] [phone]            Aug 09, 2017 11:00 AM CDT   Return with Obed Chambers MD   [facility] Blood and Marrow Transplant ([facility])    [address] [facility]  [address] [facility] [id number] [phone]              Future tests that were ordered for you today     Open Future Orders        Priority Expected Expires Ordered    Bone marrow biopsy Routine 2/27/2017 8/19/2017 2/20/2017            Who to contact     If you have questions or need follow up information about today's clinic visit or your schedule please contact [facility] BLOOD AND MARROW TRANSPLANT directly at [phone].  Normal or non-critical lab and imaging results will be communicated to you by Auto I.D.hart, letter or phone within 4 business days after the clinic has received the results. If you do not hear from us within 7 days, please contact the clinic through Vinnyt or phone. If you have a critical or abnormal lab result, we will notify you by phone as soon as possible.  Submit refill requests through [employer] or call your pharmacy and they will forward the refill request to us. Please allow 3 business days for your refill to be completed.          Additional Information About Your Visit        [employer] Information     [employer] gives you secure access to your electronic health record. If you see a primary care provider, you can also send messages to your care team and make appointments. If you have questions, please call your primary care clinic.  If you do not have a primary care provider, please call [phone] and they will assist you.        Care EveryWhere ID     This is your Care EveryWhere ID. This could be used by other organizations to access your [place] medical records  [id number]        Your Vitals Were     Pulse Temperature Respirations Pulse Oximetry BMI (Body Mass Index)       108 98.9  F (37.2  C) (Oral) 18 97% 30.8 kg/m2        Blood Pressure from Last 3 Encounters:   02/21/17 121/72    02/20/17 (!) 99/34   02/19/17 103/60    Weight from Last 3 Encounters:   02/21/17 81.4 kg (179 lb 8 oz)   02/20/17 81.8 kg (180 lb 6.4 oz)   02/19/17 81.2 kg (179 lb 0.2 oz)              Today, you had the following     No orders found for display       Recent Review Flowsheet Data     BMT Recent Results Latest Ref Rng & Units 2/3/2017 2/14/2017 2/15/2017 2/16/2017 2/17/2017 2/18/2017 2/19/2017    WBC 4.0 - 11.0 10e9/L 2.3(L) 22.6(H) 20.4(H) 40.8(H) 45.6(H) 45.8(H) 38.6(H)    Hemoglobin 11.7 - 15.7 g/dL 11.2(L) 11.2(L) 11.4(L) 10.8(L) 10.5(L) 9.6(L) 9.5(L)    Platelet Count 150 - 450 10e9/L 232 178 181 156 132(L) 106(L) 81(L)    Neutrophils (Absolute) 1.6 - 8.3 10e9/L 1.1(L) - 18.4(H) 36.5(H) 41.6(H) 41.6(H) 35.2(H)    INR 0.86 - 1.14 - 0.94 - - - - -    Sodium 133 - 144 mmol/L - - 142 141 - 143 144    Potassium 3.4 - 5.3 mmol/L - - 4.1 3.9 - 3.6 3.4    Chloride 94 - 109 mmol/L - - 108 107 - 106 106    Glucose 70 - 99 mg/dL - - 91 92 - 82 93    Urea Nitrogen 7 - 30 mg/dL - - 16 15 - 14 10    Creatinine 0.52 - 1.04 mg/dL - - 0.66 0.61 - 0.61 0.61    Calcium (Total) 8.5 - 10.1 mg/dL - - 8.2(L) 8.7 - 7.8(L) 8.0(L)    Protein (Total) 6.8 - 8.8 g/dL - - - - - - -    Albumin 3.4 - 5.0 g/dL - - - - - - -    Alkaline Phosphatase 40 - 150 U/L - - - - - - -    AST 0 - 45 U/L - - - - - - -    ALT 0 - 50 U/L - - - - - - -    MCV 78 - 100 fl 100 102(H) 102(H) 100 101(H) 102(H) 100               Primary Care Provider    Physician No Ref-Primary       No address on file        Thank you!     Thank you for choosing [facility] BLOOD AND MARROW TRANSPLANT  for your care. Our goal is always to provide you with excellent care. Hearing back from our patients is one way we can continue to improve our services. Please take a few minutes to complete the written survey that you may receive in the mail after your visit with us. Thank you!             Your Updated Medication List - Protect others around you: Learn how to safely use, store and  throw away your medicines at www.disposemymeds.org.          This list is accurate as of: 2/21/17 10:55 AM.  Always use your most recent med list.                   Brand Name Dispense Instructions for use    gabapentin 100 MG capsule    NEURONTIN    90 capsule    Take 2 capsules (200 mg) by mouth 3 times daily       LORAZEPAM PO      Take 0.25 mg by mouth every 4 hours as needed for anxiety       losartan 25 MG tablet    COZAAR    45 tablet    Take 0.5 tablets (12.5 mg) by mouth daily       venlafaxine 75 MG 24 hr capsule    EFFEXOR-XR    30 capsule    Take 1 capsule (75 mg) by mouth daily

## 2017-02-21 NOTE — PROGRESS NOTES
BMT Clinic Daily Progress Note    ID:  40 y/o female with classical HL.  S/p ABVD, RT, RICE, brentuximab.  Now has had GF/Mozobil priming and two PB collections with low yield now awaiting plans for bone marrow marrow harvest.       HPI: doing okay. No fevers. Some congestion that she feels is improving. A little cough. No SOB. She feels this is related to weather changes and does not feel acutely ill. No other issues.     ROS: 10 point ROS otherwise unremarkable other than what is noted in the HPI.     Current Outpatient Prescriptions   Medication Sig Dispense Refill     losartan (COZAAR) 25 MG tablet Take 0.5 tablets (12.5 mg) by mouth daily 45 tablet 0     venlafaxine (EFFEXOR-XR) 75 MG 24 hr capsule Take 1 capsule (75 mg) by mouth daily 30 capsule 3     gabapentin (NEURONTIN) 100 MG capsule Take 2 capsules (200 mg) by mouth 3 times daily 90 capsule 3     LORAZEPAM PO Take 0.25 mg by mouth every 4 hours as needed for anxiety         Physical Exam:   There were no vitals taken for this visit.  - General:              A&O x3, appropriate, NAD   - Head:                 NC/AT   - Eyes:                 PERRL.  Sclera anicteric.   - Nose/Mouth/Throat:   Moist, no erythema or open lesions  - Neck:                 Supple.   - Lungs:                CTAB.  - Cardiovascular:       RRR, no m/r/g  - Abdominal/Rectal:     +BS, soft, NT, ND, no.   - Lymphatics:           No edema.  - Musculoskeletal: Full strength.   - Skin:                 No rashes or petechaie.   - Neuro:                Non-focal   - Additional Findings:  Duvol site NT, no erythema/exudate.   Laboratory Data:     Lab Results   Component Value Date    WBC 38.6 (H) 02/19/2017    ANEU 35.2 (H) 02/19/2017    HGB 9.5 (L) 02/19/2017    HCT 28.4 (L) 02/19/2017    PLT 81 (L) 02/19/2017     02/19/2017    POTASSIUM 3.4 02/19/2017    CHLORIDE 106 02/19/2017    CO2 32 02/19/2017    GLC 93 02/19/2017    BUN 10 02/19/2017    CR 0.61 02/19/2017    MAG 1.6 02/19/2017     INR 0.94 02/14/2017    BILITOTAL 0.5 02/01/2017    AST 31 02/01/2017    ALT 38 02/01/2017    ALKPHOS 138 02/01/2017    PROTTOTAL 7.0 02/01/2017    ALBUMIN 3.2 (L) 02/01/2017     Assessment and Plan:     # Classical Hodgkin Lymphoma/BMT.  Now s/p numerous chemotherapy regimens and RT (cf above).  Has only collected 0.66 X 10e6 CD34+/kg after GF/Mozobil and two phereses.  Discussed with primary MD, Dr. Chambers. DC'd apheresis attempts and start BM harvest protocol.  Pt understands and agrees. Mary Osborn RN coordinator discussed with patient will have a bone marrow biopsy on Monday 2/27 to check cellularity and return to BMT clinic on 3/1 for results and to make plan for GM-CSF and bone marrow harvest. Message sent to schedulers to assist with these appts. Patient verbalized understanding and had no further questions.         #  URI. 2/20 Influenza/RSV negative. Sats 100% on RA. Clear Lungs. Looks clinically well. Nothing further to do.     RTC next week as per above.    Cheryl Hernandez PA-C  #[id number]

## 2017-02-21 NOTE — PROGRESS NOTES
Faxed recent notes to [employer] (f: [phone]) for disability claim and received success confirmation.

## 2017-02-24 ENCOUNTER — MEDICAL CORRESPONDENCE (OUTPATIENT)
Dept: TRANSPLANT | Facility: CLINIC | Age: 42
End: 2017-02-24

## 2017-02-27 ENCOUNTER — APPOINTMENT (OUTPATIENT)
Dept: LAB | Facility: CLINIC | Age: 42
End: 2017-02-27
Attending: NURSE PRACTITIONER
Payer: COMMERCIAL

## 2017-02-27 ENCOUNTER — OFFICE VISIT (OUTPATIENT)
Dept: TRANSPLANT | Facility: CLINIC | Age: 42
End: 2017-02-27
Attending: NURSE PRACTITIONER
Payer: COMMERCIAL

## 2017-02-27 VITALS
OXYGEN SATURATION: 100 % | DIASTOLIC BLOOD PRESSURE: 66 MMHG | BODY MASS INDEX: 31.88 KG/M2 | WEIGHT: 185.8 LBS | SYSTOLIC BLOOD PRESSURE: 97 MMHG | HEART RATE: 76 BPM | TEMPERATURE: 98.3 F | RESPIRATION RATE: 16 BRPM

## 2017-02-27 DIAGNOSIS — C81.00 NODULAR LYMPHOCYTE PREDOMINANT HODGKIN LYMPHOMA, UNSPECIFIED BODY REGION (H): Primary | ICD-10-CM

## 2017-02-27 LAB
BASOPHILS # BLD AUTO: 0 10E9/L (ref 0–0.2)
BASOPHILS NFR BLD AUTO: 0.8 %
DIFFERENTIAL METHOD BLD: ABNORMAL
EOSINOPHIL # BLD AUTO: 0.1 10E9/L (ref 0–0.7)
EOSINOPHIL NFR BLD AUTO: 5.4 %
ERYTHROCYTE [DISTWIDTH] IN BLOOD BY AUTOMATED COUNT: 17.4 % (ref 10–15)
HCT VFR BLD AUTO: 26.2 % (ref 35–47)
HGB BLD-MCNC: 8.6 G/DL (ref 11.7–15.7)
IMM GRANULOCYTES # BLD: 0 10E9/L (ref 0–0.4)
IMM GRANULOCYTES NFR BLD: 0.4 %
LYMPHOCYTES # BLD AUTO: 0.4 10E9/L (ref 0.8–5.3)
LYMPHOCYTES NFR BLD AUTO: 17.1 %
MCH RBC QN AUTO: 33.9 PG (ref 26.5–33)
MCHC RBC AUTO-ENTMCNC: 32.8 G/DL (ref 31.5–36.5)
MCV RBC AUTO: 103 FL (ref 78–100)
MONOCYTES # BLD AUTO: 0.2 10E9/L (ref 0–1.3)
MONOCYTES NFR BLD AUTO: 8.8 %
NEUTROPHILS # BLD AUTO: 1.6 10E9/L (ref 1.6–8.3)
NEUTROPHILS NFR BLD AUTO: 67.5 %
NRBC # BLD AUTO: 0 10*3/UL
NRBC BLD AUTO-RTO: 0 /100
PLATELET # BLD AUTO: 148 10E9/L (ref 150–450)
RBC # BLD AUTO: 2.54 10E12/L (ref 3.8–5.2)
WBC # BLD AUTO: 2.4 10E9/L (ref 4–11)

## 2017-02-27 PROCEDURE — 85025 COMPLETE CBC W/AUTO DIFF WBC: CPT | Performed by: NURSE PRACTITIONER

## 2017-02-27 PROCEDURE — 88305 TISSUE EXAM BY PATHOLOGIST: CPT | Performed by: INTERNAL MEDICINE

## 2017-02-27 PROCEDURE — 25000128 H RX IP 250 OP 636: Mod: ZF | Performed by: PHYSICIAN ASSISTANT

## 2017-02-27 PROCEDURE — 88311 DECALCIFY TISSUE: CPT | Performed by: INTERNAL MEDICINE

## 2017-02-27 PROCEDURE — 88161 CYTOPATH SMEAR OTHER SOURCE: CPT | Performed by: INTERNAL MEDICINE

## 2017-02-27 PROCEDURE — G0364 BONE MARROW ASPIRATE &BIOPSY: HCPCS

## 2017-02-27 PROCEDURE — 88342 IMHCHEM/IMCYTCHM 1ST ANTB: CPT | Performed by: INTERNAL MEDICINE

## 2017-02-27 PROCEDURE — 38221 DX BONE MARROW BIOPSIES: CPT | Mod: ZF

## 2017-02-27 PROCEDURE — 00000161 ZZHCL STATISTIC H-SPHEME PROCESS B/S: Performed by: INTERNAL MEDICINE

## 2017-02-27 PROCEDURE — 40000424 ZZHCL STATISTIC BONE MARROW CORE PERF TC 38221: Performed by: INTERNAL MEDICINE

## 2017-02-27 RX ORDER — MORPHINE SULFATE 2 MG/ML
2 INJECTION, SOLUTION INTRAMUSCULAR; INTRAVENOUS ONCE
Status: COMPLETED | OUTPATIENT
Start: 2017-02-27 | End: 2017-02-27

## 2017-02-27 RX ADMIN — MORPHINE SULFATE 2 MG: 2 INJECTION, SOLUTION INTRAMUSCULAR; INTRAVENOUS at 10:15

## 2017-02-27 ASSESSMENT — PAIN SCALES - GENERAL: PAINLEVEL: NO PAIN (0)

## 2017-02-27 NOTE — PROGRESS NOTES
BMT ONC Adult Bone Marrow Biopsy Procedure Note  February 27, 2017  /68  Pulse 106  Temp 98.3  F (36.8  C)  Resp 16  Wt 84.3 kg (185 lb 12.8 oz)  SpO2 98%  BMI 31.88 kg/m2     Learning needs assessment complete within 12 months? YES    DIAGNOSIS: lymphoma pre-BMT (pre-harvest assessment; failed standard priming)     PROCEDURE: Unilateral Bone Marrow Biopsy    [place] [facility] [place]    Patient s identification was positively verified by verbal identification and invasive procedure safety checklist was completed. Informed consent was obtained. Following the administration of Morphine 2mg IV as pre-medication, patient was placed in the prone position and prepped and draped in a sterile manner. Approximately 17 cc of 1% Lidocaine was used over the left posterior iliac spine. Following this a 3 mm incision was made. Trephine bone marrow core(s) was (were) obtained from the [facility]. No aspirates were obtained. Following this procedure a sterile dressing was applied to the bone marrow biopsy site(s). The patient was placed in the supine position to maintain pressure on the biopsy site. Post-procedure wound care instructions were given.     Complications: NO    Interventions: NO    Length of procedure:20 minutes or less      Procedure performed by: Joellen Conte PA-C  [phone]

## 2017-02-27 NOTE — NURSING NOTE
BMT Teaching Flowsheet  Teaching Topic: bone marrow biopsy  Person(s) involved in teaching: Patient  Motivation Level  Asks Questions: Yes  Eager to Learn: Yes  Cooperative: Yes  Receptive (willing/able to accept information): Yes  Patient demonstrates understanding of the following:   - Reason for the appointment, diagnosis and treatment plan: Yes  - Knowledge of proper use of medications and conditions for which they are ordered (with special attention to potential side effects or drug interactions): Yes  - Which situations necessitate calling provider and whom to contact: Yes  Teaching concerns addressed: what to expect during and post procedure including restrictions  Proper use and care of (medical equipment, care aids, etc.) NA  Pain management techniques: Yes  Patient instructed on hand hygiene: NA  How and/when to access community resources: NA  Infection Control:  Patient demonstrates understanding of the following:   Surgical procedure site care taught Yes  Signs and symptoms of infection taught Yes  Wound care taught Yes  Central venous catheter care taught NA  Instructional Materials Used/Given: post procedure instructions

## 2017-02-27 NOTE — MR AVS SNAPSHOT
After Visit Summary   2/27/2017    Steph Agee    MRN: [id number]           Patient Information     Date Of Birth          1975        Visit Information        Provider Department      2/27/2017 10:00 AM UU BONE MARROW BIOPSY;  BMT CECILIA #2 [facility] Blood and Marrow Transplant        Today's Diagnoses     Nodular lymphocyte predominant Hodgkin lymphoma, unspecified body region (H)    -  1          [facility] ([facility])  [address] [place] [address]  Phone: [phone]  Clinic Hours:   Monday-Friday:    8am to 4:30pm   Weekends and holidays:    8am to noon (in general)  If your fever is 100.5  or greater,   call the clinic.  After hours call the   hospital at [phone] or   [phone]. Ask for the BMT   fellow for pediatric or adult patients            Follow-ups after your visit        Your next 10 appointments already scheduled     Mar 01, 2017 10:00 AM CST   Masonic Lab Draw with  MASONIC LAB DRAW   [facility] Masonic Lab Draw ([facility])    [address] [place] [id number] [phone]            Mar 01, 2017 10:30 AM CST   Return with  BMT DOM   [facility] Blood and Marrow Transplant ([facility])    [address] [place] [id number] [phone]            Aug 09, 2017 10:30 AM CDT   Masonic Lab Draw with  MASONIC LAB DRAW   [facility] Masonic Lab Draw ([facility])    [address] [place] [id number] [phone]            Aug 09, 2017 11:00 AM CDT   Return with Obed Chambers MD   [facility] Blood and Marrow Transplant ([facility])    [address] [place] [id number] [phone]              Who to contact     If you have questions or need follow up information about today's clinic visit or your schedule please contact [facility] BLOOD AND MARROW  TRANSPLANT directly at [phone].  Normal or non-critical lab and imaging results will be communicated to you by [employer], letter or phone within 4 business days after the clinic has received the results. If you do not hear from us within 7 days, please contact the clinic through [employer] or phone. If you have a critical or abnormal lab result, we will notify you by phone as soon as possible.  Submit refill requests through [employer] or call your pharmacy and they will forward the refill request to us. Please allow 3 business days for your refill to be completed.          Additional Information About Your Visit        [employer] Information     [employer] gives you secure access to your electronic health record. If you see a primary care provider, you can also send messages to your care team and make appointments. If you have questions, please call your primary care clinic.  If you do not have a primary care provider, please call [phone] and they will assist you.        Care EveryWhere ID     This is your Care EveryWhere ID. This could be used by other organizations to access your [place] medical records  [id number]        Your Vitals Were     Pulse Temperature Respirations Pulse Oximetry BMI (Body Mass Index)       106 98.3  F (36.8  C) 16 98% 31.88 kg/m2        Blood Pressure from Last 3 Encounters:   02/27/17 105/68   02/21/17 121/72   02/20/17 (!) 99/34    Weight from Last 3 Encounters:   02/27/17 84.3 kg (185 lb 12.8 oz)   02/21/17 81.4 kg (179 lb 8 oz)   02/20/17 81.8 kg (180 lb 6.4 oz)              We Performed the Following     Bone Marrow Biopsy (Charge)     Bone marrow biopsy     CBC with platelets differential        Recent Review Flowsheet Data     BMT Recent Results Latest Ref Rng & Units 2/3/2017 2/14/2017 2/15/2017 2/16/2017 2/17/2017 2/18/2017 2/19/2017    WBC 4.0 - 11.0 10e9/L 2.3(L) 22.6(H) 20.4(H) 40.8(H) 45.6(H) 45.8(H) 38.6(H)    Hemoglobin 11.7 - 15.7 g/dL 11.2(L) 11.2(L) 11.4(L) 10.8(L)  10.5(L) 9.6(L) 9.5(L)    Platelet Count 150 - 450 10e9/L 232 178 181 156 132(L) 106(L) 81(L)    Neutrophils (Absolute) 1.6 - 8.3 10e9/L 1.1(L) - 18.4(H) 36.5(H) 41.6(H) 41.6(H) 35.2(H)    INR 0.86 - 1.14 - 0.94 - - - - -    Sodium 133 - 144 mmol/L - - 142 141 - 143 144    Potassium 3.4 - 5.3 mmol/L - - 4.1 3.9 - 3.6 3.4    Chloride 94 - 109 mmol/L - - 108 107 - 106 106    Glucose 70 - 99 mg/dL - - 91 92 - 82 93    Urea Nitrogen 7 - 30 mg/dL - - 16 15 - 14 10    Creatinine 0.52 - 1.04 mg/dL - - 0.66 0.61 - 0.61 0.61    Calcium (Total) 8.5 - 10.1 mg/dL - - 8.2(L) 8.7 - 7.8(L) 8.0(L)    Protein (Total) 6.8 - 8.8 g/dL - - - - - - -    Albumin 3.4 - 5.0 g/dL - - - - - - -    Alkaline Phosphatase 40 - 150 U/L - - - - - - -    AST 0 - 45 U/L - - - - - - -    ALT 0 - 50 U/L - - - - - - -    MCV 78 - 100 fl 100 102(H) 102(H) 100 101(H) 102(H) 100               Primary Care Provider    Physician No Ref-Primary       No address on file        Thank you!     Thank you for choosing [facility] BLOOD AND MARROW TRANSPLANT  for your care. Our goal is always to provide you with excellent care. Hearing back from our patients is one way we can continue to improve our services. Please take a few minutes to complete the written survey that you may receive in the mail after your visit with us. Thank you!             Your Updated Medication List - Protect others around you: Learn how to safely use, store and throw away your medicines at www.disposemymeds.org.          This list is accurate as of: 2/27/17 10:44 AM.  Always use your most recent med list.                   Brand Name Dispense Instructions for use    gabapentin 100 MG capsule    NEURONTIN    90 capsule    Take 2 capsules (200 mg) by mouth 3 times daily       LORAZEPAM PO      Take 0.25 mg by mouth every 4 hours as needed for anxiety Reported on 2/27/2017       losartan 25 MG tablet    COZAAR    45 tablet    Take 0.5 tablets (12.5 mg) by mouth daily       venlafaxine 75 MG 24 hr  capsule    EFFEXOR-XR    30 capsule    Take 1 capsule (75 mg) by mouth daily

## 2017-02-28 ENCOUNTER — CARE COORDINATION (OUTPATIENT)
Dept: TRANSPLANT | Facility: CLINIC | Age: 42
End: 2017-02-28

## 2017-02-28 LAB — COPATH REPORT: NORMAL

## 2017-02-28 NOTE — PROGRESS NOTES
"Patient notified writer of bilateral cheek swelling in the mornings x2-3 days, which improves throughout the day. No vision changes, dyspnea, throat tightness/scratching or tickling, no difficulty talking or swallowing, afebrile, no skin changes. She also c/o mild nights sweats \"just around my neck and hair, not bad enough to change clothes or bedding/pillow case\". Discussed with Dr Chambers who recommends re-imaging with PET/CT. Currently scheduled for 3/2/17 however awaiting insurance approval.   "

## 2017-03-01 ENCOUNTER — APPOINTMENT (OUTPATIENT)
Dept: LAB | Facility: CLINIC | Age: 42
End: 2017-03-01
Attending: NURSE PRACTITIONER
Payer: COMMERCIAL

## 2017-03-01 ENCOUNTER — ONCOLOGY VISIT (OUTPATIENT)
Dept: TRANSPLANT | Facility: CLINIC | Age: 42
End: 2017-03-01
Attending: INTERNAL MEDICINE
Payer: COMMERCIAL

## 2017-03-01 VITALS
DIASTOLIC BLOOD PRESSURE: 64 MMHG | RESPIRATION RATE: 18 BRPM | HEART RATE: 87 BPM | TEMPERATURE: 97.4 F | OXYGEN SATURATION: 98 % | WEIGHT: 181.88 LBS | BODY MASS INDEX: 31.2 KG/M2 | SYSTOLIC BLOOD PRESSURE: 106 MMHG

## 2017-03-01 DIAGNOSIS — C81.10 NODULAR SCLEROSING HODGKIN'S LYMPHOMA, UNSPECIFIED BODY REGION (H): ICD-10-CM

## 2017-03-01 DIAGNOSIS — C81.12 NODULAR SCLEROSIS HODGKIN LYMPHOMA OF INTRATHORACIC LYMPH NODES (H): Primary | ICD-10-CM

## 2017-03-01 DIAGNOSIS — Z29.9 NEED FOR PROPHYLACTIC MEASURE: ICD-10-CM

## 2017-03-01 LAB
ALBUMIN SERPL-MCNC: 3.4 G/DL (ref 3.4–5)
ALP SERPL-CCNC: 143 U/L (ref 40–150)
ALT SERPL W P-5'-P-CCNC: 22 U/L (ref 0–50)
ANION GAP SERPL CALCULATED.3IONS-SCNC: 6 MMOL/L (ref 3–14)
AST SERPL W P-5'-P-CCNC: 20 U/L (ref 0–45)
BASOPHILS # BLD AUTO: 0 10E9/L (ref 0–0.2)
BASOPHILS NFR BLD AUTO: 0.9 %
BILIRUB SERPL-MCNC: 0.3 MG/DL (ref 0.2–1.3)
BUN SERPL-MCNC: 8 MG/DL (ref 7–30)
CALCIUM SERPL-MCNC: 8.4 MG/DL (ref 8.5–10.1)
CHLORIDE SERPL-SCNC: 108 MMOL/L (ref 94–109)
CO2 SERPL-SCNC: 27 MMOL/L (ref 20–32)
CREAT SERPL-MCNC: 0.6 MG/DL (ref 0.52–1.04)
DIFFERENTIAL METHOD BLD: ABNORMAL
EOSINOPHIL # BLD AUTO: 0.1 10E9/L (ref 0–0.7)
EOSINOPHIL NFR BLD AUTO: 5.2 %
ERYTHROCYTE [DISTWIDTH] IN BLOOD BY AUTOMATED COUNT: 17.4 % (ref 10–15)
GFR SERPL CREATININE-BSD FRML MDRD: ABNORMAL ML/MIN/1.7M2
GLUCOSE SERPL-MCNC: 91 MG/DL (ref 70–99)
HCT VFR BLD AUTO: 27.5 % (ref 35–47)
HGB BLD-MCNC: 9 G/DL (ref 11.7–15.7)
IMM GRANULOCYTES # BLD: 0 10E9/L (ref 0–0.4)
IMM GRANULOCYTES NFR BLD: 0.4 %
LYMPHOCYTES # BLD AUTO: 0.5 10E9/L (ref 0.8–5.3)
LYMPHOCYTES NFR BLD AUTO: 21.7 %
MCH RBC QN AUTO: 33.8 PG (ref 26.5–33)
MCHC RBC AUTO-ENTMCNC: 32.7 G/DL (ref 31.5–36.5)
MCV RBC AUTO: 103 FL (ref 78–100)
MONOCYTES # BLD AUTO: 0.2 10E9/L (ref 0–1.3)
MONOCYTES NFR BLD AUTO: 7.8 %
NEUTROPHILS # BLD AUTO: 1.5 10E9/L (ref 1.6–8.3)
NEUTROPHILS NFR BLD AUTO: 64 %
NRBC # BLD AUTO: 0 10*3/UL
NRBC BLD AUTO-RTO: 0 /100
PLATELET # BLD AUTO: 153 10E9/L (ref 150–450)
POTASSIUM SERPL-SCNC: 4.2 MMOL/L (ref 3.4–5.3)
PROT SERPL-MCNC: 6.7 G/DL (ref 6.8–8.8)
RBC # BLD AUTO: 2.66 10E12/L (ref 3.8–5.2)
SODIUM SERPL-SCNC: 141 MMOL/L (ref 133–144)
WBC # BLD AUTO: 2.3 10E9/L (ref 4–11)

## 2017-03-01 PROCEDURE — 25000125 ZZHC RX 250: Mod: ZF | Performed by: PHYSICIAN ASSISTANT

## 2017-03-01 PROCEDURE — 80053 COMPREHEN METABOLIC PANEL: CPT | Performed by: INTERNAL MEDICINE

## 2017-03-01 PROCEDURE — 85025 COMPLETE CBC W/AUTO DIFF WBC: CPT | Performed by: INTERNAL MEDICINE

## 2017-03-01 PROCEDURE — 99213 OFFICE O/P EST LOW 20 MIN: CPT | Mod: ZF

## 2017-03-01 PROCEDURE — 36592 COLLECT BLOOD FROM PICC: CPT

## 2017-03-01 RX ORDER — PLERIXAFOR 24 MG/1.2ML
0.24 SOLUTION SUBCUTANEOUS DAILY
Status: CANCELLED
Start: 2017-03-02

## 2017-03-01 RX ORDER — HEPARIN SODIUM,PORCINE 10 UNIT/ML
5 VIAL (ML) INTRAVENOUS
Status: DISCONTINUED | OUTPATIENT
Start: 2017-03-01 | End: 2017-03-02 | Stop reason: HOSPADM

## 2017-03-01 RX ORDER — HEPARIN SODIUM,PORCINE 10 UNIT/ML
5 VIAL (ML) INTRAVENOUS
Status: CANCELLED | OUTPATIENT
Start: 2017-03-02

## 2017-03-01 RX ADMIN — SODIUM CHLORIDE, PRESERVATIVE FREE 5 ML: 5 INJECTION INTRAVENOUS at 10:18

## 2017-03-01 RX ADMIN — SODIUM CHLORIDE, PRESERVATIVE FREE 5 ML: 5 INJECTION INTRAVENOUS at 10:20

## 2017-03-01 NOTE — PROGRESS NOTES
HISTORY OF PRESENT ILLNESS:  I saw Steph Agee today for followup on a recently performed bone marrow aspirate and biopsy.  Steph has Hodgkin's disease that has been treated with a combination of ICE and brentuximab to complete response.  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  She came here for autologous transplant workup.  Unfortunately, she failed to mobilize with growth factor alone.  The current plan is to harvest bone marrow sometime next week after 5 days of GM-CSF stimulation.  Steph states that she continues to have intermittent night sweats.  She attributes this to menopause.  She has had no fevers.  She also notes that in the morning when she gets up her neck is swollen.  This disappears over the course of the day.  She is concerned because she had similar symptoms when she presented with mediastinal involvement originally.  She is having no stridor or problems breathing, no itching and no weight loss.  She has no fevers, chills, nausea, vomiting, diarrhea, cough, hemoptysis, shortness of breath, hematuria, dysuria, bruising or bleeding.  The remainder of the 10-point review of systems is negative except she has gained a few kilos since she started the losartan.       PHYSICAL EXAMINATION:   VITAL SIGNS:  Vital signs were unremarkable.     GENERAL:  She looked healthy.     NECK:  I did not notice that her neck was swollen.     LYMPH:  I could palpate no supraclavicular, cervical, axillary or inguinal adenopathy.   CARDIAC:  Without S3, rub or murmur.   ABDOMEN:  Nondistended, active bowel sounds.   LUNGS:  Clear lungs bilaterally.  No adventitious sounds.   SKIN:  She had a prominent venous pattern on her anterior chest.  This is chronic and does not appear to be different than what I remember it being before.  There is no skin rash.       LABORATORY DATA:  Labs today include a white count of 2,300, hemoglobin 9 and  platelets 153,000.  She had a bone marrow aspirate and biopsy which showed 50-60% cellularity with no evidence of involvement with Hodgkin's disease.  Her electrolyte panel was normal with potassium of 4.2 and creatinine of 0.6.  Her liver function tests were normal.       ASSESSMENT AND PLAN:  Steph has symptoms suggestive of possible progression.  We will repeat a PET/CT tomorrow at noon.  If there is minimal evidence for progression, then I would be in favor of completing her marrow harvest now before further therapy with salvage brentuximab, as it will only be harder to collect stem cells in the future should she require further chemotherapy.  In the event of an emergent need for treatment such as progression of the mediastinal mass, then we will unfortunately have to delay this in order to initiate salvage therapy.

## 2017-03-01 NOTE — MR AVS SNAPSHOT
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 ([facility])  [address] [place] [address]  Phone: [phone]  Clinic Hours:   Monday-Friday:    8am to 4:30pm   Weekends and holidays:    8am to noon (in general)  If your fever is 100.5  or greater,   call the clinic.  After hours call the   hospital at [phone] or   [phone]. Ask for the BMT   fellow for pediatric or adult patients            Follow-ups after your visit        Your next 10 appointments already scheduled     Mar 05, 2017  8:30 AM CST   BMT Injection with Uc Bmt Nurse 1   [facility] Blood and Marrow Transplant ([facility])    [address] [place] [id number]   [phone]            Mar 06, 2017  8:30 AM CST   BMT Injection with Uc Bmt Nurse 1   [facility] Blood and Marrow Transplant ([facility])    [address] [place] [phone]   [phone]            Mar 07, 2017  8:30 AM CST   BMT Injection with Uc Bmt Nurse 1   [facility] Blood and Marrow Transplant ([facility])    [address] [place] [address]   [phone]            Mar 08, 2017  9:15 AM CST   Masonic Lab Draw with UC MASONIC LAB DRAW   [facility] Masonic Lab Draw ([facility])    [address] [place] [address]   [phone]            Mar 08, 2017  9:30 AM CST   Return with UC BMT CECILIA #2   [facility] Blood and Marrow Transplant ([facility])    [address] [facility]  [address] [facility] [address]   [phone]            Mar 09, 2017   Procedure with Zohaib Santo MD   [facility] [place] (--)    [address]  [place] [address]   [phone]            Aug 09, 2017 10:30 AM CDT   Masonic Lab Draw with  MASONIC LAB DRAW   [facility] Masonic Lab Draw ([facility])    [address] [phone] [phone]            Aug 09, 2017 11:00 AM CDT   Return with Obed Chambers MD   [facility] Blood and Marrow Transplant ([facility])    [address] [address] [phone]              Future tests that were ordered for you today     Open Future Orders        Priority Expected Expires Ordered    CBC with platelets differential Routine 3/8/2017 3/1/2018 3/1/2017    INR Routine 3/8/2017 3/1/2018 3/1/2017    ABO/Rh type and screen Routine 3/8/2017 3/1/2018 3/1/2017            Who to contact     If you have questions or need follow up information about today's clinic visit or your schedule please contact [facility] BLOOD AND MARROW TRANSPLANT directly at [phone].  Normal or non-critical lab and imaging results will be communicated to you by [facility], letter or phone within 4 business days after the clinic has received the results. If you do not hear from us within 7 days, please contact the clinic through [employer] or phone. If you have a critical or abnormal lab result, we will notify you by phone as soon as possible.  Submit refill requests through [email or web address]) or call your pharmacy and they will forward the refill request to us. Please allow 3 business days for your refill to be completed.          Additional Information About Your Visit        [facility] Information     [email or web address] ([email or web address]) gives you secure access to your electronic health record. If you see a  primary care provider, you can also send messages to your care team and make appointments. If you have questions, please call your primary care clinic.  If you do not have a primary care provider, please call [phone] and they will assist you.        Care EveryWhere ID     This is your Care EveryWhere ID. This could be used by other organizations to access your [place] medical records  [id number]        Your Vitals Were     Pulse Temperature Respirations Pulse Oximetry BMI (Body Mass Index)       87 97.4  F (36.3  C) (Oral) 18 98% 31.2 kg/m2        Blood Pressure from Last 3 Encounters:   03/01/17 106/64   02/27/17 97/66   02/21/17 121/72    Weight from Last 3 Encounters:   03/01/17 82.5 kg (181 lb 14.1 oz)   02/27/17 84.3 kg (185 lb 12.8 oz)   02/21/17 81.4 kg (179 lb 8 oz)              We Performed the Following     CBC with platelets differential - NOW     Comprehensive metabolic panel - NOW        Recent Review Flowsheet Data     BMT Recent Results Latest Ref Rng & Units 2/15/2017 2/16/2017 2/17/2017 2/18/2017 2/19/2017 2/27/2017 3/1/2017    WBC 4.0 - 11.0 10e9/L 20.4(H) 40.8(H) 45.6(H) 45.8(H) 38.6(H) 2.4(L) 2.3(L)    Hemoglobin 11.7 - 15.7 g/dL 11.4(L) 10.8(L) 10.5(L) 9.6(L) 9.5(L) 8.6(L) 9.0(L)    Platelet Count 150 - 450 10e9/L 181 156 132(L) 106(L) 81(L) 148(L) 153    Neutrophils (Absolute) 1.6 - 8.3 10e9/L 18.4(H) 36.5(H) 41.6(H) 41.6(H) 35.2(H) 1.6 1.5(L)    INR 0.86 - 1.14 - - - - - - -    Sodium 133 - 144 mmol/L 142 141 - 143 144 - 141    Potassium 3.4 - 5.3 mmol/L 4.1 3.9 - 3.6 3.4 - 4.2    Chloride 94 - 109 mmol/L 108 107 - 106 106 - 108    Glucose 70 - 99 mg/dL 91 92 - 82 93 - 91    Urea Nitrogen 7 - 30 mg/dL 16 15 - 14 10 - 8    Creatinine 0.52 - 1.04 mg/dL 0.66 0.61 - 0.61 0.61 - 0.60    Calcium (Total) 8.5 - 10.1 mg/dL 8.2(L) 8.7 - 7.8(L) 8.0(L) - 8.4(L)    Protein (Total) 6.8 - 8.8 g/dL - - - - - - 6.7(L)    Albumin 3.4 - 5.0 g/dL - - - - - - 3.4    Alkaline Phosphatase 40 - 150 U/L - - -  - - - 143    AST 0 - 45 U/L - - - - - - 20    ALT 0 - 50 U/L - - - - - - 22    MCV 78 - 100 fl 102(H) 100 101(H) 102(H) 100 103(H) 103(H)               Primary Care Provider    Physician No Ref-Primary       No address on file        Thank you!     Thank you for choosing [facility] BLOOD AND MARROW TRANSPLANT  for your care. Our goal is always to provide you with excellent care. Hearing back from our patients is one way we can continue to improve our services. Please take a few minutes to complete the written survey that you may receive in the mail after your visit with us. Thank you!             Your Updated Medication List - Protect others around you: Learn how to safely use, store and throw away your medicines at www.disposemymeds.org.          This list is accurate as of: 3/1/17 11:59 PM.  Always use your most recent med list.                   Brand Name Dispense Instructions for use    gabapentin 100 MG capsule    NEURONTIN    90 capsule    Take 2 capsules (200 mg) by mouth 3 times daily       LORAZEPAM PO      Take 0.25 mg by mouth every 4 hours as needed for anxiety Reported on 2/27/2017       losartan 25 MG tablet    COZAAR    45 tablet    Take 0.5 tablets (12.5 mg) by mouth daily       venlafaxine 75 MG 24 hr capsule    EFFEXOR-XR    30 capsule    Take 1 capsule (75 mg) by mouth daily

## 2017-03-01 NOTE — LETTER
3/1/2017      RE: Steph Agee  [address] [address]       HISTORY OF PRESENT ILLNESS:  I saw Steph Agee today for followup on a recently performed bone marrow aspirate and biopsy.  Steph has Hodgkin's disease that has been treated with a combination of ICE and brentuximab to complete response.  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  She came here for autologous transplant workup.  Unfortunately, she failed to mobilize with growth factor alone.  The current plan is to harvest bone marrow sometime next week after 5 days of GM-CSF stimulation.  Steph states that she continues to have intermittent night sweats.  She attributes this to menopause.  She has had no fevers.  She also notes that in the morning when she gets up her neck is swollen.  This disappears over the course of the day.  She is concerned because she had similar symptoms when she presented with mediastinal involvement originally.  She is having no stridor or problems breathing, no itching and no weight loss.  She has no fevers, chills, nausea, vomiting, diarrhea, cough, hemoptysis, shortness of breath, hematuria, dysuria, bruising or bleeding.  The remainder of the 10-point review of systems is negative except she has gained a few kilos since she started the losartan.       PHYSICAL EXAMINATION:   VITAL SIGNS:  Vital signs were unremarkable.     GENERAL:  She looked healthy.     NECK:  I did not notice that her neck was swollen.     LYMPH:  I could palpate no supraclavicular, cervical, axillary or inguinal adenopathy.   CARDIAC:  Without S3, rub or murmur.   ABDOMEN:  Nondistended, active bowel sounds.   LUNGS:  Clear lungs bilaterally.  No adventitious sounds.   SKIN:  She had a prominent venous pattern on her anterior chest.  This is chronic and does not appear to be different than what I remember it being before.  There is no skin rash.        LABORATORY DATA:  Labs today include a white count of 2,300, hemoglobin 9 and platelets 153,000.  She had a bone marrow aspirate and biopsy which showed 50-60% cellularity with no evidence of involvement with Hodgkin's disease.  Her electrolyte panel was normal with potassium of 4.2 and creatinine of 0.6.  Her liver function tests were normal.       ASSESSMENT AND PLAN:  Steph has symptoms suggestive of possible progression.  We will repeat a PET/CT tomorrow at noon.  If there is minimal evidence for progression, then I would be in favor of completing her marrow harvest now before further therapy with salvage brentuximab, as it will only be harder to collect stem cells in the future should she require further chemotherapy.  In the event of an emergent need for treatment such as progression of the mediastinal mass, then we will unfortunately have to delay this in order to initiate salvage therapy.         Obed Chambers MD

## 2017-03-01 NOTE — NURSING NOTE
BMT Heme Malignancy Rooming Note    Steph LUNDBERG Anuel - 3/1/2017 10:28 AM     Chief Complaint   Patient presents with     Other     vitals taken by CMA      RECHECK     pre bmt for hodgkins here for labs and md visit        /64 (BP Location: Left arm, Patient Position: Chair, Cuff Size: Adult Regular)  Pulse 87  Temp 97.4  F (36.3  C) (Oral)  Resp 18  Wt 82.5 kg (181 lb 14.1 oz)  SpO2 98%  BMI 31.2 kg/m2     Medications reviewed: Yes    Labs drawn: Yes    Drawn by: Clinic Staff  Via: central venous catheter  See Doc Flowsheet for details.      Dressing changed: No     Medications given: No    Staff time:10    Additional information if applicable: kimberly Bowen RN

## 2017-03-01 NOTE — NURSING NOTE
Chief Complaint   Patient presents with     Other     vitals taken by AURELIANO Dumont CMA March 1, 2017

## 2017-03-02 ENCOUNTER — HOSPITAL ENCOUNTER (OUTPATIENT)
Dept: PET IMAGING | Facility: CLINIC | Age: 42
Discharge: HOME | End: 2017-03-02
Attending: INTERNAL MEDICINE | Admitting: INTERNAL MEDICINE
Payer: COMMERCIAL

## 2017-03-02 ENCOUNTER — HOSPITAL ENCOUNTER (OUTPATIENT)
Dept: PET IMAGING | Facility: CLINIC | Age: 42
End: 2017-03-02
Attending: INTERNAL MEDICINE
Payer: COMMERCIAL

## 2017-03-02 DIAGNOSIS — I42.7 CHEMOTHERAPY INDUCED CARDIOMYOPATHY (H): ICD-10-CM

## 2017-03-02 DIAGNOSIS — T45.1X5A CHEMOTHERAPY INDUCED CARDIOMYOPATHY (H): ICD-10-CM

## 2017-03-02 DIAGNOSIS — C81.00 NODULAR LYMPHOCYTE PREDOMINANT HODGKIN LYMPHOMA, UNSPECIFIED BODY REGION (H): ICD-10-CM

## 2017-03-02 DIAGNOSIS — C81.90: Primary | ICD-10-CM

## 2017-03-02 LAB — GLUCOSE BLDC GLUCOMTR-MCNC: 79 MG/DL (ref 70–99)

## 2017-03-02 PROCEDURE — 25500064 ZZH RX 255 OP 636: Performed by: INTERNAL MEDICINE

## 2017-03-02 PROCEDURE — 82962 GLUCOSE BLOOD TEST: CPT

## 2017-03-02 PROCEDURE — 78816 PET IMAGE W/CT FULL BODY: CPT | Mod: PS

## 2017-03-02 PROCEDURE — 70491 CT SOFT TISSUE NECK W/DYE: CPT

## 2017-03-02 PROCEDURE — A9552 F18 FDG: HCPCS | Performed by: INTERNAL MEDICINE

## 2017-03-02 PROCEDURE — 34300033 ZZH RX 343: Performed by: INTERNAL MEDICINE

## 2017-03-02 PROCEDURE — 71260 CT THORAX DX C+: CPT

## 2017-03-02 RX ORDER — IOPAMIDOL 755 MG/ML
45-150 INJECTION, SOLUTION INTRAVASCULAR ONCE
Status: COMPLETED | OUTPATIENT
Start: 2017-03-02 | End: 2017-03-02

## 2017-03-02 RX ADMIN — IOPAMIDOL 98 ML: 755 INJECTION, SOLUTION INTRAVENOUS at 13:28

## 2017-03-02 RX ADMIN — FLUDEOXYGLUCOSE F-18 11.49 MCI.: 500 INJECTION, SOLUTION INTRAVENOUS at 13:28

## 2017-03-03 ENCOUNTER — ONCOLOGY VISIT (OUTPATIENT)
Dept: TRANSPLANT | Facility: CLINIC | Age: 42
End: 2017-03-03
Attending: INTERNAL MEDICINE
Payer: COMMERCIAL

## 2017-03-03 VITALS
WEIGHT: 180.8 LBS | RESPIRATION RATE: 18 BRPM | HEART RATE: 80 BPM | OXYGEN SATURATION: 100 % | BODY MASS INDEX: 31.02 KG/M2 | TEMPERATURE: 97.3 F | DIASTOLIC BLOOD PRESSURE: 53 MMHG | SYSTOLIC BLOOD PRESSURE: 103 MMHG

## 2017-03-03 DIAGNOSIS — C81.12 NODULAR SCLEROSIS HODGKIN LYMPHOMA OF INTRATHORACIC LYMPH NODES (H): Primary | ICD-10-CM

## 2017-03-03 DIAGNOSIS — C81.10 NODULAR SCLEROSING HODGKIN'S LYMPHOMA, UNSPECIFIED BODY REGION (H): Primary | ICD-10-CM

## 2017-03-03 PROCEDURE — 99213 OFFICE O/P EST LOW 20 MIN: CPT | Mod: ZF

## 2017-03-03 ASSESSMENT — PAIN SCALES - GENERAL: PAINLEVEL: NO PAIN (0)

## 2017-03-03 NOTE — PROGRESS NOTES
ID/CC: Steph Agee is a 40 y/o woman followed by Dr. Chambers with HD in CR who has a planned GM-CSF primed marrow harvest here to f/u UE US.     S:  Patient endorsing worsening facial swelling overnight after laying flat that self resolves throughout the day.  No change in sensation or coloration of the face.  Denies any other concerning symptoms.  No new illnesses.    O:  /53 (BP Location: Right arm, Patient Position: Chair, Cuff Size: Adult Large)  Pulse 80  Temp 97.3  F (36.3  C) (Oral)  Resp 18  Wt 82 kg (180 lb 12.8 oz)  SpO2 100%  BMI 31.02 kg/m2  Gen: Alert and interactive, pleasant  HEENT: Facial fullness without plethora.  Normo-oropharynx.  CV: RRR with no m/r/g.  Distal extremities warm to the touch with palpable pulses.  Increased venous congestion of the chest.  Resp: CTAB.  Normal rate and effort.    Ab: Soft, non-tender,   Neuro: A&Ox3, freely moving all extremities.    U/S of upper extremities bilaterally per informal read of radiologist demonstrated stable non-occlusive left internal jugular vein.  No evidence of new clots/occlusions.      A/P:  40 yo female PMHx significant Hodgkin's disease that has been treated with a combination of ICE and brentuximab to complete response who presents for follow up for veno-occlusive disease of LIJ.  - repeat imaging demonstrates stable non-occlusive process; will start Lovenox ppx 40 mg subq QD, instructed patient to hold day prior and morning of procedure  - return to clinic on 3/5 for first GM-CSF injection in preparation for autologous BMT  - follow up on 3/7 for pre-op by anesthesiologist given veno-occlusive disease      The patient was seen and discussed with Dr. Vani Chan, the attending, who agrees with the plan as stated above.    Michael Baker MD  IM/PEDS, PGY2  pager [phone]    Attending note:    I have reviewed today's vital signs, medications, labs and imaging results. I have seen, evaluated and examined the patient  independently and discussed the plan with the patient and Dr. Baker The associated note has been read and corrected by me.  My independent findings and assessment are below.  In Summary:  Steph Agee is a 42 y/o woman in CR w/ HD planning GM-CSF primed marrow harvest w/ facial swelling and clot on PET/CT.  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  No new acute clot.  Discussed w/ Dr. Chambers. Will start prophy lovenox (40 mg daily).  She knows how to do the injections.Start GM-CSF Neftaly 3/5.  F/u with audrey-op anesthesia work up 3/7.    F/u BMT CECILIA 3/8/17.  OR planned 3/9/17.    Vani Chan MD  Pager: [phone]

## 2017-03-03 NOTE — MR AVS SNAPSHOT
After Visit Summary   3/3/2017    Steph Agee    MRN: [id number]           Patient Information     Date Of Birth          1975        Visit Information        Provider Department      3/3/2017 1:00 PM UC BMT DOM [facility] Blood and Marrow Transplant        Today's Diagnoses     Nodular sclerosing Hodgkin's lymphoma, unspecified body region (H)    -  1          [facility] ([facility])  [address] [place] [address]  Phone: [phone]  Clinic Hours:   Monday-Friday:    8am to 4:30pm   Weekends and holidays:    8am to noon (in general)  If your fever is 100.5  or greater,   call the clinic.  After hours call the   hospital at [phone] or   [phone]. Ask for the BMT   fellow for pediatric or adult patients            Follow-ups after your visit        Your next 10 appointments already scheduled     Mar 05, 2017  8:30 AM CST   BMT Injection with Uc Bmt Nurse 1   [facility] Blood and Marrow Transplant ([facility] [place])    [address] [place] [address]  2nd [facility] [address]   [phone]            Mar 06, 2017  8:30 AM CST   BMT Injection with Uc Bmt Nurse 1   [facility] Blood and Marrow Transplant ([facility] [place])    [address] [place] [address]  [address] [facility] [address]   [phone]            Mar 07, 2017  8:00 AM CST   (Arrive by 7:45 AM)   PAC RN ASSESSMENT with Uc Pac Rn   [facility] Pre[facility] ([facility] [place])    [address] [place] [address]  4th Floor  [facility] [address]   [phone]            Mar 07, 2017  8:30 AM CST   BMT Injection with Uc Bmt Nurse 1   [facility] Blood and Marrow Transplant ([facility] [place])    [address] [place] [address]  [address] [facility] [address]   [phone]            Mar 07, 2017  8:30 AM CST   (Arrive by 8:15 AM)   PAC EVALUATION with Uc Pac Elizabeth 7   [facility] Pre[facility] ([facility]  [place])    [address] [facility]  4th [facility] [address]   [phone]            Mar 07, 2017  9:30 AM CST   (Arrive by 9:15 AM)   PAC Anesthesia Consult with  Pac Anesthesiologist   [facility] Preoperative Assessment Center ([facility])    [address] [facility]  4th [facility] [address]   [phone]            Mar 08, 2017  9:15 AM CST   Masonic Lab Draw with  MASONIC LAB DRAW   [facility] Masonic Lab Draw ([facility])    [address] [place] [address]   [phone]            Mar 08, 2017  9:30 AM CST   Return with  BMT CECILIA #2   [facility] Blood and Marrow Transplant ([facility])    [address] [place] [address]   [phone]            Mar 09, 2017   Procedure with Zohaib Santo MD   [facility] [place], Same Day Surgery (--)    [address] [facility] [address]   [phone]            Aug 09, 2017 10:30 AM CDT   Masonic Lab Draw with  MASONIC LAB DRAW   [facility] Masonic Lab Draw ([facility])    [address] [place] [address] [phone]              Future tests that were ordered for you today     Open Future Orders        Priority Expected Expires Ordered    EBV Capsid Antibody IgG Routine 3/8/2017 3/2/2018 3/2/2017    Herpes Simplex Virus 1 and 2 IgG Routine 3/8/2017 3/2/2018 3/2/2017    HBV HCV HIV WNV by DIANA Routine 3/8/2017 3/2/2018 3/2/2017    BMT Infections disease donor panel Routine 3/8/2017 3/2/2018 3/2/2017            Who to contact     If you have questions or need follow up information about today's clinic visit or your schedule please contact [facility] BLOOD AND MARROW TRANSPLANT directly at [phone].  Normal or non-critical lab and imaging results will be communicated to you by MyChart, letter or phone within 4 business days after the clinic has received the results. If you  do not hear from us within 7 days, please contact the clinic through [employer] or phone. If you have a critical or abnormal lab result, we will notify you by phone as soon as possible.  Submit refill requests through [employer] or call your pharmacy and they will forward the refill request to us. Please allow 3 business days for your refill to be completed.          Additional Information About Your Visit        [employer] Information     [employer] gives you secure access to your electronic health record. If you see a primary care provider, you can also send messages to your care team and make appointments. If you have questions, please call your primary care clinic.  If you do not have a primary care provider, please call [phone] and they will assist you.        Care EveryWhere ID     This is your Care EveryWhere ID. This could be used by other organizations to access your [facility] medical records  [id number]        Your Vitals Were     Pulse Temperature Respirations Pulse Oximetry BMI (Body Mass Index)       80 97.3  F (36.3  C) (Oral) 18 100% 31.02 kg/m2        Blood Pressure from Last 3 Encounters:   03/03/17 103/53   03/01/17 106/64   02/27/17 97/66    Weight from Last 3 Encounters:   03/03/17 82 kg (180 lb 12.8 oz)   03/01/17 82.5 kg (181 lb 14.1 oz)   02/27/17 84.3 kg (185 lb 12.8 oz)              Today, you had the following     No orders found for display         Today's Medication Changes          These changes are accurate as of: 3/3/17  2:09 PM.  If you have any questions, ask your nurse or doctor.               Start taking these medicines.        Dose/Directions    enoxaparin 40 MG/0.4ML injection   Commonly known as:  LOVENOX   Used for:  Nodular sclerosing Hodgkin's lymphoma, unspecified body region (H)        Dose:  40 mg   Inject 0.4 mLs (40 mg) Subcutaneous daily   Quantity:  30 Syringe   Refills:  3            Where to get your medicines      These medications were sent to [facility]  [place], [place] - [address]  [address], [facility] [address]    Hours:  TRANSPLANT PHONE NUMBER [phone] Phone:  [phone]     enoxaparin 40 MG/0.4ML injection                Recent Review Flowsheet Data     BMT Recent Results Latest Ref Rng & Units 2/16/2017 2/17/2017 2/18/2017 2/19/2017 2/27/2017 3/1/2017 3/2/2017    WBC 4.0 - 11.0 10e9/L 40.8(H) 45.6(H) 45.8(H) 38.6(H) 2.4(L) 2.3(L) -    Hemoglobin 11.7 - 15.7 g/dL 10.8(L) 10.5(L) 9.6(L) 9.5(L) 8.6(L) 9.0(L) -    Platelet Count 150 - 450 10e9/L 156 132(L) 106(L) 81(L) 148(L) 153 -    Neutrophils (Absolute) 1.6 - 8.3 10e9/L 36.5(H) 41.6(H) 41.6(H) 35.2(H) 1.6 1.5(L) -    INR 0.86 - 1.14 - - - - - - -    Sodium 133 - 144 mmol/L 141 - 143 144 - 141 -    Potassium 3.4 - 5.3 mmol/L 3.9 - 3.6 3.4 - 4.2 -    Chloride 94 - 109 mmol/L 107 - 106 106 - 108 -    Glucose 70 - 99 mg/dL 92 - 82 93 - 91 79    Urea Nitrogen 7 - 30 mg/dL 15 - 14 10 - 8 -    Creatinine 0.52 - 1.04 mg/dL 0.61 - 0.61 0.61 - 0.60 -    Calcium (Total) 8.5 - 10.1 mg/dL 8.7 - 7.8(L) 8.0(L) - 8.4(L) -    Protein (Total) 6.8 - 8.8 g/dL - - - - - 6.7(L) -    Albumin 3.4 - 5.0 g/dL - - - - - 3.4 -    Alkaline Phosphatase 40 - 150 U/L - - - - - 143 -    AST 0 - 45 U/L - - - - - 20 -    ALT 0 - 50 U/L - - - - - 22 -    MCV 78 - 100 fl 100 101(H) 102(H) 100 103(H) 103(H) -               Primary Care Provider    Physician No Ref-Primary       No address on file        Thank you!     Thank you for choosing [facility] BLOOD AND MARROW TRANSPLANT  for your care. Our goal is always to provide you with excellent care. Hearing back from our patients is one way we can continue to improve our services. Please take a few minutes to complete the written survey that you may receive in the mail after your visit with us. Thank you!             Your Updated Medication List - Protect others around you: Learn how to safely use, store and throw away your medicines at  www.disposemymeds.org.          This list is accurate as of: 3/3/17  2:09 PM.  Always use your most recent med list.                   Brand Name Dispense Instructions for use    enoxaparin 40 MG/0.4ML injection    LOVENOX    30 Syringe    Inject 0.4 mLs (40 mg) Subcutaneous daily       gabapentin 100 MG capsule    NEURONTIN    90 capsule    Take 2 capsules (200 mg) by mouth 3 times daily       LORAZEPAM PO      Take 0.25 mg by mouth every 4 hours as needed for anxiety Reported on 2/27/2017       losartan 25 MG tablet    COZAAR    45 tablet    Take 0.5 tablets (12.5 mg) by mouth daily       venlafaxine 75 MG 24 hr capsule    EFFEXOR-XR    30 capsule    Take 1 capsule (75 mg) by mouth daily

## 2017-03-03 NOTE — NURSING NOTE
Chief Complaint   Patient presents with     RECHECK     Provider visit,      BMT Heme Malignancy Rooming Note    Steph LUNDBERG Anuel - 3/3/2017 1:41 PM     Chief Complaint   Patient presents with     RECHECK     Provider visit,         /53 (BP Location: Right arm, Patient Position: Chair, Cuff Size: Adult Large)  Pulse 80  Temp 97.3  F (36.3  C) (Oral)  Resp 18  Wt 82 kg (180 lb 12.8 oz)  SpO2 100%  BMI 31.02 kg/m2     Medications reviewed: Yes    Labs drawn: No    Dressing changed: Not applicable     Medications given: No    Staff time:8    Additional information if applicable:     Fatimah Ross, RN

## 2017-03-05 ENCOUNTER — OFFICE VISIT (OUTPATIENT)
Dept: TRANSPLANT | Facility: CLINIC | Age: 42
End: 2017-03-05
Attending: INTERNAL MEDICINE
Payer: COMMERCIAL

## 2017-03-05 VITALS
DIASTOLIC BLOOD PRESSURE: 68 MMHG | TEMPERATURE: 97.6 F | OXYGEN SATURATION: 97 % | HEART RATE: 118 BPM | SYSTOLIC BLOOD PRESSURE: 119 MMHG | BODY MASS INDEX: 30.86 KG/M2 | RESPIRATION RATE: 18 BRPM | WEIGHT: 179.9 LBS

## 2017-03-05 DIAGNOSIS — Z29.9 NEED FOR PROPHYLACTIC MEASURE: Primary | ICD-10-CM

## 2017-03-05 PROCEDURE — 96372 THER/PROPH/DIAG INJ SC/IM: CPT

## 2017-03-05 PROCEDURE — 25000128 H RX IP 250 OP 636: Mod: ZF | Performed by: INTERNAL MEDICINE

## 2017-03-05 RX ORDER — PLERIXAFOR 24 MG/1.2ML
0.24 SOLUTION SUBCUTANEOUS DAILY
Status: CANCELLED
Start: 2017-03-06

## 2017-03-05 RX ORDER — HEPARIN SODIUM,PORCINE 10 UNIT/ML
5 VIAL (ML) INTRAVENOUS
Status: CANCELLED | OUTPATIENT
Start: 2017-03-06

## 2017-03-05 RX ADMIN — SARGRAMOSTIM 475 MCG: 250 INJECTION, POWDER, FOR SOLUTION INTRAVENOUS; SUBCUTANEOUS at 08:50

## 2017-03-05 ASSESSMENT — PAIN SCALES - GENERAL: PAINLEVEL: NO PAIN (0)

## 2017-03-05 NOTE — NURSING NOTE
2414  Patient was discharge with caregiver with no adverse reaction to 1st dose of CM-CSF.  No redness at injection site.   Edith Dumont CMA March 5, 2017

## 2017-03-05 NOTE — MR AVS SNAPSHOT
After Visit Summary   3/5/2017    Steph Agee    MRN: [id number]           Patient Information     Date Of Birth          1975        Visit Information        Provider Department      3/5/2017 8:30 AM 1, Liane Bmt Nurse [facility] Blood and Marrow Transplant        Today's Diagnoses     Need for prophylactic measure    -  1          [facility] ([facility])  [address] [place] [address]  Phone: [phone]  Clinic Hours:   Monday-Friday:    8am to 4:30pm   Weekends and holidays:    8am to noon (in general)  If your fever is 100.5  or greater,   call the clinic.  After hours call the   hospital at [phone] or   [phone]. Ask for the BMT   fellow for pediatric or adult patients            Follow-ups after your visit        Your next 10 appointments already scheduled     Mar 06, 2017  8:30 AM CST   BMT Injection with Liane Bmt Nurse 1   [facility] Blood and Marrow Transplant ([facility] [place])    [address] [place] [address]  2nd [facility] [address]   [phone]            Mar 07, 2017  7:45 AM CST   BMT Injection with  Bmt Nurse 1   [facility] Blood and Marrow Transplant ([facility] [place])    [address] [place] [address]   [phone]            Mar 07, 2017  8:00 AM CST   (Arrive by 7:45 AM)   PAC RN ASSESSMENT with Liane Pac Rn   [facility] Preoperative Assessment [place] ([facility])    [address] [place] [address]  4th [facility] [address]   [phone]            Mar 07, 2017  8:30 AM CST   (Arrive by 8:15 AM)   PAC EVALUATION with  Pac Elizabeth 7   [facility] Preoperative Assessment Center ([facility])    [address] [place] [address]  4th [facility] [address]   [phone]            Mar 07, 2017  9:30 AM CST   (Arrive by 9:15 AM)   PAC Anesthesia Consult with Liane Pac Anesthesiologist   [facility] Preoperative Assessment Center ([facility])    [address] [facility]  4th Floor  [facility] [address]   [phone]            Mar 08, 2017  9:15 AM CST   Masonic Lab Draw with UC MASONIC LAB DRAW   [facility] Masonic Lab Draw ([facility])    [address] [facility]  2nd [facility] [address]   [phone]            Mar 08, 2017  9:30 AM CST   Return with  BMT CECILIA #2   [facility] Blood and Marrow Transplant ([facility])    [address] [facility]  2nd [facility] [address]   [phone]            Mar 09, 2017   Procedure with Zohaib Santo MD   [facility] [place], Same Day Surgery (--)    [address]  [facility] [address]   [phone]            Aug 09, 2017 10:30 AM CDT   Masonic Lab Draw with UC MASONIC LAB DRAW   [facility] Masonic Lab Draw ([facility])    [address] [place] [address] [phone]            Aug 09, 2017 11:00 AM CDT   Return with Obed Chambers MD   [facility] Blood and Marrow Transplant ([facility])    [address] [place] [address] [phone]              Future tests that were ordered for you today     Open Future Orders        Priority Expected Expires Ordered    EBV Capsid Antibody IgG Routine 3/8/2017 3/2/2018 3/2/2017    Herpes Simplex Virus 1 and 2 IgG Routine 3/8/2017 3/2/2018 3/2/2017    HBV HCV HIV WNV by DIANA Routine 3/8/2017 3/2/2018 3/2/2017    BMT Infections disease donor panel Routine 3/8/2017 3/2/2018 3/2/2017            Who to contact     If you have questions or need follow up information about today's clinic visit or your schedule please contact [facility] BLOOD AND MARROW TRANSPLANT directly at [phone].  Normal or non-critical lab and imaging results will be communicated to you by MyChart, letter or phone within 4 business days after the clinic has received the results. If you do not hear from us within 7 days,  please contact the clinic through [employer] or phone. If you have a critical or abnormal lab result, we will notify you by phone as soon as possible.  Submit refill requests through [employer] or call your pharmacy and they will forward the refill request to us. Please allow 3 business days for your refill to be completed.          Additional Information About Your Visit        [employer] Information     [employer] gives you secure access to your electronic health record. If you see a primary care provider, you can also send messages to your care team and make appointments. If you have questions, please call your primary care clinic.  If you do not have a primary care provider, please call [phone] and they will assist you.        Care EveryWhere ID     This is your Care EveryWhere ID. This could be used by other organizations to access your [facility] medical records  [id number]        Your Vitals Were     Pulse Temperature Respirations Pulse Oximetry BMI (Body Mass Index)       118 97.6  F (36.4  C) (Oral) 18 97% 30.86 kg/m2        Blood Pressure from Last 3 Encounters:   03/05/17 119/68   03/03/17 103/53   03/01/17 106/64    Weight from Last 3 Encounters:   03/05/17 81.6 kg (179 lb 14.3 oz)   03/03/17 82 kg (180 lb 12.8 oz)   03/01/17 82.5 kg (181 lb 14.1 oz)              Today, you had the following     No orders found for display       Recent Review Flowsheet Data     BMT Recent Results Latest Ref Rng & Units 2/16/2017 2/17/2017 2/18/2017 2/19/2017 2/27/2017 3/1/2017 3/2/2017    WBC 4.0 - 11.0 10e9/L 40.8(H) 45.6(H) 45.8(H) 38.6(H) 2.4(L) 2.3(L) -    Hemoglobin 11.7 - 15.7 g/dL 10.8(L) 10.5(L) 9.6(L) 9.5(L) 8.6(L) 9.0(L) -    Platelet Count 150 - 450 10e9/L 156 132(L) 106(L) 81(L) 148(L) 153 -    Neutrophils (Absolute) 1.6 - 8.3 10e9/L 36.5(H) 41.6(H) 41.6(H) 35.2(H) 1.6 1.5(L) -    INR 0.86 - 1.14 - - - - - - -    Sodium 133 - 144 mmol/L 141 - 143 144 - 141 -    Potassium 3.4 - 5.3 mmol/L 3.9 - 3.6 3.4 - 4.2 -     Chloride 94 - 109 mmol/L 107 - 106 106 - 108 -    Glucose 70 - 99 mg/dL 92 - 82 93 - 91 79    Urea Nitrogen 7 - 30 mg/dL 15 - 14 10 - 8 -    Creatinine 0.52 - 1.04 mg/dL 0.61 - 0.61 0.61 - 0.60 -    Calcium (Total) 8.5 - 10.1 mg/dL 8.7 - 7.8(L) 8.0(L) - 8.4(L) -    Protein (Total) 6.8 - 8.8 g/dL - - - - - 6.7(L) -    Albumin 3.4 - 5.0 g/dL - - - - - 3.4 -    Alkaline Phosphatase 40 - 150 U/L - - - - - 143 -    AST 0 - 45 U/L - - - - - 20 -    ALT 0 - 50 U/L - - - - - 22 -    MCV 78 - 100 fl 100 101(H) 102(H) 100 103(H) 103(H) -               Primary Care Provider    Physician No Ref-Primary       No address on file        Thank you!     Thank you for choosing [facility] BLOOD AND MARROW TRANSPLANT  for your care. Our goal is always to provide you with excellent care. Hearing back from our patients is one way we can continue to improve our services. Please take a few minutes to complete the written survey that you may receive in the mail after your visit with us. Thank you!             Your Updated Medication List - Protect others around you: Learn how to safely use, store and throw away your medicines at www.disposemymeds.org.          This list is accurate as of: 3/5/17  9:34 AM.  Always use your most recent med list.                   Brand Name Dispense Instructions for use    enoxaparin 40 MG/0.4ML injection    LOVENOX    30 Syringe    Inject 0.4 mLs (40 mg) Subcutaneous daily       gabapentin 100 MG capsule    NEURONTIN    90 capsule    Take 2 capsules (200 mg) by mouth 3 times daily       LORAZEPAM PO      Take 0.25 mg by mouth every 4 hours as needed for anxiety Reported on 2/27/2017       losartan 25 MG tablet    COZAAR    45 tablet    Take 0.5 tablets (12.5 mg) by mouth daily       venlafaxine 75 MG 24 hr capsule    EFFEXOR-XR    30 capsule    Take 1 capsule (75 mg) by mouth daily

## 2017-03-05 NOTE — PROGRESS NOTES
BMT Heme Malignancy Rooming Note    Steph Agee - 3/5/2017 8:55 AM     Chief Complaint   Patient presents with     Imm/Inj     Patient with Hodgkin lymphoma here for 1st dose of CM-CSF injection         /68 (BP Location: Right arm, Patient Position: Chair, Cuff Size: Adult Regular)  Pulse 118  Temp 97.6  F (36.4  C) (Oral)  Resp 18  Wt 81.6 kg (179 lb 14.3 oz)  SpO2 97%  BMI 30.86 kg/m2     Medications reviewed: Yes    Dressing changed: Yes - See doc flowsheet for details.     Medications given: Yes - See MAR  CM- mcg administered SQ to right upper arm per order. Patient was observed post injection and asked to wait in clinic for 20 minutes to assure that no reactions occur.      Staff time:0 minutes     Additional information if applicable:      Edith Dumont CMA

## 2017-03-06 ENCOUNTER — OFFICE VISIT (OUTPATIENT)
Dept: TRANSPLANT | Facility: CLINIC | Age: 42
End: 2017-03-06
Attending: INTERNAL MEDICINE
Payer: COMMERCIAL

## 2017-03-06 DIAGNOSIS — Z52.011 AUTOLOGOUS DONOR, STEM CELLS: ICD-10-CM

## 2017-03-06 DIAGNOSIS — Z29.9 NEED FOR PROPHYLACTIC MEASURE: Primary | ICD-10-CM

## 2017-03-06 PROCEDURE — 25000128 H RX IP 250 OP 636: Mod: ZF | Performed by: INTERNAL MEDICINE

## 2017-03-06 PROCEDURE — 96372 THER/PROPH/DIAG INJ SC/IM: CPT

## 2017-03-06 RX ORDER — HEPARIN SODIUM,PORCINE 10 UNIT/ML
5 VIAL (ML) INTRAVENOUS
Status: CANCELLED | OUTPATIENT
Start: 2017-03-07

## 2017-03-06 RX ORDER — PLERIXAFOR 24 MG/1.2ML
0.24 SOLUTION SUBCUTANEOUS DAILY
Status: CANCELLED
Start: 2017-03-07

## 2017-03-06 RX ADMIN — SARGRAMOSTIM 825 MCG: 250 INJECTION, POWDER, FOR SOLUTION INTRAVENOUS; SUBCUTANEOUS at 09:24

## 2017-03-06 NOTE — MR AVS SNAPSHOT
After Visit Summary   3/6/2017    Steph Agee    MRN: [id number]           Patient Information     Date Of Birth          1975        Visit Information        Provider Department      3/6/2017 8:30 AM 1, Liane Bmt Nurse [facility]        Today's Diagnoses     Need for prophylactic measure    -  1    Autologous donor, stem cells              [facility] ([facility])  [address] [place] [address]  Phone: [phone]  Clinic Hours:   Monday-Friday:    8am to 4:30pm   Weekends and holidays:    8am to noon (in general)  If your fever is 100.5  or greater,   call the clinic.  After hours call the   hospital at [phone] or   [phone]. Ask for the BMT   fellow for pediatric or adult patients            Follow-ups after your visit        Your next 10 appointments already scheduled     Mar 07, 2017  7:45 AM CST   BMT Injection with  Bmt Nurse 1   [facility] ([facility] [place])    [address] [place] [address]  2nd [facility] [id number] [phone]            Mar 07, 2017  8:00 AM CST   (Arrive by 7:45 AM)   PAC RN ASSESSMENT with  Pac Rn   [facility] ([facility])    [address] [place] [address]  4th [facility] [id number] [phone]            Mar 07, 2017  8:30 AM CST   (Arrive by 8:15 AM)   PAC EVALUATION with  Pac Elizabeth 7   [facility] ([facility] [place])    [address] [place] [address]  4th [facility] [address] [phone]            Mar 07, 2017  9:30 AM CST   (Arrive by 9:15 AM)   PAC Anesthesia Consult with  Pac Anesthesiologist   [facility] ([facility])    [address] [place] [address]  4th [facility] [id number] [phone]            Mar 08, 2017  9:15 AM CST   [facility] with  [facility] DRAW   [facility] ([facility])    [address] [facility]  2nd [facility] [address]   [phone]            Mar 08, 2017  9:30 AM CST   Return with  BMT CECILIA #2   [facility] ([facility])    [address] [address] [phone]            Mar 09, 2017   Procedure with Zohaib Santo MD   [facility] [place] (--)    [address]  [place] [address]   [phone]            Aug 09, 2017 10:30 AM CDT   [facility] Draw with  [facility] DRAW   [facility] ([facility])    [address] [address]   [phone]            Aug 09, 2017 11:00 AM CDT   Return with Obed Chambers MD   [facility] ([facility])    [address] [address] [phone]              Future tests that were ordered for you today     Open Future Orders        Priority Expected Expires Ordered    EBV Capsid Antibody IgG Routine 3/8/2017 3/2/2018 3/2/2017    Herpes Simplex Virus 1 and 2 IgG Routine 3/8/2017 3/2/2018 3/2/2017    HBV HCV HIV WNV by DIANA Routine 3/8/2017 3/2/2018 3/2/2017    BMT Infections disease donor panel Routine 3/8/2017 3/2/2018 3/2/2017            Who to contact     If you have questions or need follow up information about today's clinic visit or your schedule please contact [facility]ARROW [facility]PL[facility] directly at [phone].  Normal or non-critical lab and imaging results will be communicated to you by MyChart, letter or phone within 4 business days after the clinic has received the results. If you do not hear from us within 7 days, please contact the clinic through MyChart or phone. If you have a critical or abnormal lab result, we will notify you by phone as soon as possible.  Submit refill requests through [employer]  or call your pharmacy and they will forward the refill request to us. Please allow 3 business days for your refill to be completed.          Additional Information About Your Visit        charming charliehar[employer]Vidria Information     [employer] gives you secure access to your electronic health record. If you see a primary care provider, you can also send messages to your care team and make appointments. If you have questions, please call your primary care clinic.  If you do not have a primary care provider, please call [phone] and they will assist you.        Care EveryWhere ID     This is your Care EveryWhere ID. This could be used by other organizations to access your [place] medical records  [id number]         Blood Pressure from Last 3 Encounters:   03/05/17 119/68   03/03/17 103/53   03/01/17 106/64    Weight from Last 3 Encounters:   03/05/17 81.6 kg (179 lb 14.3 oz)   03/03/17 82 kg (180 lb 12.8 oz)   03/01/17 82.5 kg (181 lb 14.1 oz)              Today, you had the following     No orders found for display       Recent Review Flowsheet Data     BMT Recent Results Latest Ref Rng & Units 2/16/2017 2/17/2017 2/18/2017 2/19/2017 2/27/2017 3/1/2017 3/2/2017    WBC 4.0 - 11.0 10e9/L 40.8(H) 45.6(H) 45.8(H) 38.6(H) 2.4(L) 2.3(L) -    Hemoglobin 11.7 - 15.7 g/dL 10.8(L) 10.5(L) 9.6(L) 9.5(L) 8.6(L) 9.0(L) -    Platelet Count 150 - 450 10e9/L 156 132(L) 106(L) 81(L) 148(L) 153 -    Neutrophils (Absolute) 1.6 - 8.3 10e9/L 36.5(H) 41.6(H) 41.6(H) 35.2(H) 1.6 1.5(L) -    INR 0.86 - 1.14 - - - - - - -    Sodium 133 - 144 mmol/L 141 - 143 144 - 141 -    Potassium 3.4 - 5.3 mmol/L 3.9 - 3.6 3.4 - 4.2 -    Chloride 94 - 109 mmol/L 107 - 106 106 - 108 -    Glucose 70 - 99 mg/dL 92 - 82 93 - 91 79    Urea Nitrogen 7 - 30 mg/dL 15 - 14 10 - 8 -    Creatinine 0.52 - 1.04 mg/dL 0.61 - 0.61 0.61 - 0.60 -    Calcium (Total) 8.5 - 10.1 mg/dL 8.7 - 7.8(L) 8.0(L) - 8.4(L) -    Protein (Total) 6.8 - 8.8 g/dL - - - - - 6.7(L) -    Albumin 3.4 - 5.0  g/dL - - - - - 3.4 -    Alkaline Phosphatase 40 - 150 U/L - - - - - 143 -    AST 0 - 45 U/L - - - - - 20 -    ALT 0 - 50 U/L - - - - - 22 -    MCV 78 - 100 fl 100 101(H) 102(H) 100 103(H) 103(H) -               Primary Care Provider    Physician No Ref-Primary       No address on file        Thank you!     Thank you for choosing [facility]OW [facility]  for your care. Our goal is always to provide you with excellent care. Hearing back from our patients is one way we can continue to improve our services. Please take a few minutes to complete the written survey that you may receive in the mail after your visit with us. Thank you!             Your Updated Medication List - Protect others around you: Learn how to safely use, store and throw away your medicines at www.disposemymeds.org.          This list is accurate as of: 3/6/17  9:26 AM.  Always use your most recent med list.                   Brand Name Dispense Instructions for use    enoxaparin 40 MG/0.4ML injection    LOVENOX    30 Syringe    Inject 0.4 mLs (40 mg) Subcutaneous daily       gabapentin 100 MG capsule    NEURONTIN    90 capsule    Take 2 capsules (200 mg) by mouth 3 times daily       LORAZEPAM PO      Take 0.25 mg by mouth every 4 hours as needed for anxiety Reported on 2/27/2017       losartan 25 MG tablet    COZAAR    45 tablet    Take 0.5 tablets (12.5 mg) by mouth daily       venlafaxine 75 MG 24 hr capsule    EFFEXOR-XR    30 capsule    Take 1 capsule (75 mg) by mouth daily

## 2017-03-06 NOTE — PROGRESS NOTES
BMT Heme Malignancy Rooming Note    Steph Agee - 3/6/2017 9:25 AM     Chief Complaint   Patient presents with     RECHECK     GM-CSF injection, pre BMT for Hodgkins        There were no vitals taken for this visit.     Medications reviewed: No    Labs drawn: No    Dressing changed: No     Medications given: Yes - See MAR    Staff time:0    Additional information if applicable: Patient given GM-CSF injection x1 in right arm.  She tolerated it without issue.     MONI CEDENO RN

## 2017-03-07 ENCOUNTER — OFFICE VISIT (OUTPATIENT)
Dept: SURGERY | Facility: CLINIC | Age: 42
End: 2017-03-07

## 2017-03-07 ENCOUNTER — OFFICE VISIT (OUTPATIENT)
Dept: TRANSPLANT | Facility: CLINIC | Age: 42
End: 2017-03-07
Attending: INTERNAL MEDICINE
Payer: COMMERCIAL

## 2017-03-07 ENCOUNTER — ANESTHESIA EVENT (OUTPATIENT)
Dept: SURGERY | Facility: CLINIC | Age: 42
End: 2017-03-07

## 2017-03-07 ENCOUNTER — ALLIED HEALTH/NURSE VISIT (OUTPATIENT)
Dept: SURGERY | Facility: CLINIC | Age: 42
End: 2017-03-07

## 2017-03-07 VITALS
SYSTOLIC BLOOD PRESSURE: 124 MMHG | TEMPERATURE: 98.2 F | HEART RATE: 117 BPM | RESPIRATION RATE: 14 BRPM | OXYGEN SATURATION: 100 % | DIASTOLIC BLOOD PRESSURE: 81 MMHG

## 2017-03-07 VITALS
BODY MASS INDEX: 30.9 KG/M2 | HEIGHT: 64 IN | DIASTOLIC BLOOD PRESSURE: 81 MMHG | SYSTOLIC BLOOD PRESSURE: 124 MMHG | HEART RATE: 117 BPM | RESPIRATION RATE: 14 BRPM | OXYGEN SATURATION: 100 % | TEMPERATURE: 98.2 F | WEIGHT: 181 LBS

## 2017-03-07 DIAGNOSIS — Z52.011 AUTOLOGOUS DONOR, STEM CELLS: Primary | ICD-10-CM

## 2017-03-07 DIAGNOSIS — Z01.818 PREOP GENERAL PHYSICAL EXAM: Primary | ICD-10-CM

## 2017-03-07 DIAGNOSIS — Z01.818 PREOP GENERAL PHYSICAL EXAM: ICD-10-CM

## 2017-03-07 DIAGNOSIS — Z01.818 PREOP EXAMINATION: Primary | ICD-10-CM

## 2017-03-07 DIAGNOSIS — Z29.9 NEED FOR PROPHYLACTIC MEASURE: ICD-10-CM

## 2017-03-07 DIAGNOSIS — C81.12 NODULAR SCLEROSIS HODGKIN LYMPHOMA OF INTRATHORACIC LYMPH NODES (H): ICD-10-CM

## 2017-03-07 LAB
ERYTHROCYTE [DISTWIDTH] IN BLOOD BY AUTOMATED COUNT: 18 % (ref 10–15)
HCT VFR BLD AUTO: 28.4 % (ref 35–47)
HGB BLD-MCNC: 9.3 G/DL (ref 11.7–15.7)
MCH RBC QN AUTO: 34.4 PG (ref 26.5–33)
MCHC RBC AUTO-ENTMCNC: 32.7 G/DL (ref 31.5–36.5)
MCV RBC AUTO: 105 FL (ref 78–100)
PLATELET # BLD AUTO: 104 10E9/L (ref 150–450)
RBC # BLD AUTO: 2.7 10E12/L (ref 3.8–5.2)
WBC # BLD AUTO: 6.2 10E9/L (ref 4–11)

## 2017-03-07 PROCEDURE — 86900 BLOOD TYPING SEROLOGIC ABO: CPT | Performed by: NURSE PRACTITIONER

## 2017-03-07 PROCEDURE — 96372 THER/PROPH/DIAG INJ SC/IM: CPT | Mod: ZF

## 2017-03-07 PROCEDURE — 85027 COMPLETE CBC AUTOMATED: CPT | Performed by: NURSE PRACTITIONER

## 2017-03-07 PROCEDURE — 86850 RBC ANTIBODY SCREEN: CPT | Performed by: NURSE PRACTITIONER

## 2017-03-07 PROCEDURE — 86901 BLOOD TYPING SEROLOGIC RH(D): CPT | Performed by: NURSE PRACTITIONER

## 2017-03-07 PROCEDURE — 25000128 H RX IP 250 OP 636: Mod: ZF | Performed by: INTERNAL MEDICINE

## 2017-03-07 RX ORDER — HEPARIN SODIUM,PORCINE 10 UNIT/ML
5 VIAL (ML) INTRAVENOUS
Status: CANCELLED | OUTPATIENT
Start: 2017-03-08

## 2017-03-07 RX ORDER — PLERIXAFOR 24 MG/1.2ML
0.24 SOLUTION SUBCUTANEOUS DAILY
Status: CANCELLED
Start: 2017-03-08

## 2017-03-07 RX ADMIN — SARGRAMOSTIM 825 MCG: 250 INJECTION, POWDER, FOR SOLUTION INTRAVENOUS; SUBCUTANEOUS at 09:33

## 2017-03-07 ASSESSMENT — LIFESTYLE VARIABLES: TOBACCO_USE: 1

## 2017-03-07 NOTE — PROGRESS NOTES
Please see note by this author on 3/7/17 regarding anticoagulation plan.     Tho Soni, Pharm.D, BCPS

## 2017-03-07 NOTE — PROGRESS NOTES
ID/CC: Steph Agee is a 42 y/o woman followed by Dr. Chambers with HD in CR who has a planned GM-CSF primed marrow harvest here to f/u UE US.     S:  Patient endorsing worsening facial swelling overnight after laying flat that self resolves throughout the day.  No change in sensation or coloration of the face.  Denies any other concerning symptoms.  No new illnesses.    O:  There were no vitals taken for this visit.  Gen: Alert and interactive, pleasant  HEENT: Facial fullness without plethora.  Normo-oropharynx.  CV: RRR with no m/r/g.  Distal extremities warm to the touch with palpable pulses.  Increased venous congestion of the chest.  Resp: CTAB.  Normal rate and effort.    Ab: Soft, non-tender,   Neuro: A&Ox3, freely moving all extremities.    U/S of upper extremities bilaterally per informal read of radiologist demonstrated stable non-occlusive left internal jugular vein.  No evidence of new clots/occlusions.      A/P:  40 yo female PMHx significant Hodgkin's disease that has been treated with a combination of ICE and brentuximab to complete response who presents for follow up for veno-occlusive disease of LIJ.  - repeat imaging demonstrates stable non-occlusive process; will start Lovenox ppx 40 mg subq QD, instructed patient to hold day prior and morning of procedure  - return to clinic on 3/5 for first GM-CSF injection in preparation for autologous BMT  - follow up on 3/7 for pre-op by anesthesiologist given veno-occlusive disease      The patient was seen and discussed with Dr. Vani Chan, the attending, who agrees with the plan as stated above.    Michael Baker MD  IM/PEDS, PGY2  pager [phone]    Attending note:    I have reviewed today's vital signs, medications, labs and imaging results. I have seen, evaluated and examined the patient independently and discussed the plan with the patient and Dr. Baker The associated note has been read and corrected by me.  My independent findings and  assessment are below.  In Summary:  Steph Agee is a 40 y/o woman in CR w/ HD planning GM-CSF primed marrow harvest w/ facial swelling and clot on PET/CT.  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  No new acute clot.  Discussed w/ Dr. Chambers. Will start prophy lovenox (40 mg daily).  She knows how to do the injections.Start GM-CSF Neftaly 3/5.  F/u with audrey-op anesthesia work up 3/7.    F/u BMT CECILIA 3/8/17.  OR planned 3/9/17.    Vani Chan MD  Pager: [phone]

## 2017-03-07 NOTE — PROGRESS NOTES
Chief Complaint   Patient presents with     RECHECK     PT is here for GM-CSF     BMT Heme Malignancy Rooming Note    Steph LUNDBERG Anuel - 3/7/2017 10:44 AM     Chief Complaint   Patient presents with     RECHECK     PT is here for GM-CSF        /81  Pulse 117  Temp 98.2  F (36.8  C) (Oral)  Resp 14  SpO2 100%    Data Unavailable     Medications reviewed: Yes    Labs drawn: No    Dressing changed: No     Medications given: Yes - See MAR    Staff time:0    Additional information if applicable: Pt is here for GMCSF    Nidhi Chun MA

## 2017-03-07 NOTE — H&P
Pre-Operative H & P     CC:  Preoperative exam to assess for increased cardiopulmonary risk while undergoing surgery and anesthesia.    Date of Encounter: 3/7/2017  Primary Care Physician:  No Ref-Primary, Physician    NATALIE  Steph Agee is a 42 year old female who presents for pre-operative H & P in preparation for Bone Marrow [place]   with Dr. Santo on 3/9/17 at [facility].     History is obtained from the patient.     Past Medical History  Past Medical History   Diagnosis Date     Depression with anxiety      History of blood transfusion      9/2016     Hodgkin disease (H) 2014, 2016     Neuropathy (H)      SVC syndrome 2014       Past Surgical History  Past Surgical History   Procedure Laterality Date     Biopsy/excision lymph node(s), needle, superficial (cervical/inguinal/axillary) Left 2014     axillary     Biopsy of mass in the manubrium Left 2016     Insert port vascular access Right 2/14/2017     Procedure: INSERT PORT VASCULAR ACCESS;  Surgeon: Michael Sy PA-C;  Location: [facility] OR       Hx of Blood transfusions/reactions: yes, no reactions    Hx of abnormal bleeding or anti-platelet use: lovenox, hold as of today    Menstrual history: No LMP recorded. Patient is not currently having periods (Reason: UNKNOWN).: chemo related    Steroid use in the last year: none    Personal or FH with difficulty with Anesthesia:  None          Prior to Admission Medications  Current Outpatient Prescriptions   Medication Sig Dispense Refill     enoxaparin (LOVENOX) 40 MG/0.4ML injection Inject 0.4 mLs (40 mg) Subcutaneous daily 30 Syringe 3     losartan (COZAAR) 25 MG tablet Take 0.5 tablets (12.5 mg) by mouth daily (Patient taking differently: Take 12.5 mg by mouth At Bedtime ) 45 tablet 0     venlafaxine (EFFEXOR-XR) 75 MG 24 hr capsule Take 1 capsule (75 mg) by mouth daily (Patient taking differently: Take 75 mg by mouth At Bedtime ) 30 capsule 3      gabapentin (NEURONTIN) 100 MG capsule Take 2 capsules (200 mg) by mouth 3 times daily 90 capsule 3     LORAZEPAM PO Take 0.25 mg by mouth every 4 hours as needed for anxiety Reported on 2/27/2017         Allergies  Allergies   Allergen Reactions     Lisinopril Cough       Social History  Social History     Social History     Marital status: Single     Spouse name: N/A     Number of children: N/A     Years of education: N/A     Occupational History     Not on file.     Social History Main Topics     Smoking status: Former Smoker     Packs/day: 1.00     Types: Cigarettes     Start date: 1/1/1995     Quit date: 5/25/2011     Smokeless tobacco: Never Used     Alcohol use 0.0 oz/week     0 Standard drinks or equivalent per week      Comment: occasionally      Drug use: Yes      Comment: Daily marijuana use. 1 joint daily      Sexual activity: Not on file     Other Topics Concern     Not on file     Social History Narrative       Family History  Family History   Problem Relation Age of Onset     Substance Abuse Mother              Anesthesia Evaluation     . Pt has had prior anesthetic. Type: General and MAC      ROS/MED HX    ENT/Pulmonary:     (+)tobacco use, Past use 1 PPD for 16 years, quit 5/25/2011 packs/day  , . .    Neurologic:  - neg neurologic ROS     Cardiovascular: Comment: Started on cozaar with last MUGA, EF 47%    (+) ----. : . . . :. . Previous cardiac testing Echodate:results:date: results:ECG reviewed date: results: date: results:          METS/Exercise Tolerance:  1 - Eating, dressing   Hematologic:     (+) History of Transfusion no previous transfusion reaction -      Musculoskeletal:  - neg musculoskeletal ROS       GI/Hepatic:  - neg GI/hepatic ROS       Renal/Genitourinary:  - ROS Renal section negative       Endo:     (+) Obesity, .      Psychiatric:     (+) psychiatric history anxiety and depression      Infectious Disease:  - neg infectious disease ROS       Malignancy:   (+) Malignancy History  "of Lymphoma/Leukemia  Lymph CA Active status post,         Other:    (+) No chance of pregnancy C-spine cleared: N/A, no H/O Chronic Pain,no other significant disability              Physical Exam  Normal systems: pulmonary and dental    Airway   Mallampati: II  TM distance: >3 FB  Neck ROM: full    Dental   Normal    Cardiovascular   Rhythm and rate: regular and normal      Pulmonary    breath sounds clear to auscultation             The complete review of systems is negative other than noted in the HPI or here.   Temp: 98.2  F (36.8  C) Temp src: Oral BP: 124/81 Pulse: 117   Resp: 14 SpO2: 100 %         181 lbs 0 oz  5' 4\"   Body mass index is 31.07 kg/(m^2).       Physical Exam  Constitutional: Awake, alert, cooperative, no apparent distress, and appears stated age.  Eyes: Pupils equal, round and reactive to light, extra ocular muscles intact, sclera clear, conjunctiva normal.  HENT: Normocephalic, oral pharynx with moist mucus membranes, good dentition, missing many. No goiter appreciated.   Respiratory: Clear to auscultation bilaterally, no crackles or wheezing.  Cardiovascular: Regular rate and rhythm, normal S1 and S2, and no murmur noted.  Carotids +2, no bruits. No edema. Palpable pulses to radial  DP and PT arteries.   GI: Normal bowel sounds, soft, non-distended, non-tender, no masses palpated, no hepatosplenomegaly.  Surgical scars: chest with port and central line  Lymph/Hematologic: No cervical lymphadenopathy and no supraclavicular lymphadenopathy.  Genitourinary:  deferred  Skin: Warm and dry.  No rashes at anticipated surgical site.   Musculoskeletal: Full ROM of neck. There is no redness, warmth, or swelling of the joints. Gross motor strength is normal.    Neurologic: Awake, alert, oriented to name, place and time. Cranial nerves II-XII are grossly intact. Gait is normal.   Neuropsychiatric: Calm, cooperative. Normal affect.     Labs: (personally reviewed)  Lab Results   Component Value Date    " WBC 6.2 2017     Lab Results   Component Value Date    RBC 2.70 2017     Lab Results   Component Value Date    HGB 9.3 2017     Lab Results   Component Value Date    HCT 28.4 2017     No components found for: MCT  Lab Results   Component Value Date     2017     Lab Results   Component Value Date    MCH 34.4 2017     Lab Results   Component Value Date    MCHC 32.7 2017     Lab Results   Component Value Date    RDW 18.0 2017     Lab Results   Component Value Date     2017       Last Basic Metabolic Panel:  Lab Results   Component Value Date     2017      Lab Results   Component Value Date    POTASSIUM 4.2 2017     Lab Results   Component Value Date    CHLORIDE 108 2017     Lab Results   Component Value Date    VALERIA 8.4 2017     Lab Results   Component Value Date    CO2 27 2017     Lab Results   Component Value Date    BUN 8 2017     Lab Results   Component Value Date    CR 0.60 2017     Lab Results   Component Value Date    GLC 91 2017         EKG: Personally reviewed but formal cardiology read pendin/3/2017 SR    US 3/3/2017  Impression:  1. No acute upper extremity deep venous thrombosis.  2. 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    Examination: MR CARDIAC W CONTRAST 2017    Indication: 41-year old female with Hodgkin's lymphoma, status post  anthracycline chemotherapy, and possible mild LV dysfunction noted on  MUGA.      IMPRESSION:  1. Normal left ventricular size with mild reduction in systolic  function with a calculated ejection fraction of 47 %.  2. Normal right ventricular size and systolic function with a  calculated ejection fraction of 52%.   3. On delayed enhancement imaging, there is no abnormal  hyperenhancement to suggest myocardial scar/inflammation/infiltration.     The MRI sequences and imaging planes in this study were  tailored for  cardiac imaging and are suboptimal for evaluation of non-cardiac  structures.     FINDINGS     Left ventricle  Cavity size: Normal  Wall thickness: Normal  Global systolic function: Mildly depressed with a quantitative LV  ejection fraction of 47%.   Regional wall motion: Normal  Delayed enhancement: None     Right ventricle  Cavity size: Normal  Wall thickness: Normal  Global systolic function: Normal with a quantitative RV ejection  fraction of 52%.   Regional wall motion: Normal     Atria  LA size: Normal  RA size: Normal     Valves  Aortic Valve  Valve morphology: Tricuspid  Aortic stenosis: None  Aortic regurgitation: Mild     Mitral Valve  Mitral stenosis: None  Mitral regurgitation: None     Tricuspid Valve   Tricuspid stenosis: None  Tricuspid regurgitation: None     Pulmonic Valve  Pulmonic stenosis: None  Pulmonic regurgitation: None     Extracardiac  Aortic root dimension: 3.1 cm (at the sinuses of Valsalva)  Main PA dimension: 2.5 cm  Pericardial thickness: Normal  Pericardial effusion:None  Pleural effusion: None    EXAMINATION: NM HEART MUGA REST  Rest MUGA examination of the heart, 2/1/2017 3:53 PM.     INDICATION: Hodgkin lymphoma, unspecified, unspecified site, Personal  history of diseases of the blood and blood-forming organs and certain  disorders involving the immune mechanism      Additional Information: none     TECHNIQUE:     The patient's red blood cells were labeled with 25.4 mCi of Tc-99m  pertechnetate. Anterior, Uzbek, and left lateral gated views are  obtained of the heart. The left ventricular ejection fraction was  calculated.     FINDINGS:     COMPARISON: Previous MUGA examination from 10/5/2016, LV ejection  fraction was 67%..     The left ventricular ejection fraction is low normal at 52%.     There is normal left ventricular size. There is normal wall motion of  the left ventricle. The right ventricle is moving normally. The  pulmonary arteries appear normal.     A  [id number] comparison to previous study documents that there is  interval reduction in the LV ejection fraction.         IMPRESSION:     1. Low normal left ventricular ejection fraction at 52%.     2. Interval reduction in LV ejection fraction from 67% to 52%.     -----------------------  Normal Ejection Fraction Value Ranges:  Male LV EF% - 50-78%,   Female LVEF% - 50-87%      ASSESSMENT and PLAN  Steph Agee is a 42 year old female scheduled to undergo Bone Marrow [place] . She has the following specific operative considerations:   - RCRI : Low serious cardiac risks.  0.4% risk of major adverse cardiac event.   - Anesthesia considerations:  Refer to PAC assessment in anesthesia records  - VTE risk: currently being anticoagulated  - WAYNE # of risks = no risk  - Post-op delirium risk: low risk  - Risk of PONV score = 2.  If > 2, anti-emetic intervention recommended.      Previous anesthesia without complications   Cardiac: chronic occlusion of left central internal jugular vein for which she is on lovenox (will hold day prior to procedure). Denies current cardiac symptoms. Started on cozaar when MUGA showed decreased EF at 47%.  Pulmonary: Denies current pulmonary symptoms. Smoked 1 PPD for 16 years, quit 2011.  Immune:  Hodgkin's disease that has been treated with a combination of ICE and brentuximab  METS low due to current treatments. Feasible airway. Currently has double lumen central line and port on right chest.    Pt optimized for surgery. AVS with information on surgery time/arrival time, meds and NPO status given by nursing staff    Patient was discussed with Dr Eli.    MARIE Morales CNS  Preoperative Assessment Center  [facility]  Clinic and Surgery Center  Phone: [phone]  Fax: [phone]

## 2017-03-07 NOTE — MR AVS SNAPSHOT
After Visit Summary   3/7/2017    Steph Agee    MRN: [id number]           Patient Information     Date Of Birth          1975        Visit Information        Provider Department      3/7/2017 7:45 AM 1,  Bmt Nurse 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  After hours call the   hospital at [phone] or   [phone]. Ask for the BMT   fellow for pediatric or adult patients            Follow-ups after your visit        Your next 10 appointments already scheduled     Mar 08, 2017  9:15 AM CST   Masonic Lab Draw with  MASON[facility] LAB DRAW   [facility] Masonic Lab Draw ([facility])    [address] [place] [id number] [phone]            Mar 08, 2017  9:30 AM CST   Return with  BMT CECILIA #2   [facility] Blood and Marrow Transplant ([facility])    [address] [place] [address] [phone]            Mar 09, 2017   Procedure with Zohaib Santo MD   [facility], [place], Same Day Surgery (--)    [address] [facility] [address]   [phone]            Aug 09, 2017 10:30 AM CDT   Masonic Lab Draw with  MASONIC LAB DRAW   [facility] Masonic Lab Draw ([facility])    [address] [place] [id number] [phone]            Aug 09, 2017 11:00 AM CDT   Return with Obed Chambers MD   [facility] Blood and Marrow Transplant ([facility])    [address] [place] [address]  [address]  2[address] Floor  [facility] [address] [phone]              Future tests that were ordered for you today     Open Future Orders        Priority Expected Expires Ordered    EBV Capsid Antibody IgG Routine 3/8/2017 3/2/2018 3/2/2017    Herpes Simplex Virus 1 and 2 IgG Routine 3/8/2017 3/2/2018 3/2/2017    HBV HCV HIV WNV by DIANA Routine 3/8/2017 3/2/2018 3/2/2017    BMT Infections disease donor panel Routine 3/8/2017 3/2/2018 3/2/2017            Who to contact     If you have questions or need follow up information about today's clinic visit or your schedule please contact [facility] BLOOD AND MARROW TRANSPLANT directly at [phone].  Normal or non-critical lab and imaging results will be communicated to you by Locassahart, letter or phone within 4 business days after the clinic has received the results. If you do not hear from us within 7 days, please contact the clinic through Locassahart or phone. If you have a critical or abnormal lab result, we will notify you by phone as soon as possible.  Submit refill requests through [employer] or call your pharmacy and they will forward the refill request to us. Please allow 3 business days for your refill to be completed.          Additional Information About Your Visit        LocassaharS[employer]o Information     [employer] gives you secure access to your electronic health record. If you see a primary care provider, you can also send messages to your care team and make appointments. If you have questions, please call your primary care clinic.  If you do not have a primary care provider, please call [phone] and they will assist you.        Care EveryWhere ID     This is your Care EveryWhere ID. This could be used by other organizations to access your [place] medical records  [id number]        Your Vitals Were     Pulse Temperature Respirations Pulse Oximetry          117 98.2  F (36.8  C) (Oral) 14 100%         Blood Pressure from Last 3 Encounters:   03/07/17 124/81   03/07/17  124/81   03/05/17 119/68    Weight from Last 3 Encounters:   03/07/17 82.1 kg (181 lb)   03/05/17 81.6 kg (179 lb 14.3 oz)   03/03/17 82 kg (180 lb 12.8 oz)              We Performed the Following     ABO/Rh type and screen     CBC with platelets          Today's Medication Changes          These changes are accurate as of: 3/7/17 10:45 AM.  If you have any questions, ask your nurse or doctor.               These medicines have changed or have updated prescriptions.        Dose/Directions    losartan 25 MG tablet   Commonly known as:  COZAAR   This may have changed:  when to take this   Used for:  Chemotherapy induced cardiomyopathy (H)        Dose:  12.5 mg   Take 0.5 tablets (12.5 mg) by mouth daily   Quantity:  45 tablet   Refills:  0       venlafaxine 75 MG 24 hr capsule   Commonly known as:  EFFEXOR-XR   This may have changed:  when to take this   Used for:  Hodgkin disease (H), Personal history of diseases of blood and blood-forming organs        Dose:  75 mg   Take 1 capsule (75 mg) by mouth daily   Quantity:  30 capsule   Refills:  3                Recent Review Flowsheet Data     BMT Recent Results Latest Ref Rng & Units 2/17/2017 2/18/2017 2/19/2017 2/27/2017 3/1/2017 3/2/2017 3/7/2017    WBC 4.0 - 11.0 10e9/L 45.6(H) 45.8(H) 38.6(H) 2.4(L) 2.3(L) - 6.2    Hemoglobin 11.7 - 15.7 g/dL 10.5(L) 9.6(L) 9.5(L) 8.6(L) 9.0(L) - 9.3(L)    Platelet Count 150 - 450 10e9/L 132(L) 106(L) 81(L) 148(L) 153 - 104(L)    Neutrophils (Absolute) 1.6 - 8.3 10e9/L 41.6(H) 41.6(H) 35.2(H) 1.6 1.5(L) - -    INR 0.86 - 1.14 - - - - - - -    Sodium 133 - 144 mmol/L - 143 144 - 141 - -    Potassium 3.4 - 5.3 mmol/L - 3.6 3.4 - 4.2 - -    Chloride 94 - 109 mmol/L - 106 106 - 108 - -    Glucose 70 - 99 mg/dL - 82 93 - 91 79 -    Urea Nitrogen 7 - 30 mg/dL - 14 10 - 8 - -    Creatinine 0.52 - 1.04 mg/dL - 0.61 0.61 - 0.60 - -    Calcium (Total) 8.5 - 10.1 mg/dL - 7.8(L) 8.0(L) - 8.4(L) - -    Protein (Total) 6.8 - 8.8 g/dL - - - -  6.7(L) - -    Albumin 3.4 - 5.0 g/dL - - - - 3.4 - -    Alkaline Phosphatase 40 - 150 U/L - - - - 143 - -    AST 0 - 45 U/L - - - - 20 - -    ALT 0 - 50 U/L - - - - 22 - -    MCV 78 - 100 fl 101(H) 102(H) 100 103(H) 103(H) - 105(H)               Primary Care Provider    Physician No Ref-Primary       No address on file        Thank you!     Thank you for choosing [facility] BLOOD AND MARROW TRANSPLANT  for your care. Our goal is always to provide you with excellent care. Hearing back from our patients is one way we can continue to improve our services. Please take a few minutes to complete the written survey that you may receive in the mail after your visit with us. Thank you!             Your Updated Medication List - Protect others around you: Learn how to safely use, store and throw away your medicines at www.disposemymeds.org.          This list is accurate as of: 3/7/17 10:45 AM.  Always use your most recent med list.                   Brand Name Dispense Instructions for use    enoxaparin 40 MG/0.4ML injection    LOVENOX    30 Syringe    Inject 0.4 mLs (40 mg) Subcutaneous daily       gabapentin 100 MG capsule    NEURONTIN    90 capsule    Take 2 capsules (200 mg) by mouth 3 times daily       LORAZEPAM PO      Take 0.25 mg by mouth every 4 hours as needed for anxiety Reported on 2/27/2017       losartan 25 MG tablet    COZAAR    45 tablet    Take 0.5 tablets (12.5 mg) by mouth daily       venlafaxine 75 MG 24 hr capsule    EFFEXOR-XR    30 capsule    Take 1 capsule (75 mg) by mouth daily

## 2017-03-07 NOTE — MR AVS SNAPSHOT
After Visit Summary   3/7/2017    Steph Agee    MRN: [id number]           Patient Information     Date Of Birth          1975        Visit Information        Provider Department      3/7/2017 9:45 AM Pharmacist, Liane Contreras [facility]        Today's Diagnoses     Preop examination    -  1       Follow-ups after your visit        Your next 10 appointments already scheduled     Mar 08, 2017  9:15 AM CST   Masonic Lab Draw with  MASONIC LAB DRAW   [facility] Masonic Lab Draw ([facility])    [address] [facility]  [address] [facility] [address] [phone]            Mar 08, 2017  9:30 AM CST   Return with  BMT CECILIA #2   [facility] Blood and Marrow Transplant ([facility])    [address] [address] [phone]            Mar 09, 2017   Procedure with Zohaib Santo MD   [facility], [place], Same Day Surgery (--)    [address] [facility] [address]   [phone]            Aug 09, 2017 10:30 AM CDT   Masonic Lab Draw with  MASONIC LAB DRAW   [facility] Masonic Lab Draw ([facility])    [address] [address] [phone]            Aug 09, 2017 11:00 AM CDT   Return with Obed Chambers MD   [facility] Blood and Marrow Transplant ([facility])    [address] [address] [phone]              Future tests that were ordered for you today     Open Future Orders        Priority Expected Expires Ordered    EBV Capsid Antibody IgG Routine 3/8/2017 3/2/2018 3/2/2017    Herpes Simplex Virus 1 and 2 IgG Routine 3/8/2017 3/2/2018 3/2/2017    HBV HCV HIV WNV by DIANA Routine 3/8/2017 3/2/2018 3/2/2017    BMT Infections disease donor panel Routine 3/8/2017 3/2/2018 3/2/2017            Who to contact     Please call your clinic at [phone] to:    Ask questions about  your health    Make or cancel appointments    Discuss your medicines    Learn about your test results    Speak to your doctor   If you have compliments or concerns about an experience at your clinic, or if you wish to file a complaint, please contact [facility] Patient Relations at [phone] or email us at [email or web address]         Additional Information About Your Visit        MyChart Information     [employer] gives you secure access to your electronic health record. If you see a primary care provider, you can also send messages to your care team and make appointments. If you have questions, please call your primary care clinic.  If you do not have a primary care provider, please call [phone] and they will assist you.      [employer] is an electronic gateway that provides easy, online access to your medical records. With [employer], you can request a clinic appointment, read your test results, renew a prescription or communicate with your care team.     To access your existing account, please contact your [facility] or call [phone] for assistance.        Care EveryWhere ID     This is your Care EveryWhere ID. This could be used by other organizations to access your [place] medical records  [id number]         Blood Pressure from Last 3 Encounters:   03/07/17 124/81   03/07/17 124/81   03/05/17 119/68    Weight from Last 3 Encounters:   03/07/17 82.1 kg (181 lb)   03/05/17 81.6 kg (179 lb 14.3 oz)   03/03/17 82 kg (180 lb 12.8 oz)              Today, you had the following     No orders found for display         Today's Medication Changes          These changes are accurate as of: 3/7/17  1:14 PM.  If you have any questions, ask your nurse or doctor.               These medicines have changed or have updated prescriptions.        Dose/Directions    losartan 25 MG tablet   Commonly known as:  COZAAR   This may have changed:  when to take  this   Used for:  Chemotherapy induced cardiomyopathy (H)        Dose:  12.5 mg   Take 0.5 tablets (12.5 mg) by mouth daily   Quantity:  45 tablet   Refills:  0       venlafaxine 75 MG 24 hr capsule   Commonly known as:  EFFEXOR-XR   This may have changed:  when to take this   Used for:  Hodgkin disease (H), Personal history of diseases of blood and blood-forming organs        Dose:  75 mg   Take 1 capsule (75 mg) by mouth daily   Quantity:  30 capsule   Refills:  3                Primary Care Provider    Physician No Ref-Primary       No address on file        Thank you!     Thank you for choosing [facility]  for your care. Our goal is always to provide you with excellent care. Hearing back from our patients is one way we can continue to improve our services. Please take a few minutes to complete the written survey that you may receive in the mail after your visit with us. Thank you!             Your Updated Medication List - Protect others around you: Learn how to safely use, store and throw away your medicines at www.disposemymeds.org.          This list is accurate as of: 3/7/17  1:14 PM.  Always use your most recent med list.                   Brand Name Dispense Instructions for use    enoxaparin 40 MG/0.4ML injection    LOVENOX    30 Syringe    Inject 0.4 mLs (40 mg) Subcutaneous daily       gabapentin 100 MG capsule    NEURONTIN    90 capsule    Take 2 capsules (200 mg) by mouth 3 times daily       LORAZEPAM PO      Take 0.25 mg by mouth every 4 hours as needed for anxiety Reported on 2/27/2017       losartan 25 MG tablet    COZAAR    45 tablet    Take 0.5 tablets (12.5 mg) by mouth daily       venlafaxine 75 MG 24 hr capsule    EFFEXOR-XR    30 capsule    Take 1 capsule (75 mg) by mouth daily

## 2017-03-07 NOTE — ANESTHESIA PREPROCEDURE EVALUATION
Anesthesia Evaluation     . Pt has had prior anesthetic. Type: General and MAC      ROS/MED HX    ENT/Pulmonary:     (+)tobacco use, Past use 1 PPD for 16 years, quit 5/25/2011 packs/day  , . .    Neurologic:  - neg neurologic ROS    (-) seizures and CVA   Cardiovascular: Comment: Started on cozaar with last MUGA, EF 47%    (+) ----. : . . . :. . Previous cardiac testing Echodate:2/1/2017results:MUGA  1. Low normal left ventricular ejection fraction at 52%.     2. Interval reduction in LV ejection fraction from 67% to 52%.date: results:ECG reviewed date:2/3/2017 results:SR date: results:          METS/Exercise Tolerance:  1 - Eating, dressing   Hematologic:     (+) History of Transfusion no previous transfusion reaction -      Musculoskeletal:  - neg musculoskeletal ROS       GI/Hepatic:  - neg GI/hepatic ROS       Renal/Genitourinary:  - ROS Renal section negative       Endo:     (+) Obesity, .      Psychiatric:     (+) psychiatric history anxiety and depression      Infectious Disease:  - neg infectious disease ROS       Malignancy:   (+) Malignancy History of Lymphoma/Leukemia  Lymph CA Active status post,         Other:    (+) No chance of pregnancy C-spine cleared: N/A, no H/O Chronic Pain,no other significant disability              Physical Exam  Normal systems: pulmonary and dental    Airway   Mallampati: II  TM distance: >3 FB  Neck ROM: full    Dental     Cardiovascular   Rhythm and rate: regular and normal      Pulmonary    breath sounds clear to auscultation    Other findings: US 3/3/2017  Impression:  1. No acute upper extremity deep venous thrombosis.  2. 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          Examination: MR CARDIAC W CONTRAST 2/7/2017    Indication: 41-year old female with Hodgkin's lymphoma, status post  anthracycline chemotherapy, and possible mild LV dysfunction noted on  MUGA.      IMPRESSION:  1. Normal left ventricular size with  mild reduction in systolic  function with a calculated ejection fraction of 47 %.  2. Normal right ventricular size and systolic function with a  calculated ejection fraction of 52%.   3. On delayed enhancement imaging, there is no abnormal  hyperenhancement to suggest myocardial scar/inflammation/infiltration.     The MRI sequences and imaging planes in this study were tailored for  cardiac imaging and are suboptimal for evaluation of non-cardiac  structures.     FINDINGS     Left ventricle  Cavity size: Normal  Wall thickness: Normal  Global systolic function: Mildly depressed with a quantitative LV  ejection fraction of 47%.   Regional wall motion: Normal  Delayed enhancement: None     Right ventricle  Cavity size: Normal  Wall thickness: Normal  Global systolic function: Normal with a quantitative RV ejection  fraction of 52%.   Regional wall motion: Normal     Atria  LA size: Normal  RA size: Normal     Valves  Aortic Valve  Valve morphology: Tricuspid  Aortic stenosis: None  Aortic regurgitation: Mild     Mitral Valve  Mitral stenosis: None  Mitral regurgitation: None     Tricuspid Valve   Tricuspid stenosis: None  Tricuspid regurgitation: None     Pulmonic Valve  Pulmonic stenosis: None  Pulmonic regurgitation: None     Extracardiac  Aortic root dimension: 3.1 cm (at the sinuses of Valsalva)  Main PA dimension: 2.5 cm  Pericardial thickness: Normal  Pericardial effusion:None  Pleural effusion: None          EXAMINATION: NM HEART MUGA REST  Rest MUGA examination of the heart, 2/1/2017 3:53 PM.     INDICATION: Hodgkin lymphoma, unspecified, unspecified site, Personal  history of diseases of the blood and blood-forming organs and certain  disorders involving the immune mechanism      Additional Information: none     TECHNIQUE:     The patient's red blood cells were labeled with 25.4 mCi of Tc-99m  pertechnetate. Anterior, Maori, and left lateral gated views are  obtained of the heart. The left ventricular ejection  fraction was  calculated.     FINDINGS:     COMPARISON: Previous MUGA examination from 10/5/2016, LV ejection  fraction was 67%..     The left ventricular ejection fraction is low normal at 52%.     There is normal left ventricular size. There is normal wall motion of  the left ventricle. The right ventricle is moving normally. The  pulmonary arteries appear normal.     A [id number] comparison to previous study documents that there is  interval reduction in the LV ejection fraction.         IMPRESSION:     1. Low normal left ventricular ejection fraction at 52%.     2. Interval reduction in LV ejection fraction from 67% to 52%.     -----------------------  Normal Ejection Fraction Value Ranges:  Male LV EF% - 50-78%,   Female LVEF% - 50-87%      Lab Results      Component                Value               Date                      WBC                      6.2                 03/07/2017            Lab Results      Component                Value               Date                      RBC                      2.70                03/07/2017            Lab Results      Component                Value               Date                      HGB                      9.3                 03/07/2017            Lab Results      Component                Value               Date                      HCT                      28.4                03/07/2017            No components found for: MCT  Lab Results      Component                Value               Date                      MCV                      105                 03/07/2017            Lab Results      Component                Value               Date                      MCH                      34.4                03/07/2017            Lab Results      Component                Value               Date                      MCHC                     32.7                03/07/2017            Lab Results      Component                Value               Date                       RDW                      18.0                03/07/2017            Lab Results      Component                Value               Date                      PLT                      104                 03/07/2017              Last Basic Metabolic Panel:  Lab Results      Component                Value               Date                      NA                       141                 03/01/2017             Lab Results      Component                Value               Date                      POTASSIUM                4.2                 03/01/2017            Lab Results      Component                Value               Date                      CHLORIDE                 108                 03/01/2017            Lab Results      Component                Value               Date                      VALERIA                      8.4                 03/01/2017            Lab Results      Component                Value               Date                      CO2                      27                  03/01/2017            Lab Results      Component                Value               Date                      BUN                      8                   03/01/2017            Lab Results      Component                Value               Date                      CR                       0.60                03/01/2017            Lab Results      Component                Value               Date                      GLC                      91                  03/01/2017                     PAC Discussion and Assessment    ASA Classification: 3  Case is suitable for: [place]  Anesthetic techniques and relevant risks discussed: GA  Invasive monitoring and risk discussed: Yes  Types:   Possibility and Risk of blood transfusion discussed: Yes  NPO instructions given:   Additional anesthetic preparation and risks discussed:   Needs early admission to pre-op area:   Other:     PAC Resident/NP Anesthesia  Assessment:  Tonya Agee is a 41 yo female scheduled for Bone Marrow [place] on 3/9/2017 by Dr. Santo. PAC referral by Dr. Santo for assessment and optimization of anesthesia due to Hodgkin Lymphoma.  Previous anesthesia without complications.    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 Denies current cardiac symptoms. Started on cozaar when MUGA showed decreased EF at 47%.  Pulmonary: Denies current pulmonary symptoms. Smoked 1 PPD for 16 years, quit 2011.  Immune:  Hodgkin's disease that has been treated with a combination of ICE and brentuximab  METS low due to current treatments. Feasible airway.     Currently has double lumen central line and port on right chest.    Pt also discussed with  Dr. Eli. See recommendations below. Type and screen and CBC drawn today.  I spent 20 minutes face to face with pt, assessing, examining, and educating      Reviewed and Signed by PAC Mid-Level Provider/Resident  Mid-Level Provider/Resident: MARIE Cox  Date: 3/7/2017  Time: 0900    Attending Anesthesiologist Anesthesia Assessment:  42 year old for bone marrow farvest in management of hodgkin's lymphoma. Patient/case discussed with CECILIA. No need to see patient. Hgb low at ~9; will send for T&S.    Patient is appropriate for the planned procedure without further work-up or medical management.      Reviewed and Signed by PAC Anesthesiologist  Anesthesiologist: jaimee  Date: 3/7/2017  Time:   Pass/Fail: Pass  Disposition:     PAC Pharmacist Assessment:        Pharmacist:   Date:   Time:      Anesthesia Plan      History & Physical Review  History and physical reviewed and following examination; no interval change.    ASA Status:  3 .    NPO Status:  > 8 hours    Plan for General and ETT with Intravenous induction. Maintenance will be Balanced (Balanced vs TIVA based on PONV risk).    PONV prophylaxis:  Ondansetron (or other  5HT-3) and Dexamethasone or Solumedrol  Additional equipment: 2nd IV (Consider arterial line placement if unstable pressures on presentation)     - Standard ASA monitors  - Abx per surgery team    Final anesthesia plan per staff anesthesiologist on the day of surgery.      Postoperative Care  Postoperative pain management:  IV analgesics.      Consents  Anesthetic plan, risks, benefits and alternatives discussed with:  Patient..        Mansoor Valencia MD  03/08/17        I have seen and evaluated the patient myself and agree with the above assessment and plan.  I have explained the risks/benefits of anesthesia to the patient who understands and agrees to proceed.    Devi Cheema MD  Staff Anesthesiologist  Pager [phone]

## 2017-03-07 NOTE — PHARMACY - PREOPERATIVE ASSESSMENT CENTER
ANTICOAGULATION DOCUMENTATION - Preoperative Assessment Center (PAC) Pharmacist     Based on profile review Steph Agee has been on Lovenox 40mg subcutaneous daily for prophylaxis treatment of a veno-occlusion on PET/CT since 3/1/17.  Current dose is Lovenox is 40mg daily.    It is prescribed by Dr. Chan.  The expected duration of therapy will be determined by Dr. Chambers.      Steph Agee is scheduled for surgery on 3/9/17 and the perioperative anticoagulation plan outlined by Dr. Chan is to continue taking lovenox and hold the day prior to surgery (3/8/17) and holding morning of procedure (3/9/17).   This plan may require re-assessment and modification by her primary team in the perioperative setting depending on patients clinical situation.        Tho Soni, PharmD  March 7, 2017  1:00 PM

## 2017-03-07 NOTE — PATIENT INSTRUCTIONS
Preparing for Your Surgery      Name:  Steph Agee   MRN:  [id number]   :  1975   Today's Date:  3/7/2017     Arriving for surgery:  Surgery date:  3/9/17  Surgery time:  7:30am  Arrival time:  5:30am  Please come to:       [facility] [address]  [address] [place]  [address]    -   parking is available in front of the hospital from 5:15 am to 8:00 pm    -  Stop at the Information Desk in the lobby    -   Inform the information person that you are here for surgery. An escort to 3c will be provided. If you would not like an escort, please proceed to 3C on the 3rd floor. [phone]     What can I eat or drink?  -  You may have solid food or milk products until 8 hours prior to your surgery.  -  You may have water, apple juice or 7up/Sprite until 2 hours prior to your surgery.    Which medicines can I take?  -  Do NOT take these medications in the morning, the day of surgery:  losartan        How do I prepare myself?  -  Take two showers: one the night before surgery; and one the morning of surgery.         Use Scrubcare or Hibiclens to wash from neck down.  You may use your own shampoo and conditioner. No other hair products.   -  Do NOT use lotion, powder, deodorant, or antiperspirant the day of your surgery.  -  Do NOT wear any makeup, fingernail polish or jewelry.  -Do not bring your own medications to the hospital, except for inhalers and eye drops.  -  Bring your ID and insurance card.    Questions or Concerns:  If you have questions or concerns, please call the  Preoperative Assessment Center, Monday-Friday 7AM-7PM:  [phone]  AFTER YOUR SURGERY  Breathing exercises   Breathing exercises help you recover faster. Take deep breaths and let the air out slowly. This will:     Help you wake up after surgery.    Help prevent complications like pneumonia.  Preventing complications will help you go home sooner.   We may give you a breathing device  (incentive spirometer) to encourage you to breathe deeply.   Nausea and vomiting   You may feel sick to your stomach after surgery; if so, let your nurse know.    Pain control:  After surgery, you may have pain. Our goal is to help you manage your pain. Pain medicine will help you feel comfortable enough to do activities that will help you heal.  These activities may include breathing exercises, walking and physical therapy.   To help your health care team treat your pain we will ask: 1) If you have pain  2) where it is located 3) describe your pain in your words  Methods of pain control include medications given by mouth, vein or by nerve block for some surgeries.  We may give you a pain control pump that will:  1) Deliver the medicine through a tube placed in your vein  2) Control the amount of medicine you receive  3) Allow you to push a button to deliver a dose of pain medicine  Sequential Compression Device (SCD) or Pneumo Boots:  You may need to wear SCD S on your legs or feet. These are wraps connected to a machine that pumps in air and releases it. The repeated pumping helps prevent blood clots from forming.

## 2017-03-08 ENCOUNTER — ONCOLOGY VISIT (OUTPATIENT)
Dept: TRANSPLANT | Facility: CLINIC | Age: 42
End: 2017-03-08
Attending: NURSE PRACTITIONER
Payer: COMMERCIAL

## 2017-03-08 ENCOUNTER — APPOINTMENT (OUTPATIENT)
Dept: LAB | Facility: CLINIC | Age: 42
End: 2017-03-08
Attending: INTERNAL MEDICINE
Payer: COMMERCIAL

## 2017-03-08 VITALS
RESPIRATION RATE: 16 BRPM | BODY MASS INDEX: 31.26 KG/M2 | WEIGHT: 182.1 LBS | SYSTOLIC BLOOD PRESSURE: 95 MMHG | DIASTOLIC BLOOD PRESSURE: 64 MMHG | HEART RATE: 117 BPM | OXYGEN SATURATION: 96 % | TEMPERATURE: 98.4 F

## 2017-03-08 DIAGNOSIS — Z29.9 NEED FOR PROPHYLACTIC MEASURE: ICD-10-CM

## 2017-03-08 DIAGNOSIS — C81.00 NODULAR LYMPHOCYTE PREDOMINANT HODGKIN LYMPHOMA, UNSPECIFIED BODY REGION (H): Primary | ICD-10-CM

## 2017-03-08 DIAGNOSIS — C81.12 NODULAR SCLEROSIS HODGKIN LYMPHOMA OF INTRATHORACIC LYMPH NODES (H): ICD-10-CM

## 2017-03-08 DIAGNOSIS — Z52.011 AUTOLOGOUS DONOR, STEM CELLS: ICD-10-CM

## 2017-03-08 DIAGNOSIS — C81.10 NODULAR SCLEROSING HODGKIN'S LYMPHOMA, UNSPECIFIED BODY REGION (H): ICD-10-CM

## 2017-03-08 LAB
BASOPHILS # BLD AUTO: 0 10E9/L (ref 0–0.2)
BASOPHILS NFR BLD AUTO: 0.2 %
DIFFERENTIAL METHOD BLD: ABNORMAL
EOSINOPHIL # BLD AUTO: 1.3 10E9/L (ref 0–0.7)
EOSINOPHIL NFR BLD AUTO: 15.1 %
ERYTHROCYTE [DISTWIDTH] IN BLOOD BY AUTOMATED COUNT: 18.3 % (ref 10–15)
HCT VFR BLD AUTO: 28.5 % (ref 35–47)
HGB BLD-MCNC: 9.3 G/DL (ref 11.7–15.7)
IMM GRANULOCYTES # BLD: 0.1 10E9/L (ref 0–0.4)
IMM GRANULOCYTES NFR BLD: 0.7 %
INR PPP: 1.11 (ref 0.86–1.14)
LYMPHOCYTES # BLD AUTO: 0.4 10E9/L (ref 0.8–5.3)
LYMPHOCYTES NFR BLD AUTO: 4.6 %
MCH RBC QN AUTO: 34.2 PG (ref 26.5–33)
MCHC RBC AUTO-ENTMCNC: 32.6 G/DL (ref 31.5–36.5)
MCV RBC AUTO: 105 FL (ref 78–100)
MONOCYTES # BLD AUTO: 0.7 10E9/L (ref 0–1.3)
MONOCYTES NFR BLD AUTO: 8.7 %
NEUTROPHILS # BLD AUTO: 5.9 10E9/L (ref 1.6–8.3)
NEUTROPHILS NFR BLD AUTO: 70.7 %
NRBC # BLD AUTO: 0 10*3/UL
NRBC BLD AUTO-RTO: 0 /100
PLATELET # BLD AUTO: 111 10E9/L (ref 150–450)
RBC # BLD AUTO: 2.72 10E12/L (ref 3.8–5.2)
WBC # BLD AUTO: 8.3 10E9/L (ref 4–11)

## 2017-03-08 PROCEDURE — 87798 DETECT AGENT NOS DNA AMP: CPT | Performed by: NURSE PRACTITIONER

## 2017-03-08 PROCEDURE — 87535 HIV-1 PROBE&REVERSE TRNSCRPJ: CPT | Performed by: NURSE PRACTITIONER

## 2017-03-08 PROCEDURE — 86850 RBC ANTIBODY SCREEN: CPT | Performed by: INTERNAL MEDICINE

## 2017-03-08 PROCEDURE — 86803 HEPATITIS C AB TEST: CPT | Performed by: NURSE PRACTITIONER

## 2017-03-08 PROCEDURE — 86696 HERPES SIMPLEX TYPE 2 TEST: CPT | Performed by: INTERNAL MEDICINE

## 2017-03-08 PROCEDURE — 85025 COMPLETE CBC W/AUTO DIFF WBC: CPT | Performed by: INTERNAL MEDICINE

## 2017-03-08 PROCEDURE — 86592 SYPHILIS TEST NON-TREP QUAL: CPT | Performed by: NURSE PRACTITIONER

## 2017-03-08 PROCEDURE — 25000128 H RX IP 250 OP 636: Mod: ZF | Performed by: INTERNAL MEDICINE

## 2017-03-08 PROCEDURE — 86687 HTLV-I ANTIBODY: CPT | Performed by: NURSE PRACTITIONER

## 2017-03-08 PROCEDURE — 86665 EPSTEIN-BARR CAPSID VCA: CPT | Performed by: INTERNAL MEDICINE

## 2017-03-08 PROCEDURE — 96372 THER/PROPH/DIAG INJ SC/IM: CPT

## 2017-03-08 PROCEDURE — 87340 HEPATITIS B SURFACE AG IA: CPT | Performed by: NURSE PRACTITIONER

## 2017-03-08 PROCEDURE — 85610 PROTHROMBIN TIME: CPT | Performed by: INTERNAL MEDICINE

## 2017-03-08 PROCEDURE — 86923 COMPATIBILITY TEST ELECTRIC: CPT | Performed by: INTERNAL MEDICINE

## 2017-03-08 PROCEDURE — 87521 HEPATITIS C PROBE&RVRS TRNSC: CPT | Performed by: NURSE PRACTITIONER

## 2017-03-08 PROCEDURE — 87516 HEPATITIS B DNA AMP PROBE: CPT | Performed by: NURSE PRACTITIONER

## 2017-03-08 PROCEDURE — 25000125 ZZHC RX 250: Mod: ZF | Performed by: PHYSICIAN ASSISTANT

## 2017-03-08 PROCEDURE — 86901 BLOOD TYPING SEROLOGIC RH(D): CPT | Performed by: INTERNAL MEDICINE

## 2017-03-08 PROCEDURE — 86704 HEP B CORE ANTIBODY TOTAL: CPT | Performed by: NURSE PRACTITIONER

## 2017-03-08 PROCEDURE — 86695 HERPES SIMPLEX TYPE 1 TEST: CPT | Performed by: INTERNAL MEDICINE

## 2017-03-08 PROCEDURE — 86644 CMV ANTIBODY: CPT | Performed by: NURSE PRACTITIONER

## 2017-03-08 PROCEDURE — 86900 BLOOD TYPING SEROLOGIC ABO: CPT | Performed by: INTERNAL MEDICINE

## 2017-03-08 PROCEDURE — 86703 HIV-1/HIV-2 1 RESULT ANTBDY: CPT | Performed by: NURSE PRACTITIONER

## 2017-03-08 PROCEDURE — 86753 PROTOZOA ANTIBODY NOS: CPT | Performed by: NURSE PRACTITIONER

## 2017-03-08 RX ORDER — HEPARIN SODIUM,PORCINE 10 UNIT/ML
5 VIAL (ML) INTRAVENOUS
Status: CANCELLED | OUTPATIENT
Start: 2017-03-09

## 2017-03-08 RX ORDER — PLERIXAFOR 24 MG/1.2ML
0.24 SOLUTION SUBCUTANEOUS DAILY
Status: CANCELLED
Start: 2017-03-09

## 2017-03-08 RX ORDER — HEPARIN SODIUM,PORCINE 10 UNIT/ML
5 VIAL (ML) INTRAVENOUS
Status: DISCONTINUED | OUTPATIENT
Start: 2017-03-08 | End: 2017-03-08 | Stop reason: HOSPADM

## 2017-03-08 RX ADMIN — SODIUM CHLORIDE, PRESERVATIVE FREE 5 ML: 5 INJECTION INTRAVENOUS at 09:24

## 2017-03-08 RX ADMIN — SARGRAMOSTIM 825 MCG: 250 INJECTION, POWDER, FOR SOLUTION INTRAVENOUS; SUBCUTANEOUS at 09:57

## 2017-03-08 RX ADMIN — SODIUM CHLORIDE, PRESERVATIVE FREE 5 ML: 5 INJECTION INTRAVENOUS at 09:22

## 2017-03-08 ASSESSMENT — PAIN SCALES - GENERAL: PAINLEVEL: NO PAIN (0)

## 2017-03-08 NOTE — PROGRESS NOTES
[facility] [place] History and Physical    Steph Agee MRN# [id number]   Age: 42 year old YOB: 1975            Assessment and Plan     HPI: Pt with Hodgkins lymphoma.  Failed GCSF/Mozobil priming, collected 0.66x10^6 CD34 cells.   Now receiving GMCSF in preparation for stem cell harvest on 3/9.  Transfusions: Yes, RBC's & plt.  No reactions  Hepatitis: No  Currently pregnant or any chance may be pregnant: No  Currently breast feeding: No  Currently using a method of birth control:  No  Number of previous pregnancies: 2, 1 miscarriage  Alcohol use: Not in the past month, otherwise a social drinker.    Tobacco use: No  Recreational drugs: Marijuana daily  Nonmedical percutaneous drug use: No  Recent immunizations or planning on getting any immunizations: Flu shot this year  Ear or body piercing in the last 12 months: No  New tattoo in recent 12 months:No  History of cancer or blood disease: Hodgkins disease  Known risk factors: None      PMH:    Steph presented in 11/2014 with 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 She was found to have adenopathy confined above the diaphragm. She had also lost 30 pounds of weight, and had night sweats. She was staged at [facility]. A diagnostic biopsy was performed on a left axillary node. She subsequently had 6 cycles of ABVD followed by external beam radiation to the areas of known disease in the chest, approximately 4000 cGy. She then did well until 05/2016, when she again developed heaviness in the chest and recurrence of night sweats. PET/CT showed recurrence of disease predominantly in the manubrium and sternum with extension into the left pectoralis muscle. A biopsy confirmed relapse. She then underwent a total of 4 cycles of ICE chemotherapy, which she tolerated well. She came for pre-transplant evaluation in 10/2016. Unfortunately, restaging labs showed a progression with a new 1-cm, hypermetabolic, left cardiophrenic lymph node. This was biopsied by and  confirmed recurrence of her Hodgkin's disease. She subsequently has had 4 cycles of brentuximab as a bridge to transplant.        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 She lives with a boyfriend and 2 dogs. She has 1 sibling.         Family History:   Paternal GF had diabetes.  Paternal GM had breast Ca         Immunizations:   Immunizations are up to date         Allergies:   All allergies reviewed and addressed         Medications:     Current Outpatient Prescriptions   Medication Sig     enoxaparin (LOVENOX) 40 MG/0.4ML injection Inject 0.4 mLs (40 mg) Subcutaneous daily     losartan (COZAAR) 25 MG tablet Take 0.5 tablets (12.5 mg) by mouth daily (Patient taking differently: Take 12.5 mg by mouth At Bedtime )     venlafaxine (EFFEXOR-XR) 75 MG 24 hr capsule Take 1 capsule (75 mg) by mouth daily (Patient taking differently: Take 75 mg by mouth At Bedtime )     gabapentin (NEURONTIN) 100 MG capsule Take 2 capsules (200 mg) by mouth 3 times daily     LORAZEPAM PO Take 0.25 mg by mouth every 4 hours as needed for anxiety Reported on 2/27/2017     Current Facility-Administered Medications   Medication     heparin lock flush 10 UNIT/ML injection 5 mL     sargramostim (LEUKINE) injection 825 mcg     Facility-Administered Medications Ordered in Other Visits   Medication     midazolam (VERSED) injection 0.5-1 mg     fentaNYL Citrate (PF) (SUBLIMAZE) injection 25-50 mcg            Review of Systems:   C:tired, achy from growth factor shots.  Hot flashes have diminished.  I: NEGATIVE for worrisome rashes, moles or lesions  E: NEGATIVE for vision changes or irritation  E/M: NEGATIVE for ear, mouth and throat problems  R: NEGATIVE for significant cough or SOB  CV: NEGATIVE for chest pain, palpitations or peripheral edema  GI: C/O morning nausea.  Negative for  abdominal pain, heartburn, or change in bowel habits  : NEGATIVE for frequency, dysuria, or hematuria  M: Achy  from carrie factor  N: NEGATIVE for weakness, dizziness or paresthesias  E: NEGATIVE for temperature intolerance, skin/hair changes  H: NEGATIVE for bleeding problems  P: NEGATIVE for changes in mood or affect         Physical Exam:   Vitals were reviewed  Constitutional: Awake, alert, cooperative, no apparent distress, and appears stated age.  Eyes: Lids and lashes normal, pupils equal, round and reactive to light, extra ocular muscles intact, sclera clear, conjunctiva normal.  ENT: Normocephalic, without obvious abnormality, atramatic, sinuses nontender on palpation, external ears without lesions, oral pharynx with moist mucus membranes, tonsils without erythema or exudates, gums normal and good dentition.  Neck: Supple, symmetrical, trachea midline, no adenopathy, thyroid symmetric, not enlarged and no tenderness, skin normal.  Hematologic / Lymphatic: No cervical lymphadenopathy and no supraclavicular lymphadenopathy.  Back: Symmetric, no curvature, spinous processes are non-tender on palpation, paraspinous muscles are non-tender on palpation, no costal vertebral tenderness.  Lungs: No increased work of breathing, good air exchange, clear to auscultation bilaterally, no crackles or wheezing.  Cardiovascular: Regular rate and rhythm, normal S1 and S2, no S3 or S4, and no murmur noted.  Abdomen: No scars, normal bowel sounds, soft, non-distended, non-tender, no masses palpated, no hepatosplenomegally.  Musculoskeletal: No redness, warmth, or swelling of the joints.  Full range of motion noted.  Motor strength is 5 out of 5 all extremities bilaterally.  Tone is normal.  Neurologic: Awake, alert, oriented to name, place and time.  Cranial nerves II-XII are grossly intact.  Neuropsychiatric: Normal affect, mood, orientation, memory and insight.  Skin: No rashes, erythema, pallor, petechia or purpura.         Data:   All laboratory data reviewed         MARIE Bojorquez CNP

## 2017-03-08 NOTE — PROGRESS NOTES
HISTORY AND PHYSICAL    CC: ***     HPI: Steph Agee 42 year old female presents with  *** of  moderate severity for ***. The patient describes the symptoms as ***. The patient describes modifying or exacerbating factors as:***none. The patient has attempted the following treatments:***none. The patient denies any other aggravating or alleviating factors or associated symptoms.      Past Surgical History:   Past Surgical History   Procedure Laterality Date     Biopsy/excision lymph node(s), needle, superficial (cervical/inguinal/axillary) Left 2014     axillary     Biopsy of mass in the manubrium Left 2016     Insert port vascular access Right 2/14/2017     Procedure: INSERT PORT VASCULAR ACCESS;  Surgeon: Michael Sy PA-C;  Location:  OR       Past Medical History:   Past Medical History   Diagnosis Date     Depression with anxiety      History of blood transfusion      9/2016     Hodgkin disease (H) 2014, 2016     Neuropathy (H)      SVC syndrome 2014       Medications:    Current Outpatient Prescriptions   Medication Sig     enoxaparin (LOVENOX) 40 MG/0.4ML injection Inject 0.4 mLs (40 mg) Subcutaneous daily     losartan (COZAAR) 25 MG tablet Take 0.5 tablets (12.5 mg) by mouth daily (Patient taking differently: Take 12.5 mg by mouth At Bedtime )     venlafaxine (EFFEXOR-XR) 75 MG 24 hr capsule Take 1 capsule (75 mg) by mouth daily (Patient taking differently: Take 75 mg by mouth At Bedtime )     gabapentin (NEURONTIN) 100 MG capsule Take 2 capsules (200 mg) by mouth 3 times daily     LORAZEPAM PO Take 0.25 mg by mouth every 4 hours as needed for anxiety Reported on 2/27/2017     No current facility-administered medications for this visit.      Facility-Administered Medications Ordered in Other Visits   Medication     midazolam (VERSED) injection 0.5-1 mg     fentaNYL Citrate (PF) (SUBLIMAZE) injection 25-50 mcg       Allergies:  Lisinopril    Social History:   Social History     Social  "History     Marital status: Single     Spouse name: N/A     Number of children: N/A     Years of education: N/A     Occupational History     Not on file.     Social History Main Topics     Smoking status: Former Smoker     Packs/day: 1.00     Types: Cigarettes     Start date: 1/1/1995     Quit date: 5/25/2011     Smokeless tobacco: Never Used     Alcohol use 0.0 oz/week     0 Standard drinks or equivalent per week      Comment: occasionally      Drug use: Yes      Comment: Daily marijuana use. 1 joint daily      Sexual activity: Not on file     Other Topics Concern     Not on file     Social History Narrative       Family History:   Family History   Problem Relation Age of Onset     Substance Abuse Mother        Medical records were reviewed and are summarized above.    ROS: In addition to previously mentioned symptoms; constitutional symptoms: {ros-constitutional:732070::\"negative\"}, Eyes:{EYES:200::\"negative\"}, Ent:{ENT:300::\"negative\"}, Resp:{RESP:400::\"No shortness of breath, dyspnea on exertion, cough, or hemoptysis\"}, Cardiovascular:{CV:500::\"negative\"}, GI:{GI:600::\"negative\"}, Gu:{:700::\"negative\"}, Musc-skel:{MS:800::\"negative\"}, Nuero:{NEURO:900::\"negative\"}, Endo:{ENDO:3558::\"negative\"}, Heme:{HEMO:1100::\"negative\"}, Pysch:{PYSCH:1000::\"negative\"}, Skin:{SKIN:100::\"negative\"}.    Physical Exam:  vital signs:There were no vitals taken for this visit.   General appearence:Well nourished well developed female appearing without apparent distress  Psych: {PSYCH:923754::\"Exhibits normal mood\",\"normal affect\",\"normal judgment\",\"normal insight\",\"and normal orientation\"} during causal conversation.  Head:Head is normal cephalic and without gross abnormality.  eyes:Eye exam reveals {EYE EXAM :201::\"conjunctivae/corneas clear. PERRL, EOM's intact. Fundi benign\"}  ENT: {JWENT:518057::\"Normal external ears and nose . Normal canals and TM's. Normal lips teeth and gums\"}  Resp:{JWXLUNGS:697691::\"clear to auscultation " "and palpation with relaxed effort\"}  Cardiovascular:{CARDIAC NORMAL/ABNORMAL (CP):02771::\"NORMAL - regular rate and rhythm without murmur.\"}  Abdomen:{ABDOMEN EXAM:601::\"Abdomen soft, non-tender. BS normal. No masses, organomegaly\"}  Rectal:{RECTAL EXAM:185260::\"deferred\"}  Musc-Skel:Normal  Extremities:No cyanosis or edema  Skin:{jwskin:204470::\"normal to inspection and palpation\"}    Labs: Labs were reviewed and significant abnormalities noted.  Chest X-Ray: *** There is no evidence of an acute process. Radiologic confirmation is pending.  EKG: ***    Assessment: ***    Plan: ***    [unfilled]  Juan Mayes MD  3/8/2017   "

## 2017-03-08 NOTE — MR AVS SNAPSHOT
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  After hours call the   hospital at [phone] or   [phone]. Ask for the BMT   fellow for pediatric or adult patients            Follow-ups after your visit        Your next 10 appointments already scheduled     Mar 09, 2017   Procedure with Zohaib Santo MD   [facility], [place], Same Day Surgery (--)    [address] [facility] [id number] [phone]            Aug 09, 2017 10:30 AM CDT   [facility] Draw with  [facility]   [facility] Draw ([facility])    [address] [place] [id number] [phone]            Aug 09, 2017 11:00 AM CDT   Return with Obed Chambers MD   [facility] Blood and Marrow Transplant ([facility])    [address] [place] [id number] [phone]              Who to contact     If you have questions or need follow up information about today's clinic visit or your schedule please contact [facility] BLOOD AND MARROW TRANSPLANT directly at [phone].  Normal or non-critical lab and imaging results will  be communicated to you by [employer], letter or phone within 4 business days after the clinic has received the results. If you do not hear from us within 7 days, please contact the clinic through [employer] or phone. If you have a critical or abnormal lab result, we will notify you by phone as soon as possible.  Submit refill requests through [employer] or call your pharmacy and they will forward the refill request to us. Please allow 3 business days for your refill to be completed.          Additional Information About Your Visit        [employer] Information     [employer] gives you secure access to your electronic health record. If you see a primary care provider, you can also send messages to your care team and make appointments. If you have questions, please call your primary care clinic.  If you do not have a primary care provider, please call [phone] and they will assist you.        Care EveryWhere ID     This is your Care EveryWhere ID. This could be used by other organizations to access your [facility] medical records  [id number]        Your Vitals Were     Pulse Temperature Respirations Pulse Oximetry BMI (Body Mass Index)       117 98.4  F (36.9  C) 16 96% 31.26 kg/m2        Blood Pressure from Last 3 Encounters:   03/08/17 95/64   03/07/17 124/81   03/07/17 124/81    Weight from Last 3 Encounters:   03/08/17 82.6 kg (182 lb 1.6 oz)   03/07/17 82.1 kg (181 lb)   03/05/17 81.6 kg (179 lb 14.3 oz)              We Performed the Following     ABO/Rh type and screen     BMT Infections disease donor panel     CBC with platelets differential     EBV Capsid Antibody IgG     HBV HCV HIV WNV by DIANA     Herpes Simplex Virus 1 and 2 IgG     INR          Today's Medication Changes          These changes are accurate as of: 3/8/17 10:36 AM.  If you have any questions, ask your nurse or doctor.               These medicines have changed or have updated prescriptions.        Dose/Directions    losartan 25 MG tablet   Commonly  known as:  COZAAR   This may have changed:  when to take this   Used for:  Chemotherapy induced cardiomyopathy (H)        Dose:  12.5 mg   Take 0.5 tablets (12.5 mg) by mouth daily   Quantity:  45 tablet   Refills:  0       venlafaxine 75 MG 24 hr capsule   Commonly known as:  EFFEXOR-XR   This may have changed:  when to take this   Used for:  Hodgkin disease (H), Personal history of diseases of blood and blood-forming organs        Dose:  75 mg   Take 1 capsule (75 mg) by mouth daily   Quantity:  30 capsule   Refills:  3                Recent Review Flowsheet Data     BMT Recent Results Latest Ref Rng & Units 2/18/2017 2/19/2017 2/27/2017 3/1/2017 3/2/2017 3/7/2017 3/8/2017    WBC 4.0 - 11.0 10e9/L 45.8(H) 38.6(H) 2.4(L) 2.3(L) - 6.2 8.3    Hemoglobin 11.7 - 15.7 g/dL 9.6(L) 9.5(L) 8.6(L) 9.0(L) - 9.3(L) 9.3(L)    Platelet Count 150 - 450 10e9/L 106(L) 81(L) 148(L) 153 - 104(L) 111(L)    Neutrophils (Absolute) 1.6 - 8.3 10e9/L 41.6(H) 35.2(H) 1.6 1.5(L) - - 5.9    INR 0.86 - 1.14 - - - - - - 1.11    Sodium 133 - 144 mmol/L 143 144 - 141 - - -    Potassium 3.4 - 5.3 mmol/L 3.6 3.4 - 4.2 - - -    Chloride 94 - 109 mmol/L 106 106 - 108 - - -    Glucose 70 - 99 mg/dL 82 93 - 91 79 - -    Urea Nitrogen 7 - 30 mg/dL 14 10 - 8 - - -    Creatinine 0.52 - 1.04 mg/dL 0.61 0.61 - 0.60 - - -    Calcium (Total) 8.5 - 10.1 mg/dL 7.8(L) 8.0(L) - 8.4(L) - - -    Protein (Total) 6.8 - 8.8 g/dL - - - 6.7(L) - - -    Albumin 3.4 - 5.0 g/dL - - - 3.4 - - -    Alkaline Phosphatase 40 - 150 U/L - - - 143 - - -    AST 0 - 45 U/L - - - 20 - - -    ALT 0 - 50 U/L - - - 22 - - -    MCV 78 - 100 fl 102(H) 100 103(H) 103(H) - 105(H) 105(H)               Primary Care Provider    Physician No Ref-Primary       No address on file        Thank you!     Thank you for choosing [facility] BLOOD AND MARROW TRANSPLANT  for your care. Our goal is always to provide you with excellent care. Hearing back from our patients is one way we can continue to  improve our services. Please take a few minutes to complete the written survey that you may receive in the mail after your visit with us. Thank you!             Your Updated Medication List - Protect others around you: Learn how to safely use, store and throw away your medicines at www.disposemymeds.org.          This list is accurate as of: 3/8/17 10:36 AM.  Always use your most recent med list.                   Brand Name Dispense Instructions for use    enoxaparin 40 MG/0.4ML injection    LOVENOX    30 Syringe    Inject 0.4 mLs (40 mg) Subcutaneous daily       gabapentin 100 MG capsule    NEURONTIN    90 capsule    Take 2 capsules (200 mg) by mouth 3 times daily       LORAZEPAM PO      Take 0.25 mg by mouth every 4 hours as needed for anxiety Reported on 2/27/2017       losartan 25 MG tablet    COZAAR    45 tablet    Take 0.5 tablets (12.5 mg) by mouth daily       venlafaxine 75 MG 24 hr capsule    EFFEXOR-XR    30 capsule    Take 1 capsule (75 mg) by mouth daily

## 2017-03-08 NOTE — NURSING NOTE
Chief Complaint   Patient presents with     RECHECK     pre bmt for lymphoma here for labs and md whitt with GMCSF inj     BMT Heme Malignancy Rooming Note    Steph LUNDBERG Anuel - 3/8/2017 9:22 AM     Chief Complaint   Patient presents with     RECHECK     pre bmt for lymphoma here for labs and md whitt with GMCSF inj        BP 95/64  Pulse 117  Temp 98.4  F (36.9  C)  Resp 16  Wt 82.6 kg (182 lb 1.6 oz)  SpO2 96%  BMI 31.26 kg/m2     Medications reviewed: Yes    Labs drawn: Yes    Drawn by: Clinic Staff  Via: central venous catheter  See Doc Flowsheet for details.      Dressing changed: No     Medications given: Yes - See MAR    Staff time:0    Additional information if applicable: kimberly Bowen RN

## 2017-03-09 ENCOUNTER — ANESTHESIA (OUTPATIENT)
Dept: SURGERY | Facility: CLINIC | Age: 42
End: 2017-03-09

## 2017-03-09 ENCOUNTER — HOSPITAL ENCOUNTER (OUTPATIENT)
Facility: CLINIC | Age: 42
Discharge: HOME | End: 2017-03-09
Attending: INTERNAL MEDICINE | Admitting: INTERNAL MEDICINE
Payer: COMMERCIAL

## 2017-03-09 ENCOUNTER — DOCUMENTATION ONLY (OUTPATIENT)
Dept: TRANSPLANT | Facility: CLINIC | Age: 42
End: 2017-03-09

## 2017-03-09 VITALS
OXYGEN SATURATION: 98 % | HEIGHT: 64 IN | SYSTOLIC BLOOD PRESSURE: 96 MMHG | TEMPERATURE: 97.5 F | WEIGHT: 179.01 LBS | DIASTOLIC BLOOD PRESSURE: 62 MMHG | HEART RATE: 96 BPM | RESPIRATION RATE: 18 BRPM | BODY MASS INDEX: 30.56 KG/M2

## 2017-03-09 DIAGNOSIS — C81.40: Primary | ICD-10-CM

## 2017-03-09 PROBLEM — C81.90 HODGKIN LYMPHOMA (H): Status: ACTIVE | Noted: 2017-03-09

## 2017-03-09 LAB
ABO + RH BLD: NORMAL
BLD GP AB SCN SERPL QL: NORMAL
BLD GP AB SCN SERPL QL: NORMAL
BLD PROD TYP BPU: NORMAL
BLD UNIT ID BPU: 0
BLD UNIT ID BPU: 0
BLOOD BANK CMNT PATIENT-IMP: NORMAL
BLOOD PRODUCT CODE: NORMAL
BLOOD PRODUCT CODE: NORMAL
BPU ID: NORMAL
BPU ID: NORMAL
DONOR CYTOMEGALOVIRUS ABY: POSITIVE
DONOR HEP B CORE ABY: NONREACTIVE
DONOR HEP B SURF AGN: NONREACTIVE
DONOR HEPATITIS C ABY: NONREACTIVE
DONOR HTLV 1&2 ANTIBODY: NONREACTIVE
DONOR TREPONEMA PAL ABY: NONREACTIVE
EBV VCA IGG SER QL IA: ABNORMAL AI (ref 0–0.8)
ERYTHROCYTE [DISTWIDTH] IN BLOOD BY AUTOMATED COUNT: 21 % (ref 10–15)
HCT VFR BLD AUTO: 25.7 % (ref 35–47)
HGB BLD-MCNC: 8.6 G/DL (ref 11.7–15.7)
HIV1+2 AB SERPL QL IA: NONREACTIVE
HSV1 IGG SERPL QL IA: ABNORMAL AI (ref 0–0.8)
HSV2 IGG SERPL QL IA: 4.9 AI (ref 0–0.8)
MCH RBC QN AUTO: 32.5 PG (ref 26.5–33)
MCHC RBC AUTO-ENTMCNC: 33.5 G/DL (ref 31.5–36.5)
MCV RBC AUTO: 97 FL (ref 78–100)
MPX SERIES: NORMAL
NUM BPU REQUESTED: 2
PLATELET # BLD AUTO: 77 10E9/L (ref 150–450)
RBC # BLD AUTO: 2.65 10E12/L (ref 3.8–5.2)
SPECIMEN EXP DATE BLD: NORMAL
SPECIMEN EXP DATE BLD: NORMAL
T CRUZI AB SER DONR QL: ABNORMAL
TRANSFUSION STATUS PATIENT QL: NORMAL
WBC # BLD AUTO: 10.6 10E9/L (ref 4–11)
WBC MAR: 13.1 10E9/L
WBC MAR: 13.9 10E9/L
WNV RNA SPEC QL NAA+PROBE: NORMAL

## 2017-03-09 PROCEDURE — 71000014 ZZH RECOVERY PHASE 1 LEVEL 2 FIRST HR: Performed by: INTERNAL MEDICINE

## 2017-03-09 PROCEDURE — 38207 CRYOPRESERVE STEM CELLS: CPT | Performed by: INTERNAL MEDICINE

## 2017-03-09 PROCEDURE — 36000053 ZZH SURGERY LEVEL 2 EA 15 ADDTL MIN - UMMC: Performed by: INTERNAL MEDICINE

## 2017-03-09 PROCEDURE — 40000170 ZZH STATISTIC PRE-PROCEDURE ASSESSMENT II: Performed by: INTERNAL MEDICINE

## 2017-03-09 PROCEDURE — 25000128 H RX IP 250 OP 636: Performed by: ANESTHESIOLOGY

## 2017-03-09 PROCEDURE — P9040 RBC LEUKOREDUCED IRRADIATED: HCPCS | Performed by: INTERNAL MEDICINE

## 2017-03-09 PROCEDURE — 25000128 H RX IP 250 OP 636: Performed by: INTERNAL MEDICINE

## 2017-03-09 PROCEDURE — 25000566 ZZH SEVOFLURANE, EA 15 MIN: Performed by: INTERNAL MEDICINE

## 2017-03-09 PROCEDURE — 85048 AUTOMATED LEUKOCYTE COUNT: CPT | Performed by: INTERNAL MEDICINE

## 2017-03-09 PROCEDURE — 36000051 ZZH SURGERY LEVEL 2 1ST 30 MIN - UMMC: Performed by: INTERNAL MEDICINE

## 2017-03-09 PROCEDURE — 27210794 ZZH OR GENERAL SUPPLY STERILE: Performed by: INTERNAL MEDICINE

## 2017-03-09 PROCEDURE — S5010 5% DEXTROSE AND 0.45% SALINE: HCPCS | Performed by: INTERNAL MEDICINE

## 2017-03-09 PROCEDURE — 25800025 ZZH RX 258: Performed by: INTERNAL MEDICINE

## 2017-03-09 PROCEDURE — 25000132 ZZH RX MED GY IP 250 OP 250 PS 637: Performed by: NURSE PRACTITIONER

## 2017-03-09 PROCEDURE — 85027 COMPLETE CBC AUTOMATED: CPT | Performed by: INTERNAL MEDICINE

## 2017-03-09 PROCEDURE — P9041 ALBUMIN (HUMAN),5%, 50ML: HCPCS | Performed by: ANESTHESIOLOGY

## 2017-03-09 PROCEDURE — 37000008 ZZH ANESTHESIA TECHNICAL FEE, 1ST 30 MIN: Performed by: INTERNAL MEDICINE

## 2017-03-09 PROCEDURE — 25000125 ZZHC RX 250: Performed by: ANESTHESIOLOGY

## 2017-03-09 PROCEDURE — 71000015 ZZH RECOVERY PHASE 1 LEVEL 2 EA ADDTL HR: Performed by: INTERNAL MEDICINE

## 2017-03-09 PROCEDURE — 25000125 ZZHC RX 250: Performed by: NURSE PRACTITIONER

## 2017-03-09 PROCEDURE — 25800025 ZZH RX 258: Performed by: ANESTHESIOLOGY

## 2017-03-09 PROCEDURE — 37000009 ZZH ANESTHESIA TECHNICAL FEE, EACH ADDTL 15 MIN: Performed by: INTERNAL MEDICINE

## 2017-03-09 RX ORDER — SODIUM CHLORIDE, SODIUM LACTATE, POTASSIUM CHLORIDE, CALCIUM CHLORIDE 600; 310; 30; 20 MG/100ML; MG/100ML; MG/100ML; MG/100ML
INJECTION, SOLUTION INTRAVENOUS CONTINUOUS PRN
Status: DISCONTINUED | OUTPATIENT
Start: 2017-03-09 | End: 2017-03-09

## 2017-03-09 RX ORDER — HEPARIN SODIUM,PORCINE 10 UNIT/ML
5-10 VIAL (ML) INTRAVENOUS EVERY 24 HOURS
Status: DISCONTINUED | OUTPATIENT
Start: 2017-03-09 | End: 2017-03-09 | Stop reason: HOSPADM

## 2017-03-09 RX ORDER — NEOSTIGMINE METHYLSULFATE 1 MG/ML
VIAL (ML) INJECTION PRN
Status: DISCONTINUED | OUTPATIENT
Start: 2017-03-09 | End: 2017-03-09

## 2017-03-09 RX ORDER — SODIUM CHLORIDE, SODIUM LACTATE, POTASSIUM CHLORIDE, CALCIUM CHLORIDE 600; 310; 30; 20 MG/100ML; MG/100ML; MG/100ML; MG/100ML
INJECTION, SOLUTION INTRAVENOUS CONTINUOUS
Status: DISCONTINUED | OUTPATIENT
Start: 2017-03-09 | End: 2017-03-09 | Stop reason: HOSPADM

## 2017-03-09 RX ORDER — ALBUMIN, HUMAN INJ 5% 5 %
SOLUTION INTRAVENOUS CONTINUOUS PRN
Status: DISCONTINUED | OUTPATIENT
Start: 2017-03-09 | End: 2017-03-09

## 2017-03-09 RX ORDER — HYDROMORPHONE HYDROCHLORIDE 1 MG/ML
.3-.5 INJECTION, SOLUTION INTRAMUSCULAR; INTRAVENOUS; SUBCUTANEOUS EVERY 5 MIN PRN
Status: DISCONTINUED | OUTPATIENT
Start: 2017-03-09 | End: 2017-03-09 | Stop reason: HOSPADM

## 2017-03-09 RX ORDER — HYDROCODONE BITARTRATE AND ACETAMINOPHEN 5; 325 MG/1; MG/1
1-2 TABLET ORAL EVERY 4 HOURS PRN
Qty: 15 TABLET | Refills: 0 | Status: ON HOLD | OUTPATIENT
Start: 2017-03-09 | End: 2017-03-23

## 2017-03-09 RX ORDER — GLYCOPYRROLATE 0.2 MG/ML
INJECTION, SOLUTION INTRAMUSCULAR; INTRAVENOUS PRN
Status: DISCONTINUED | OUTPATIENT
Start: 2017-03-09 | End: 2017-03-09

## 2017-03-09 RX ORDER — ONDANSETRON 4 MG/1
4 TABLET, ORALLY DISINTEGRATING ORAL EVERY 30 MIN PRN
Status: DISCONTINUED | OUTPATIENT
Start: 2017-03-09 | End: 2017-03-09 | Stop reason: HOSPADM

## 2017-03-09 RX ORDER — FENTANYL CITRATE 50 UG/ML
INJECTION, SOLUTION INTRAMUSCULAR; INTRAVENOUS PRN
Status: DISCONTINUED | OUTPATIENT
Start: 2017-03-09 | End: 2017-03-09

## 2017-03-09 RX ORDER — HEPARIN SODIUM,PORCINE 10 UNIT/ML
5-10 VIAL (ML) INTRAVENOUS
Status: DISCONTINUED | OUTPATIENT
Start: 2017-03-09 | End: 2017-03-09 | Stop reason: HOSPADM

## 2017-03-09 RX ORDER — LIDOCAINE HYDROCHLORIDE 20 MG/ML
INJECTION, SOLUTION INFILTRATION; PERINEURAL PRN
Status: DISCONTINUED | OUTPATIENT
Start: 2017-03-09 | End: 2017-03-09

## 2017-03-09 RX ORDER — ONDANSETRON 4 MG/1
4 TABLET, FILM COATED ORAL EVERY 6 HOURS PRN
Qty: 18 TABLET | Refills: 0 | Status: SHIPPED | OUTPATIENT
Start: 2017-03-09 | End: 2017-04-07

## 2017-03-09 RX ORDER — PROPOFOL 10 MG/ML
INJECTION, EMULSION INTRAVENOUS PRN
Status: DISCONTINUED | OUTPATIENT
Start: 2017-03-09 | End: 2017-03-09

## 2017-03-09 RX ORDER — HYDROCODONE BITARTRATE AND ACETAMINOPHEN 5; 325 MG/1; MG/1
1-2 TABLET ORAL EVERY 4 HOURS PRN
Status: DISCONTINUED | OUTPATIENT
Start: 2017-03-09 | End: 2017-03-09 | Stop reason: HOSPADM

## 2017-03-09 RX ORDER — ONDANSETRON 2 MG/ML
4 INJECTION INTRAMUSCULAR; INTRAVENOUS EVERY 30 MIN PRN
Status: DISCONTINUED | OUTPATIENT
Start: 2017-03-09 | End: 2017-03-09 | Stop reason: HOSPADM

## 2017-03-09 RX ORDER — NALOXONE HYDROCHLORIDE 0.4 MG/ML
.1-.4 INJECTION, SOLUTION INTRAMUSCULAR; INTRAVENOUS; SUBCUTANEOUS
Status: DISCONTINUED | OUTPATIENT
Start: 2017-03-09 | End: 2017-03-09 | Stop reason: HOSPADM

## 2017-03-09 RX ORDER — HEPARIN SODIUM (PORCINE) LOCK FLUSH IV SOLN 100 UNIT/ML 100 UNIT/ML
5 SOLUTION INTRAVENOUS
Status: DISCONTINUED | OUTPATIENT
Start: 2017-03-09 | End: 2017-03-09 | Stop reason: HOSPADM

## 2017-03-09 RX ORDER — LIDOCAINE 40 MG/G
CREAM TOPICAL
Status: DISCONTINUED | OUTPATIENT
Start: 2017-03-09 | End: 2017-03-09 | Stop reason: HOSPADM

## 2017-03-09 RX ORDER — SODIUM CHLORIDE 9 MG/ML
INJECTION, SOLUTION INTRAVENOUS CONTINUOUS PRN
Status: DISCONTINUED | OUTPATIENT
Start: 2017-03-09 | End: 2017-03-09

## 2017-03-09 RX ORDER — FENTANYL CITRATE 50 UG/ML
25-50 INJECTION, SOLUTION INTRAMUSCULAR; INTRAVENOUS EVERY 5 MIN PRN
Status: DISCONTINUED | OUTPATIENT
Start: 2017-03-09 | End: 2017-03-09 | Stop reason: HOSPADM

## 2017-03-09 RX ORDER — ONDANSETRON 4 MG/1
4 TABLET, FILM COATED ORAL EVERY 6 HOURS PRN
Status: DISCONTINUED | OUTPATIENT
Start: 2017-03-09 | End: 2017-03-09 | Stop reason: HOSPADM

## 2017-03-09 RX ADMIN — PHENYLEPHRINE HYDROCHLORIDE 100 MCG: 10 INJECTION, SOLUTION INTRAMUSCULAR; INTRAVENOUS; SUBCUTANEOUS at 08:59

## 2017-03-09 RX ADMIN — FENTANYL CITRATE 25 MCG: 50 INJECTION, SOLUTION INTRAMUSCULAR; INTRAVENOUS at 10:44

## 2017-03-09 RX ADMIN — SODIUM CHLORIDE, POTASSIUM CHLORIDE, SODIUM LACTATE AND CALCIUM CHLORIDE: 600; 310; 30; 20 INJECTION, SOLUTION INTRAVENOUS at 08:00

## 2017-03-09 RX ADMIN — PHENYLEPHRINE HYDROCHLORIDE 100 MCG: 10 INJECTION, SOLUTION INTRAMUSCULAR; INTRAVENOUS; SUBCUTANEOUS at 09:22

## 2017-03-09 RX ADMIN — FENTANYL CITRATE 25 MCG: 50 INJECTION, SOLUTION INTRAMUSCULAR; INTRAVENOUS at 10:50

## 2017-03-09 RX ADMIN — ROCURONIUM BROMIDE 10 MG: 10 INJECTION INTRAVENOUS at 08:44

## 2017-03-09 RX ADMIN — LIDOCAINE HYDROCHLORIDE 80 MG: 20 INJECTION, SOLUTION INFILTRATION; PERINEURAL at 08:17

## 2017-03-09 RX ADMIN — PROPOFOL 40 MG: 10 INJECTION, EMULSION INTRAVENOUS at 08:18

## 2017-03-09 RX ADMIN — ALBUMIN (HUMAN): 12.5 SOLUTION INTRAVENOUS at 09:36

## 2017-03-09 RX ADMIN — PHENYLEPHRINE HYDROCHLORIDE 100 MCG: 10 INJECTION, SOLUTION INTRAMUSCULAR; INTRAVENOUS; SUBCUTANEOUS at 09:37

## 2017-03-09 RX ADMIN — FENTANYL CITRATE 25 MCG: 50 INJECTION, SOLUTION INTRAMUSCULAR; INTRAVENOUS at 10:39

## 2017-03-09 RX ADMIN — HYDROMORPHONE HYDROCHLORIDE 0.3 MG: 10 INJECTION, SOLUTION INTRAMUSCULAR; INTRAVENOUS; SUBCUTANEOUS at 11:11

## 2017-03-09 RX ADMIN — PROPOFOL 100 MG: 10 INJECTION, EMULSION INTRAVENOUS at 08:17

## 2017-03-09 RX ADMIN — SODIUM CHLORIDE: 9 INJECTION, SOLUTION INTRAVENOUS at 08:26

## 2017-03-09 RX ADMIN — DEXTROSE AND SODIUM CHLORIDE: 5; 450 INJECTION, SOLUTION INTRAVENOUS at 11:13

## 2017-03-09 RX ADMIN — HYDROMORPHONE HYDROCHLORIDE 0.2 MG: 10 INJECTION, SOLUTION INTRAMUSCULAR; INTRAVENOUS; SUBCUTANEOUS at 11:22

## 2017-03-09 RX ADMIN — PHENYLEPHRINE HYDROCHLORIDE 200 MCG: 10 INJECTION, SOLUTION INTRAMUSCULAR; INTRAVENOUS; SUBCUTANEOUS at 09:32

## 2017-03-09 RX ADMIN — ROCURONIUM BROMIDE 50 MG: 10 INJECTION INTRAVENOUS at 08:17

## 2017-03-09 RX ADMIN — FENTANYL CITRATE 50 MCG: 50 INJECTION, SOLUTION INTRAMUSCULAR; INTRAVENOUS at 08:30

## 2017-03-09 RX ADMIN — HYDROCODONE BITARTRATE AND ACETAMINOPHEN 2 TABLET: 5; 325 TABLET ORAL at 12:16

## 2017-03-09 RX ADMIN — FENTANYL CITRATE 25 MCG: 50 INJECTION, SOLUTION INTRAMUSCULAR; INTRAVENOUS at 11:00

## 2017-03-09 RX ADMIN — Medication 4.5 MG: at 10:01

## 2017-03-09 RX ADMIN — FENTANYL CITRATE 50 MCG: 50 INJECTION, SOLUTION INTRAMUSCULAR; INTRAVENOUS at 08:17

## 2017-03-09 RX ADMIN — ROCURONIUM BROMIDE 10 MG: 10 INJECTION INTRAVENOUS at 09:14

## 2017-03-09 RX ADMIN — SARGRAMOSTIM 1000 MCG: 250 INJECTION, POWDER, FOR SOLUTION INTRAVENOUS; SUBCUTANEOUS at 08:05

## 2017-03-09 RX ADMIN — ONDANSETRON HYDROCHLORIDE 4 MG: 4 TABLET, FILM COATED ORAL at 15:20

## 2017-03-09 RX ADMIN — ROCURONIUM BROMIDE 5 MG: 10 INJECTION INTRAVENOUS at 09:39

## 2017-03-09 RX ADMIN — GLYCOPYRROLATE 0.8 MG: 0.2 INJECTION, SOLUTION INTRAMUSCULAR; INTRAVENOUS at 10:01

## 2017-03-09 RX ADMIN — PHENYLEPHRINE HYDROCHLORIDE 100 MCG: 10 INJECTION, SOLUTION INTRAMUSCULAR; INTRAVENOUS; SUBCUTANEOUS at 09:28

## 2017-03-09 ASSESSMENT — PAIN DESCRIPTION - DESCRIPTORS: DESCRIPTORS: SORE

## 2017-03-09 ASSESSMENT — ENCOUNTER SYMPTOMS: SEIZURES: 0

## 2017-03-09 NOTE — ANESTHESIA CARE TRANSFER NOTE
Patient: Steph Agee    Procedure(s):  Bone Marrow [place]  - Wound Class: I-Clean    Diagnosis: Back Up Donor   Diagnosis Additional Information: No value filed.    Anesthesia Type:   General, ETT     Note:  Airway :Face Mask  Patient transferred to:PACU  Comments: Pt transferred to PACU on 6L oxygen, breathing easily and with strong cough. VSS on arrival, not complaining of pain or nausea. Report given to PACU nurse on arrival.      Vitals: (Last set prior to Anesthesia Care Transfer)    CRNA VITALS  3/9/2017 0940 - 3/9/2017 1021      3/9/2017             Pulse: 119    SpO2: 100 %    Resp Rate (observed): 8                Electronically Signed By: Mansoor Valencia MD  March 9, 2017  10:21 AM

## 2017-03-09 NOTE — PROGRESS NOTES
Patient vital signs stable. Patient very angry and upset about mix up with personal belongings from PACU and the time it took for discharge medication prescriptions. Patient unsatisfied with service. Patient encouarged to vent frustrations. Patient given prescription for Vicodin and taken down to pharmacy via wheelchair by NA to have  filled   BM harvest sites clean dry intact, no hematoma, Hgb 8.6. Patient accompanied by cousin.

## 2017-03-09 NOTE — PROGRESS NOTES
BMT Progress Note    S:  Patient's pain well controlled with hydrocodone/acetaminophen (soreness). Had some nausea but relieved with PRN ondansetron.  She has been eating and drinking without issue, ambulating, urinating, and passing flatus. No BM yet but she says she normally has then in the AM so is not concerned. No bleeding.    O:  Vitals reviewed, notable for some soft BPs and mild tachycardia    Gen: female in NAD, pleasant and appropriate  Neuro: alert, answering questions appropriately  Psych: appropriate affect    Assessment  S/p bone marrow harvest, doing well as noted above.    Hemoglobin stable.    Plan  -discharge today with hydrocodone/acetaminophen and ondansetron    Karla (Jamal Castellano MD, PhD   Hem/onc fellow

## 2017-03-09 NOTE — TELEPHONE ENCOUNTER
Assisted with harvest of bone marrow from bilateral, posterior iliac crests.  EBL =1200 cc with estimated nucleated cell count of > or = 2.0 x 10^8 nc/kg obtained after marrow filtered at back table.  Marrow taken to blood bank at 1030 for export to [facility] lab and subsequent autologous freeze.  Marrow product #  [id number]

## 2017-03-09 NOTE — PLAN OF CARE
D/I: A/Ox4, very pleasant. Low back pain controlled with PRN vicodin. Low back surgical sites are clean/dry/intact. Still waiting for pt to void. Filippo 15:00 CBC, results pending. Pt's cousin April is here. Pt is hoping to be able to go home later this afternoon.   A/P:  Continue to monitor. Continue plan of care.

## 2017-03-09 NOTE — OP NOTE
BM [place] Note  The patient was placed in prone position under general anesthesia in the operating room. Intubation was uncomplicated. She was prepped and draped under sterile conditions. After further verification of patient identity and the type of procedure, skin incisions were made in bilateral posterior iliac crest areas over previous markings. Multiple aspirations were obtained on each site by directing the needle to different areas of the marrow cavity. Each aspiration was from distinct direction with 5-10 cc per aspiration. The count checks were performed after 600 cc and 1200 cc cumulative yield. The estimated number of > 2.5x10^8 TNC was reached after 1200 cc of marrow blood collection. The procedure was deemed complete after cumulative collection of 1200 cc of marrow product. The patient tolerated procedure well with no immediate complications. Sterile strips were applied to approximate incision wounds.   Bone marrow harvest procedure was performed by JEFF Guzman, Dr EVAN Barajas and Dr MATEO Barahona.    Zohaib Santo MD  Hematology, Oncology and Transplantation  [facility]

## 2017-03-09 NOTE — ANESTHESIA POSTPROCEDURE EVALUATION
Patient: Steph Agee    Procedure(s):  Bone Marrow Reading  - Wound Class: I-Clean    Diagnosis:Back Up Donor   Diagnosis Additional Information: No value filed.    Anesthesia Type:  General, ETT    Note:  Anesthesia Post Evaluation    Patient location during evaluation: PACU  Patient participation: Able to fully participate in evaluation  Level of consciousness: awake and alert  Pain management: adequate  Airway patency: patent  Cardiovascular status: acceptable  Respiratory status: acceptable  Hydration status: acceptable  PONV: none     Anesthetic complications: None          Last vitals:  Vitals:    03/09/17 1130 03/09/17 1140 03/09/17 1200   BP: 106/65 100/62 101/61   Pulse:      Resp: 18 18 18   Temp:  36.6  C (97.8  F) 36.7  C (98  F)   SpO2: 96% 98% 100%         Electronically Signed By: Devi Cheema MD  March 9, 2017  12:30 PM

## 2017-03-15 ENCOUNTER — HOSPITAL ENCOUNTER (INPATIENT)
Facility: CLINIC | Age: 42
LOS: 8 days | Discharge: HOME HEALTH | DRG: 016 | End: 2017-03-23
Attending: INTERNAL MEDICINE | Admitting: INTERNAL MEDICINE
Payer: COMMERCIAL

## 2017-03-15 DIAGNOSIS — C81.12 NODULAR SCLEROSIS HODGKIN LYMPHOMA OF INTRATHORACIC LYMPH NODES (H): Primary | ICD-10-CM

## 2017-03-15 DIAGNOSIS — Z29.9 NEED FOR PROPHYLACTIC MEASURE: ICD-10-CM

## 2017-03-15 DIAGNOSIS — C81.90 HODGKIN DISEASE (H): ICD-10-CM

## 2017-03-15 DIAGNOSIS — Z86.2 PERSONAL HISTORY OF DISEASES OF BLOOD AND BLOOD-FORMING ORGANS: ICD-10-CM

## 2017-03-15 DIAGNOSIS — C81.90 HODGKIN DISEASE (H): Primary | ICD-10-CM

## 2017-03-15 LAB
ABO + RH BLD: NORMAL
ABO + RH BLD: NORMAL
ALBUMIN SERPL-MCNC: 3.1 G/DL (ref 3.4–5)
ALP SERPL-CCNC: 120 U/L (ref 40–150)
ALT SERPL W P-5'-P-CCNC: 31 U/L (ref 0–50)
ANION GAP SERPL CALCULATED.3IONS-SCNC: 7 MMOL/L (ref 3–14)
APTT PPP: 30 SEC (ref 22–37)
AST SERPL W P-5'-P-CCNC: 27 U/L (ref 0–45)
BASOPHILS # BLD AUTO: 0 10E9/L (ref 0–0.2)
BASOPHILS NFR BLD AUTO: 0.3 %
BILIRUB SERPL-MCNC: 0.2 MG/DL (ref 0.2–1.3)
BLD GP AB SCN SERPL QL: NORMAL
BLOOD BANK CMNT PATIENT-IMP: NORMAL
BUN SERPL-MCNC: 14 MG/DL (ref 7–30)
CALCIUM SERPL-MCNC: 7.8 MG/DL (ref 8.5–10.1)
CHLORIDE SERPL-SCNC: 107 MMOL/L (ref 94–109)
CO2 SERPL-SCNC: 27 MMOL/L (ref 20–32)
CREAT SERPL-MCNC: 0.66 MG/DL (ref 0.52–1.04)
DIFFERENTIAL METHOD BLD: ABNORMAL
EOSINOPHIL # BLD AUTO: 0.2 10E9/L (ref 0–0.7)
EOSINOPHIL NFR BLD AUTO: 5.5 %
ERYTHROCYTE [DISTWIDTH] IN BLOOD BY AUTOMATED COUNT: 19 % (ref 10–15)
GFR SERPL CREATININE-BSD FRML MDRD: ABNORMAL ML/MIN/1.7M2
GLUCOSE SERPL-MCNC: 129 MG/DL (ref 70–99)
HCT VFR BLD AUTO: 27.5 % (ref 35–47)
HGB BLD-MCNC: 8.9 G/DL (ref 11.7–15.7)
IMM GRANULOCYTES # BLD: 0 10E9/L (ref 0–0.4)
IMM GRANULOCYTES NFR BLD: 0.6 %
INR PPP: 0.93 (ref 0.86–1.14)
LYMPHOCYTES # BLD AUTO: 0.8 10E9/L (ref 0.8–5.3)
LYMPHOCYTES NFR BLD AUTO: 23.8 %
MAGNESIUM SERPL-MCNC: 2 MG/DL (ref 1.6–2.3)
MCH RBC QN AUTO: 32.6 PG (ref 26.5–33)
MCHC RBC AUTO-ENTMCNC: 32.4 G/DL (ref 31.5–36.5)
MCV RBC AUTO: 101 FL (ref 78–100)
MONOCYTES # BLD AUTO: 0.2 10E9/L (ref 0–1.3)
MONOCYTES NFR BLD AUTO: 7 %
NEUTROPHILS # BLD AUTO: 2.2 10E9/L (ref 1.6–8.3)
NEUTROPHILS NFR BLD AUTO: 62.8 %
NRBC # BLD AUTO: 0 10*3/UL
NRBC BLD AUTO-RTO: 0 /100
PLATELET # BLD AUTO: 152 10E9/L (ref 150–450)
POTASSIUM SERPL-SCNC: 3.8 MMOL/L (ref 3.4–5.3)
PROT SERPL-MCNC: 6.3 G/DL (ref 6.8–8.8)
RBC # BLD AUTO: 2.73 10E12/L (ref 3.8–5.2)
SODIUM SERPL-SCNC: 141 MMOL/L (ref 133–144)
SPECIMEN EXP DATE BLD: NORMAL
URATE SERPL-MCNC: 3.5 MG/DL (ref 2.6–6)
WBC # BLD AUTO: 3.5 10E9/L (ref 4–11)

## 2017-03-15 PROCEDURE — 87081 CULTURE SCREEN ONLY: CPT | Performed by: NURSE PRACTITIONER

## 2017-03-15 PROCEDURE — 20600000 ZZH R&B BMT

## 2017-03-15 PROCEDURE — 25800025 ZZH RX 258: Performed by: NURSE PRACTITIONER

## 2017-03-15 PROCEDURE — 25000132 ZZH RX MED GY IP 250 OP 250 PS 637: Performed by: INTERNAL MEDICINE

## 2017-03-15 PROCEDURE — 85025 COMPLETE CBC W/AUTO DIFF WBC: CPT | Performed by: NURSE PRACTITIONER

## 2017-03-15 PROCEDURE — 80053 COMPREHEN METABOLIC PANEL: CPT | Performed by: NURSE PRACTITIONER

## 2017-03-15 PROCEDURE — 86901 BLOOD TYPING SEROLOGIC RH(D): CPT | Performed by: NURSE PRACTITIONER

## 2017-03-15 PROCEDURE — 25000125 ZZHC RX 250: Performed by: NURSE PRACTITIONER

## 2017-03-15 PROCEDURE — 85730 THROMBOPLASTIN TIME PARTIAL: CPT | Performed by: NURSE PRACTITIONER

## 2017-03-15 PROCEDURE — 25000128 H RX IP 250 OP 636: Performed by: NURSE PRACTITIONER

## 2017-03-15 PROCEDURE — 84550 ASSAY OF BLOOD/URIC ACID: CPT | Performed by: NURSE PRACTITIONER

## 2017-03-15 PROCEDURE — 85610 PROTHROMBIN TIME: CPT | Performed by: NURSE PRACTITIONER

## 2017-03-15 PROCEDURE — 25000132 ZZH RX MED GY IP 250 OP 250 PS 637: Performed by: NURSE PRACTITIONER

## 2017-03-15 PROCEDURE — 86900 BLOOD TYPING SEROLOGIC ABO: CPT | Performed by: NURSE PRACTITIONER

## 2017-03-15 PROCEDURE — S5010 5% DEXTROSE AND 0.45% SALINE: HCPCS | Performed by: NURSE PRACTITIONER

## 2017-03-15 PROCEDURE — 83735 ASSAY OF MAGNESIUM: CPT | Performed by: NURSE PRACTITIONER

## 2017-03-15 PROCEDURE — 86850 RBC ANTIBODY SCREEN: CPT | Performed by: NURSE PRACTITIONER

## 2017-03-15 RX ORDER — LIDOCAINE 40 MG/G
CREAM TOPICAL
Status: DISCONTINUED | OUTPATIENT
Start: 2017-03-15 | End: 2017-03-23 | Stop reason: HOSPADM

## 2017-03-15 RX ORDER — VENLAFAXINE HYDROCHLORIDE 150 MG/1
150 TABLET, EXTENDED RELEASE ORAL AT BEDTIME
Status: DISCONTINUED | OUTPATIENT
Start: 2017-03-15 | End: 2017-03-23 | Stop reason: HOSPADM

## 2017-03-15 RX ORDER — GABAPENTIN 100 MG/1
200 CAPSULE ORAL 3 TIMES DAILY
Status: DISCONTINUED | OUTPATIENT
Start: 2017-03-15 | End: 2017-03-23 | Stop reason: HOSPADM

## 2017-03-15 RX ORDER — POTASSIUM CHLORIDE 7.45 MG/ML
10 INJECTION INTRAVENOUS
Status: DISCONTINUED | OUTPATIENT
Start: 2017-03-15 | End: 2017-03-23 | Stop reason: HOSPADM

## 2017-03-15 RX ORDER — ALLOPURINOL 300 MG/1
300 TABLET ORAL DAILY
Status: COMPLETED | OUTPATIENT
Start: 2017-03-16 | End: 2017-03-22

## 2017-03-15 RX ORDER — LEVOFLOXACIN 250 MG/1
250 TABLET, FILM COATED ORAL
Status: DISCONTINUED | OUTPATIENT
Start: 2017-03-21 | End: 2017-03-23 | Stop reason: HOSPADM

## 2017-03-15 RX ORDER — LORAZEPAM 0.5 MG/1
.5-1 TABLET ORAL EVERY 4 HOURS PRN
Status: DISCONTINUED | OUTPATIENT
Start: 2017-03-15 | End: 2017-03-23 | Stop reason: HOSPADM

## 2017-03-15 RX ORDER — ACETAMINOPHEN 325 MG/1
325-650 TABLET ORAL EVERY 4 HOURS PRN
Status: DISCONTINUED | OUTPATIENT
Start: 2017-03-15 | End: 2017-03-23 | Stop reason: HOSPADM

## 2017-03-15 RX ORDER — PANTOPRAZOLE SODIUM 40 MG/1
40 TABLET, DELAYED RELEASE ORAL DAILY
Status: DISCONTINUED | OUTPATIENT
Start: 2017-03-15 | End: 2017-03-23 | Stop reason: HOSPADM

## 2017-03-15 RX ORDER — OXYCODONE HYDROCHLORIDE 5 MG/1
5-10 TABLET ORAL EVERY 4 HOURS PRN
Status: DISCONTINUED | OUTPATIENT
Start: 2017-03-15 | End: 2017-03-23 | Stop reason: HOSPADM

## 2017-03-15 RX ORDER — LORAZEPAM 0.5 MG/1
0.25 TABLET ORAL EVERY 4 HOURS PRN
Status: DISCONTINUED | OUTPATIENT
Start: 2017-03-15 | End: 2017-03-23 | Stop reason: HOSPADM

## 2017-03-15 RX ORDER — ONDANSETRON 8 MG/1
8 TABLET, FILM COATED ORAL EVERY 8 HOURS
Status: COMPLETED | OUTPATIENT
Start: 2017-03-16 | End: 2017-03-22

## 2017-03-15 RX ORDER — VENLAFAXINE HYDROCHLORIDE 75 MG/1
75 TABLET, EXTENDED RELEASE ORAL AT BEDTIME
Status: DISCONTINUED | OUTPATIENT
Start: 2017-03-15 | End: 2017-03-15

## 2017-03-15 RX ORDER — VORICONAZOLE 200 MG/1
200 TABLET, FILM COATED ORAL EVERY 12 HOURS SCHEDULED
Status: DISCONTINUED | OUTPATIENT
Start: 2017-03-23 | End: 2017-03-23 | Stop reason: HOSPADM

## 2017-03-15 RX ORDER — PROCHLORPERAZINE MALEATE 5 MG
5-10 TABLET ORAL EVERY 6 HOURS PRN
Status: DISCONTINUED | OUTPATIENT
Start: 2017-03-15 | End: 2017-03-23 | Stop reason: HOSPADM

## 2017-03-15 RX ORDER — POTASSIUM CHLORIDE 29.8 MG/ML
20 INJECTION INTRAVENOUS
Status: DISCONTINUED | OUTPATIENT
Start: 2017-03-15 | End: 2017-03-23 | Stop reason: HOSPADM

## 2017-03-15 RX ORDER — NALOXONE HYDROCHLORIDE 0.4 MG/ML
.1-.4 INJECTION, SOLUTION INTRAMUSCULAR; INTRAVENOUS; SUBCUTANEOUS
Status: DISCONTINUED | OUTPATIENT
Start: 2017-03-15 | End: 2017-03-23 | Stop reason: HOSPADM

## 2017-03-15 RX ORDER — HEPARIN SODIUM,PORCINE 10 UNIT/ML
5-10 VIAL (ML) INTRAVENOUS EVERY 24 HOURS
Status: DISCONTINUED | OUTPATIENT
Start: 2017-03-15 | End: 2017-03-23 | Stop reason: HOSPADM

## 2017-03-15 RX ORDER — FUROSEMIDE 10 MG/ML
20 INJECTION INTRAMUSCULAR; INTRAVENOUS EVERY 8 HOURS PRN
Status: DISPENSED | OUTPATIENT
Start: 2017-03-15 | End: 2017-03-20

## 2017-03-15 RX ORDER — SULFAMETHOXAZOLE/TRIMETHOPRIM 800-160 MG
1 TABLET ORAL
Status: DISCONTINUED | OUTPATIENT
Start: 2017-04-24 | End: 2017-03-23 | Stop reason: HOSPADM

## 2017-03-15 RX ORDER — POTASSIUM CHLORIDE 750 MG/1
20-40 TABLET, EXTENDED RELEASE ORAL
Status: DISCONTINUED | OUTPATIENT
Start: 2017-03-15 | End: 2017-03-23 | Stop reason: HOSPADM

## 2017-03-15 RX ORDER — POTASSIUM CHLORIDE 1.5 G/1.58G
20-40 POWDER, FOR SOLUTION ORAL
Status: DISCONTINUED | OUTPATIENT
Start: 2017-03-15 | End: 2017-03-23 | Stop reason: HOSPADM

## 2017-03-15 RX ORDER — FUROSEMIDE 10 MG/ML
10 INJECTION INTRAMUSCULAR; INTRAVENOUS
Status: DISPENSED | OUTPATIENT
Start: 2017-03-15 | End: 2017-03-20

## 2017-03-15 RX ORDER — ACYCLOVIR 800 MG/1
800 TABLET ORAL
Status: DISCONTINUED | OUTPATIENT
Start: 2017-03-18 | End: 2017-03-23 | Stop reason: HOSPADM

## 2017-03-15 RX ORDER — MAGNESIUM SULFATE HEPTAHYDRATE 40 MG/ML
4 INJECTION, SOLUTION INTRAVENOUS EVERY 4 HOURS PRN
Status: DISCONTINUED | OUTPATIENT
Start: 2017-03-15 | End: 2017-03-23 | Stop reason: HOSPADM

## 2017-03-15 RX ORDER — DEXAMETHASONE 4 MG/1
8 TABLET ORAL ONCE
Status: COMPLETED | OUTPATIENT
Start: 2017-03-20 | End: 2017-03-20

## 2017-03-15 RX ORDER — LORAZEPAM 2 MG/ML
.5-1 INJECTION INTRAMUSCULAR EVERY 4 HOURS PRN
Status: DISCONTINUED | OUTPATIENT
Start: 2017-03-15 | End: 2017-03-23 | Stop reason: HOSPADM

## 2017-03-15 RX ORDER — DEXAMETHASONE 4 MG/1
4 TABLET ORAL ONCE
Status: COMPLETED | OUTPATIENT
Start: 2017-03-22 | End: 2017-03-22

## 2017-03-15 RX ORDER — HEPARIN SODIUM,PORCINE 10 UNIT/ML
5-10 VIAL (ML) INTRAVENOUS
Status: DISCONTINUED | OUTPATIENT
Start: 2017-03-15 | End: 2017-03-23 | Stop reason: HOSPADM

## 2017-03-15 RX ADMIN — PANTOPRAZOLE SODIUM 40 MG: 40 TABLET, DELAYED RELEASE ORAL at 16:23

## 2017-03-15 RX ADMIN — GABAPENTIN 200 MG: 100 CAPSULE ORAL at 16:23

## 2017-03-15 RX ADMIN — Medication 12.5 MG: at 23:39

## 2017-03-15 RX ADMIN — VENLAFAXINE HYDROCHLORIDE 150 MG: 150 TABLET, EXTENDED RELEASE ORAL at 23:38

## 2017-03-15 RX ADMIN — Medication 2 G: at 16:24

## 2017-03-15 RX ADMIN — OXYCODONE HYDROCHLORIDE 5 MG: 5 TABLET ORAL at 21:58

## 2017-03-15 RX ADMIN — DEXTROSE AND SODIUM CHLORIDE 1000 ML: 5; 450 INJECTION, SOLUTION INTRAVENOUS at 21:59

## 2017-03-15 RX ADMIN — ALTEPLASE 2 MG: 2.2 INJECTION, POWDER, LYOPHILIZED, FOR SOLUTION INTRAVENOUS at 16:24

## 2017-03-15 RX ADMIN — ENOXAPARIN SODIUM 40 MG: 40 INJECTION SUBCUTANEOUS at 20:05

## 2017-03-15 RX ADMIN — SODIUM CHLORIDE, PRESERVATIVE FREE 10 ML: 5 INJECTION INTRAVENOUS at 17:32

## 2017-03-15 RX ADMIN — GABAPENTIN 200 MG: 100 CAPSULE ORAL at 20:05

## 2017-03-15 ASSESSMENT — ACTIVITIES OF DAILY LIVING (ADL)
COGNITION: 0 - NO COGNITION ISSUES REPORTED
RETIRED_COMMUNICATION: 0-->UNDERSTANDS/COMMUNICATES WITHOUT DIFFICULTY
TRANSFERRING: 0-->INDEPENDENT
BATHING: 0-->INDEPENDENT
RETIRED_EATING: 0-->INDEPENDENT
TOILETING: 0-->INDEPENDENT
AMBULATION: 0-->INDEPENDENT
DRESS: 0-->INDEPENDENT
SWALLOWING: 0-->SWALLOWS FOODS/LIQUIDS WITHOUT DIFFICULTY
FALL_HISTORY_WITHIN_LAST_SIX_MONTHS: NO

## 2017-03-15 ASSESSMENT — PAIN DESCRIPTION - DESCRIPTORS
DESCRIPTORS: DULL;ACHING
DESCRIPTORS: ACHING;SORE;DULL

## 2017-03-15 NOTE — H&P
BMT History and Physical    Admission  - Name: Steph Agee  - Date:  3/15/2017  - Service: BMT   - Chief Complaint:     - Informant:  pt  - Resuscitation Status: Full Code     History of Present Illness    She presented in 11/2014 with 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 She staged out as IIB with disease above the diaphragm. Biopsy of a left axillary node confirmed that this was classical Hodgkin's. She subsequently had 6 cycles of ABVD followed by external beam radiation. She then did well until 05/2016, when she again developed heaviness in the chest and recurrence of night sweats. PET/CT showed recurrence of disease predominantly in the manubrium and sternum with extension into the left pectoralis muscle. A biopsy confirmed relapse. She then underwent a total of 4 cycles of ICE chemotherapy, which she tolerated well. She then came for pre-transplant evaluation in 10/2016. Unfortunately, restaging labs showed a progression with a new 1-cm, hypermetabolic, left cardiophrenic lymph node. This was biopsied by Interventional Radiology, and the biopsy confirmed recurrence of her Hodgkin's disease. She subsequently has had 4 cycles of brentuximab as a bridge to transplant.    Steph failed to mobilize with gcsf alone. She then underwent bone marrow harvest with GM-csf stimulation. Total cell dose: 1.18 CD34/KG from harvest.      Family and Social History  Family History   Problem Relation Age of Onset     Substance Abuse Mother      Social History     Social History     Marital status: Single     Spouse name: N/A     Number of children: N/A     Years of education: N/A     Occupational History     Not on file.     Social History Main Topics     Smoking status: Former Smoker     Packs/day: 1.00     Types: Cigarettes     Start date: 1/1/1995     Quit date: 5/25/2011     Smokeless tobacco: Never Used     Alcohol use 0.0 oz/week     0 Standard drinks or equivalent per week      Comment:  occasionally      Drug use: Yes      Comment: Daily marijuana use. 1 joint daily      Sexual activity: Not on file     Other Topics Concern     Not on file     Social History Narrative       Past Medical and Surgical History  Past Medical History   Diagnosis Date     Depression with anxiety      History of blood transfusion      9/2016     Hodgkin disease (H) 2014, 2016     Neuropathy (H)      SVC syndrome 2014       Bone Marrow Work-up  Her restaging labs include a PET/CT scan which showed no evidence of active disease but did show some infiltrates. The patient did have influenza-like symptoms in January and developed a left pleural effusion at that time and required a 2-week course of antibiotics. Her symptoms have now completely resolved. Seen by Dr. Montoya in Pulmonary to review CT scan before making a final recommendation about possible bronchoscopy. She had a bone marrow aspirate and biopsy that had no morphologic evidence of Hodgkin's disease. MUGA showed a 52% ejection fraction. She was seen by Cardiology, who performed an MRI cardiac study which showed an ejection fraction of 47%. They recommended starting lisinopril 2.5 mg. A chest x-ray was normal. Pulmonary function tests showed about 70% FEV1 and FVC. DLCO was 78% of predicted value. Thyroid function tests were normal. White count was 1,500, hemoglobin 11.7 and platelets 219,000. Electrolyte panel was normal. Liver function tests were normal. LDH was 215. PTT and INR were within the normal range.                             Home Medications  Prior to Admission medications    Medication Sig Start Date End Date Taking? Authorizing Provider   HYDROcodone-acetaminophen (NORCO) 5-325 MG per tablet Take 1-2 tablets by mouth every 4 hours as needed for moderate to severe pain 3/9/17  Yes Cali Pitt MD   ondansetron (ZOFRAN) 4 MG tablet Take 1 tablet (4 mg) by mouth every 6 hours as needed for nausea or vomiting 3/9/17  Yes Cali Pitt MD    enoxaparin (LOVENOX) 40 MG/0.4ML injection Inject 0.4 mLs (40 mg) Subcutaneous daily 3/3/17  Yes Vani Chan MD   losartan (COZAAR) 25 MG tablet Take 0.5 tablets (12.5 mg) by mouth daily  Patient taking differently: Take 12.5 mg by mouth At Bedtime  2/16/17  Yes Sharyn Mitchell MD   venlafaxine (EFFEXOR-XR) 75 MG 24 hr capsule Take 1 capsule (75 mg) by mouth daily  Patient taking differently: Take 75 mg by mouth At Bedtime  2/9/17  Yes Obed Chambers MD   gabapentin (NEURONTIN) 100 MG capsule Take 2 capsules (200 mg) by mouth 3 times daily 2/8/17  Yes Obed Chambers MD   LORAZEPAM PO Take 0.25 mg by mouth every 4 hours as needed for anxiety Reported on 2/27/2017   Yes Reported, Patient       Review of Systems:   - Constitutional:       Pain 2/2 bone marrow harvest   - Skin:                 Negative   - Musculoskeletal:      Negative   - Eyes:                 Negative   - ENT:                  Negative   - Endocrine:            Negative   - Respiratory:          Negative   - Cardiovascular:       Negative   - Heme:                 Negative   - Lymph:                Negative   - GI:                   Negative   - :                   Negative   - Neuro:                Negative   - Psych:                Negative   Physical Exam:   - General:              A&O x3, appropriate, NAD   - Head:                 NC/AT   - Eyes:                 PERRL.  Sclera anicteric.   - Nose/Mouth/Throat:    No OP erythema, buccal mucosa moist, no ulcerations.   - Neck:                 Supple.   - Lungs:                CTA t/o. No crackles.  - Cardiovascular:       RRR, no M/R/G.   - Abdominal/Rectal:     +BS x4, soft, NT, ND, no HSM.   - Lymphatics:           No edema.  - Musculoskeletal: Full strength.   - Skin:                 No rashes or petechaie.   - Neuro:                Non-focal, CN II-XII grossly intact.   - Additional Findings:  Duvol site NT, no erythema/exudate.     Vital Signs/Labs/Imaging/Culture Review  -  "Vital Signs:          /60  Pulse 82  Temp 96.6  F (35.9  C) (Axillary)  Resp 16  Ht 1.575 m (5' 2.01\")  Wt 81.5 kg (179 lb 11.2 oz)  SpO2 99%  BMI 32.86 kg/m2    - Lab Results:            Lab Results   Component Value Date    WBC 3.5 (L) 03/15/2017    HGB 8.9 (L) 03/15/2017    HCT 27.5 (L) 03/15/2017     03/15/2017    ALT 31 03/15/2017    AST 27 03/15/2017     03/15/2017    BUN 14 03/15/2017    CO2 27 03/15/2017    TSH 2.07 02/01/2017    INR 0.93 03/15/2017   Last Basic Metabolic Panel:  Lab Results   Component Value Date     03/01/2017      Lab Results   Component Value Date    POTASSIUM 4.2 03/01/2017     Lab Results   Component Value Date    CHLORIDE 108 03/01/2017     Lab Results   Component Value Date    VALERIA 8.4 03/01/2017     Lab Results   Component Value Date    CO2 27 03/01/2017     Lab Results   Component Value Date    BUN 8 03/01/2017     Lab Results   Component Value Date    CR 0.60 03/01/2017     Lab Results   Component Value Date    GLC 91 03/01/2017           Assessment and Plan:   Steph Agee is a 42 year old female with Hodgkins disease admitted for auto transplant.  Prep:  D-6: BCNU, -16, Cytoxan  D-5-D-4: -16, Cytoxan  D-3: Cytoxan  D-2-D-1: Rest  3/22 Transplant    1.  BMT: Prep as above. Allopurinol through day 0. Flush and pre-meds prior to transplant. GCSF starts d+5 and cont to until ANC>1500 x3 consecutive days. Re-stage per protocol.    2.  HEME: Keep Hgb>8 and plts>10K. No pre-meds.  -On Lovenox 40mg QD d/t hx of chronic IJ clot recently noted on 3/3 U/s                            3.  ID: Afebrile. Cont Levo, Sharron/Fluc(hx of marijuana use), and HD ACV (CMV+) prophy. Bactrim or appropriate PCP therapy to start d+28.                                             4.  GI: Zofran and dexamethasone prior to chemo to prevent N/V. Ativan and compazine available PRN break-through symptoms. Protonix for GI prophy.    5.  FEN/Renal: Monitor creat and lytes. Replete lytes " PRN per SS. Monitor weight, I+O, lytes per protocol with IVF flush.     6.  CV: Cleared by cardiology d/t drop in EF. Cardiac MRI confirmed EF 47%. Started on cozaar 12.5 daily.    7.  Psych: Hx of anxiety. Previously smoked daily marijuana and took ativan qHS. Ativan too sedating for daily use-per pt. Already on effexor, increase to 150mg daily (75).    Debra Black NP  Pager [phone]  3/15/2017  2:13 PM    Attending Note:    The patient was seen and examined by me separate from mid-level provider.   I personally reviewed the today's laboratory results, vital signs and radiology results.    Admitted from home for auto BMT for Hodgkin's lymphoma. No new issues. Improving BM harvest site pain. I found that vitals are stable and there was no fever. No acute distress. Patient was alert and oriented and grossly neurologically intact. Mouth is clean. Line insertion non-tender and clear. Lungs clear bilateral. Heart regular. Abdomen soft non-tender, BS present. Lower extremity with no edema. No rash. BM harvest site and clean and healed.    Main laboratory finding were   Lab Results   Component Value Date    WBC 3.5 (L) 03/15/2017    ANEU 2.2 03/15/2017    HGB 8.9 (L) 03/15/2017    HCT 27.5 (L) 03/15/2017     03/15/2017     03/15/2017    POTASSIUM 3.8 03/15/2017    CHLORIDE 107 03/15/2017    CO2 27 03/15/2017     (H) 03/15/2017    BUN 14 03/15/2017    CR 0.66 03/15/2017    MAG 2.0 03/15/2017    INR 0.93 03/15/2017     My plan is to start hydration and plan chemo for tomorrow  With CBV conditioning. Reviewed patients last minutes questions. Due to chronic marijuana use we will cover antifugal with clemente/vfend.     Connor Barajas M.D.

## 2017-03-15 NOTE — PLAN OF CARE
Problem: Goal Outcome Summary  Goal: Goal Outcome Summary  Outcome: No Change  AVSS.  No pain, no nausea.  Brown port has no blood return so TPA placed and it is working fine now.  On poop study.  BM harvest site bruised and gunky covered with steristrips.  2gm magnesium given.  Should start flush tonight.    Problem: Blood and Marrow Transplantation  Goal: Blood and Marrow Transplantation  Signs and symptoms of listed problems will be absent or manageable.   Outcome: No Change    03/15/17 1838   Blood and Marrow Transplantation   Blood and Marrow Transplantation Assessed all   Blood and Marrow Transplantation Present none

## 2017-03-15 NOTE — PROGRESS NOTES
Patient admitted to:  rm 12  Admitted from: home  Arrived by: ambulatory  Reason for admission: auto transplant  Patient accompanied by: cousin  Belongings:in room with pt  Teaching: per unit routine

## 2017-03-16 ENCOUNTER — APPOINTMENT (OUTPATIENT)
Dept: OCCUPATIONAL THERAPY | Facility: CLINIC | Age: 42
DRG: 016 | End: 2017-03-16
Attending: NURSE PRACTITIONER
Payer: COMMERCIAL

## 2017-03-16 LAB
ANION GAP SERPL CALCULATED.3IONS-SCNC: 7 MMOL/L (ref 3–14)
BASOPHILS # BLD AUTO: 0 10E9/L (ref 0–0.2)
BASOPHILS NFR BLD AUTO: 0.3 %
BUN SERPL-MCNC: 12 MG/DL (ref 7–30)
CALCIUM SERPL-MCNC: 7.8 MG/DL (ref 8.5–10.1)
CHLORIDE SERPL-SCNC: 111 MMOL/L (ref 94–109)
CMV DNA SPEC NAA+PROBE-ACNC: NORMAL [IU]/ML
CMV DNA SPEC NAA+PROBE-LOG#: NORMAL {LOG_IU}/ML
CO2 SERPL-SCNC: 27 MMOL/L (ref 20–32)
CREAT SERPL-MCNC: 0.72 MG/DL (ref 0.52–1.04)
DIFFERENTIAL METHOD BLD: ABNORMAL
EOSINOPHIL # BLD AUTO: 0.4 10E9/L (ref 0–0.7)
EOSINOPHIL NFR BLD AUTO: 10.9 %
ERYTHROCYTE [DISTWIDTH] IN BLOOD BY AUTOMATED COUNT: 18.8 % (ref 10–15)
GFR SERPL CREATININE-BSD FRML MDRD: 88 ML/MIN/1.7M2
GLUCOSE SERPL-MCNC: 100 MG/DL (ref 70–99)
HCT VFR BLD AUTO: 26.5 % (ref 35–47)
HGB BLD-MCNC: 8.5 G/DL (ref 11.7–15.7)
IMM GRANULOCYTES # BLD: 0 10E9/L (ref 0–0.4)
IMM GRANULOCYTES NFR BLD: 0.3 %
LYMPHOCYTES # BLD AUTO: 0.9 10E9/L (ref 0.8–5.3)
LYMPHOCYTES NFR BLD AUTO: 27.1 %
MAGNESIUM SERPL-MCNC: 2.5 MG/DL (ref 1.6–2.3)
MCH RBC QN AUTO: 32 PG (ref 26.5–33)
MCHC RBC AUTO-ENTMCNC: 32.1 G/DL (ref 31.5–36.5)
MCV RBC AUTO: 100 FL (ref 78–100)
MONOCYTES # BLD AUTO: 0.2 10E9/L (ref 0–1.3)
MONOCYTES NFR BLD AUTO: 7 %
NEUTROPHILS # BLD AUTO: 1.8 10E9/L (ref 1.6–8.3)
NEUTROPHILS NFR BLD AUTO: 54.4 %
NRBC # BLD AUTO: 0 10*3/UL
NRBC BLD AUTO-RTO: 0 /100
PLATELET # BLD AUTO: 125 10E9/L (ref 150–450)
POTASSIUM SERPL-SCNC: 3.9 MMOL/L (ref 3.4–5.3)
POTASSIUM SERPL-SCNC: 4.2 MMOL/L (ref 3.4–5.3)
RBC # BLD AUTO: 2.66 10E12/L (ref 3.8–5.2)
SODIUM SERPL-SCNC: 141 MMOL/L (ref 133–144)
SODIUM SERPL-SCNC: 146 MMOL/L (ref 133–144)
SPECIMEN SOURCE: NORMAL
WBC # BLD AUTO: 3.3 10E9/L (ref 4–11)

## 2017-03-16 PROCEDURE — S5010 5% DEXTROSE AND 0.45% SALINE: HCPCS | Performed by: NURSE PRACTITIONER

## 2017-03-16 PROCEDURE — 85025 COMPLETE CBC W/AUTO DIFF WBC: CPT | Performed by: NURSE PRACTITIONER

## 2017-03-16 PROCEDURE — 25000132 ZZH RX MED GY IP 250 OP 250 PS 637: Performed by: INTERNAL MEDICINE

## 2017-03-16 PROCEDURE — 83735 ASSAY OF MAGNESIUM: CPT | Performed by: NURSE PRACTITIONER

## 2017-03-16 PROCEDURE — 20600000 ZZH R&B BMT

## 2017-03-16 PROCEDURE — 97110 THERAPEUTIC EXERCISES: CPT | Mod: GO | Performed by: OCCUPATIONAL THERAPIST

## 2017-03-16 PROCEDURE — 25000132 ZZH RX MED GY IP 250 OP 250 PS 637: Performed by: NURSE PRACTITIONER

## 2017-03-16 PROCEDURE — 25800025 ZZH RX 258: Performed by: NURSE PRACTITIONER

## 2017-03-16 PROCEDURE — 84132 ASSAY OF SERUM POTASSIUM: CPT | Performed by: NURSE PRACTITIONER

## 2017-03-16 PROCEDURE — 3E04305 INTRODUCTION OF OTHER ANTINEOPLASTIC INTO CENTRAL VEIN, PERCUTANEOUS APPROACH: ICD-10-PCS | Performed by: INTERNAL MEDICINE

## 2017-03-16 PROCEDURE — 40000133 ZZH STATISTIC OT WARD VISIT: Performed by: OCCUPATIONAL THERAPIST

## 2017-03-16 PROCEDURE — 80048 BASIC METABOLIC PNL TOTAL CA: CPT | Performed by: NURSE PRACTITIONER

## 2017-03-16 PROCEDURE — 97165 OT EVAL LOW COMPLEX 30 MIN: CPT | Mod: GO | Performed by: OCCUPATIONAL THERAPIST

## 2017-03-16 PROCEDURE — 25000131 ZZH RX MED GY IP 250 OP 636 PS 637: Performed by: NURSE PRACTITIONER

## 2017-03-16 PROCEDURE — 25000125 ZZHC RX 250: Performed by: NURSE PRACTITIONER

## 2017-03-16 PROCEDURE — 25000125 ZZHC RX 250: Performed by: STUDENT IN AN ORGANIZED HEALTH CARE EDUCATION/TRAINING PROGRAM

## 2017-03-16 PROCEDURE — 84295 ASSAY OF SERUM SODIUM: CPT | Performed by: NURSE PRACTITIONER

## 2017-03-16 PROCEDURE — 25000128 H RX IP 250 OP 636: Performed by: STUDENT IN AN ORGANIZED HEALTH CARE EDUCATION/TRAINING PROGRAM

## 2017-03-16 PROCEDURE — 25000128 H RX IP 250 OP 636: Performed by: NURSE PRACTITIONER

## 2017-03-16 RX ORDER — LORATADINE 10 MG/1
10 TABLET ORAL ONCE
Status: COMPLETED | OUTPATIENT
Start: 2017-03-16 | End: 2017-03-16

## 2017-03-16 RX ADMIN — ENOXAPARIN SODIUM 40 MG: 40 INJECTION SUBCUTANEOUS at 20:10

## 2017-03-16 RX ADMIN — CYCLOPHOSPHAMIDE 2445 MG: 2 INJECTION, POWDER, FOR SOLUTION INTRAVENOUS; ORAL at 10:18

## 2017-03-16 RX ADMIN — ALLOPURINOL 300 MG: 300 TABLET ORAL at 08:12

## 2017-03-16 RX ADMIN — ETOPOSIDE 285 MG: 20 INJECTION, SOLUTION, CONCENTRATE INTRAVENOUS at 23:44

## 2017-03-16 RX ADMIN — PANTOPRAZOLE SODIUM 40 MG: 40 TABLET, DELAYED RELEASE ORAL at 08:12

## 2017-03-16 RX ADMIN — VENLAFAXINE HYDROCHLORIDE 150 MG: 150 TABLET, EXTENDED RELEASE ORAL at 22:22

## 2017-03-16 RX ADMIN — DEXTROSE AND SODIUM CHLORIDE 1000 ML: 5; 450 INJECTION, SOLUTION INTRAVENOUS at 23:45

## 2017-03-16 RX ADMIN — GABAPENTIN 200 MG: 100 CAPSULE ORAL at 14:05

## 2017-03-16 RX ADMIN — MESNA 490 MG: 100 INJECTION, SOLUTION INTRAVENOUS at 09:59

## 2017-03-16 RX ADMIN — MESNA 490 MG: 100 INJECTION, SOLUTION INTRAVENOUS at 12:43

## 2017-03-16 RX ADMIN — MICAFUNGIN SODIUM 50 MG: 10 INJECTION, POWDER, LYOPHILIZED, FOR SOLUTION INTRAVENOUS at 06:04

## 2017-03-16 RX ADMIN — GABAPENTIN 200 MG: 100 CAPSULE ORAL at 08:12

## 2017-03-16 RX ADMIN — MESNA 490 MG: 100 INJECTION, SOLUTION INTRAVENOUS at 15:26

## 2017-03-16 RX ADMIN — CARMUSTINE 567 MG: KIT at 08:06

## 2017-03-16 RX ADMIN — DEXAMETHASONE 10 MG: 2 TABLET ORAL at 08:12

## 2017-03-16 RX ADMIN — ONDANSETRON HYDROCHLORIDE 8 MG: 8 TABLET, FILM COATED ORAL at 22:22

## 2017-03-16 RX ADMIN — OXYCODONE HYDROCHLORIDE 5 MG: 5 TABLET ORAL at 20:10

## 2017-03-16 RX ADMIN — ETOPOSIDE 285 MG: 20 INJECTION, SOLUTION, CONCENTRATE INTRAVENOUS at 12:34

## 2017-03-16 RX ADMIN — ONDANSETRON HYDROCHLORIDE 8 MG: 8 TABLET, FILM COATED ORAL at 15:26

## 2017-03-16 RX ADMIN — MESNA 490 MG: 100 INJECTION, SOLUTION INTRAVENOUS at 18:31

## 2017-03-16 RX ADMIN — GABAPENTIN 200 MG: 100 CAPSULE ORAL at 20:10

## 2017-03-16 RX ADMIN — LORATADINE 10 MG: 10 TABLET ORAL at 14:05

## 2017-03-16 RX ADMIN — ONDANSETRON HYDROCHLORIDE 8 MG: 8 TABLET, FILM COATED ORAL at 08:12

## 2017-03-16 RX ADMIN — FUROSEMIDE 20 MG: 10 INJECTION, SOLUTION INTRAVENOUS at 15:26

## 2017-03-16 RX ADMIN — DEXTROSE AND SODIUM CHLORIDE 1000 ML: 5; 450 INJECTION, SOLUTION INTRAVENOUS at 06:04

## 2017-03-16 RX ADMIN — MESNA 490 MG: 100 INJECTION, SOLUTION INTRAVENOUS at 22:22

## 2017-03-16 RX ADMIN — OXYCODONE HYDROCHLORIDE 10 MG: 5 TABLET ORAL at 12:11

## 2017-03-16 ASSESSMENT — ACTIVITIES OF DAILY LIVING (ADL): PREVIOUS_RESPONSIBILITIES: MEAL PREP;HOUSEKEEPING;LAUNDRY;SHOPPING;MEDICATION MANAGEMENT;FINANCES;DRIVING;WORK

## 2017-03-16 ASSESSMENT — PAIN DESCRIPTION - DESCRIPTORS
DESCRIPTORS: DULL;ACHING
DESCRIPTORS: HEADACHE;PRESSURE
DESCRIPTORS: ACHING;SORE

## 2017-03-16 NOTE — PLAN OF CARE
Problem: Goal Outcome Summary  Goal: Goal Outcome Summary  PT 5C: Orders received for PT evaluation. Per discussion with OT, pt is independent with transfers, no LE strength/balance concerns at this time. OT following during hospital admission to encourage mobility, avoid deconditioning with hospital admission/BMT, educate on lab values and activity/exercise modification. PT order completed. Please re-consult PT if new needs arise.

## 2017-03-16 NOTE — PLAN OF CARE
Problem: Discharge Planning  Goal: Discharge Planning (Adult, OB, Behavioral, Peds)  Social Work Note  1. Continue to provide support and assessment re: patient's adjustment to illness and coping.  2. Continue to provide support and assessment re: caregiver's adjustment to role of caregiver and coping.  3. Continue to complete ongoing needs assessment for appropriate resources.  4. Patient will remain involved with discharge planning

## 2017-03-16 NOTE — PROGRESS NOTES
CLINICAL    Blood and Marrow Transplant Service      Focus: Counseling and support.     Data:  Steph is admitted to 5C.  Today is Day -6 s/p pending autologous transplant for a diagnosis of Hodgkin's Disease.    Intervention:   Met with pt, she reports that she is doing well and adjusting to the unit.  Her mood is positive, she is tired and trying to rest. Pt has a blank copy of an advance directive, she has not started working on it yet but plans to.  Provided assessment of coping, supportive counseling, validation of concerns and encouragement.    Assessment:  Pt appears to be doing well, resting comfortably in bed with a soft blanket she brought from home.  States that her cousin, April, was here yesterday and will return today.      Plan: Encouraged patient to express any questions/concerns as they arise. SW to remain available supportively and work with the interdisciplinary team regarding patient's plan of care.    Kymberly Salmon MSW LICSW  Pager) [phone]

## 2017-03-16 NOTE — PLAN OF CARE
Problem: Goal Outcome Summary  Goal: Goal Outcome Summary  Outcome: No Change  Pt afeb, vss, headache earlier, resolved (see previous note). Pt up in room, reports feeling fatigued. Resting between cares. Cytoxan fluid flush @ 225ml/hr, plan for etoposide tonight. Continue to monitor per poc.    Problem: Blood and Marrow Transplantation  Goal: Blood and Marrow Transplantation  Signs and symptoms of listed problems will be absent or manageable.   Outcome: No Change    03/16/17 1803   Blood and Marrow Transplantation   Blood and Marrow Transplantation Assessed all   Blood and Marrow Transplantation Present fluid imbalances

## 2017-03-16 NOTE — PROGRESS NOTES
"BMT Daily Progress Note    Date of Service:3/16/2017    History of Present Illness:  Ms. Agee is day -6 of her transplant prep.  She still has a \"deep ache\" at her marrow harvest sites.  The heating pad helps.  She notes she is always nauseated in the mornings, including this morning.  She got some zofran and it is helping.  She has noticed a puffy face lately upon waking, and she thinks it might be slightly worse today with her fluid flush.  She otherwise does not have complaints.     Review of Systems:  ROS otherwise unremarkable other than what is noted in the HPI.     Physical Exam:    /68 (BP Location: Right arm)  Pulse 81  Temp 96.3  F (35.7  C) (Axillary)  Resp 18  Ht 1.575 m (5' 2.01\")  Wt 81.5 kg (179 lb 11.2 oz)  SpO2 96%  BMI 32.86 kg/m2    General: A&O. NAD.   HEENT: CALEB, sclera anicteric  Neck: supple  Lungs: CTAB.  No wheezes or crackles.    Heart: RRR, no m/r/g  Abdomen: +BS, soft, NT/ND, no HSM  Extremities: No edema  Skin: no rashes or petechiae  Neurologic: Nonfocal  RAC: [place] nontender, no erythema or drainage    Laboratory Data:  Lab Results   Component Value Date    WBC 3.3 (L) 03/16/2017    ANEU 1.8 03/16/2017    HGB 8.5 (L) 03/16/2017    HCT 26.5 (L) 03/16/2017     (L) 03/16/2017     (H) 03/16/2017    POTASSIUM 4.2 03/16/2017    CHLORIDE 111 (H) 03/16/2017    CO2 27 03/16/2017     (H) 03/16/2017    BUN 12 03/16/2017    CR 0.72 03/16/2017    MAG 2.5 (H) 03/16/2017    INR 0.93 03/15/2017       Assessment/Plan:  Steph Agee is a 42 year old female with Hodgkins disease admitted for auto transplant.  Prep:  D-6: BCNU, -16, Cytoxan  D-5-D-4: -16, Cytoxan  D-3: Cytoxan  D-2-D-1: Rest  3/22 Transplant     1. BMT: Prep as above. Allopurinol through day 0. Flush and pre-meds prior to transplant. GCSF starts d+5 and cont to until ANC>1500 x3 consecutive days. Re-stage per protocol.     2. HEME: Keep Hgb>8 and plts>10K. No pre-meds.  -On Lovenox 40mg QD d/t hx of " chronic IJ clot recently noted on 3/3 U/s      3. ID: Afebrile. Cont Levo, Sharron/Fluc(hx of marijuana use), and HD ACV (CMV+) prophy. Bactrim or appropriate PCP therapy to start d+28.       4.  GI: Zofran and dexamethasone prior to chemo to prevent N/V. Ativan and compazine available PRN break-through symptoms. Protonix for GI prophy.     5. FEN/Renal: Monitor creat and lytes. Replete lytes PRN per SS. Monitor weight, I+O, lytes per protocol with IVF flush.      6. CV: Cleared by cardiology d/t drop in EF. Cardiac MRI confirmed EF 47%. Started on cozaar 12.5 daily.     7. Psych: Hx of anxiety. Previously smoked daily marijuana and took ativan qHS. Ativan too sedating for daily use-per pt. Already on effexor, increase to 150mg daily (75).     Lluvia AKBAR Carrier  3/16/2017     Attending Note:     The patient was seen and examined by me separate from mid-level provider.   I personally reviewed the today's laboratory results, vital signs and radiology results.     Emesis and some anxiety, but not bad.  PRN medication helped. Improving BM harvest site pain. I found that vitals are stable and there was no fever. No acute distress. Patient was alert and oriented and grossly neurologically intact. Mouth is clean. Line insertion non-tender and clear. Lungs clear bilateral. Heart regular. Abdomen soft non-tender, BS present. Lower extremity with no edema. No rash. BM harvest site and clean and healed.     Labs were   Lab Results   Component Value Date    WBC 3.3 (L) 03/16/2017    ANEU 1.8 03/16/2017    HGB 8.5 (L) 03/16/2017    HCT 26.5 (L) 03/16/2017     (L) 03/16/2017     03/16/2017    POTASSIUM 3.9 03/16/2017    CHLORIDE 111 (H) 03/16/2017    CO2 27 03/16/2017     (H) 03/16/2017    BUN 12 03/16/2017    CR 0.72 03/16/2017    MAG 2.5 (H) 03/16/2017    INR 0.93 03/15/2017     Plan is to continue chemo as planned. Use zofran and ativan for nausea and anxiety. Continue IV fluids and monitor Na and weight.

## 2017-03-16 NOTE — PROGRESS NOTES
Psychosocial assessment completed in BMT clinic and copied here for staff to review.      Blood and Marrow Transplant   Psychosocial Assessment with   Clinical      Assessment completed on 2/3/17 of living situation, support system, financial status, functional status, coping, stressors and need for resources.  Social work intervention provided as needed.  Information for this assessment was provided by pt and caregiver (cousin, Chaparrita Donohue) report in addition to medical chart review and consultation with medical team.      Present at assessment: Patient, Steph Agee and her cousin, Chaparrita Donohue, were present for this assessment conducted by Kymberly Salmon, BMT .      Diagnosis: Hodgkin's Lymphoma     Date of Diagnosis: 11/14, relapse 5/2016     Transplant type: Autologous     Donor: Autologous      Physician: Obed Chambers MD     Nurse Coordinator: Mary Krishnamurthy RN     Permanent Address:   [address] [place] [id number]     Local Address:  Pt's cousinChaparrita home:  [address] [place] [id number]     Contact Information:  Pt Cell Phone: [phone]  Pt Mail: [email or web address]   Pt's caregiver Chaparrita Xiong cell: [phone]     Presenting Information:  Steph is a 41 year old woman diagnosed with Hodgkin's Lymphoma who presents for evaluation for potential autologous transplant at the [facility] Pt was accompanied to today's visit by her cousin, Chaparrita Donohue.      Decision Making: Self     Health Care Directive:  Yes. Pt has completed a health care directive and will bring it when is admitted or when she returns to clinic. The form needs to be notarized.      Relationship Status: Pt and pt's MAGGY Awais, live together, they have been in a relationship for almost twelve years. Pt described their relationship as stable and supportive.      Special Needs: Pt will stay with her cousin, in [place], [place], post-transplant.     Family/Support  System: Pt endorsed a strong support system including family (her SO, Awais Ernandez's mother and family, rossy's son) and close friends (who live in the [place] area) who will be available to support pt throughout transplant process.      SO: Awais  Children: Pt's son, Kamran, 24 y/o  Awais's children/pt's step-children: Lo, 24 y/o and Marshall, 26 y/o  All of the children live in [place]     Grandchildren: 3 (on Awais's side) ages 8, 2 and 1   Parents: Mother, . Father, living, 61 y/o  Siblings: one full sibling: Earl, lives in [place], [place] and 2 half-brothers, Jian, lives in [place], [place] and Kayode, lives in [place], [place]   Friends: yes, many friends in [place], [place]      Caregiver: MILLY discussed with pt the caregiver role and expectation at length. Pt is agreeable to having a full time caregiver for a minimum of 30 days until cleared by the BMT physician. Pt and pt's cousin, April, confirmed understanding the caregiver requirement. Pt shared that  April will be her main caregiver, April's son is available as a back-up caregiver. April's neighbor and other mutual friends can also assist as needed. Pt signed the caregiver contract which will be scanned into the EMR. Caregiver education and resources provided. No caregiver concerns identified.      Name & Numbers  Chaparrita Donohue, [phone]     Permanent Living Situation: Pt lives with her SOAwais. No concerns identified at this time.      Temporary Living Situation:  Pt will be staying with her cousin, April, in [place], [place].      Transportation Mode: Pt's cousin, April, will provide transportation to clinic. Pt is aware of driving restrictions post-BMT and the need for the caregiver is to drive until cleared to drive by the BMT physician. SW provided information on parking info and monthly parking pass options.      Insurance: Pt has employer group health insurance through [employer]. Pt denied specific insurance concerns at this time. MILLY reiterated  information about the [employer]  should specific insurance questions arise as pt moves through transplant process. Future Insurance questions referred to [employer] . Pt reports that she has in , Nicholas Stratton, through [employer].      Sources of Income: Pt is supported by short-term disability (which began 2/1/17, she is paid 80% of her income and states that she also has LTD coverage, paid at 60% of her income). Pt denied anticipation of financial hardship related to BMT at this time. SW provided information on adalid options and encouraged pt to contact this SW for support should financial situation change.      Employment: Pt works full-time as a  with [employer]. She has worked for [employer] for nine months. Her last day of work was 1/31/2017. Pt states that her job is very flexible and sometimes she is able to work from home. She enjoys her work and hopes to get back to work as soon as possible.      Pt reports that her SO is not working at this time due to injuries. He fell off a roof and broke his back in October, 2015 (he was working as a  at the time). Pt reports that he was also electrocuted on the job and is still healing from both those injuries. Pt states that Awais was not eligible for worker's comp as he was a contract employee at the time.      Mental Health: Pt reported a hx of anxiety and depression. Pt is currently taking medication (Effexor) and pt feels the medication is working well. She previously took Prozac but reports that it caused menopause symptoms so she was changed to Effexor. She also takes Lorazepam, for sleep, as needed. SW explained that it's not uncommon for patients going through transplant to experience symptoms of depression/anxiety. Pt believes that she would be able to identify symptoms of increased depression/anxiety throughout the transplant process. SW completed a PHQ 9 Assessment with the pt and pt  "scored a 0 which indicates no issues with depression.     Chemical Use: Pt uses marijuana daily, she primary use is in the morning. She estimates using a quantity of one joint per day, she has used edible marijuana in the past. Pt states that she has never had a prescription for medical marijuana but has used marijuana from [place] or [place]. Pt finds that marijuana use helps her combat chemotherapy related nausea and improves her appetite. Pt plans to talk with Dr. Chambers, during her closing appointment, about other options.   Based on the information provided, there appear to be no specific risks or concerns identified at this time.      Trauma/Loss/Abuse History: Multiple losses associated with cancer diagnosis and treatment, including health, employment, changes to physical appearance, etc.      Spirituality: Pt identified as having no Hoahaoism affiliation. SW explained that there are Chaplains on the unit and pt can request to meet with a  at anytime.     Coping: Pt noted that she is currently feeling \"positive, prepared and ready to begin\". Pt shared that her main coping mechanisms are talking with family and friends, exercise, gathering educational information about her cancer diagnosis and keeping busy at work. Pt noted that she enjoys dance, camping, fishing, outdoor activities, spending time with her grand kids and social media. SW and pt discussed additional positive coping mechanisms that pt can utilize while in the hospital. While hospitalized, pt plans to relax, watch tv (HuK[employer] and Netflix) and spend time coloring in coloring books using colored pencils. Pt said that her grandchildren have given her presents to open while she is in the hospital and she got a gift basket from her co-workers full of things that she can use to pass the time while she is hospitalized.      Pt said that she is worried that when she has her closing appointment with Dr. Chambers that she will not be able to " "move forward with transplant. She noted that during her previous work-up week (in October, 2016) when she was told that her cancer had come back and that she wouldn't be able to proceed to transplant right away that she felt worse that when she was originally diagnosed (since she was not expecting that news). She is happy that Dr. Chambers will have a plan for her next week and she is hopeful that the plan will include actually beginning treatment.      Caregiver Coping: Pt's caregiver noted that she is feeling \"happy, excited and ready to get things going\" at this time. Pt's caregiver noted that she juliane by talking with friends and family, exercise, taking naps, meditation and guided imagery, talking with a mental health professional and gathering information about cancer.      Education Provided: Transplant process expectations, Caregiver requirements, Caregiver self-care, Financial issues related to transplant, Financial resources/grants available, Common psychosocial stressors pre/post transplant, Support group(s) available, Hopsital resources available, Web site information and the Social work role.     Interventions Provided: Psychosocial support and education      Assessment and Recommendations for Team:  Pt is a 41 year old female diagnosed with Hodgkin's Lymophoma who is here undergoing preparation for a planned autologous transplant.      Pt is very pleasant, calm and able to articulate concerns/coping mechanisms in an appropriate manner. Pt feels comfortable communicating with the medical team. Pt has a strong supportive network of family and friends who are involved.      Pt has developed strong coping mechanisms, she may benefit from assistance in developing coping mechanisms.         Pt and pt's caregiver will benefit from ongoing psychosocial support in regards to coping with the adjustment to the BMT process.      Pt has a strong support system and a solid caregiver plan. Pt verbalizes " understanding of the transplant process and wanting to proceed. SW provided contact information and encouraged pt to contact SW with questions, concerns, resources and for support.        Important Information:     Pt is interested in having exercise equipment in her room.     Pt will need to have her advance directive notarized.     Pt's anxiety and depression are well controlled with medication.     Pt currently uses marijuana (daily) for relief from nausea and to help her with appetite.      Follow up Planned:   Psychosocial support  Support group information  Parking arrangements     VENKATA Schmitt, [facility]  Phone [phone]  Pager [phone]

## 2017-03-16 NOTE — PLAN OF CARE
Problem: Goal Outcome Summary  Goal: Goal Outcome Summary  Outcome: No Change  Afeb, VSS.  Denied nausea, received oxycodone x1 and used heating pad for bilateral hip pain at BM harvest sites with relief.  Cytoxan flush started at 2200 last evening.  Will get cytoxan, etoposide and BICNU today.  Slept well.  On the poop study.  Continue current plan of care.    Problem: Blood and Marrow Transplantation  Goal: Blood and Marrow Transplantation  Signs and symptoms of listed problems will be absent or manageable.     03/16/17 0626   Blood and Marrow Transplantation   Blood and Marrow Transplantation Assessed all   Blood and Marrow Transplantation Present acute pain

## 2017-03-16 NOTE — PROGRESS NOTES
Type of chemo infused: Carmustine(BCNU), Cytoxan, and Etoposide  Pt tolerated infusion: Pt complained of sinus pressure, jaw pain, and headache after BCNU infusion, during cytoxan/etoposide.   Interventions: Oxycodone 10mg with no change, claritin given with relief. Cytoxan fluid flush @ 225ml/hr, 20mg lasix for I>O /shift, with good response.  Plan: Continue fluid flush and monitor strict i/o's

## 2017-03-17 ENCOUNTER — APPOINTMENT (OUTPATIENT)
Dept: OCCUPATIONAL THERAPY | Facility: CLINIC | Age: 42
DRG: 016 | End: 2017-03-17
Attending: INTERNAL MEDICINE
Payer: COMMERCIAL

## 2017-03-17 LAB
ANION GAP SERPL CALCULATED.3IONS-SCNC: 7 MMOL/L (ref 3–14)
BACTERIA SPEC CULT: NORMAL
BASOPHILS # BLD AUTO: 0 10E9/L (ref 0–0.2)
BASOPHILS NFR BLD AUTO: 0 %
BUN SERPL-MCNC: 9 MG/DL (ref 7–30)
CALCIUM SERPL-MCNC: 7.7 MG/DL (ref 8.5–10.1)
CHLORIDE SERPL-SCNC: 114 MMOL/L (ref 94–109)
CO2 SERPL-SCNC: 25 MMOL/L (ref 20–32)
CREAT SERPL-MCNC: 0.61 MG/DL (ref 0.52–1.04)
DIFFERENTIAL METHOD BLD: ABNORMAL
EOSINOPHIL # BLD AUTO: 0 10E9/L (ref 0–0.7)
EOSINOPHIL NFR BLD AUTO: 0 %
ERYTHROCYTE [DISTWIDTH] IN BLOOD BY AUTOMATED COUNT: 18.9 % (ref 10–15)
GFR SERPL CREATININE-BSD FRML MDRD: ABNORMAL ML/MIN/1.7M2
GLUCOSE SERPL-MCNC: 101 MG/DL (ref 70–99)
HCT VFR BLD AUTO: 27.1 % (ref 35–47)
HGB BLD-MCNC: 8.7 G/DL (ref 11.7–15.7)
IMM GRANULOCYTES # BLD: 0 10E9/L (ref 0–0.4)
IMM GRANULOCYTES NFR BLD: 0.4 %
LYMPHOCYTES # BLD AUTO: 0.5 10E9/L (ref 0.8–5.3)
LYMPHOCYTES NFR BLD AUTO: 5.5 %
MAGNESIUM SERPL-MCNC: 2.1 MG/DL (ref 1.6–2.3)
MCH RBC QN AUTO: 32.1 PG (ref 26.5–33)
MCHC RBC AUTO-ENTMCNC: 32.1 G/DL (ref 31.5–36.5)
MCV RBC AUTO: 100 FL (ref 78–100)
MICRO REPORT STATUS: NORMAL
MONOCYTES # BLD AUTO: 0.3 10E9/L (ref 0–1.3)
MONOCYTES NFR BLD AUTO: 3.1 %
NEUTROPHILS # BLD AUTO: 7.6 10E9/L (ref 1.6–8.3)
NEUTROPHILS NFR BLD AUTO: 91 %
NRBC # BLD AUTO: 0 10*3/UL
NRBC BLD AUTO-RTO: 0 /100
PLATELET # BLD AUTO: 158 10E9/L (ref 150–450)
POTASSIUM SERPL-SCNC: 3.7 MMOL/L (ref 3.4–5.3)
POTASSIUM SERPL-SCNC: 3.9 MMOL/L (ref 3.4–5.3)
RBC # BLD AUTO: 2.71 10E12/L (ref 3.8–5.2)
SODIUM SERPL-SCNC: 145 MMOL/L (ref 133–144)
SODIUM SERPL-SCNC: 146 MMOL/L (ref 133–144)
SPECIMEN SOURCE: NORMAL
WBC # BLD AUTO: 8.4 10E9/L (ref 4–11)

## 2017-03-17 PROCEDURE — 40000133 ZZH STATISTIC OT WARD VISIT: Performed by: OCCUPATIONAL THERAPIST

## 2017-03-17 PROCEDURE — 80048 BASIC METABOLIC PNL TOTAL CA: CPT | Performed by: NURSE PRACTITIONER

## 2017-03-17 PROCEDURE — 85025 COMPLETE CBC W/AUTO DIFF WBC: CPT | Performed by: NURSE PRACTITIONER

## 2017-03-17 PROCEDURE — 25000131 ZZH RX MED GY IP 250 OP 636 PS 637: Performed by: NURSE PRACTITIONER

## 2017-03-17 PROCEDURE — 25000128 H RX IP 250 OP 636: Performed by: NURSE PRACTITIONER

## 2017-03-17 PROCEDURE — 97110 THERAPEUTIC EXERCISES: CPT | Mod: GO | Performed by: OCCUPATIONAL THERAPIST

## 2017-03-17 PROCEDURE — 83735 ASSAY OF MAGNESIUM: CPT | Performed by: NURSE PRACTITIONER

## 2017-03-17 PROCEDURE — 25000132 ZZH RX MED GY IP 250 OP 250 PS 637: Performed by: INTERNAL MEDICINE

## 2017-03-17 PROCEDURE — 25000125 ZZHC RX 250: Performed by: STUDENT IN AN ORGANIZED HEALTH CARE EDUCATION/TRAINING PROGRAM

## 2017-03-17 PROCEDURE — 84132 ASSAY OF SERUM POTASSIUM: CPT | Performed by: NURSE PRACTITIONER

## 2017-03-17 PROCEDURE — 25000132 ZZH RX MED GY IP 250 OP 250 PS 637: Performed by: NURSE PRACTITIONER

## 2017-03-17 PROCEDURE — S5010 5% DEXTROSE AND 0.45% SALINE: HCPCS | Performed by: NURSE PRACTITIONER

## 2017-03-17 PROCEDURE — 25000125 ZZHC RX 250: Performed by: NURSE PRACTITIONER

## 2017-03-17 PROCEDURE — 25000128 H RX IP 250 OP 636: Performed by: STUDENT IN AN ORGANIZED HEALTH CARE EDUCATION/TRAINING PROGRAM

## 2017-03-17 PROCEDURE — 84295 ASSAY OF SERUM SODIUM: CPT | Performed by: NURSE PRACTITIONER

## 2017-03-17 PROCEDURE — 25800025 ZZH RX 258: Performed by: NURSE PRACTITIONER

## 2017-03-17 PROCEDURE — 20600000 ZZH R&B BMT

## 2017-03-17 RX ORDER — SENNOSIDES 8.6 MG
8.6 TABLET ORAL 2 TIMES DAILY PRN
Status: DISCONTINUED | OUTPATIENT
Start: 2017-03-17 | End: 2017-03-23 | Stop reason: HOSPADM

## 2017-03-17 RX ORDER — AMOXICILLIN 250 MG
1 CAPSULE ORAL AT BEDTIME
Status: DISCONTINUED | OUTPATIENT
Start: 2017-03-17 | End: 2017-03-18

## 2017-03-17 RX ADMIN — OXYCODONE HYDROCHLORIDE 5 MG: 5 TABLET ORAL at 22:08

## 2017-03-17 RX ADMIN — MESNA 490 MG: 100 INJECTION, SOLUTION INTRAVENOUS at 12:53

## 2017-03-17 RX ADMIN — MESNA 490 MG: 100 INJECTION, SOLUTION INTRAVENOUS at 22:08

## 2017-03-17 RX ADMIN — DEXTROSE AND SODIUM CHLORIDE 1000 ML: 5; 450 INJECTION, SOLUTION INTRAVENOUS at 03:51

## 2017-03-17 RX ADMIN — MICAFUNGIN SODIUM 50 MG: 10 INJECTION, POWDER, LYOPHILIZED, FOR SOLUTION INTRAVENOUS at 06:03

## 2017-03-17 RX ADMIN — ALLOPURINOL 300 MG: 300 TABLET ORAL at 08:33

## 2017-03-17 RX ADMIN — SENNOSIDES 8.6 MG: 8.6 TABLET, FILM COATED ORAL at 15:45

## 2017-03-17 RX ADMIN — GABAPENTIN 200 MG: 100 CAPSULE ORAL at 19:50

## 2017-03-17 RX ADMIN — DEXTROSE AND SODIUM CHLORIDE 1000 ML: 5; 450 INJECTION, SOLUTION INTRAVENOUS at 10:24

## 2017-03-17 RX ADMIN — ETOPOSIDE 285 MG: 20 INJECTION, SOLUTION, CONCENTRATE INTRAVENOUS at 12:50

## 2017-03-17 RX ADMIN — GABAPENTIN 200 MG: 100 CAPSULE ORAL at 13:06

## 2017-03-17 RX ADMIN — MESNA 490 MG: 100 INJECTION, SOLUTION INTRAVENOUS at 15:45

## 2017-03-17 RX ADMIN — ONDANSETRON HYDROCHLORIDE 8 MG: 8 TABLET, FILM COATED ORAL at 13:06

## 2017-03-17 RX ADMIN — ENOXAPARIN SODIUM 40 MG: 40 INJECTION SUBCUTANEOUS at 19:50

## 2017-03-17 RX ADMIN — DEXTROSE AND SODIUM CHLORIDE 1000 ML: 5; 450 INJECTION, SOLUTION INTRAVENOUS at 18:32

## 2017-03-17 RX ADMIN — MESNA 490 MG: 100 INJECTION, SOLUTION INTRAVENOUS at 18:32

## 2017-03-17 RX ADMIN — PANTOPRAZOLE SODIUM 40 MG: 40 TABLET, DELAYED RELEASE ORAL at 08:32

## 2017-03-17 RX ADMIN — ONDANSETRON HYDROCHLORIDE 8 MG: 8 TABLET, FILM COATED ORAL at 22:08

## 2017-03-17 RX ADMIN — CYCLOPHOSPHAMIDE 2445 MG: 2 INJECTION, POWDER, FOR SOLUTION INTRAVENOUS; ORAL at 10:21

## 2017-03-17 RX ADMIN — DEXAMETHASONE 10 MG: 2 TABLET ORAL at 08:33

## 2017-03-17 RX ADMIN — Medication 12.5 MG: at 22:08

## 2017-03-17 RX ADMIN — MESNA 490 MG: 100 INJECTION, SOLUTION INTRAVENOUS at 09:53

## 2017-03-17 RX ADMIN — ONDANSETRON HYDROCHLORIDE 8 MG: 8 TABLET, FILM COATED ORAL at 07:16

## 2017-03-17 RX ADMIN — FUROSEMIDE 10 MG: 10 INJECTION, SOLUTION INTRAVENOUS at 10:28

## 2017-03-17 RX ADMIN — OXYCODONE HYDROCHLORIDE 5 MG: 5 TABLET ORAL at 13:06

## 2017-03-17 RX ADMIN — SENNOSIDES AND DOCUSATE SODIUM 1 TABLET: 8.6; 5 TABLET ORAL at 22:08

## 2017-03-17 RX ADMIN — GABAPENTIN 200 MG: 100 CAPSULE ORAL at 08:32

## 2017-03-17 RX ADMIN — VENLAFAXINE HYDROCHLORIDE 150 MG: 150 TABLET, EXTENDED RELEASE ORAL at 22:08

## 2017-03-17 ASSESSMENT — PAIN DESCRIPTION - DESCRIPTORS
DESCRIPTORS: ACHING;SORE
DESCRIPTORS: ACHING
DESCRIPTORS: ACHING

## 2017-03-17 NOTE — PROGRESS NOTES
"BMT Daily Progress Note    Date of Service:3/17/2017    History of Present Illness:  Ms. Agee is day -5 of her transplant prep.    Up every 2 hours d/t Cytoxan flush protocol. Feels somewhat swollen in her face with all the fluids. Intermittent nausea without emesis. No diarrhea. No SOB/CP. No bleeding. No other complaints this morning.      Review of Systems:  ROS otherwise unremarkable other than what is noted in the HPI.     Physical Exam:    /66 (BP Location: Right arm)  Pulse 74  Temp 96.9  F (36.1  C) (Axillary)  Resp 16  Ht 1.575 m (5' 2.01\")  Wt 83.3 kg (183 lb 9.6 oz)  SpO2 100%  BMI 33.57 kg/m2    General: A&O. NAD.   HEENT: CALEB, sclera anicteric  Neck: supple  Lungs: CTAB.  No wheezes or crackles.    Heart: RRR, no m/r/g  Abdomen: +BS, soft, NT/ND, no HSM  Extremities: No edema  Skin: no rashes or petechiae  Neurologic: Nonfocal  RAC: [place] nontender, no erythema or drainage    Laboratory Data:  Lab Results   Component Value Date    WBC 8.4 03/17/2017    ANEU 7.6 03/17/2017    HGB 8.7 (L) 03/17/2017    HCT 27.1 (L) 03/17/2017     03/17/2017     (H) 03/17/2017    POTASSIUM 3.9 03/17/2017    CHLORIDE 114 (H) 03/17/2017    CO2 25 03/17/2017     (H) 03/17/2017    BUN 9 03/17/2017    CR 0.61 03/17/2017    MAG 2.1 03/17/2017    INR 0.93 03/15/2017       Assessment/Plan:  Steph Agee is a 42 year old female with Hodgkins disease admitted for auto transplant.  Prep:  D-6: BCNU, -16, Cytoxan  D-5-D-4: -16, Cytoxan  D-3: Cytoxan  D-2-D-1: Rest  3/22 Transplant     1. BMT: Prep as above. Allopurinol through day 0. Flush and pre-meds prior to transplant. GCSF starts d+5 and cont to until ANC>1500 x3 consecutive days. Re-stage per protocol.     2. HEME: Keep Hgb>8 and plts>10K. No pre-meds.  -On Lovenox 40mg QD d/t hx of chronic IJ clot recently noted on 3/3 U/s      3. ID: Afebrile. Cont Levo, Sharron/Fluc(hx of marijuana use), and HD ACV (CMV+) prophy. Bactrim or appropriate PCP " therapy to start d+28.       4.  GI: Zofran and dexamethasone prior to chemo to prevent N/V. Ativan and compazine available PRN break-through symptoms. Protonix for GI prophy.  Constipation: add senna-s qHS and prn     5. FEN/Renal: Monitor creat and lytes. Replete lytes PRN per SS. Monitor weight, I+O, lytes per protocol with IVF flush.   Na 146? 1600 Na pending.      6. CV: Cleared by cardiology d/t drop in EF. Cardiac MRI confirmed EF 47%. Started on cozaar 12.5 daily.     7. Psych: Hx of anxiety. Previously smoked daily marijuana and took ativan qHS. Ativan too sedating for daily use-per pt. Already on effexor, increase to 150mg daily (75).     Debra Black NP  [phone]     Attending Note:     The patient was seen and examined by me separate from mid-level provider.   I personally reviewed the today's laboratory results, vital signs and radiology results.     Nausea but no emesis. PRN medication helped some. No bowel movement since admission. Improving BM harvest site pain. I found that vitals are stable and there was no fever. No acute distress. Patient was alert and oriented and grossly neurologically intact. Mouth is clean. Line insertion non-tender and clear. Lungs clear bilateral. Heart regular. Abdomen soft non-tender, BS present. Lower extremity with no edema. No rash. BM harvest site and clean and healed.     Labs were   Lab Results   Component Value Date    WBC 8.4 03/17/2017    ANEU 7.6 03/17/2017    HGB 8.7 (L) 03/17/2017    HCT 27.1 (L) 03/17/2017     03/17/2017     (H) 03/17/2017    POTASSIUM 3.9 03/17/2017    CHLORIDE 114 (H) 03/17/2017    CO2 25 03/17/2017     (H) 03/17/2017    BUN 9 03/17/2017    CR 0.61 03/17/2017    MAG 2.1 03/17/2017    INR 0.93 03/15/2017     Plan is to continue chemo as planned. Start laxative and stool softener. May continue to use zofran and ativan for nausea and anxiety. Continue IV fluids but may need to be changed to D5% if sodium further elevated  this PM. Continue to monitor Na and weight.       Connor Barajas

## 2017-03-17 NOTE — PLAN OF CARE
Problem: Goal Outcome Summary  Goal: Goal Outcome Summary  OT 5C: Recommend discharge to home with independent daily exercise.  Pt receptive to education on lab values and implications for exercise.  Tolerates 17 minutes LE ergometer.  Issued ergometer to pt room.  VSS.

## 2017-03-17 NOTE — PROGRESS NOTES
Type of chemo infused: Etoposide  Pt tolerated infusion: Tolerated well, no complaints  Interventions: Blood return noted pre and post infusion, scheduled zofran  Response to interventions used: N/a  Plan: Etoposide and cytoxan today, continue chemo regimen

## 2017-03-17 NOTE — PLAN OF CARE
Problem: Goal Outcome Summary  Goal: Goal Outcome Summary  Outcome: No Change  Pt afeb, vss, right side harvest site soreness oxycodone x 1 and heat with improvement. Cytoxan and etoposide given with no complaints. Lasix x 1 for not meeting 2 hour void parameter, good response. Pt reports fatigue and decreased appetite, eating fair amount at each meal. Cytoxan fluid flush @ 225ml/hr. Plan for etoposide tonight. Continue to monitor per poc.    Problem: Blood and Marrow Transplantation  Goal: Blood and Marrow Transplantation  Signs and symptoms of listed problems will be absent or manageable.   Outcome: No Change    03/17/17 1800   Blood and Marrow Transplantation   Blood and Marrow Transplantation Assessed all   Blood and Marrow Transplantation Present fluid imbalances;metabolic imbalances

## 2017-03-17 NOTE — PLAN OF CARE
Problem: Goal Outcome Summary  Goal: Goal Outcome Summary  Outcome: No Change  Afeb, soft BP's (90's/60's)- baseline per pt.  Evening cozaar dose held d/t bp being 92/62.  Received etoposide without incident.  Meeting cytoxan flush void parameters.  Flush continues at 225 mL/hr.  Oxycodone x1 for bilateral hip pain with relief.  No replacements needed.  Will get cytoxan and etoposide today.  Continue to monitor and continue current plan of care.    Problem: Blood and Marrow Transplantation  Goal: Blood and Marrow Transplantation  Signs and symptoms of listed problems will be absent or manageable.     03/17/17 0617   Blood and Marrow Transplantation   Blood and Marrow Transplantation Assessed all   Blood and Marrow Transplantation Present acute pain

## 2017-03-18 LAB
ABO + RH BLD: NORMAL
ABO + RH BLD: NORMAL
ANION GAP SERPL CALCULATED.3IONS-SCNC: 8 MMOL/L (ref 3–14)
BASOPHILS # BLD AUTO: 0 10E9/L (ref 0–0.2)
BASOPHILS NFR BLD AUTO: 0 %
BLD GP AB SCN SERPL QL: NORMAL
BLD PROD TYP BPU: NORMAL
BLD PROD TYP BPU: NORMAL
BLD UNIT ID BPU: 0
BLOOD BANK CMNT PATIENT-IMP: NORMAL
BLOOD PRODUCT CODE: NORMAL
BPU ID: NORMAL
BUN SERPL-MCNC: 8 MG/DL (ref 7–30)
CALCIUM SERPL-MCNC: 7.6 MG/DL (ref 8.5–10.1)
CHLORIDE SERPL-SCNC: 115 MMOL/L (ref 94–109)
CO2 SERPL-SCNC: 25 MMOL/L (ref 20–32)
CREAT SERPL-MCNC: 0.56 MG/DL (ref 0.52–1.04)
DIFFERENTIAL METHOD BLD: ABNORMAL
EOSINOPHIL # BLD AUTO: 0 10E9/L (ref 0–0.7)
EOSINOPHIL NFR BLD AUTO: 0 %
ERYTHROCYTE [DISTWIDTH] IN BLOOD BY AUTOMATED COUNT: 19.2 % (ref 10–15)
GFR SERPL CREATININE-BSD FRML MDRD: ABNORMAL ML/MIN/1.7M2
GLUCOSE SERPL-MCNC: 96 MG/DL (ref 70–99)
HCT VFR BLD AUTO: 24.2 % (ref 35–47)
HGB BLD-MCNC: 7.8 G/DL (ref 11.7–15.7)
IMM GRANULOCYTES # BLD: 0 10E9/L (ref 0–0.4)
IMM GRANULOCYTES NFR BLD: 0.2 %
LYMPHOCYTES # BLD AUTO: 0.2 10E9/L (ref 0.8–5.3)
LYMPHOCYTES NFR BLD AUTO: 4.9 %
MCH RBC QN AUTO: 32.4 PG (ref 26.5–33)
MCHC RBC AUTO-ENTMCNC: 32.2 G/DL (ref 31.5–36.5)
MCV RBC AUTO: 100 FL (ref 78–100)
MONOCYTES # BLD AUTO: 0.2 10E9/L (ref 0–1.3)
MONOCYTES NFR BLD AUTO: 3.6 %
NEUTROPHILS # BLD AUTO: 4.5 10E9/L (ref 1.6–8.3)
NEUTROPHILS NFR BLD AUTO: 91.3 %
NRBC # BLD AUTO: 0 10*3/UL
NRBC BLD AUTO-RTO: 0 /100
NUM BPU REQUESTED: 1
PLATELET # BLD AUTO: 124 10E9/L (ref 150–450)
POTASSIUM SERPL-SCNC: 3.5 MMOL/L (ref 3.4–5.3)
POTASSIUM SERPL-SCNC: 3.6 MMOL/L (ref 3.4–5.3)
RBC # BLD AUTO: 2.41 10E12/L (ref 3.8–5.2)
SODIUM SERPL-SCNC: 139 MMOL/L (ref 133–144)
SODIUM SERPL-SCNC: 140 MMOL/L (ref 133–144)
SODIUM SERPL-SCNC: 141 MMOL/L (ref 133–144)
SODIUM SERPL-SCNC: 148 MMOL/L (ref 133–144)
SPECIMEN EXP DATE BLD: NORMAL
TRANSFUSION STATUS PATIENT QL: NORMAL
TRANSFUSION STATUS PATIENT QL: NORMAL
WBC # BLD AUTO: 4.9 10E9/L (ref 4–11)

## 2017-03-18 PROCEDURE — 84295 ASSAY OF SERUM SODIUM: CPT | Performed by: INTERNAL MEDICINE

## 2017-03-18 PROCEDURE — 86900 BLOOD TYPING SEROLOGIC ABO: CPT | Performed by: NURSE PRACTITIONER

## 2017-03-18 PROCEDURE — 86923 COMPATIBILITY TEST ELECTRIC: CPT | Performed by: NURSE PRACTITIONER

## 2017-03-18 PROCEDURE — 84295 ASSAY OF SERUM SODIUM: CPT | Performed by: PHYSICIAN ASSISTANT

## 2017-03-18 PROCEDURE — 80048 BASIC METABOLIC PNL TOTAL CA: CPT | Performed by: NURSE PRACTITIONER

## 2017-03-18 PROCEDURE — 25000128 H RX IP 250 OP 636: Performed by: NURSE PRACTITIONER

## 2017-03-18 PROCEDURE — 25000125 ZZHC RX 250: Performed by: NURSE PRACTITIONER

## 2017-03-18 PROCEDURE — 86901 BLOOD TYPING SEROLOGIC RH(D): CPT | Performed by: NURSE PRACTITIONER

## 2017-03-18 PROCEDURE — 86850 RBC ANTIBODY SCREEN: CPT | Performed by: NURSE PRACTITIONER

## 2017-03-18 PROCEDURE — 25000132 ZZH RX MED GY IP 250 OP 250 PS 637: Performed by: PHYSICIAN ASSISTANT

## 2017-03-18 PROCEDURE — 25000132 ZZH RX MED GY IP 250 OP 250 PS 637: Performed by: INTERNAL MEDICINE

## 2017-03-18 PROCEDURE — S5010 5% DEXTROSE AND 0.45% SALINE: HCPCS | Performed by: PHYSICIAN ASSISTANT

## 2017-03-18 PROCEDURE — 25800025 ZZH RX 258: Performed by: NURSE PRACTITIONER

## 2017-03-18 PROCEDURE — 20600000 ZZH R&B BMT

## 2017-03-18 PROCEDURE — S5010 5% DEXTROSE AND 0.45% SALINE: HCPCS | Performed by: NURSE PRACTITIONER

## 2017-03-18 PROCEDURE — 25000125 ZZHC RX 250: Performed by: INTERNAL MEDICINE

## 2017-03-18 PROCEDURE — 25000125 ZZHC RX 250: Performed by: PHYSICIAN ASSISTANT

## 2017-03-18 PROCEDURE — 25800025 ZZH RX 258: Performed by: PHYSICIAN ASSISTANT

## 2017-03-18 PROCEDURE — P9040 RBC LEUKOREDUCED IRRADIATED: HCPCS | Performed by: NURSE PRACTITIONER

## 2017-03-18 PROCEDURE — 25000125 ZZHC RX 250: Performed by: STUDENT IN AN ORGANIZED HEALTH CARE EDUCATION/TRAINING PROGRAM

## 2017-03-18 PROCEDURE — 25000128 H RX IP 250 OP 636: Performed by: PHYSICIAN ASSISTANT

## 2017-03-18 PROCEDURE — 25000132 ZZH RX MED GY IP 250 OP 250 PS 637: Performed by: NURSE PRACTITIONER

## 2017-03-18 PROCEDURE — 84295 ASSAY OF SERUM SODIUM: CPT | Performed by: NURSE PRACTITIONER

## 2017-03-18 PROCEDURE — 85025 COMPLETE CBC W/AUTO DIFF WBC: CPT | Performed by: NURSE PRACTITIONER

## 2017-03-18 PROCEDURE — 25000131 ZZH RX MED GY IP 250 OP 636 PS 637: Performed by: NURSE PRACTITIONER

## 2017-03-18 PROCEDURE — 84132 ASSAY OF SERUM POTASSIUM: CPT | Performed by: NURSE PRACTITIONER

## 2017-03-18 PROCEDURE — 25000128 H RX IP 250 OP 636: Performed by: STUDENT IN AN ORGANIZED HEALTH CARE EDUCATION/TRAINING PROGRAM

## 2017-03-18 RX ORDER — DEXTROSE MONOHYDRATE 50 MG/ML
INJECTION, SOLUTION INTRAVENOUS CONTINUOUS
Status: DISCONTINUED | OUTPATIENT
Start: 2017-03-18 | End: 2017-03-18

## 2017-03-18 RX ORDER — FUROSEMIDE 10 MG/ML
20 INJECTION INTRAMUSCULAR; INTRAVENOUS ONCE
Status: DISCONTINUED | OUTPATIENT
Start: 2017-03-18 | End: 2017-03-18

## 2017-03-18 RX ORDER — POLYETHYLENE GLYCOL 3350 17 G/17G
17 POWDER, FOR SOLUTION ORAL DAILY PRN
Status: DISCONTINUED | OUTPATIENT
Start: 2017-03-18 | End: 2017-03-23 | Stop reason: HOSPADM

## 2017-03-18 RX ORDER — FUROSEMIDE 10 MG/ML
20 INJECTION INTRAMUSCULAR; INTRAVENOUS ONCE
Status: COMPLETED | OUTPATIENT
Start: 2017-03-18 | End: 2017-03-18

## 2017-03-18 RX ORDER — AMOXICILLIN 250 MG
1 CAPSULE ORAL 2 TIMES DAILY
Status: DISCONTINUED | OUTPATIENT
Start: 2017-03-18 | End: 2017-03-21

## 2017-03-18 RX ADMIN — GABAPENTIN 200 MG: 100 CAPSULE ORAL at 19:38

## 2017-03-18 RX ADMIN — SENNOSIDES AND DOCUSATE SODIUM 1 TABLET: 8.6; 5 TABLET ORAL at 19:38

## 2017-03-18 RX ADMIN — ONDANSETRON HYDROCHLORIDE 8 MG: 8 TABLET, FILM COATED ORAL at 22:22

## 2017-03-18 RX ADMIN — ENOXAPARIN SODIUM 40 MG: 40 INJECTION SUBCUTANEOUS at 19:38

## 2017-03-18 RX ADMIN — OXYCODONE HYDROCHLORIDE 5 MG: 5 TABLET ORAL at 17:24

## 2017-03-18 RX ADMIN — DEXTROSE MONOHYDRATE: 50 INJECTION, SOLUTION INTRAVENOUS at 10:07

## 2017-03-18 RX ADMIN — PANTOPRAZOLE SODIUM 40 MG: 40 TABLET, DELAYED RELEASE ORAL at 08:54

## 2017-03-18 RX ADMIN — MESNA 490 MG: 100 INJECTION, SOLUTION INTRAVENOUS at 16:12

## 2017-03-18 RX ADMIN — GABAPENTIN 200 MG: 100 CAPSULE ORAL at 08:55

## 2017-03-18 RX ADMIN — LORAZEPAM 0.5 MG: 2 INJECTION INTRAMUSCULAR; INTRAVENOUS at 06:41

## 2017-03-18 RX ADMIN — FUROSEMIDE 20 MG: 10 INJECTION, SOLUTION INTRAVENOUS at 09:08

## 2017-03-18 RX ADMIN — ACYCLOVIR 800 MG: 800 TABLET ORAL at 17:24

## 2017-03-18 RX ADMIN — GABAPENTIN 200 MG: 100 CAPSULE ORAL at 13:56

## 2017-03-18 RX ADMIN — SENNOSIDES 8.6 MG: 8.6 TABLET, FILM COATED ORAL at 14:02

## 2017-03-18 RX ADMIN — ETOPOSIDE 285 MG: 20 INJECTION, SOLUTION, CONCENTRATE INTRAVENOUS at 12:18

## 2017-03-18 RX ADMIN — DEXTROSE AND SODIUM CHLORIDE 1000 ML: 5; 450 INJECTION, SOLUTION INTRAVENOUS at 08:55

## 2017-03-18 RX ADMIN — ACYCLOVIR 800 MG: 800 TABLET ORAL at 08:54

## 2017-03-18 RX ADMIN — ALLOPURINOL 300 MG: 300 TABLET ORAL at 08:55

## 2017-03-18 RX ADMIN — DEXAMETHASONE 10 MG: 2 TABLET ORAL at 08:54

## 2017-03-18 RX ADMIN — LORAZEPAM 0.5 MG: 2 INJECTION INTRAMUSCULAR; INTRAVENOUS at 19:38

## 2017-03-18 RX ADMIN — MESNA 490 MG: 100 INJECTION, SOLUTION INTRAVENOUS at 18:46

## 2017-03-18 RX ADMIN — MESNA 490 MG: 100 INJECTION, SOLUTION INTRAVENOUS at 13:57

## 2017-03-18 RX ADMIN — MICAFUNGIN SODIUM 50 MG: 10 INJECTION, POWDER, LYOPHILIZED, FOR SOLUTION INTRAVENOUS at 06:47

## 2017-03-18 RX ADMIN — ACYCLOVIR 800 MG: 800 TABLET ORAL at 22:21

## 2017-03-18 RX ADMIN — ACYCLOVIR 800 MG: 800 TABLET ORAL at 12:24

## 2017-03-18 RX ADMIN — LORAZEPAM 0.5 MG: 0.5 TABLET ORAL at 10:18

## 2017-03-18 RX ADMIN — FUROSEMIDE 20 MG: 10 INJECTION, SOLUTION INTRAVENOUS at 15:16

## 2017-03-18 RX ADMIN — CYCLOPHOSPHAMIDE 2445 MG: 2 INJECTION, POWDER, FOR SOLUTION INTRAVENOUS; ORAL at 10:04

## 2017-03-18 RX ADMIN — ETOPOSIDE 285 MG: 20 INJECTION, SOLUTION, CONCENTRATE INTRAVENOUS at 00:16

## 2017-03-18 RX ADMIN — DEXTROSE AND SODIUM CHLORIDE: 5; 200 INJECTION, SOLUTION INTRAVENOUS at 22:21

## 2017-03-18 RX ADMIN — DEXTROSE AND SODIUM CHLORIDE 1000 ML: 5; 450 INJECTION, SOLUTION INTRAVENOUS at 00:19

## 2017-03-18 RX ADMIN — ONDANSETRON HYDROCHLORIDE 8 MG: 8 TABLET, FILM COATED ORAL at 13:57

## 2017-03-18 RX ADMIN — ACYCLOVIR 800 MG: 800 TABLET ORAL at 14:30

## 2017-03-18 RX ADMIN — VENLAFAXINE HYDROCHLORIDE 150 MG: 150 TABLET, EXTENDED RELEASE ORAL at 22:22

## 2017-03-18 RX ADMIN — MESNA 490 MG: 100 INJECTION, SOLUTION INTRAVENOUS at 09:48

## 2017-03-18 RX ADMIN — ONDANSETRON HYDROCHLORIDE 8 MG: 8 TABLET, FILM COATED ORAL at 06:42

## 2017-03-18 RX ADMIN — MESNA 490 MG: 100 INJECTION, SOLUTION INTRAVENOUS at 22:21

## 2017-03-18 NOTE — PLAN OF CARE
Problem: Goal Outcome Summary  Goal: Goal Outcome Summary  Outcome: No Change  Pt afeb, vss, headache this afternoon oxycodone x 1. Nausea this am, ativan x 1 with improvement. Pt reports no appetite, eating each meal fair amount. Lasix 20mg x 2 for increased weight/not meeting cytoxan flush void parameters. Na+ and K+ stable. Good response to lasix. Senna x 1, hypoactive bs, passing flatus, 2x small hard bm, plan for prn senna this evening. Cytoxan/etoposide today, tolerating well. Cytoxan fluid flush D5 @ 175ml/hr. Pt offers no other complaints. Continue to monitor per poc.    Problem: Blood and Marrow Transplantation  Goal: Blood and Marrow Transplantation  Signs and symptoms of listed problems will be absent or manageable.   Outcome: No Change    03/18/17 1740   Blood and Marrow Transplantation   Blood and Marrow Transplantation Assessed all   Blood and Marrow Transplantation Present acute pain;fluid imbalances;nausea/vomiting

## 2017-03-18 NOTE — PLAN OF CARE
Problem: Blood and Marrow Transplantation  Goal: Blood and Marrow Transplantation  Signs and symptoms of listed problems will be absent or manageable.   Outcome: No Change    03/18/17 0656   Blood and Marrow Transplantation   Blood and Marrow Transplantation Assessed all   Blood and Marrow Transplantation Present acute pain;nausea/vomiting      Pt continues to experience nausea; she had one emesis this AM and was given PRN IV Ativan and scheduled Zofran.  1 unit RBCs given.  VSS.

## 2017-03-18 NOTE — PROGRESS NOTES
"BMT Daily Progress Note    Date of Service:3/18/2017  Steph Agee is a 42 year old female with Hodgkins disease who is day-4 pre auto for Hodgkins disease.  History of Present Illness:  Had one emesis this am but nausea overall controlled.  Has not had a stool in several days but does not feel constipated. Denies abdominal pain or cramping. She is passing gas. Her appetite is decreased so she is eating less. No cough or sob.  No bleeding.  No rash.      Review of Systems:  ROS otherwise unremarkable other than what is noted in the HPI.     Physical Exam:    /77  Pulse 77  Temp 97  F (36.1  C) (Axillary)  Resp 16  Ht 1.575 m (5' 2.01\")  Wt 84.3 kg (185 lb 14.4 oz)  SpO2 99%  BMI 33.99 kg/m2    General: A&O. NAD.   HEENT: CALEB, sclera anicteric, face appears edematous  Neck: supple  Lungs: CTAB.  No wheezes or crackles.    Heart: RRR, no m/r/g  Abdomen: +BS, soft, NT/ND, no HSM  Extremities: trace edema in distal LE  Skin: no rashes or petechiae  Neurologic: Nonfocal  RAC: [place]: nontender, no erythema or drainage    Laboratory Data:  Lab Results   Component Value Date    WBC 4.9 03/18/2017    ANEU 4.5 03/18/2017    HGB 7.8 (L) 03/18/2017    HCT 24.2 (L) 03/18/2017     (L) 03/18/2017     (H) 03/18/2017    POTASSIUM 3.5 03/18/2017    CHLORIDE 115 (H) 03/18/2017    CO2 25 03/18/2017    GLC 96 03/18/2017    BUN 8 03/18/2017    CR 0.56 03/18/2017    MAG 2.1 03/17/2017    INR 0.93 03/15/2017       Assessment/Plan:  Steph Agee is a 42 year old female with Hodgkins disease who is day-4 pre auto for Hodgkins disease.Prep:  D-6: BCNU, -16, Cytoxan  D-5-D-4: -16, Cytoxan  D-3: Cytoxan  D-2-D-1: Rest  3/22 Transplant     1. BMT: Prep as above, will get -16 and cytoxan today with flush. Allopurinol through day 0. Flush and pre-meds prior to transplant. GCSF starts d+5 and cont to until ANC>1500 x3 consecutive days. Re-stage per protocol.     2. HEME: Keep Hgb>8 and plts>10K. No pre-meds.  RBC today " for hgb=7.8.  Counts trending down but not yet neutropenic.  -On Lovenox 40mg QD d/t hx of chronic IJ clot recently noted on 3/3 U/s.  Platelets=124.      3. ID: Afebrile. Cont Sharron/Fluc(hx of marijuana use), and HD ACV starts today (CMV+) prophy.Levaquin starts on day-1. Bactrim or appropriate PCP therapy to start d+28.       4.  GI: had emesis x1, is not having nausea all the time.  Zofran and dexamethasone prior to chemo to prevent N/V. Ativan and compazine available PRN break-through symptoms. Protonix for GI prophy.  Constipation: cont senna-s qHS and prn, will change to bid today     5. FEN/Renal: Monitor creat and lytes. Replete lytes PRN per SS. Monitor weight, I+O, lytes per protocol with IVF flush.   Na= 148, will change to d5 and decrease amount of fluid to 175cc/hour.  Recheck NA at 13:00 and 1600.  Lasix 20 already given per cytoxan protoxol but will repeat again this afternoon.  Weight is up 4 kg.     6. CV: Cleared by cardiology d/t drop in EF. Cardiac MRI confirmed EF 47%. Started on cozaar 12.5 daily.     7. Psych: Hx of anxiety. Previously smoked daily marijuana and took ativan qHS. Ativan too sedating for daily use-per pt. Already on effexor, increase to 150mg daily (75).     Sierra Richardson PA-C  [phone]   Attending Note:     The patient was seen and examined by me separate from mid-level provider.   I personally reviewed the today's laboratory results, vital signs and radiology results.     Nausea and emesis. Tired. No stool yes. Willing to consider miralax. Improving BM harvest site pain. I found that vitals are stable and there was no fever. No acute distress. Patient was alert and oriented and grossly neurologically intact. Mouth is clean. Line insertion non-tender and clear. Lungs clear bilateral. Heart regular. Abdomen soft non-tender, BS present. Lower extremity with no edema. No rash. BM harvest site and clean and healed.     Labs were   Lab Results   Component Value Date    WBC 4.9  03/18/2017    ANEU 4.5 03/18/2017    HGB 7.8 (L) 03/18/2017    HCT 24.2 (L) 03/18/2017     (L) 03/18/2017     (H) 03/18/2017    POTASSIUM 3.5 03/18/2017    CHLORIDE 115 (H) 03/18/2017    CO2 25 03/18/2017    GLC 96 03/18/2017    BUN 8 03/18/2017    CR 0.56 03/18/2017    MAG 2.1 03/17/2017    INR 0.93 03/15/2017     Plan is to continue chemo as planned. Miralax today. May continue to use zofran and ativan for nausea and anxiety. Changed fluids to D5% as Na remains in upper 140's. Recheck q12h. Continue to monitor weight.     Connor Barajas

## 2017-03-19 LAB
ANION GAP SERPL CALCULATED.3IONS-SCNC: 5 MMOL/L (ref 3–14)
BASOPHILS # BLD AUTO: 0 10E9/L (ref 0–0.2)
BASOPHILS NFR BLD AUTO: 0 %
BUN SERPL-MCNC: 11 MG/DL (ref 7–30)
CALCIUM SERPL-MCNC: 7.9 MG/DL (ref 8.5–10.1)
CHLORIDE SERPL-SCNC: 109 MMOL/L (ref 94–109)
CO2 SERPL-SCNC: 31 MMOL/L (ref 20–32)
CREAT SERPL-MCNC: 0.62 MG/DL (ref 0.52–1.04)
DIFFERENTIAL METHOD BLD: ABNORMAL
EOSINOPHIL # BLD AUTO: 0 10E9/L (ref 0–0.7)
EOSINOPHIL NFR BLD AUTO: 0 %
ERYTHROCYTE [DISTWIDTH] IN BLOOD BY AUTOMATED COUNT: 19.1 % (ref 10–15)
GFR SERPL CREATININE-BSD FRML MDRD: ABNORMAL ML/MIN/1.7M2
GLUCOSE SERPL-MCNC: 105 MG/DL (ref 70–99)
HCT VFR BLD AUTO: 29.7 % (ref 35–47)
HGB BLD-MCNC: 10 G/DL (ref 11.7–15.7)
IMM GRANULOCYTES # BLD: 0 10E9/L (ref 0–0.4)
IMM GRANULOCYTES NFR BLD: 0.2 %
LYMPHOCYTES # BLD AUTO: 0.1 10E9/L (ref 0.8–5.3)
LYMPHOCYTES NFR BLD AUTO: 2.5 %
MCH RBC QN AUTO: 32.6 PG (ref 26.5–33)
MCHC RBC AUTO-ENTMCNC: 33.7 G/DL (ref 31.5–36.5)
MCV RBC AUTO: 97 FL (ref 78–100)
MONOCYTES # BLD AUTO: 0.1 10E9/L (ref 0–1.3)
MONOCYTES NFR BLD AUTO: 1.5 %
NEUTROPHILS # BLD AUTO: 4.6 10E9/L (ref 1.6–8.3)
NEUTROPHILS NFR BLD AUTO: 95.8 %
NRBC # BLD AUTO: 0 10*3/UL
NRBC BLD AUTO-RTO: 0 /100
PLATELET # BLD AUTO: 130 10E9/L (ref 150–450)
POTASSIUM SERPL-SCNC: 3.4 MMOL/L (ref 3.4–5.3)
POTASSIUM SERPL-SCNC: 3.7 MMOL/L (ref 3.4–5.3)
RBC # BLD AUTO: 3.07 10E12/L (ref 3.8–5.2)
SODIUM SERPL-SCNC: 139 MMOL/L (ref 133–144)
SODIUM SERPL-SCNC: 145 MMOL/L (ref 133–144)
WBC # BLD AUTO: 4.8 10E9/L (ref 4–11)

## 2017-03-19 PROCEDURE — 85025 COMPLETE CBC W/AUTO DIFF WBC: CPT | Performed by: NURSE PRACTITIONER

## 2017-03-19 PROCEDURE — 25000132 ZZH RX MED GY IP 250 OP 250 PS 637: Performed by: NURSE PRACTITIONER

## 2017-03-19 PROCEDURE — 25000125 ZZHC RX 250: Performed by: NURSE PRACTITIONER

## 2017-03-19 PROCEDURE — 80048 BASIC METABOLIC PNL TOTAL CA: CPT | Performed by: NURSE PRACTITIONER

## 2017-03-19 PROCEDURE — 25000132 ZZH RX MED GY IP 250 OP 250 PS 637: Performed by: STUDENT IN AN ORGANIZED HEALTH CARE EDUCATION/TRAINING PROGRAM

## 2017-03-19 PROCEDURE — 25000125 ZZHC RX 250: Performed by: INTERNAL MEDICINE

## 2017-03-19 PROCEDURE — 84295 ASSAY OF SERUM SODIUM: CPT | Performed by: NURSE PRACTITIONER

## 2017-03-19 PROCEDURE — 25000128 H RX IP 250 OP 636: Performed by: NURSE PRACTITIONER

## 2017-03-19 PROCEDURE — 25000132 ZZH RX MED GY IP 250 OP 250 PS 637: Performed by: PHYSICIAN ASSISTANT

## 2017-03-19 PROCEDURE — 25000128 H RX IP 250 OP 636: Performed by: STUDENT IN AN ORGANIZED HEALTH CARE EDUCATION/TRAINING PROGRAM

## 2017-03-19 PROCEDURE — 20600000 ZZH R&B BMT

## 2017-03-19 PROCEDURE — 25000125 ZZHC RX 250: Performed by: STUDENT IN AN ORGANIZED HEALTH CARE EDUCATION/TRAINING PROGRAM

## 2017-03-19 PROCEDURE — 84132 ASSAY OF SERUM POTASSIUM: CPT | Performed by: NURSE PRACTITIONER

## 2017-03-19 PROCEDURE — 25000131 ZZH RX MED GY IP 250 OP 636 PS 637: Performed by: NURSE PRACTITIONER

## 2017-03-19 RX ORDER — LORAZEPAM 0.5 MG/1
0.25 TABLET ORAL
Status: DISCONTINUED | OUTPATIENT
Start: 2017-03-19 | End: 2017-03-23 | Stop reason: HOSPADM

## 2017-03-19 RX ADMIN — MICAFUNGIN SODIUM 50 MG: 10 INJECTION, POWDER, LYOPHILIZED, FOR SOLUTION INTRAVENOUS at 06:26

## 2017-03-19 RX ADMIN — SENNOSIDES AND DOCUSATE SODIUM 1 TABLET: 8.6; 5 TABLET ORAL at 08:24

## 2017-03-19 RX ADMIN — Medication 0.25 MG: at 18:19

## 2017-03-19 RX ADMIN — VENLAFAXINE HYDROCHLORIDE 150 MG: 150 TABLET, EXTENDED RELEASE ORAL at 22:03

## 2017-03-19 RX ADMIN — DEXAMETHASONE 10 MG: 2 TABLET ORAL at 08:24

## 2017-03-19 RX ADMIN — ONDANSETRON HYDROCHLORIDE 8 MG: 8 TABLET, FILM COATED ORAL at 22:03

## 2017-03-19 RX ADMIN — GABAPENTIN 200 MG: 100 CAPSULE ORAL at 13:56

## 2017-03-19 RX ADMIN — MESNA 490 MG: 100 INJECTION, SOLUTION INTRAVENOUS at 09:32

## 2017-03-19 RX ADMIN — CYCLOPHOSPHAMIDE 2445 MG: 2 INJECTION, POWDER, FOR SOLUTION INTRAVENOUS; ORAL at 09:53

## 2017-03-19 RX ADMIN — ACYCLOVIR 800 MG: 800 TABLET ORAL at 11:11

## 2017-03-19 RX ADMIN — ENOXAPARIN SODIUM 40 MG: 40 INJECTION SUBCUTANEOUS at 19:44

## 2017-03-19 RX ADMIN — PANTOPRAZOLE SODIUM 40 MG: 40 TABLET, DELAYED RELEASE ORAL at 09:24

## 2017-03-19 RX ADMIN — GABAPENTIN 200 MG: 100 CAPSULE ORAL at 08:23

## 2017-03-19 RX ADMIN — DEXTROSE AND SODIUM CHLORIDE: 5; 200 INJECTION, SOLUTION INTRAVENOUS at 06:24

## 2017-03-19 RX ADMIN — GABAPENTIN 200 MG: 100 CAPSULE ORAL at 19:44

## 2017-03-19 RX ADMIN — ALLOPURINOL 300 MG: 300 TABLET ORAL at 08:24

## 2017-03-19 RX ADMIN — LORAZEPAM 0.5 MG: 0.5 TABLET ORAL at 05:33

## 2017-03-19 RX ADMIN — Medication 0.25 MG: at 13:04

## 2017-03-19 RX ADMIN — MESNA 490 MG: 100 INJECTION, SOLUTION INTRAVENOUS at 15:39

## 2017-03-19 RX ADMIN — ACYCLOVIR 800 MG: 800 TABLET ORAL at 22:03

## 2017-03-19 RX ADMIN — MESNA 490 MG: 100 INJECTION, SOLUTION INTRAVENOUS at 18:22

## 2017-03-19 RX ADMIN — ACYCLOVIR 800 MG: 800 TABLET ORAL at 13:56

## 2017-03-19 RX ADMIN — ACYCLOVIR 800 MG: 800 TABLET ORAL at 18:19

## 2017-03-19 RX ADMIN — ONDANSETRON HYDROCHLORIDE 8 MG: 8 TABLET, FILM COATED ORAL at 05:33

## 2017-03-19 RX ADMIN — MESNA 490 MG: 100 INJECTION, SOLUTION INTRAVENOUS at 13:04

## 2017-03-19 RX ADMIN — MESNA 490 MG: 100 INJECTION, SOLUTION INTRAVENOUS at 22:03

## 2017-03-19 RX ADMIN — Medication 12.5 MG: at 22:03

## 2017-03-19 RX ADMIN — ACYCLOVIR 800 MG: 800 TABLET ORAL at 08:24

## 2017-03-19 RX ADMIN — LORAZEPAM 0.5 MG: 2 INJECTION INTRAMUSCULAR; INTRAVENOUS at 19:44

## 2017-03-19 RX ADMIN — ONDANSETRON HYDROCHLORIDE 8 MG: 8 TABLET, FILM COATED ORAL at 13:57

## 2017-03-19 RX ADMIN — ETOPOSIDE 285 MG: 20 INJECTION, SOLUTION, CONCENTRATE INTRAVENOUS at 02:06

## 2017-03-19 RX ADMIN — FUROSEMIDE 10 MG: 10 INJECTION, SOLUTION INTRAVENOUS at 11:11

## 2017-03-19 RX ADMIN — DEXTROSE AND SODIUM CHLORIDE: 5; 200 INJECTION, SOLUTION INTRAVENOUS at 13:12

## 2017-03-19 RX ADMIN — LORAZEPAM 0.5 MG: 2 INJECTION INTRAMUSCULAR; INTRAVENOUS at 08:20

## 2017-03-19 NOTE — PLAN OF CARE
Problem: Blood and Marrow Transplantation  Goal: Blood and Marrow Transplantation  Signs and symptoms of listed problems will be absent or manageable.   Outcome: No Change    03/19/17 0710   Blood and Marrow Transplantation   Blood and Marrow Transplantation Assessed all   Blood and Marrow Transplantation Present nausea/vomiting      Pt had quiet night.  She was given PO ativan preventatively this AM.  VSS.

## 2017-03-19 NOTE — PROGRESS NOTES
"BMT Daily Progress Note    Date of Service:3/19/2017  Steph Agee is a 42 year old female with Hodgkins disease who is day-3 pre auto for Hodgkins disease.  History of Present Illness:  Has some nausea, usually in am, no emesis since yesterday am.  Had 2 harder stools yesterday. No abdominal pain or cramping.  Still eating fair per nursing.  She says her appetite is definitely decreased.  No new cough or sob.  No bleeding.  No rash.      Review of Systems:  ROS otherwise unremarkable other than what is noted in the HPI.     Physical Exam:    /79  Pulse 81  Temp 96.9  F (36.1  C) (Axillary)  Resp 16  Ht 1.575 m (5' 2.01\")  Wt 81.2 kg (179 lb 1.6 oz)  SpO2 100%  BMI 32.75 kg/m2    General: A&O. NAD.   HEENT: CALEB, sclera anicteric, face appears less edematous  Neck: supple  Lungs: CTAB.  No wheezes or crackles.    Heart: RRR, no m/r/g  Abdomen: +BS, soft, NT/ND, no HSM  Extremities: trace edema in distal LE, better  Skin: no rashes or petechiae  Neurologic: Nonfocal  RAC: Charly: nontender, no erythema or drainage    Laboratory Data:  Lab Results   Component Value Date    WBC 4.8 03/19/2017    ANEU 4.6 03/19/2017    HGB 10.0 (L) 03/19/2017    HCT 29.7 (L) 03/19/2017     (L) 03/19/2017     (H) 03/19/2017    POTASSIUM 3.4 03/19/2017    CHLORIDE 109 03/19/2017    CO2 31 03/19/2017     (H) 03/19/2017    BUN 11 03/19/2017    CR 0.62 03/19/2017    MAG 2.1 03/17/2017    INR 0.93 03/15/2017       Assessment/Plan:  Steph Agee is a 42 year old female with Hodgkins disease who is day-3 pre auto for Hodgkins disease.Prep:  D-6: BCNU, -16, Cytoxan  D-5-D-4: -16, Cytoxan  D-3: Cytoxan  D-2-D-1: Rest  3/22 Transplant     1. BMT: Prep as above,  Cytoxan/mesna today with flush  Completed -16. Allopurinol through day 0.  GCSF starts d+5 and cont to until ANC>1500 x3 consecutive days. Re-stage per protocol.     2. HEME: Keep Hgb>8 and plts>10K. No pre-meds.  No transfusions today.  Counts trending " down but not yet neutropenic.  -On Lovenox 40mg QD d/t hx of chronic IJ clot recently noted on 3/3 U/s.  Platelets=130.      3. ID: Afebrile. Cont Sharron/vfend(hx of marijuana use), and HD ACV  (CMV+) prophy.Levaquin starts on day-1. Bactrim or appropriate PCP therapy to start d+28.       4.  GI: Having some nausea in am, improved with ativan.  Zofran and dexamethasone prior to chemo to prevent N/V. Ativan and compazine available PRN break-through symptoms. Protonix for GI prophy.  Constipation: Had stools yesterday after increasing senna to bid.     5. FEN/Renal: Monitor creat and lytes. Replete lytes PRN per SS. Monitor weight, I+O, lytes per protocol with IVF flush.   Na= 145, continue d5 + 0.2% saline and continue decreasde amount of fluid to 175cc/hour.  Recheck NA at 1600. Received 2 doses of lasix yesterday and weight down to 81.2 which is at admit weight.  No diuresis today.   6. CV: Cleared by cardiology d/t drop in EF. Cardiac MRI confirmed EF 47%. Continue cozaar 12.5 daily.     7. Psych: Hx of anxiety. Previously smoked daily marijuana and took ativan qHS. Ativan too sedating for daily use-per pt. Already on effexor, increase to 150mg daily (75).     Sierra Richardson PA-C  [phone]   Attending Note:     The patient was seen and examined by me separate from mid-level provider.   I personally reviewed the today's laboratory results, vital signs and radiology results.     Nausea and emesis. Tired.  Had bowel movement x2. Still constipated though. Did not complain of pain at BM harvest site. I found that vitals are stable and there was no fever. No acute distress. Patient was alert and oriented and grossly neurologically intact. Mouth is clean. Line insertion non-tender and clear. Lungs clear bilateral. Heart regular. Abdomen soft non-tender, BS present. Lower extremity with no edema. No rash. BM harvest site and clean and healed.     Labs were   Lab Results   Component Value Date    WBC 4.8 03/19/2017    ANEU 4.6  03/19/2017    HGB 10.0 (L) 03/19/2017    HCT 29.7 (L) 03/19/2017     (L) 03/19/2017     03/19/2017    POTASSIUM 3.7 03/19/2017    CHLORIDE 109 03/19/2017    CO2 31 03/19/2017     (H) 03/19/2017    BUN 11 03/19/2017    CR 0.62 03/19/2017    MAG 2.1 03/17/2017    INR 0.93 03/15/2017     Plan is to continue chemo as planned. Miralax today PRN. May continue to use zofran and ativan for nausea and anxiety. Continue fluids to D5%0.25NS as Na is now back to normal. Recheck q12h. Continue to monitor weight.     Connor Barajas

## 2017-03-19 NOTE — PLAN OF CARE
Problem: Blood and Marrow Transplantation  Goal: Blood and Marrow Transplantation  Signs and symptoms of listed problems will be absent or manageable.   Outcome: No Change  Patient's VSS, afebrile. She continues with intermittant nausea, no emesis. Has had a very large soft stool followed by a few looser stools mixed with urine. Denies abdiominal cramping or pain. Is eating small amts of food. Started on scheduled ativan before meals along with scheduled zofran.   Received High dose Chemotherapy, Cytoxan. Tolerated infusion without problem. She has been meeting the parameters for voiding every 2 hrs except for one time this AM. She then received lasix.   1500 shift Intake > than output by 680ml.

## 2017-03-20 LAB
ALBUMIN SERPL-MCNC: 2.9 G/DL (ref 3.4–5)
ALP SERPL-CCNC: 110 U/L (ref 40–150)
ALT SERPL W P-5'-P-CCNC: 67 U/L (ref 0–50)
ANION GAP SERPL CALCULATED.3IONS-SCNC: 8 MMOL/L (ref 3–14)
AST SERPL W P-5'-P-CCNC: 62 U/L (ref 0–45)
BASOPHILS # BLD AUTO: 0 10E9/L (ref 0–0.2)
BASOPHILS NFR BLD AUTO: 0 %
BILIRUB DIRECT SERPL-MCNC: 0.1 MG/DL (ref 0–0.2)
BILIRUB SERPL-MCNC: 0.5 MG/DL (ref 0.2–1.3)
BUN SERPL-MCNC: 8 MG/DL (ref 7–30)
CALCIUM SERPL-MCNC: 8.2 MG/DL (ref 8.5–10.1)
CHLORIDE SERPL-SCNC: 108 MMOL/L (ref 94–109)
CO2 SERPL-SCNC: 27 MMOL/L (ref 20–32)
CREAT SERPL-MCNC: 0.61 MG/DL (ref 0.52–1.04)
DIFFERENTIAL METHOD BLD: ABNORMAL
EOSINOPHIL # BLD AUTO: 0 10E9/L (ref 0–0.7)
EOSINOPHIL NFR BLD AUTO: 0 %
ERYTHROCYTE [DISTWIDTH] IN BLOOD BY AUTOMATED COUNT: 17.8 % (ref 10–15)
GFR SERPL CREATININE-BSD FRML MDRD: ABNORMAL ML/MIN/1.7M2
GLUCOSE SERPL-MCNC: 94 MG/DL (ref 70–99)
HCT VFR BLD AUTO: 29.8 % (ref 35–47)
HGB BLD-MCNC: 9.9 G/DL (ref 11.7–15.7)
IMM GRANULOCYTES # BLD: 0 10E9/L (ref 0–0.4)
IMM GRANULOCYTES NFR BLD: 0 %
INR PPP: 1.01 (ref 0.86–1.14)
LYMPHOCYTES # BLD AUTO: 0.1 10E9/L (ref 0.8–5.3)
LYMPHOCYTES NFR BLD AUTO: 2.9 %
MAGNESIUM SERPL-MCNC: 2.1 MG/DL (ref 1.6–2.3)
MCH RBC QN AUTO: 31.8 PG (ref 26.5–33)
MCHC RBC AUTO-ENTMCNC: 33.2 G/DL (ref 31.5–36.5)
MCV RBC AUTO: 96 FL (ref 78–100)
MONOCYTES # BLD AUTO: 0 10E9/L (ref 0–1.3)
MONOCYTES NFR BLD AUTO: 1.1 %
NEUTROPHILS # BLD AUTO: 2.7 10E9/L (ref 1.6–8.3)
NEUTROPHILS NFR BLD AUTO: 96 %
NRBC # BLD AUTO: 0 10*3/UL
NRBC BLD AUTO-RTO: 0 /100
PHOSPHATE SERPL-MCNC: 2.2 MG/DL (ref 2.5–4.5)
PLATELET # BLD AUTO: 123 10E9/L (ref 150–450)
POTASSIUM SERPL-SCNC: 3.4 MMOL/L (ref 3.4–5.3)
POTASSIUM SERPL-SCNC: 3.8 MMOL/L (ref 3.4–5.3)
PROT SERPL-MCNC: 6 G/DL (ref 6.8–8.8)
RBC # BLD AUTO: 3.11 10E12/L (ref 3.8–5.2)
SODIUM SERPL-SCNC: 141 MMOL/L (ref 133–144)
SODIUM SERPL-SCNC: 143 MMOL/L (ref 133–144)
WBC # BLD AUTO: 2.8 10E9/L (ref 4–11)

## 2017-03-20 PROCEDURE — 25000132 ZZH RX MED GY IP 250 OP 250 PS 637: Performed by: PHYSICIAN ASSISTANT

## 2017-03-20 PROCEDURE — 25000132 ZZH RX MED GY IP 250 OP 250 PS 637: Performed by: NURSE PRACTITIONER

## 2017-03-20 PROCEDURE — 20600000 ZZH R&B BMT

## 2017-03-20 PROCEDURE — 85610 PROTHROMBIN TIME: CPT | Performed by: NURSE PRACTITIONER

## 2017-03-20 PROCEDURE — 80048 BASIC METABOLIC PNL TOTAL CA: CPT | Performed by: NURSE PRACTITIONER

## 2017-03-20 PROCEDURE — 25800025 ZZH RX 258: Performed by: NURSE PRACTITIONER

## 2017-03-20 PROCEDURE — 25000132 ZZH RX MED GY IP 250 OP 250 PS 637: Performed by: STUDENT IN AN ORGANIZED HEALTH CARE EDUCATION/TRAINING PROGRAM

## 2017-03-20 PROCEDURE — 25000128 H RX IP 250 OP 636: Performed by: NURSE PRACTITIONER

## 2017-03-20 PROCEDURE — S5010 5% DEXTROSE AND 0.45% SALINE: HCPCS | Performed by: NURSE PRACTITIONER

## 2017-03-20 PROCEDURE — 84132 ASSAY OF SERUM POTASSIUM: CPT | Performed by: NURSE PRACTITIONER

## 2017-03-20 PROCEDURE — 83735 ASSAY OF MAGNESIUM: CPT | Performed by: NURSE PRACTITIONER

## 2017-03-20 PROCEDURE — 25000125 ZZHC RX 250: Performed by: NURSE PRACTITIONER

## 2017-03-20 PROCEDURE — 85025 COMPLETE CBC W/AUTO DIFF WBC: CPT | Performed by: NURSE PRACTITIONER

## 2017-03-20 PROCEDURE — 25000125 ZZHC RX 250: Performed by: INTERNAL MEDICINE

## 2017-03-20 PROCEDURE — 80076 HEPATIC FUNCTION PANEL: CPT | Performed by: NURSE PRACTITIONER

## 2017-03-20 PROCEDURE — 84100 ASSAY OF PHOSPHORUS: CPT | Performed by: NURSE PRACTITIONER

## 2017-03-20 PROCEDURE — 25000131 ZZH RX MED GY IP 250 OP 636 PS 637: Performed by: NURSE PRACTITIONER

## 2017-03-20 PROCEDURE — 84295 ASSAY OF SERUM SODIUM: CPT | Performed by: NURSE PRACTITIONER

## 2017-03-20 RX ADMIN — ONDANSETRON HYDROCHLORIDE 8 MG: 8 TABLET, FILM COATED ORAL at 06:03

## 2017-03-20 RX ADMIN — DEXTROSE AND SODIUM CHLORIDE: 5; 450 INJECTION, SOLUTION INTRAVENOUS at 15:27

## 2017-03-20 RX ADMIN — ONDANSETRON HYDROCHLORIDE 8 MG: 8 TABLET, FILM COATED ORAL at 22:17

## 2017-03-20 RX ADMIN — SENNOSIDES AND DOCUSATE SODIUM 1 TABLET: 8.6; 5 TABLET ORAL at 07:26

## 2017-03-20 RX ADMIN — Medication 0.25 MG: at 13:25

## 2017-03-20 RX ADMIN — ACYCLOVIR 800 MG: 800 TABLET ORAL at 07:26

## 2017-03-20 RX ADMIN — DEXAMETHASONE 8 MG: 4 TABLET ORAL at 09:43

## 2017-03-20 RX ADMIN — GABAPENTIN 200 MG: 100 CAPSULE ORAL at 13:26

## 2017-03-20 RX ADMIN — SODIUM CHLORIDE, PRESERVATIVE FREE 5 ML: 5 INJECTION INTRAVENOUS at 10:17

## 2017-03-20 RX ADMIN — SODIUM PHOSPHATE, MONOBASIC, MONOHYDRATE AND SODIUM PHOSPHATE, DIBASIC, ANHYDROUS 15 MMOL: 276; 142 INJECTION, SOLUTION INTRAVENOUS at 06:03

## 2017-03-20 RX ADMIN — ENOXAPARIN SODIUM 40 MG: 40 INJECTION SUBCUTANEOUS at 19:47

## 2017-03-20 RX ADMIN — ACYCLOVIR 800 MG: 800 TABLET ORAL at 18:33

## 2017-03-20 RX ADMIN — ONDANSETRON HYDROCHLORIDE 8 MG: 8 TABLET, FILM COATED ORAL at 13:25

## 2017-03-20 RX ADMIN — ACYCLOVIR 800 MG: 800 TABLET ORAL at 13:26

## 2017-03-20 RX ADMIN — ACYCLOVIR 800 MG: 800 TABLET ORAL at 22:17

## 2017-03-20 RX ADMIN — GABAPENTIN 200 MG: 100 CAPSULE ORAL at 07:26

## 2017-03-20 RX ADMIN — LORAZEPAM 0.5 MG: 0.5 TABLET ORAL at 07:27

## 2017-03-20 RX ADMIN — ALLOPURINOL 300 MG: 300 TABLET ORAL at 07:26

## 2017-03-20 RX ADMIN — ACYCLOVIR 800 MG: 800 TABLET ORAL at 10:17

## 2017-03-20 RX ADMIN — LORAZEPAM 0.5 MG: 2 INJECTION INTRAMUSCULAR; INTRAVENOUS at 04:01

## 2017-03-20 RX ADMIN — VENLAFAXINE HYDROCHLORIDE 150 MG: 150 TABLET, EXTENDED RELEASE ORAL at 22:17

## 2017-03-20 RX ADMIN — Medication 0.25 MG: at 18:33

## 2017-03-20 RX ADMIN — DEXTROSE AND SODIUM CHLORIDE: 5; 200 INJECTION, SOLUTION INTRAVENOUS at 11:25

## 2017-03-20 RX ADMIN — PANTOPRAZOLE SODIUM 40 MG: 40 TABLET, DELAYED RELEASE ORAL at 07:26

## 2017-03-20 RX ADMIN — MICAFUNGIN SODIUM 50 MG: 10 INJECTION, POWDER, LYOPHILIZED, FOR SOLUTION INTRAVENOUS at 06:04

## 2017-03-20 RX ADMIN — SODIUM CHLORIDE, PRESERVATIVE FREE 5 ML: 5 INJECTION INTRAVENOUS at 16:28

## 2017-03-20 RX ADMIN — GABAPENTIN 200 MG: 100 CAPSULE ORAL at 19:47

## 2017-03-20 NOTE — PLAN OF CARE
Problem: Goal Outcome Summary  Goal: Goal Outcome Summary  Outcome: Improving  AVSS. Denies pain, c/o of minimal nausea today, able to eat some [employer] and munch on other snacks throughout the day. PRN ativan given x1 this morning. Cytoxan fluid flush ended this afternoon, patient met q 2 hour voiding parameters. 2 loose/soft BM. K and Na check this afternoon WNL. Patient overall feeling better today. Transplant Wednesday at 1000, will receive 4 bags. Showered this afternoon, continue to monitor..    Problem: Blood and Marrow Transplantation  Goal: Blood and Marrow Transplantation  Signs and symptoms of listed problems will be absent or manageable.   Outcome: Improving    03/20/17 1800   Blood and Marrow Transplantation   Blood and Marrow Transplantation Assessed all   Blood and Marrow Transplantation Present fluid imbalances;metabolic imbalances;nausea/vomiting;nutritional deficiency undernutrition

## 2017-03-20 NOTE — PLAN OF CARE
Problem: Blood and Marrow Transplantation  Goal: Blood and Marrow Transplantation  Signs and symptoms of listed problems will be absent or manageable.   Outcome: No Change    03/20/17 0613   Blood and Marrow Transplantation   Blood and Marrow Transplantation Assessed all   Blood and Marrow Transplantation Present metabolic imbalances;nausea/vomiting;nutritional deficiency undernutrition      Pt continues to experience nausea; PRN IV Ativan given twice.  She was also given 15 mmol phos this AM.

## 2017-03-20 NOTE — PROGRESS NOTES
"BMT Daily Progress Note    Date of Service:3/19/2017  Steph Agee is a 42 year old female with Hodgkins disease who is day-3 pre auto for Hodgkins disease.  History of Present Illness:  Slept better last night. Continues to have intermittent n/v. Looser stool this morning. Trying to eat small things. No new respiratory symptoms. No bleeding. Rest day today.    Review of Systems:  ROS otherwise unremarkable other than what is noted in the HPI.     Physical Exam:    /69 (BP Location: Right arm)  Pulse 78  Temp 97.7  F (36.5  C) (Axillary)  Resp 18  Ht 1.575 m (5' 2.01\")  Wt 79.2 kg (174 lb 9.6 oz)  SpO2 100%  BMI 31.93 kg/m2    General: A&O. NAD.   HEENT: CALEB, sclera anicteric, face appears less edematous  Neck: supple  Lungs: CTAB.  No wheezes or crackles.    Heart: RRR, no m/r/g  Abdomen: +BS, soft, NT/ND, no HSM  Extremities: trace edema in distal LE, better  Skin: no rashes or petechiae  Neurologic: Nonfocal  RAC: Brown: nontender, no erythema or drainage    Laboratory Data:  Lab Results   Component Value Date    WBC 2.8 (L) 03/20/2017    ANEU 2.7 03/20/2017    HGB 9.9 (L) 03/20/2017    HCT 29.8 (L) 03/20/2017     (L) 03/20/2017     03/20/2017    POTASSIUM 3.4 03/20/2017    CHLORIDE 108 03/20/2017    CO2 27 03/20/2017    GLC 94 03/20/2017    BUN 8 03/20/2017    CR 0.61 03/20/2017    MAG 2.1 03/20/2017    INR 1.01 03/20/2017       Assessment/Plan:  Steph Agee is a 42 year old female with Hodgkins disease who is day-2 pre auto for Hodgkins disease.  Prep:  D-6: BCNU, -16, Cytoxan  D-5-D-4: -16, Cytoxan  D-3: Cytoxan  D-2-D-1: Rest  3/22 Transplant     1. BMT: Prep as above,  Cytoxan/mesna today with flush  Completed -16. Allopurinol through day 0.  GCSF starts d+5 and cont to until ANC>1500 x3 consecutive days. Re-stage per protocol.     2. HEME: Keep Hgb>8 and plts>10K. No pre-meds.  No transfusions today.  Counts trending down but not yet neutropenic.  -On Lovenox 40mg QD d/t hx of " chronic IJ clot recently noted on 3/3 U/s.  Platelets=123.      3. ID: Afebrile. Cont Sharron/vfend(hx of marijuana use), and HD ACV  (CMV+) prophy.Levaquin starts on day-1. Bactrim or appropriate PCP therapy to start d+28.       4.  GI: Having some nausea in am, improved with ativan.  Zofran and dexamethasone prior to chemo to prevent N/V. Ativan and compazine available PRN break-through symptoms. Protonix for GI prophy.  Constipation: Had stools yesterday after increasing senna to bid.     5. FEN/Renal: Monitor creat and lytes. Replete lytes PRN per SS. Monitor weight, I+O, lytes per protocol with IVF flush.   IVF at tko after Cytoxan flush finishes at 1200 today.    6. CV: Cleared by cardiology d/t drop in EF. Cardiac MRI confirmed EF 47%. Continue cozaar 12.5 daily.     7. Psych: Hx of anxiety. Previously smoked daily marijuana and took ativan qHS. Ativan too sedating for daily use-per pt. Already on effexor, increase to 150mg daily (75).     Debra Black NP  [phone]    Attending Note:     The patient was seen and examined by me separate from mid-level provider.   I personally reviewed the today's laboratory results, vital signs and radiology results.     Nausea and emesis. Tired.  Loose bowel movement now. I found that vitals are stable and there was no fever. No acute distress. Patient was alert and oriented and grossly neurologically intact. Mouth is clean. Line insertion non-tender and clear. Lungs clear bilateral with decreased sounds L base. Heart regular. Abdomen soft non-tender, BS present. Lower extremity with no edema. No rash. BM harvest site and clean and healed.     Labs were   Lab Results   Component Value Date    WBC 2.8 (L) 03/20/2017    ANEU 2.7 03/20/2017    HGB 9.9 (L) 03/20/2017    HCT 29.8 (L) 03/20/2017     (L) 03/20/2017     03/20/2017    POTASSIUM 3.4 03/20/2017    CHLORIDE 108 03/20/2017    CO2 27 03/20/2017    GLC 94 03/20/2017    BUN 8 03/20/2017    CR 0.61 03/20/2017    MAG  2.1 03/20/2017    INR 1.01 03/20/2017     Plan is to continue chemo as planned. Discontinue Miralax today. May continue to use zofran and ativan for nausea and anxiety. discontinue fluids at noon and monitor Na daily. Continue to monitor weight.     Connor Barajas

## 2017-03-20 NOTE — PROGRESS NOTES
"SPIRITUAL HEALTH SERVICES  SPIRITUAL ASSESSMENT Progress Note  [facility] ([place]) 5C    PRIMARY FOCUS:     Emotional/spiritual/Quaker distress    Support for coping    ILLNESS CIRCUMSTANCES:   Reviewed documentation. Reflective conversation shared with Steph.  Reason for visit: introduce [facility] and the possibility of a blessing service prior to transplant.      Context of Serious Illness/Symptom(s) - Steph reviewed the ups and downs she has experienced since receiving her diagnosis in late 2014.  \"This week is much better than last week.  Last week I was up every couple hours for four days straight and I generally feel much better today.\"    Resources for Support -   o Family - \"My mom has twelve brothers and sisters so we have a big family.\"  o Cousin - Of 25 cousins, Steph is particularly close to one who lives in [place]; she stays with her cousin during her outpatient treatment.  She and her daughter were present for the first couple minutes of our visit.  o Boyfriend  o Dogs - two dachshunds; \"being away from them is the hardest since they can't come here.\"    DISTRESS:     Emotional/Existential/Relational Distress - \"I like to be in control and this not knowing for sure is hard.\"    Spiritual/Druze Distress - None discussed.    Social/Cultural/Economic Distress - None discussed.    SPIRITUAL/Lutheran COPING:     Scientology/Emily - Not named.    Spiritual Practice(s) - Steph was excited by the possibility of a blessing service.  \"The nurses told me about that. I can use all the luck I can get.\"    Emotional/Existential/Relational Connections - family, friends, dogs - see Resources.    GOALS OF CARE:    Goals of Care - Not discussed.    Meaning/Sense-Making - Not named.    PLAN: Will facilitate a blessing service on 3/22 around 10am.      Mario Donnelly M.Div.  Staff   Pager [phone]        "

## 2017-03-21 ENCOUNTER — APPOINTMENT (OUTPATIENT)
Dept: OCCUPATIONAL THERAPY | Facility: CLINIC | Age: 42
DRG: 016 | End: 2017-03-21
Attending: INTERNAL MEDICINE
Payer: COMMERCIAL

## 2017-03-21 LAB
ABO + RH BLD: NORMAL
ABO + RH BLD: NORMAL
ANION GAP SERPL CALCULATED.3IONS-SCNC: 8 MMOL/L (ref 3–14)
BASOPHILS # BLD AUTO: 0 10E9/L (ref 0–0.2)
BASOPHILS NFR BLD AUTO: 0 %
BLD GP AB SCN SERPL QL: NORMAL
BLOOD BANK CMNT PATIENT-IMP: NORMAL
BUN SERPL-MCNC: 12 MG/DL (ref 7–30)
C DIFF TOX B STL QL: NORMAL
CALCIUM SERPL-MCNC: 8.9 MG/DL (ref 8.5–10.1)
CHLORIDE SERPL-SCNC: 107 MMOL/L (ref 94–109)
CO2 SERPL-SCNC: 27 MMOL/L (ref 20–32)
CREAT SERPL-MCNC: 0.59 MG/DL (ref 0.52–1.04)
DIFFERENTIAL METHOD BLD: ABNORMAL
EOSINOPHIL # BLD AUTO: 0 10E9/L (ref 0–0.7)
EOSINOPHIL NFR BLD AUTO: 0 %
ERYTHROCYTE [DISTWIDTH] IN BLOOD BY AUTOMATED COUNT: 17.5 % (ref 10–15)
GFR SERPL CREATININE-BSD FRML MDRD: NORMAL ML/MIN/1.7M2
GLUCOSE SERPL-MCNC: 82 MG/DL (ref 70–99)
HCT VFR BLD AUTO: 32.3 % (ref 35–47)
HGB BLD-MCNC: 10.9 G/DL (ref 11.7–15.7)
IMM GRANULOCYTES # BLD: 0 10E9/L (ref 0–0.4)
IMM GRANULOCYTES NFR BLD: 0.4 %
LYMPHOCYTES # BLD AUTO: 0.1 10E9/L (ref 0.8–5.3)
LYMPHOCYTES NFR BLD AUTO: 2.1 %
MCH RBC QN AUTO: 32.8 PG (ref 26.5–33)
MCHC RBC AUTO-ENTMCNC: 33.7 G/DL (ref 31.5–36.5)
MCV RBC AUTO: 97 FL (ref 78–100)
MONOCYTES # BLD AUTO: 0 10E9/L (ref 0–1.3)
MONOCYTES NFR BLD AUTO: 1.7 %
NEUTROPHILS # BLD AUTO: 2.3 10E9/L (ref 1.6–8.3)
NEUTROPHILS NFR BLD AUTO: 95.8 %
NRBC # BLD AUTO: 0 10*3/UL
NRBC BLD AUTO-RTO: 0 /100
PHOSPHATE SERPL-MCNC: 2.5 MG/DL (ref 2.5–4.5)
PLATELET # BLD AUTO: 97 10E9/L (ref 150–450)
POTASSIUM SERPL-SCNC: 3.8 MMOL/L (ref 3.4–5.3)
RBC # BLD AUTO: 3.32 10E12/L (ref 3.8–5.2)
SODIUM SERPL-SCNC: 142 MMOL/L (ref 133–144)
SPECIMEN EXP DATE BLD: NORMAL
SPECIMEN SOURCE: NORMAL
WBC # BLD AUTO: 2.4 10E9/L (ref 4–11)

## 2017-03-21 PROCEDURE — 25000132 ZZH RX MED GY IP 250 OP 250 PS 637: Performed by: STUDENT IN AN ORGANIZED HEALTH CARE EDUCATION/TRAINING PROGRAM

## 2017-03-21 PROCEDURE — 87081 CULTURE SCREEN ONLY: CPT | Performed by: NURSE PRACTITIONER

## 2017-03-21 PROCEDURE — 40000133 ZZH STATISTIC OT WARD VISIT: Performed by: OCCUPATIONAL THERAPIST

## 2017-03-21 PROCEDURE — 97110 THERAPEUTIC EXERCISES: CPT | Mod: GO | Performed by: OCCUPATIONAL THERAPIST

## 2017-03-21 PROCEDURE — 86850 RBC ANTIBODY SCREEN: CPT | Performed by: NURSE PRACTITIONER

## 2017-03-21 PROCEDURE — 87493 C DIFF AMPLIFIED PROBE: CPT | Performed by: NURSE PRACTITIONER

## 2017-03-21 PROCEDURE — 86901 BLOOD TYPING SEROLOGIC RH(D): CPT | Performed by: NURSE PRACTITIONER

## 2017-03-21 PROCEDURE — 25000131 ZZH RX MED GY IP 250 OP 636 PS 637: Performed by: NURSE PRACTITIONER

## 2017-03-21 PROCEDURE — 86900 BLOOD TYPING SEROLOGIC ABO: CPT | Performed by: NURSE PRACTITIONER

## 2017-03-21 PROCEDURE — 80048 BASIC METABOLIC PNL TOTAL CA: CPT | Performed by: NURSE PRACTITIONER

## 2017-03-21 PROCEDURE — 25000128 H RX IP 250 OP 636: Performed by: NURSE PRACTITIONER

## 2017-03-21 PROCEDURE — 25800025 ZZH RX 258: Performed by: NURSE PRACTITIONER

## 2017-03-21 PROCEDURE — 85025 COMPLETE CBC W/AUTO DIFF WBC: CPT | Performed by: NURSE PRACTITIONER

## 2017-03-21 PROCEDURE — 25000125 ZZHC RX 250: Performed by: NURSE PRACTITIONER

## 2017-03-21 PROCEDURE — 20000002 ZZH R&B BMT INTERMEDIATE

## 2017-03-21 PROCEDURE — 25000132 ZZH RX MED GY IP 250 OP 250 PS 637: Performed by: NURSE PRACTITIONER

## 2017-03-21 PROCEDURE — S5010 5% DEXTROSE AND 0.45% SALINE: HCPCS | Performed by: NURSE PRACTITIONER

## 2017-03-21 PROCEDURE — 84100 ASSAY OF PHOSPHORUS: CPT | Performed by: NURSE PRACTITIONER

## 2017-03-21 RX ORDER — ACETAMINOPHEN 325 MG/1
650 TABLET ORAL ONCE
Status: COMPLETED | OUTPATIENT
Start: 2017-03-22 | End: 2017-03-22

## 2017-03-21 RX ORDER — DIPHENHYDRAMINE HCL 25 MG
25-50 CAPSULE ORAL ONCE
Status: COMPLETED | OUTPATIENT
Start: 2017-03-22 | End: 2017-03-22

## 2017-03-21 RX ORDER — SODIUM CHLORIDE 9 MG/ML
INJECTION, SOLUTION INTRAVENOUS CONTINUOUS
Status: DISCONTINUED | OUTPATIENT
Start: 2017-03-22 | End: 2017-03-23 | Stop reason: HOSPADM

## 2017-03-21 RX ADMIN — Medication 0.25 MG: at 07:52

## 2017-03-21 RX ADMIN — ONDANSETRON HYDROCHLORIDE 8 MG: 8 TABLET, FILM COATED ORAL at 22:36

## 2017-03-21 RX ADMIN — ACYCLOVIR 800 MG: 800 TABLET ORAL at 22:36

## 2017-03-21 RX ADMIN — DEXTROSE AND SODIUM CHLORIDE: 5; 450 INJECTION, SOLUTION INTRAVENOUS at 22:37

## 2017-03-21 RX ADMIN — Medication 0.25 MG: at 13:06

## 2017-03-21 RX ADMIN — SODIUM CHLORIDE 150 MG: 9 INJECTION, SOLUTION INTRAVENOUS at 10:50

## 2017-03-21 RX ADMIN — ACYCLOVIR 800 MG: 800 TABLET ORAL at 07:53

## 2017-03-21 RX ADMIN — ALLOPURINOL 300 MG: 300 TABLET ORAL at 07:53

## 2017-03-21 RX ADMIN — PANTOPRAZOLE SODIUM 40 MG: 40 TABLET, DELAYED RELEASE ORAL at 07:53

## 2017-03-21 RX ADMIN — GABAPENTIN 200 MG: 100 CAPSULE ORAL at 07:53

## 2017-03-21 RX ADMIN — Medication 12.5 MG: at 22:30

## 2017-03-21 RX ADMIN — VENLAFAXINE HYDROCHLORIDE 150 MG: 150 TABLET, EXTENDED RELEASE ORAL at 22:36

## 2017-03-21 RX ADMIN — Medication 0.25 MG: at 17:07

## 2017-03-21 RX ADMIN — MICAFUNGIN SODIUM 50 MG: 10 INJECTION, POWDER, LYOPHILIZED, FOR SOLUTION INTRAVENOUS at 06:03

## 2017-03-21 RX ADMIN — ACYCLOVIR 800 MG: 800 TABLET ORAL at 13:06

## 2017-03-21 RX ADMIN — ONDANSETRON HYDROCHLORIDE 8 MG: 8 TABLET, FILM COATED ORAL at 05:59

## 2017-03-21 RX ADMIN — LORAZEPAM 0.5 MG: 0.5 TABLET ORAL at 03:52

## 2017-03-21 RX ADMIN — GABAPENTIN 200 MG: 100 CAPSULE ORAL at 19:45

## 2017-03-21 RX ADMIN — DEXAMETHASONE 6 MG: 2 TABLET ORAL at 10:50

## 2017-03-21 RX ADMIN — ACYCLOVIR 800 MG: 800 TABLET ORAL at 17:07

## 2017-03-21 RX ADMIN — ACYCLOVIR 800 MG: 800 TABLET ORAL at 10:50

## 2017-03-21 RX ADMIN — ENOXAPARIN SODIUM 40 MG: 40 INJECTION SUBCUTANEOUS at 19:45

## 2017-03-21 RX ADMIN — ONDANSETRON HYDROCHLORIDE 8 MG: 8 TABLET, FILM COATED ORAL at 13:06

## 2017-03-21 RX ADMIN — LEVOFLOXACIN 250 MG: 250 TABLET, FILM COATED ORAL at 10:50

## 2017-03-21 RX ADMIN — GABAPENTIN 200 MG: 100 CAPSULE ORAL at 13:06

## 2017-03-21 RX ADMIN — SODIUM CHLORIDE, PRESERVATIVE FREE 5 ML: 5 INJECTION INTRAVENOUS at 03:53

## 2017-03-21 NOTE — PLAN OF CARE
Problem: Goal Outcome Summary  Goal: Goal Outcome Summary  Outcome: No Change  AVSS. Denies pain, had some more nausea this morning, received one dose of Emend which has helped patient in the past; eating okay despite nausea. MIV increased to 50 mL/hr. Two episodes of loose stool, c.diff sent down, results pending. Transplant tomorrow at 1000 receiving 4 bags; flush and premeds ordered. No other complaints, continue to monitor.     Problem: Blood and Marrow Transplantation  Goal: Blood and Marrow Transplantation  Signs and symptoms of listed problems will be absent or manageable.   Outcome: No Change    03/21/17 1538   Blood and Marrow Transplantation   Blood and Marrow Transplantation Assessed all   Blood and Marrow Transplantation Present nausea/vomiting

## 2017-03-21 NOTE — PLAN OF CARE
Problem: Goal Outcome Summary  Goal: Goal Outcome Summary  OT/5C: Pt with c/o nausea impacting tolerance for therapy this date, declining aerobic activity OOR. Reviewed UB/LB standing exercises, completes HEP with fair tolerance, decreased repetitions due to fatigue. Reviewed current lab values and grading/modifying exercise.      REC: Home with assist for heavier IADLs, continue HEP

## 2017-03-21 NOTE — PLAN OF CARE
Problem: Goal Outcome Summary  Goal: Goal Outcome Summary  Outcome: No Change  Afeb, VSS.  Cozaar held last evening d/t lower BP.  Denied pain, ativan x1 for breakthrough nausea.  Pt otherwise feels nausea is controlled on scheduled meds.  No replacements needed.  Slept fairly well overnight.  Rest day today, transplant tomorrow.  Continue current plan of care.    Problem: Blood and Marrow Transplantation  Goal: Blood and Marrow Transplantation  Signs and symptoms of listed problems will be absent or manageable.     03/21/17 0615   Blood and Marrow Transplantation   Blood and Marrow Transplantation Assessed all   Blood and Marrow Transplantation Present nausea/vomiting

## 2017-03-21 NOTE — PROGRESS NOTES
"BMT Daily Progress Note       Steph Agee is a 42 year old female with Hodgkins disease who is day-1 pre auto for Hodgkins disease.  History of Present Illness:  Still with intermittent nausea. Not able to eat or drink much. Now with loose stools. No new respiratory symptoms. No fevers. Rest day today.    Review of Systems:  ROS otherwise unremarkable other than what is noted in the HPI.     Physical Exam:    /71 (BP Location: Right arm)  Pulse 74  Temp 96.8  F (36  C) (Axillary)  Resp 16  Ht 1.575 m (5' 2.01\")  Wt 78.6 kg (173 lb 3.2 oz)  SpO2 98%  BMI 31.67 kg/m2    General: A&O. NAD.   HEENT: CALEB, sclera anicteric, face appears less edematous  Neck: supple  Lungs: CTAB.  No wheezes or crackles.    Heart: RRR, no m/r/g  Abdomen: +BS, soft, NT/ND, no HSM  Extremities: trace edema in distal LE, better  Skin: no rashes or petechiae  Neurologic: Nonfocal  RAC: Charly: nontender, no erythema or drainage    Laboratory Data:  Lab Results   Component Value Date    WBC 2.4 (L) 03/21/2017    ANEU 2.3 03/21/2017    HGB 10.9 (L) 03/21/2017    HCT 32.3 (L) 03/21/2017    PLT 97 (L) 03/21/2017     03/21/2017    POTASSIUM 3.8 03/21/2017    CHLORIDE 107 03/21/2017    CO2 27 03/21/2017    GLC 82 03/21/2017    BUN 12 03/21/2017    CR 0.59 03/21/2017    MAG 2.1 03/20/2017    INR 1.01 03/20/2017       Assessment/Plan:  Steph Agee is a 42 year old female with Hodgkins disease who is day-1 pre auto for Hodgkins disease.  Prep:  D-6: BCNU, -16, Cytoxan  D-5-D-4: -16, Cytoxan  D-3: Cytoxan  D-2-D-1: Rest  3/22 Transplant     1. BMT: Prep as above. Allopurinol through day 0.  GCSF starts d+5 and cont to until ANC>1500 x3 consecutive days. Re-stage per protocol.     2. HEME: Keep Hgb>8 and plts>10K. No pre-meds.  No transfusions today.  Counts trending down but not yet neutropenic.  -On Lovenox 40mg QD d/t hx of chronic IJ clot recently noted on 3/3 U/s.  Platelets=97k.      3. ID: Afebrile. Cont Sharron/vfend(hx of " marijuana use), and HD ACV  (CMV+) prophy.Levaquin starts on day-1. Bactrim or appropriate PCP therapy to start d+28.       4.  GI: Add Emend x1 today.  Zofran and dexamethasone prior to chemo to prevent N/V. Ativan and compazine available PRN break-through symptoms. Protonix for GI prophy.  Diarrhea: send cdiff today     5. FEN/Renal: Monitor creat and lytes. Replete lytes PRN per SS. Monitor weight, I+O, lytes per protocol with IVF flush.   D51/2 @50/hr       6. CV: Cleared by cardiology d/t drop in EF. Cardiac MRI confirmed EF 47%. Continue cozaar 12.5 daily.     7. Psych: Hx of anxiety. Previously smoked daily marijuana and took ativan qHS. Ativan too sedating for daily use-per pt. Already on effexor, increase to 150mg daily (75).     Debra Black NP  [phone]    Attending Note:     The patient was seen and examined by me separate from mid-level provider.   I personally reviewed the today's laboratory results, vital signs and radiology results.     Nausea and emesis are still there but not too bad today. Diarrhea. I found that vitals are stable and there was no fever. No acute distress. Patient was alert and oriented and grossly neurologically intact. Mouth is clean. Line insertion non-tender and clear. Lungs clear bilateral with decreased sounds L base. Heart regular. Abdomen soft non-tender, BS present. Lower extremity with no edema. No rash. BM harvest site and clean and healed.     Labs were   Lab Results   Component Value Date    WBC 2.4 (L) 03/21/2017    ANEU 2.3 03/21/2017    HGB 10.9 (L) 03/21/2017    HCT 32.3 (L) 03/21/2017    PLT 97 (L) 03/21/2017     03/21/2017    POTASSIUM 3.8 03/21/2017    CHLORIDE 107 03/21/2017    CO2 27 03/21/2017    GLC 82 03/21/2017    BUN 12 03/21/2017    CR 0.59 03/21/2017    MAG 2.1 03/20/2017    INR 1.01 03/20/2017     Plan is to send stool for  C diff. May continue to use zofran and ativan for nausea and anxiety. Low rate IV fluids for hydration for poor PO intake.  Continue to monitor weight.     Connor Barajas

## 2017-03-22 LAB
ANION GAP SERPL CALCULATED.3IONS-SCNC: 9 MMOL/L (ref 3–14)
BASOPHILS # BLD AUTO: 0 10E9/L (ref 0–0.2)
BASOPHILS NFR BLD AUTO: 0 %
BUN SERPL-MCNC: 14 MG/DL (ref 7–30)
CALCIUM SERPL-MCNC: 8.6 MG/DL (ref 8.5–10.1)
CHLORIDE SERPL-SCNC: 104 MMOL/L (ref 94–109)
CMV DNA SPEC NAA+PROBE-ACNC: NORMAL [IU]/ML
CMV DNA SPEC NAA+PROBE-LOG#: NORMAL {LOG_IU}/ML
CO2 SERPL-SCNC: 26 MMOL/L (ref 20–32)
CREAT SERPL-MCNC: 0.56 MG/DL (ref 0.52–1.04)
DIFFERENTIAL METHOD BLD: ABNORMAL
EOSINOPHIL # BLD AUTO: 0 10E9/L (ref 0–0.7)
EOSINOPHIL NFR BLD AUTO: 0 %
ERYTHROCYTE [DISTWIDTH] IN BLOOD BY AUTOMATED COUNT: 16.7 % (ref 10–15)
GFR SERPL CREATININE-BSD FRML MDRD: NORMAL ML/MIN/1.7M2
GLUCOSE SERPL-MCNC: 93 MG/DL (ref 70–99)
HCT VFR BLD AUTO: 31.6 % (ref 35–47)
HGB BLD-MCNC: 10.6 G/DL (ref 11.7–15.7)
IMM GRANULOCYTES # BLD: 0 10E9/L (ref 0–0.4)
IMM GRANULOCYTES NFR BLD: 1.3 %
LYMPHOCYTES # BLD AUTO: 0 10E9/L (ref 0.8–5.3)
LYMPHOCYTES NFR BLD AUTO: 1.3 %
MAGNESIUM SERPL-MCNC: 2.4 MG/DL (ref 1.6–2.3)
MCH RBC QN AUTO: 32.2 PG (ref 26.5–33)
MCHC RBC AUTO-ENTMCNC: 33.5 G/DL (ref 31.5–36.5)
MCV RBC AUTO: 96 FL (ref 78–100)
MONOCYTES # BLD AUTO: 0 10E9/L (ref 0–1.3)
MONOCYTES NFR BLD AUTO: 0.4 %
NEUTROPHILS # BLD AUTO: 2.3 10E9/L (ref 1.6–8.3)
NEUTROPHILS NFR BLD AUTO: 97 %
NRBC # BLD AUTO: 0 10*3/UL
NRBC BLD AUTO-RTO: 0 /100
PLATELET # BLD AUTO: 75 10E9/L (ref 150–450)
POTASSIUM SERPL-SCNC: 4.2 MMOL/L (ref 3.4–5.3)
RBC # BLD AUTO: 3.29 10E12/L (ref 3.8–5.2)
SODIUM SERPL-SCNC: 139 MMOL/L (ref 133–144)
SPECIMEN SOURCE: NORMAL
WBC # BLD AUTO: 2.3 10E9/L (ref 4–11)

## 2017-03-22 PROCEDURE — 40000871 ZZH STATISTIC AUTOLOGOUS BM-INITIAL INFUSION

## 2017-03-22 PROCEDURE — 38208 THAW PRESERVED STEM CELLS: CPT | Performed by: INTERNAL MEDICINE

## 2017-03-22 PROCEDURE — 25000132 ZZH RX MED GY IP 250 OP 250 PS 637: Performed by: INTERNAL MEDICINE

## 2017-03-22 PROCEDURE — 30243Y0 TRANSFUSION OF AUTOLOGOUS HEMATOPOIETIC STEM CELLS INTO CENTRAL VEIN, PERCUTANEOUS APPROACH: ICD-10-PCS | Performed by: INTERNAL MEDICINE

## 2017-03-22 PROCEDURE — 20000002 ZZH R&B BMT INTERMEDIATE

## 2017-03-22 PROCEDURE — 30243G0 TRANSFUSION OF AUTOLOGOUS BONE MARROW INTO CENTRAL VEIN, PERCUTANEOUS APPROACH: ICD-10-PCS | Performed by: INTERNAL MEDICINE

## 2017-03-22 PROCEDURE — 85025 COMPLETE CBC W/AUTO DIFF WBC: CPT | Performed by: NURSE PRACTITIONER

## 2017-03-22 PROCEDURE — 25000125 ZZHC RX 250: Performed by: NURSE PRACTITIONER

## 2017-03-22 PROCEDURE — 25000131 ZZH RX MED GY IP 250 OP 636 PS 637: Performed by: NURSE PRACTITIONER

## 2017-03-22 PROCEDURE — 25000128 H RX IP 250 OP 636: Performed by: NURSE PRACTITIONER

## 2017-03-22 PROCEDURE — 25000132 ZZH RX MED GY IP 250 OP 250 PS 637: Performed by: STUDENT IN AN ORGANIZED HEALTH CARE EDUCATION/TRAINING PROGRAM

## 2017-03-22 PROCEDURE — 25000132 ZZH RX MED GY IP 250 OP 250 PS 637: Performed by: NURSE PRACTITIONER

## 2017-03-22 PROCEDURE — 83735 ASSAY OF MAGNESIUM: CPT | Performed by: NURSE PRACTITIONER

## 2017-03-22 PROCEDURE — 80048 BASIC METABOLIC PNL TOTAL CA: CPT | Performed by: NURSE PRACTITIONER

## 2017-03-22 RX ORDER — DIPHENHYDRAMINE HCL 25 MG
25 CAPSULE ORAL ONCE
Status: DISCONTINUED | OUTPATIENT
Start: 2017-03-22 | End: 2017-03-23 | Stop reason: HOSPADM

## 2017-03-22 RX ORDER — LOPERAMIDE HCL 2 MG
2 CAPSULE ORAL 4 TIMES DAILY PRN
Status: DISCONTINUED | OUTPATIENT
Start: 2017-03-22 | End: 2017-03-23 | Stop reason: HOSPADM

## 2017-03-22 RX ADMIN — ACETAMINOPHEN 650 MG: 325 TABLET, FILM COATED ORAL at 09:58

## 2017-03-22 RX ADMIN — GABAPENTIN 200 MG: 100 CAPSULE ORAL at 07:46

## 2017-03-22 RX ADMIN — GABAPENTIN 200 MG: 100 CAPSULE ORAL at 13:56

## 2017-03-22 RX ADMIN — LORAZEPAM 0.5 MG: 0.5 TABLET ORAL at 03:51

## 2017-03-22 RX ADMIN — ACYCLOVIR 800 MG: 800 TABLET ORAL at 07:45

## 2017-03-22 RX ADMIN — GABAPENTIN 200 MG: 100 CAPSULE ORAL at 20:00

## 2017-03-22 RX ADMIN — ENOXAPARIN SODIUM 40 MG: 40 INJECTION SUBCUTANEOUS at 20:00

## 2017-03-22 RX ADMIN — ALLOPURINOL 300 MG: 300 TABLET ORAL at 07:46

## 2017-03-22 RX ADMIN — SODIUM CHLORIDE: 9 INJECTION, SOLUTION INTRAVENOUS at 07:00

## 2017-03-22 RX ADMIN — ONDANSETRON HYDROCHLORIDE 8 MG: 8 TABLET, FILM COATED ORAL at 23:09

## 2017-03-22 RX ADMIN — LEVOFLOXACIN 250 MG: 250 TABLET, FILM COATED ORAL at 09:58

## 2017-03-22 RX ADMIN — Medication 0.25 MG: at 07:46

## 2017-03-22 RX ADMIN — DEXAMETHASONE 4 MG: 4 TABLET ORAL at 07:46

## 2017-03-22 RX ADMIN — ACYCLOVIR 800 MG: 800 TABLET ORAL at 23:09

## 2017-03-22 RX ADMIN — Medication 0.25 MG: at 18:15

## 2017-03-22 RX ADMIN — ONDANSETRON HYDROCHLORIDE 8 MG: 8 TABLET, FILM COATED ORAL at 06:02

## 2017-03-22 RX ADMIN — ONDANSETRON HYDROCHLORIDE 8 MG: 8 TABLET, FILM COATED ORAL at 13:57

## 2017-03-22 RX ADMIN — ACYCLOVIR 800 MG: 800 TABLET ORAL at 10:09

## 2017-03-22 RX ADMIN — ACYCLOVIR 800 MG: 800 TABLET ORAL at 13:58

## 2017-03-22 RX ADMIN — OXYCODONE HYDROCHLORIDE 5 MG: 5 TABLET ORAL at 14:50

## 2017-03-22 RX ADMIN — ACYCLOVIR 800 MG: 800 TABLET ORAL at 18:15

## 2017-03-22 RX ADMIN — VENLAFAXINE HYDROCHLORIDE 150 MG: 150 TABLET, EXTENDED RELEASE ORAL at 23:09

## 2017-03-22 RX ADMIN — LORAZEPAM 0.5 MG: 0.5 TABLET ORAL at 09:58

## 2017-03-22 RX ADMIN — Medication 12.5 MG: at 23:09

## 2017-03-22 RX ADMIN — DIPHENHYDRAMINE HYDROCHLORIDE 50 MG: 25 CAPSULE ORAL at 09:58

## 2017-03-22 RX ADMIN — MICAFUNGIN SODIUM 50 MG: 10 INJECTION, POWDER, LYOPHILIZED, FOR SOLUTION INTRAVENOUS at 06:02

## 2017-03-22 RX ADMIN — PANTOPRAZOLE SODIUM 40 MG: 40 TABLET, DELAYED RELEASE ORAL at 07:46

## 2017-03-22 RX ADMIN — Medication 0.25 MG: at 13:00

## 2017-03-22 ASSESSMENT — PAIN DESCRIPTION - DESCRIPTORS: DESCRIPTORS: ACHING

## 2017-03-22 NOTE — PROCEDURES
Pt premedicated per protocol. Autologous peripheral blood hematopoetic stem cells (1.84x10^6/kg) infused via central vein catheter. I was present for the infusion. Pt tolerated procedure well without complication

## 2017-03-22 NOTE — PLAN OF CARE
Problem: Goal Outcome Summary  Goal: Goal Outcome Summary  Outcome: No Change  Afeb, VSS.  Denied pain, continues to have waves of nausea- ativan given x1.  Having night sweats, feels they are getting worse.  Pt has experienced these before after chemo.  Will have tx today at 10am, premeds and flush ordered for 8am.  Continue to monitor and continue current plan of care.    Problem: Blood and Marrow Transplantation  Goal: Blood and Marrow Transplantation  Signs and symptoms of listed problems will be absent or manageable.     03/22/17 0646   Blood and Marrow Transplantation   Blood and Marrow Transplantation Assessed all   Blood and Marrow Transplantation Present fluid imbalances;nausea/vomiting

## 2017-03-22 NOTE — PLAN OF CARE
Problem: Goal Outcome Summary  Goal: Goal Outcome Summary  Outcome: No Change  AVSS. Had some nausea this morning, gave PO ativan with relief; patient is eating well despite nausea. Received stem cell transplant today, tolerated well with some mild flushing towards the end of transplant. Flush will end at 1830. Had a headache after transplant, oxycodone given with success. Urine dark red, educated patient that this is expected. Patient stated that her throat felt funny this afternoon and that it was hard to swallow food, provider's aware. Gave patient a popsicle which helped; could be the start of some mucositis. Had a few hot flashes this shift.  Napping this afternoon, large loose/soft BM. No other complaints, continue to monitor closely.     Problem: Blood and Marrow Transplantation  Goal: Blood and Marrow Transplantation  Signs and symptoms of listed problems will be absent or manageable.   Outcome: No Change    03/22/17 1552   Blood and Marrow Transplantation   Blood and Marrow Transplantation Assessed all   Blood and Marrow Transplantation Present acute pain;diarrhea;fluid imbalances;nausea/vomiting

## 2017-03-22 NOTE — PLAN OF CARE
Problem: Goal Outcome Summary  Goal: Goal Outcome Summary  OT/5C: CX- Pt declining therapy, reports feeling drained following transplant. Will cancel and reschedule, pt agreeable to therapy tomorrow AM prior to possible discharge.

## 2017-03-22 NOTE — PROGRESS NOTES
Type of transplant:  Autologous stem cell transplant       BMT INFUSION DOCUMENTATION (last 24 hours)      BMT/Cellular Product Infusion     None        Preparation:  Patient started pre-flush at 200 mL/hr, premedicated with benadryl and tylenol and educated on peaching:ossible side effects of transplant  Teaching: Done per unit protocol, discussed procedure, possible side effects of transplant, as well as transplant prepatation.  Tolerated/Reaction:  Patient tolerated transplant very, denies nausea/SOB/pain, had some very mild flushing towards the end of transplant that did not bother patient; flushing resolved post transplant without intervention.   Intervention: N/A  Response to intervention: N/A  Plan: Transplant flush will end at 1810, continue to monitor patient closely for any changes.

## 2017-03-22 NOTE — PROGRESS NOTES
"BMT Daily Progress Note       Steph Agee is a 42 year old women who is day 0 of Auto PBSCT  for Hodgkins disease.  History of Present Illness:  Despite emend, she has nausea early morning.  Nausea improved with ativan and zofran.  She has had some diarrhea for the last 24 hours, no abdominal pain or cramping.  No fevers.  No cough or sob.  No bleeding.    Review of Systems:  ROS otherwise unremarkable other than what is noted in the HPI.     Physical Exam:    /83  Pulse 67  Temp 98.6  F (37  C) (Axillary)  Resp 18  Ht 1.575 m (5' 2.01\")  Wt 78.1 kg (172 lb 1.6 oz)  SpO2 100%  BMI 31.47 kg/m2    General: A&O. NAD.   HEENT: CALEB, sclera anicteric, face appears less edematous  Neck: supple  Lungs: CTAB.  No wheezes or crackles.    Heart: RRR, no m/r/g  Abdomen: +BS, soft, NT/ND, no HSM  Extremities: trace edema in distal LE, better  Skin: no rashes or petechiae  Neurologic: Nonfocal  RAC: Right:[place]: nontender, no erythema or drainage.  On the same side: Right: port a cath: not accessed    Laboratory Data:  Lab Results   Component Value Date    WBC 2.3 (L) 03/22/2017    ANEU 2.3 03/22/2017    HGB 10.6 (L) 03/22/2017    HCT 31.6 (L) 03/22/2017    PLT 75 (L) 03/22/2017     03/22/2017    POTASSIUM 4.2 03/22/2017    CHLORIDE 104 03/22/2017    CO2 26 03/22/2017    GLC 93 03/22/2017    BUN 14 03/22/2017    CR 0.56 03/22/2017    MAG 2.4 (H) 03/22/2017    INR 1.01 03/20/2017       Assessment/Plan:  Steph Agee is a 42 year old female with Hodgkins disease who is day-1 pre auto for Hodgkins disease.  Prep:  Completed prep  D-6: BCNU, -16, Cytoxan  D-5-D-4: -16, Cytoxan  D-3: Cytoxan  D-2-D-1: Rest  3/22 Transplant     1. BMT: Completed Prep as above. Allopurinol through day 0. Will get 4 bags of cells today at 10:00 am. Cell dose=1.84 X10^6 CD34/kg from both PBSCT and bone marrow. GCSF starts d+5 and cont to until ANC>1500 x3 consecutive days. Re-stage per protocol.     2. HEME: Keep Hgb>8 and " plts>10K. No pre-meds.  No transfusions today.  Counts trending down but not yet neutropenic.  -On Lovenox 40mg QD d/t hx of chronic IJ clot recently noted on 3/3 U/s.  Platelets=75k.  Plan to hold lovenox for plt 30,000 or less.      3. ID: Afebrile. Cont Sharron/vfend(hx of marijuana use), HD ACV  (CMV+) and levaquin as prophy. Bactrim or appropriate PCP therapy to start d+28.       4.  GI: Has had persistent nausea even before transplant that she would improved with smoking marijuana.  Continue ativan and zofran which works best.  Zofran and dexamethasone prior to chemo to prevent N/V through today. compazine available PRN break-through symptoms. Protonix for GI prophy.  Diarrhea: likely from chemotherapy.  3/21 C diff=neg.  Prn imodium.     5. FEN/Renal: Monitor creat and lytes. Replete lytes PRN per SS. Monitor weight, I+O, lytes per protocol with IVF flush. Receiving IV flush with PBSCT for 4 hours pre and 12 hours post.  Weight is below admit weight.  Recommended supplements.       6. CV: Cleared by cardiology d/t drop in EF. Cardiac MRI confirmed EF 47%. Continue cozaar 12.5 daily.     7. Psych: Hx of anxiety. Previously smoked daily marijuana and took ativan qHS. Ativan too sedating for daily use-per pt. Already on effexor, increase to 150mg daily (75).     Sierra Richardson PA-C  [phone]    Attending  The patient was seen and examined by me separate from the midlevel provider.The note above reflects my assessment and plan. I have personally reviewed today's labs,vital and radiology results. The points of care that were added by me are:  Interval History  Feels good today, normotensive, some loose stool,marisol nausea.Today receiving autologous stem cell transplant  On exam:  Lungs Clear  Mouth - no lesion  Abdomen soft   Ext No edema  Skin No new lesions  ASSESSMENT AND PLAN  1. Hodgkins Disease  2. Autologous PBSCTx  Today pt infused with autologous Peripheral blood stem cells 1.84 x10^6/kg via a central vein  catheter. I was present for the infusion. She tolerated procedure well.Plan DC tomorrow    Rogelio Gill M.D.  [phone]

## 2017-03-23 ENCOUNTER — CARE COORDINATION (OUTPATIENT)
Dept: TRANSPLANT | Facility: CLINIC | Age: 42
End: 2017-03-23

## 2017-03-23 VITALS
DIASTOLIC BLOOD PRESSURE: 70 MMHG | OXYGEN SATURATION: 96 % | HEIGHT: 62 IN | TEMPERATURE: 96.7 F | WEIGHT: 172.7 LBS | SYSTOLIC BLOOD PRESSURE: 116 MMHG | RESPIRATION RATE: 16 BRPM | HEART RATE: 87 BPM | BODY MASS INDEX: 31.78 KG/M2

## 2017-03-23 LAB
ANION GAP SERPL CALCULATED.3IONS-SCNC: 7 MMOL/L (ref 3–14)
BACTERIA SPEC CULT: NORMAL
BASOPHILS # BLD AUTO: 0 10E9/L (ref 0–0.2)
BASOPHILS NFR BLD AUTO: 0 %
BUN SERPL-MCNC: 14 MG/DL (ref 7–30)
CALCIUM SERPL-MCNC: 8 MG/DL (ref 8.5–10.1)
CHLORIDE SERPL-SCNC: 110 MMOL/L (ref 94–109)
CO2 SERPL-SCNC: 28 MMOL/L (ref 20–32)
CREAT SERPL-MCNC: 0.58 MG/DL (ref 0.52–1.04)
DIFFERENTIAL METHOD BLD: ABNORMAL
EOSINOPHIL # BLD AUTO: 0 10E9/L (ref 0–0.7)
EOSINOPHIL NFR BLD AUTO: 0 %
ERYTHROCYTE [DISTWIDTH] IN BLOOD BY AUTOMATED COUNT: 16.6 % (ref 10–15)
GFR SERPL CREATININE-BSD FRML MDRD: ABNORMAL ML/MIN/1.7M2
GLUCOSE SERPL-MCNC: 96 MG/DL (ref 70–99)
HCT VFR BLD AUTO: 28.6 % (ref 35–47)
HGB BLD-MCNC: 9.4 G/DL (ref 11.7–15.7)
LYMPHOCYTES # BLD AUTO: 0.1 10E9/L (ref 0.8–5.3)
LYMPHOCYTES NFR BLD AUTO: 10.7 %
MCH RBC QN AUTO: 31.5 PG (ref 26.5–33)
MCHC RBC AUTO-ENTMCNC: 32.9 G/DL (ref 31.5–36.5)
MCV RBC AUTO: 96 FL (ref 78–100)
MICRO REPORT STATUS: NORMAL
MONOCYTES # BLD AUTO: 0 10E9/L (ref 0–1.3)
MONOCYTES NFR BLD AUTO: 0 %
NEUTROPHILS # BLD AUTO: 0.4 10E9/L (ref 1.6–8.3)
NEUTROPHILS NFR BLD AUTO: 89.3 %
PLATELET # BLD AUTO: 53 10E9/L (ref 150–450)
PLATELET # BLD EST: ABNORMAL 10*3/UL
POTASSIUM SERPL-SCNC: 3.9 MMOL/L (ref 3.4–5.3)
RBC # BLD AUTO: 2.98 10E12/L (ref 3.8–5.2)
SODIUM SERPL-SCNC: 144 MMOL/L (ref 133–144)
SPECIMEN SOURCE: NORMAL
WBC # BLD AUTO: 0.5 10E9/L (ref 4–11)

## 2017-03-23 PROCEDURE — 25000132 ZZH RX MED GY IP 250 OP 250 PS 637: Performed by: STUDENT IN AN ORGANIZED HEALTH CARE EDUCATION/TRAINING PROGRAM

## 2017-03-23 PROCEDURE — 80048 BASIC METABOLIC PNL TOTAL CA: CPT | Performed by: NURSE PRACTITIONER

## 2017-03-23 PROCEDURE — 25000132 ZZH RX MED GY IP 250 OP 250 PS 637: Performed by: NURSE PRACTITIONER

## 2017-03-23 PROCEDURE — 85025 COMPLETE CBC W/AUTO DIFF WBC: CPT | Performed by: NURSE PRACTITIONER

## 2017-03-23 PROCEDURE — 25000125 ZZHC RX 250: Performed by: NURSE PRACTITIONER

## 2017-03-23 RX ORDER — VORICONAZOLE 200 MG/1
200 TABLET, FILM COATED ORAL EVERY 12 HOURS
Qty: 60 TABLET | Refills: 1 | Status: SHIPPED | OUTPATIENT
Start: 2017-03-23 | End: 2017-04-19

## 2017-03-23 RX ORDER — LORAZEPAM 0.5 MG/1
0.25 TABLET ORAL
Qty: 60 TABLET | Refills: 0 | Status: SHIPPED | OUTPATIENT
Start: 2017-03-23 | End: 2017-04-19

## 2017-03-23 RX ORDER — OXYCODONE HYDROCHLORIDE 5 MG/1
5-10 TABLET ORAL EVERY 4 HOURS PRN
Qty: 30 TABLET | Refills: 0 | Status: SHIPPED | OUTPATIENT
Start: 2017-03-23 | End: 2017-04-13

## 2017-03-23 RX ORDER — LEVOFLOXACIN 250 MG/1
250 TABLET, FILM COATED ORAL DAILY
Qty: 30 TABLET | Refills: 1 | Status: SHIPPED | OUTPATIENT
Start: 2017-03-23 | End: 2017-04-19

## 2017-03-23 RX ORDER — ACYCLOVIR 800 MG/1
800 TABLET ORAL
Qty: 150 TABLET | Refills: 1 | Status: SHIPPED | OUTPATIENT
Start: 2017-03-23 | End: 2017-04-19

## 2017-03-23 RX ORDER — VENLAFAXINE HYDROCHLORIDE 150 MG/1
150 TABLET, EXTENDED RELEASE ORAL AT BEDTIME
Qty: 30 EACH | Refills: 0 | Status: SHIPPED | OUTPATIENT
Start: 2017-03-23 | End: 2017-04-19

## 2017-03-23 RX ORDER — SULFAMETHOXAZOLE/TRIMETHOPRIM 800-160 MG
1 TABLET ORAL
Qty: 30 TABLET | Refills: 3 | Status: SHIPPED | OUTPATIENT
Start: 2017-04-24 | End: 2017-07-05

## 2017-03-23 RX ORDER — PANTOPRAZOLE SODIUM 40 MG/1
40 TABLET, DELAYED RELEASE ORAL DAILY
Qty: 30 TABLET | Refills: 1 | Status: SHIPPED | OUTPATIENT
Start: 2017-03-23 | End: 2017-04-19

## 2017-03-23 RX ADMIN — ACYCLOVIR 800 MG: 800 TABLET ORAL at 13:37

## 2017-03-23 RX ADMIN — SODIUM CHLORIDE, PRESERVATIVE FREE 5 ML: 5 INJECTION INTRAVENOUS at 07:35

## 2017-03-23 RX ADMIN — VORICONAZOLE 200 MG: 200 TABLET, FILM COATED ORAL at 07:38

## 2017-03-23 RX ADMIN — Medication 0.25 MG: at 11:41

## 2017-03-23 RX ADMIN — PANTOPRAZOLE SODIUM 40 MG: 40 TABLET, DELAYED RELEASE ORAL at 07:33

## 2017-03-23 RX ADMIN — GABAPENTIN 200 MG: 100 CAPSULE ORAL at 13:37

## 2017-03-23 RX ADMIN — ACYCLOVIR 800 MG: 800 TABLET ORAL at 07:38

## 2017-03-23 RX ADMIN — Medication 0.25 MG: at 07:33

## 2017-03-23 RX ADMIN — GABAPENTIN 200 MG: 100 CAPSULE ORAL at 07:38

## 2017-03-23 RX ADMIN — ACYCLOVIR 800 MG: 800 TABLET ORAL at 11:41

## 2017-03-23 RX ADMIN — LEVOFLOXACIN 250 MG: 250 TABLET, FILM COATED ORAL at 11:41

## 2017-03-23 NOTE — PLAN OF CARE
Problem: Blood and Marrow Transplantation  Goal: Blood and Marrow Transplantation  Signs and symptoms of listed problems will be absent or manageable.   Outcome: No Change    03/22/17 1552 03/23/17 0553   Blood and Marrow Transplantation   Blood and Marrow Transplantation Assessed all --    Blood and Marrow Transplantation Present --  none      AVSS.  No complaints of n/v or pain.  Pt said she felt much better this evening. No replacements needed.  Continue POC. D/C today.

## 2017-03-23 NOTE — PROGRESS NOTES
"Discharge SBAR:    Situation:  Steph Agee is a 42 year old being discharged to: Home  Admission reason: Scheduled Admission  Is this a readmission? No    Background:  Primary diagnosis: Hodgkin's Lymphoma     /70 (BP Location: Right arm)  Pulse 87  Temp 96.7  F (35.9  C) (Axillary)  Resp 16  Ht 1.575 m (5' 2.01\")  Wt 78.3 kg (172 lb 11.2 oz)  SpO2 96%  BMI 31.58 kg/m2  Type of donor: Autologous  Type of stem cells: PBSC  Relapsed? No  Falls Precautions? No  Isolation? No  DNR? No  DNI? No  Confidential Patient? No  Positive blood cultures? No    Assessment:  Discharge teaching    Review discharge medications and schedule: Yes    Set up pill box: No    Discharge instructions reviewed: Yes    Special considerations (think about previous reactions, issues with flushing CVC, premedication needs, etc): No    Patient Concerns: No    Recommendations:  Anticipated needs:    Daily infusions: No    Daily transfusions: No    G-CSF: will start on 3/27/17.    Other: None  Verbal report called to clinic: No      "

## 2017-03-23 NOTE — DISCHARGE SUMMARY
[facility] Discharge Summary   Steph Agee MRN# [id number]   Age: 42 year old  YOB: 1975   Date of Admission: 3/15/2017  Date of Discharge:  3/23/17  Admitting Physician: Connor Barajas MD  Discharge Physician:  Dr. Gill  Discharge Diagnoses:    1. D+1 S/p auto transplant for Hodgkins disease  2. Nausea  3. Hx of anxiety   4. Pancytopenia secondary to chemotherapy  Discharge Medications:       Steph Agee   Home Medication Instructions STEVEN:[id number]    Printed on:03/23/17 1115   Medication Information                      acyclovir (ZOVIRAX) 800 MG tablet  Take 1 tablet (800 mg) by mouth 5 times daily             gabapentin (NEURONTIN) 100 MG capsule  Take 2 capsules (200 mg) by mouth 3 times daily             levofloxacin (LEVAQUIN) 250 MG tablet  Take 1 tablet (250 mg) by mouth daily             LORazepam (ATIVAN) 0.5 MG tablet  Take 0.5 tablets (0.25 mg) by mouth 3 times daily (before meals)             losartan (COZAAR) 25 MG tablet  Take 0.5 tablets (12.5 mg) by mouth daily             ondansetron (ZOFRAN) 4 MG tablet  Take 1 tablet (4 mg) by mouth every 6 hours as needed for nausea or vomiting             oxyCODONE (ROXICODONE) 5 MG IR tablet  Take 1-2 tablets (5-10 mg) by mouth every 4 hours as needed for moderate to severe pain             pantoprazole (PROTONIX) 40 MG EC tablet  Take 1 tablet (40 mg) by mouth daily             sulfamethoxazole-trimethoprim (BACTRIM DS/SEPTRA DS) 800-160 MG per tablet  Take 1 tablet by mouth Every Mon, Tues two times daily             venlafaxine (EFFEXOR-ER) 150 MG TB24 24 hr tablet  Take 1 tablet (150 mg) by mouth At Bedtime             voriconazole (VFEND) 200 MG tablet  Take 1 tablet (200 mg) by mouth every 12 hours               Brief History of Illness:    **Adopted from H&P  She presented in 11/2014 with 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 She staged out as IIB with disease above the diaphragm. Biopsy of a  "left axillary node confirmed that this was classical Hodgkin's. She subsequently had 6 cycles of ABVD followed by external beam radiation. She then did well until 05/2016, when she again developed heaviness in the chest and recurrence of night sweats. PET/CT showed recurrence of disease predominantly in the manubrium and sternum with extension into the left pectoralis muscle. A biopsy confirmed relapse. She then underwent a total of 4 cycles of ICE chemotherapy, which she tolerated well. She then came for pre-transplant evaluation in 10/2016. Unfortunately, restaging labs showed a progression with a new 1-cm, hypermetabolic, left cardiophrenic lymph node. This was biopsied by Interventional Radiology, and the biopsy confirmed recurrence of her Hodgkin's disease. She subsequently has had 4 cycles of brentuximab as a bridge to transplant.   Steph failed to mobilize with gcsf alone. She then underwent bone marrow harvest with GM-csf stimulation. Total cell dose: 1.18 CD34/KG from harvest.  Physical Exam:    /75 (BP Location: Right arm)  Pulse 87  Temp 97.3  F (36.3  C) (Axillary)  Resp 16  Ht 1.575 m (5' 2.01\")  Wt 78.3 kg (172 lb 11.2 oz)  SpO2 100%  BMI 31.58 kg/m2   General: A&O. NAD.   HEENT: CALEB, sclera anicteric, face appears less edematous  Neck: supple  Lungs: CTAB. No wheezes or crackles.   Heart: RRR, no m/r/g  Abdomen: +BS, soft, NT/ND, no HSM  Extremities: trace edema in distal LE, better  Skin: no rashes or petechiae  Neurologic: Nonfocal  RAC: Right:[place]: nontender, no erythema or drainage. On the same side: Right: port a cath: not accessed  Hospital Course:    Steph Agee is a 42 year old female with Hodgkins disease who is day+1 pre auto for Hodgkins disease.  Prep: Completed prep  D-6: BCNU, -16, Cytoxan  D-5-D-4: -16, Cytoxan  D-3: Cytoxan  D-2-D-1: Rest  3/22 Transplant      1. BMT: Completed Prep as above. Allopurinol through day 0. Received 4 bags of cells. Cell dose=1.84 " X10^6 CD34/kg from both PBSCT and bone marrow. GCSF starts d+5 and cont to until ANC>1500 x3 consecutive days. Re-stage per protocol.      2. HEME: Keep Hgb>8 and plts>10K. No pre-meds. No transfusions today. Counts trending down but not yet neutropenic.  -On Lovenox 40mg QD d/t hx of chronic IJ clot recently noted on 3/3 U/s. Platelets trending down. Per Dr. Gill, ok to d/c today given chronic clot.        3. ID: Afebrile. Cont Sharron/vfend(hx of marijuana use), HD ACV (CMV+) and levaquin as prophy. Bactrim or appropriate PCP therapy to start d+28.       4.  GI: Has had persistent nausea even before transplant that she would improved with smoking marijuana. Continue ativan and zofran which works best.  Zofran and dexamethasone prior to chemo to prevent N/V through D0. compazine available PRN break-through symptoms. Protonix for GI prophy.  Diarrhea: likely from chemotherapy. 3/21 C diff=neg. Prn imodium.      5. FEN/Renal: Monitor creat and lytes. Replete lytes PRN per SS. Monitor weight, I+O, lytes per protocol with IVF flush.       6. CV: Cleared by cardiology d/t drop in EF. Cardiac MRI confirmed EF 47%. Continue cozaar 12.5 daily.      7. Psych: Hx of anxiety. Previously smoked daily marijuana and took ativan qHS. Ativan too sedating for daily use-per pt. Already on effexor, increase to 150mg daily (75).      Discharge Instructions and Follow-Up:    Discharge diet: Regular diet as tolerated  Discharge activity: Activity as tolerated   Discharge follow-up: Follow up with BMT Clinic     Discharge Disposition:    Discharged to home.    Debra Black NP          Attending  The patient was seen and examined by me separate from the midlevel provider.The note above reflects my assessment and plan. I have personally reviewed today's labs,vital and radiology results. The points of care that were added by me are:  Interval History  Feels good today,Tolerated autologous stem cell transplant yesterday well  On  exam:  Lungs Clear  Mouth - no lesion  Abdomen soft   Ext No edema  Skin No new lesions  ASSESSMENT AND PLAN  1. Hodgkins Disease  2. Autologous PBSCTx  3. Pancytopenia secondary to chemotherapy  Doing well Will Discharge today, I spent <30 minutes reviewing discharge with her. Will not give lovenox prophy as she has low platelets and the IJ clot was chronic  Will f/u tomorrrow in BMT clinic     Rogelio Gill M.D.  [phone]

## 2017-03-23 NOTE — PROGRESS NOTES
[employer]: [facility] [phone]  Referral made for line Care: Yes  PT recommended: No  Referral made for PT:  No  D/c location:  Home-Pt's cousin  Has placement need been communicated to Social Work?  Yes    IV medications needed at discharge: None   Pharmacy concerns: None  Teaching time arranged with family: 3/23 completed  Notify nurse to schedule line care class/ DM teaching, prior to d/c: Yes, Line care class completed on 2/2/17    Outpatient Nurse Coordinator notified of patient discharge.

## 2017-03-23 NOTE — PLAN OF CARE
Problem: Blood and Marrow Transplantation  Goal: Blood and Marrow Transplantation  Signs and symptoms of listed problems will be absent or manageable.   Outcome: Adequate for Discharge Date Met:  03/23/17  Afebrile, VSS. Pt denied pain. Small dose of Ativan being given prior to meal for nausea. No emesis. Pt discharging to home. Discharge instruction reviewed with pt. Will be followed in clinic.

## 2017-03-23 NOTE — PLAN OF CARE
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 Diagnoses of Hodgkin disease (H) and Personal history of diseases of blood and blood-forming organs were also pertinent to this visit.    Teaching Topic: Autologous Stem Cell Transplant Discharge Teaching    Person(s) involved in teaching: Patient and Caregiver  Motivation Level  Asks Questions: Yes  Eager to Learn: Yes  Cooperative: Yes  Receptive (willing/able to accept information): Yes  Any cultural factors/Latter day beliefs that may influence understanding or compliance? No    Patient and Caregiver demonstrates understanding of the following:  - Reason for the appointment, diagnosis and treatment plan: Yes  - Knowledge of proper use of medications and conditions for which they are ordered (with special attention to potential side effects or drug interactions): No, explain: bedside nurse will discuss medications with patient and caregiver  - Which situations necessitate calling provider and whom to contact: Yes    Teaching concerns addressed: None    Proper use and care of (medical equipment, care aids, etc.) NA  Pain management techniques: Yes  Patient instructed on hand hygiene: Yes  How and/when to access community resources: Yes    Infection Control:  Patient and Caregiver demonstrates understanding of the following:  Surgical procedure site care taught NA  Signs and symptoms of infection taught Yes  Wound care taught NA  Central venous catheter care taught No, explain: patient and caregiver verbalized understanding of line care and had previously participated in education.    Instructional Materials Used/Given: Discharge teaching completed with patient and family.  Written guidelines provided including clinic follow up, signs and symptoms to report, infection prevention, and contact list. Reviewed potential side effects s/p transplant and  what to expect during recovery period.  Discussed clinic routines. Discussed activities to avoid to prevent infections and mask guidelines.  [facility] home infusion for line care supplies. Pt and family verbalize understanding of material via teach back and all questions have been answered.    Time spent with patient: 30 minutes.    Specific Concerns: None

## 2017-03-23 NOTE — SUMMARY OF CARE
BMT Summary of Care    This note has data from a flowsheet    March 23, 2017 10:54 AM  Steph TOÑO Agee  MRN: [id number]    Discharge Date: 3/23/17     BMT Primary Physician: Dr. Chambers     BMT Nurse Coordinator:     Discharge Diagnosis: S/P BMT    Discharge To: home    Activity: as tolerated    Catheter Care: [place] - Flush each line with 5mL of 10 unit/mL heparin every 24 hours    Vascular Access Device Protocol Per Policy  Supplies through home infusion       IV Medications through home infusion: None    Nutrition: Regular diet as tolerated    Blood Transfusions:  Transfuse if Hemoglobin < or equal 8 mg/dL  Red Blood Cell Order: 2 units, irradiated and leukoreduced   Transfuse if Platelet count < or equal 10 uL  Platelet order: 1 adult dose, irradiated and leukoreduced       Intravenous Electrolyte Replacement:  Potassium  chloride (give only if serum creatinine < 2)     3-3.3  20mEq/hr  over 1 hour x 2 doses     <3      20mEq/hr  over 1 hour x 3 doses  Magnesium sulfate 1.3-1.7     2 grams over 1 hour x1 dose                                     <1.3         2 grams over 2 hours x1 dose    Outpatient Pharmacy:  G-CSF to be given in clinic: (dose) 480mcg    Laboratory Tests:  At next clinic appointment (date: 3/24/17)  Hemogram (CBC) differential, platelet count  Basic Metabolic Panel    Support Services:  None    Appointments:   BMT Clinic (date, time, provider): 3/24/17, appt time pending  3/27/17: to start gcsf     Steph Agee   Home Medication Instructions STEVEN:[id number]    Printed on:03/23/17 1057   Medication Information                      acyclovir (ZOVIRAX) 800 MG tablet  Take 1 tablet (800 mg) by mouth 5 times daily             gabapentin (NEURONTIN) 100 MG capsule  Take 2 capsules (200 mg) by mouth 3 times daily             levofloxacin (LEVAQUIN) 250 MG tablet  Take 1 tablet (250 mg) by mouth daily             LORazepam (ATIVAN) 0.5 MG tablet  Take 0.5 tablets (0.25 mg) by mouth 3 times daily  (before meals)             losartan (COZAAR) 25 MG tablet  Take 0.5 tablets (12.5 mg) by mouth daily             ondansetron (ZOFRAN) 4 MG tablet  Take 1 tablet (4 mg) by mouth every 6 hours as needed for nausea or vomiting             oxyCODONE (ROXICODONE) 5 MG IR tablet  Take 1-2 tablets (5-10 mg) by mouth every 4 hours as needed for moderate to severe pain             pantoprazole (PROTONIX) 40 MG EC tablet  Take 1 tablet (40 mg) by mouth daily             sulfamethoxazole-trimethoprim (BACTRIM DS/SEPTRA DS) 800-160 MG per tablet  Take 1 tablet by mouth Every Mon, Tues two times daily             venlafaxine (EFFEXOR-ER) 150 MG TB24 24 hr tablet  Take 1 tablet (150 mg) by mouth At Bedtime             voriconazole (VFEND) 200 MG tablet  Take 1 tablet (200 mg) by mouth every 12 hours                 MARIE Das CNP

## 2017-03-23 NOTE — PROGRESS NOTES
CLINICAL    Blood and Marrow Transplant Service      Focus: Counseling and support.      Data:  Steph is admitted to .  Today is Day + 1 s/p autologus transplant for a diagnosis of Hodgkin's disease.    Intervention:   Met with pt, she was sitting up in bed having breakfast.  Pt states that her transplant went well and she is feeling good this morning.  Pt reports that her mood has been positive.  She is having some nausea from chemo but it is not interfering with her intake.    Pt will be going to her cousin, Caden, house.  April is coming in this morning for teaching.   Provided assessment of coping, supportive counseling, validation of concerns, encouragement and resources.  Pt given a discharge folder with resource information for both pt and her caregiver(s).      Assessment:  Pt appears to be doing well, she is looking forward to being discharged today.   Pt has strong support from her cousin, her SO, friends and family members.      Plan: Encouraged patient to express any questions/concerns as they arise. SW to remain available supportively and work with the interdisciplinary team regarding patient's plan of care.  Reviewed the availability of social work support at the BMT clinic, contact information provided.        Kymberly HASTINGS LICSW  Pager) [phone]

## 2017-03-24 ENCOUNTER — ONCOLOGY VISIT (OUTPATIENT)
Dept: TRANSPLANT | Facility: CLINIC | Age: 42
End: 2017-03-24
Attending: PHYSICIAN ASSISTANT
Payer: COMMERCIAL

## 2017-03-24 ENCOUNTER — APPOINTMENT (OUTPATIENT)
Dept: LAB | Facility: CLINIC | Age: 42
End: 2017-03-24
Attending: PHYSICIAN ASSISTANT
Payer: COMMERCIAL

## 2017-03-24 VITALS
SYSTOLIC BLOOD PRESSURE: 103 MMHG | OXYGEN SATURATION: 97 % | TEMPERATURE: 98.1 F | DIASTOLIC BLOOD PRESSURE: 65 MMHG | HEART RATE: 114 BPM | WEIGHT: 173 LBS | BODY MASS INDEX: 31.63 KG/M2

## 2017-03-24 DIAGNOSIS — I42.7 CHEMOTHERAPY INDUCED CARDIOMYOPATHY (H): ICD-10-CM

## 2017-03-24 DIAGNOSIS — C81.12 NODULAR SCLEROSIS HODGKIN LYMPHOMA OF INTRATHORACIC LYMPH NODES (H): Primary | ICD-10-CM

## 2017-03-24 DIAGNOSIS — T45.1X5A CHEMOTHERAPY INDUCED CARDIOMYOPATHY (H): ICD-10-CM

## 2017-03-24 LAB
ANION GAP SERPL CALCULATED.3IONS-SCNC: 7 MMOL/L (ref 3–14)
BUN SERPL-MCNC: 13 MG/DL (ref 7–30)
CALCIUM SERPL-MCNC: 8.4 MG/DL (ref 8.5–10.1)
CHLORIDE SERPL-SCNC: 104 MMOL/L (ref 94–109)
CO2 SERPL-SCNC: 30 MMOL/L (ref 20–32)
CREAT SERPL-MCNC: 0.48 MG/DL (ref 0.52–1.04)
DIFFERENTIAL METHOD BLD: ABNORMAL
ERYTHROCYTE [DISTWIDTH] IN BLOOD BY AUTOMATED COUNT: 15.9 % (ref 10–15)
GFR SERPL CREATININE-BSD FRML MDRD: ABNORMAL ML/MIN/1.7M2
GLUCOSE SERPL-MCNC: 80 MG/DL (ref 70–99)
HCT VFR BLD AUTO: 30.5 % (ref 35–47)
HGB BLD-MCNC: 10.3 G/DL (ref 11.7–15.7)
MCH RBC QN AUTO: 33 PG (ref 26.5–33)
MCHC RBC AUTO-ENTMCNC: 33.8 G/DL (ref 31.5–36.5)
MCV RBC AUTO: 98 FL (ref 78–100)
PLATELET # BLD AUTO: 32 10E9/L (ref 150–450)
POTASSIUM SERPL-SCNC: 3.6 MMOL/L (ref 3.4–5.3)
RBC # BLD AUTO: 3.12 10E12/L (ref 3.8–5.2)
SODIUM SERPL-SCNC: 141 MMOL/L (ref 133–144)
WBC # BLD AUTO: 0.1 10E9/L (ref 4–11)

## 2017-03-24 PROCEDURE — 80048 BASIC METABOLIC PNL TOTAL CA: CPT | Performed by: NURSE PRACTITIONER

## 2017-03-24 PROCEDURE — 85027 COMPLETE CBC AUTOMATED: CPT

## 2017-03-24 PROCEDURE — 99212 OFFICE O/P EST SF 10 MIN: CPT | Mod: ZF

## 2017-03-24 PROCEDURE — 36592 COLLECT BLOOD FROM PICC: CPT

## 2017-03-24 PROCEDURE — 25000125 ZZHC RX 250: Mod: ZF | Performed by: NURSE PRACTITIONER

## 2017-03-24 RX ORDER — LOSARTAN POTASSIUM 25 MG/1
12.5 TABLET ORAL AT BEDTIME
Qty: 30 TABLET | Refills: 0 | Status: SHIPPED | OUTPATIENT
Start: 2017-03-24

## 2017-03-24 RX ORDER — HEPARIN SODIUM,PORCINE 10 UNIT/ML
5 VIAL (ML) INTRAVENOUS
Status: DISCONTINUED | OUTPATIENT
Start: 2017-03-24 | End: 2017-03-24 | Stop reason: HOSPADM

## 2017-03-24 RX ADMIN — SODIUM CHLORIDE, PRESERVATIVE FREE 5 ML: 5 INJECTION INTRAVENOUS at 09:47

## 2017-03-24 RX ADMIN — SODIUM CHLORIDE, PRESERVATIVE FREE 5 ML: 5 INJECTION INTRAVENOUS at 09:48

## 2017-03-24 NOTE — NURSING NOTE
Chief Complaint   Patient presents with     Blood Draw     Patient is here today for labs to be drawn via her Central Line by RN. Vitals charted,labs collected, and patient checked into next appt.

## 2017-03-24 NOTE — PROGRESS NOTES
BMT Daily Progress Note     Steph Agee is a 42 year old female with Hodgkins disease who is day+2 pre auto for Hodgkins disease.  CC: Hospital d/c f/u    HPI: Steph is tired but otherwise feeling okay. Eating and drinking without issue, not much of an appetite. She does have nasuea but limited to overnight and controlled with prn ativan/zofran. She has not taken cozaar as out of it, but bp is lower today. She declines fluids as feels she can drink extra fluids today. No fever or cold symptoms. No bleeding - has stopped lovenox. She denies mouth/throat pain but does have some peeling/funny feeling on her gums but not painful.     Review of Systems: 10 point ROS negative except as noted above.    Physical Exam:   Blood pressure 103/65, pulse 114, temperature 98.1  F (36.7  C), weight 78.5 kg (173 lb), SpO2 97 %.  General: NAD   Eyes: CALEB, sclera anicteric   Nose/Mouth/Throat: OP clear, buccal mucosa moist, no ulcerations. White ridging along gingivea upper>lower   Lungs: CTA bilaterally  Cardiovascular: RRR, no M/R/G   Abdominal/Rectal: +BS, soft, NT, ND, No HSM   Lymphatics: no edema  Skin: no rashes or petechaie  Neuro: A&O   Additional Findings: [place] site NT, no drainage.  Labs:  Lab Results   Component Value Date    WBC 0.5 (LL) 03/23/2017    ANEU 0.4 (LL) 03/23/2017    HGB 9.4 (L) 03/23/2017    HCT 28.6 (L) 03/23/2017    PLT 53 (L) 03/23/2017     03/24/2017    POTASSIUM 3.6 03/24/2017    CHLORIDE 104 03/24/2017    CO2 30 03/24/2017    GLC 80 03/24/2017    BUN 13 03/24/2017    CR 0.48 (L) 03/24/2017    MAG 2.4 (H) 03/22/2017    INR 1.01 03/20/2017         Imaging: Reviewed  Microbiology:  Reviewed    Assessment and Plan:   Steph Agee is a 42 year old female with Hodgkins disease who is day+2 pre auto for Hodgkins disease.        1. BMT: Completed BEAM prep without issue. Cell dose=1.84 X10^6 CD34/kg from both PBSCT and bone marrow.   -GCSF starts d+5 (3/27/17) and cont to until ANC>1500 x3  consecutive days. Re-stage per protocol.      2. HEME: Keep Hgb>8 and plts>10K. No pre-meds.   -Plts 32k - may need plts 3/27.   -Had been on Lovenox 40mg QD d/t hx of chronic IJ clot recently noted on 3/3 U/s. Platelets trending down. Per Dr. Gill, ok to permanently d/c lovenox as chronic clot (last dose 3/22).       3. ID: Afebrile.  -Cont vfend(hx of marijuana use), HD ACV (CMV+) and levaquin as prophy.   -Bactrim or appropriate PCP therapy to start d+28.       4.  GI: Has had persistent nausea even before transplant that she would improved with smoking marijuana. Continues to have mostly nighttime nausea- controlled with prn ativan/zofran.   - No scheduled anti-emetics currently. Stools soft but not diarrhea.   - Monitor closely for mucosits- ridging along gingiva currently but no pain/ulcerations  - Protonix for GI prophy.  Diarrhea: likely from chemotherapy. 3/21 C diff=neg. Prn imodium.      5. FEN/Renal:   -Cr and lytes stable  - BP soft- encourage oral intake.        6. CV: Cleared by cardiology d/t drop in EF. Cardiac MRI confirmed EF 47%.   - Had been on Continue cozaar 12.5 daily (cardiomyopathy) - pt has not taking today and /70's - instructed not to take cozaar over the weekend-  If BP okay resume 3/27.       7. Psych: Hx of anxiety. Previously smoked daily marijuana and took ativan qHS. Ativan too sedating for daily use-per pt. Already on effexor, increase to 150mg daily (75).  - No complaints of this today.     - RTC Monday for labs, provider visit, gcsf  - f/u plts  - f/u BP - resume cozaar if BP can tolerate and pt still drinking well  (refill sent to pharmacy 3/24)    MARILY Ruiz-C  [phone]

## 2017-03-24 NOTE — NURSING NOTE
BMT Heme Malignancy Rooming Note    Steph LUNDBERG Anuel - 3/24/2017 10:07 AM     Chief Complaint   Patient presents with     Blood Draw     Patient is here today for labs to be drawn via her Central Line by RN. Vitals charted,labs collected, and patient checked into next appt.     RECHECK     post bmt for hodgkin lymphoma - here for provider visit post transplant        /65  Pulse 114  Temp 98.1  F (36.7  C)  Wt 78.5 kg (173 lb)  SpO2 97%  BMI 31.63 kg/m2     Medications reviewed: Yes    Labs drawn: No    Dressing changed: No     Medications given: No    Staff time:6 minutes    Additional information if applicable: TONNY Lopez MA

## 2017-03-24 NOTE — MR AVS SNAPSHOT
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  After hours call the   hospital at [phone] or   [phone]. Ask for the BMT   fellow for pediatric or adult patients            Follow-ups after your visit        Your next 10 appointments already scheduled     Mar 27, 2017  9:30 AM CDT   Masonic Lab Draw with UC MASONIC LAB DRAW   [facility] Masonic Lab Draw ([facility])    [address] [place] [address]   [phone]            Mar 27, 2017 10:00 AM CDT   Return with UC BMT CECILIA #3   [facility] Blood and Marrow Transplant ([facility])    [address] [place] [address]   [phone]            Mar 27, 2017  1:30 PM CDT   Infusion 120 with UC BMT INFUSION, UC 2 ATC   [facility] Blood and Marrow Transplant ([facility])    [address] [place] [address]   [phone]            Mar 28, 2017 10:00 AM CDT   Masonic Lab Draw with UC MASONIC LAB DRAW   [facility] Masonic Lab Draw ([facility])    [address] [place] [address]   [phone]            Mar 28, 2017 10:30 AM CDT   Return with UC BMT CECILIA #1   [facility] Blood and Marrow Transplant ([facility])     [address] [address]   [phone]            Mar 29, 2017 10:00 AM CDT   Masonic Lab Draw with UC MASONIC LAB DRAW   [facility] Masonic Lab Draw ([facility])    [address] [address]   [phone]            Mar 29, 2017 10:30 AM CDT   Return with UC BMT CECILIA #4   [facility] Blood and Marrow Transplant ([facility])    [address] [place] [address]   [phone]            Mar 30, 2017  9:30 AM CDT   Masonic Lab Draw with UC MASONIC LAB DRAW   [facility] Masonic Lab Draw ([facility])    [address] [place] [address]   [phone]            Mar 30, 2017 10:00 AM CDT   Return with UC BMT CECILIA #2   [facility] Blood and Marrow Transplant ([facility])    [address] [place] [address]   [phone]              Future tests that were ordered for you today     Open Future Orders        Priority Expected Expires Ordered    Comprehensive metabolic panel Routine 3/27/2017 3/31/2017 3/24/2017    CBC with platelets differential Routine 3/27/2017 3/31/2017 3/24/2017    Magnesium Routine 3/27/2017 3/31/2017 3/24/2017            Who to contact     If you have questions or need follow up information about today's clinic visit or your schedule please contact [facility] BLOOD AND MARROW TRANSPLANT directly at [phone].  Normal or non-critical lab and imaging results will be communicated to you by MyChart, letter or phone within 4 business days after the clinic has received the results. If you do not hear from us within 7 days, please contact the clinic through MyChart or phone. If you have a critical or abnormal lab result, we will notify you by phone as soon as possible.  Submit refill requests through [employer] or call your pharmacy and they will forward the refill request to  us. Please allow 3 business days for your refill to be completed.          Additional Information About Your Visit        [employer] Information     [employer] gives you secure access to your electronic health record. If you see a primary care provider, you can also send messages to your care team and make appointments. If you have questions, please call your primary care clinic.  If you do not have a primary care provider, please call [phone] and they will assist you.        Care EveryWhere ID     This is your Care EveryWhere ID. This could be used by other organizations to access your [facility] medical records  [id number]        Your Vitals Were     Pulse Temperature Pulse Oximetry BMI (Body Mass Index)          114 98.1  F (36.7  C) 97% 31.63 kg/m2         Blood Pressure from Last 3 Encounters:   03/24/17 103/65   03/23/17 116/70   03/09/17 96/62    Weight from Last 3 Encounters:   03/24/17 78.5 kg (173 lb)   03/23/17 78.3 kg (172 lb 11.2 oz)   03/09/17 81.2 kg (179 lb 0.2 oz)              We Performed the Following     Basic metabolic panel     CBC with platelets differential          Where to get your medicines      These medications were sent to [facility] [address] [address]    Hours:  TRANSPLANT PHONE NUMBER [phone] Phone:  [phone]     losartan 25 MG tablet          Recent Review Flowsheet Data     BMT Recent Results Latest Ref Rng & Units 3/19/2017 3/20/2017 3/20/2017 3/21/2017 3/22/2017 3/23/2017 3/24/2017    WBC 4.0 - 11.0 10e9/L - 2.8(L) - 2.4(L) 2.3(L) 0.5(LL) 0.1(LL)    Hemoglobin 11.7 - 15.7 g/dL - 9.9(L) - 10.9(L) 10.6(L) 9.4(L) 10.3(L)    Platelet Count 150 - 450 10e9/L - 123(L) - 97(L) 75(L) 53(L) Pending    Neutrophils (Absolute) 1.6 - 8.3 10e9/L - 2.7 - 2.3 2.3 0.4(LL) -    INR 0.86 - 1.14 - 1.01 - - - - -    Sodium 133 - 144 mmol/L 139 143 141 142 139 144 141    Potassium 3.4 - 5.3  mmol/L 3.7 3.4 3.8 3.8 4.2 3.9 3.6    Chloride 94 - 109 mmol/L - 108 - 107 104 110(H) 104    Glucose 70 - 99 mg/dL - 94 - 82 93 96 80    Urea Nitrogen 7 - 30 mg/dL - 8 - 12 14 14 13    Creatinine 0.52 - 1.04 mg/dL - 0.61 - 0.59 0.56 0.58 0.48(L)    Calcium (Total) 8.5 - 10.1 mg/dL - 8.2(L) - 8.9 8.6 8.0(L) 8.4(L)    Protein (Total) 6.8 - 8.8 g/dL - 6.0(L) - - - - -    Albumin 3.4 - 5.0 g/dL - 2.9(L) - - - - -    Bilirubin (Direct) 0.0 - 0.2 mg/dL - 0.1 - - - - -    Alkaline Phosphatase 40 - 150 U/L - 110 - - - - -    AST 0 - 45 U/L - 62(H) - - - - -    ALT 0 - 50 U/L - 67(H) - - - - -    MCV 78 - 100 fl - 96 - 97 96 96 98               Primary Care Provider Office Phone # Fax #    Travis Carpenter [phone]       [place] [address]  [place] [address]        Thank you!     Thank you for choosing [facility] BLOOD AND MARROW TRANSPLANT  for your care. Our goal is always to provide you with excellent care. Hearing back from our patients is one way we can continue to improve our services. Please take a few minutes to complete the written survey that you may receive in the mail after your visit with us. Thank you!             Your Updated Medication List - Protect others around you: Learn how to safely use, store and throw away your medicines at www.disposemymeds.org.          This list is accurate as of: 3/24/17 10:58 AM.  Always use your most recent med list.                   Brand Name Dispense Instructions for use    acyclovir 800 MG tablet    ZOVIRAX    150 tablet    Take 1 tablet (800 mg) by mouth 5 times daily       gabapentin 100 MG capsule    NEURONTIN    90 capsule    Take 2 capsules (200 mg) by mouth 3 times daily       levofloxacin 250 MG tablet    LEVAQUIN    30 tablet    Take 1 tablet (250 mg) by mouth daily       LORazepam 0.5 MG tablet    ATIVAN    60 tablet    Take 0.5 tablets (0.25 mg) by mouth 3 times daily (before meals)       losartan 25 MG tablet     COZAAR    30 tablet    Take 0.5 tablets (12.5 mg) by mouth At Bedtime       ondansetron 4 MG tablet    ZOFRAN    18 tablet    Take 1 tablet (4 mg) by mouth every 6 hours as needed for nausea or vomiting       oxyCODONE 5 MG IR tablet    ROXICODONE    30 tablet    Take 1-2 tablets (5-10 mg) by mouth every 4 hours as needed for moderate to severe pain       pantoprazole 40 MG EC tablet    PROTONIX    30 tablet    Take 1 tablet (40 mg) by mouth daily       sulfamethoxazole-trimethoprim 800-160 MG per tablet   Start taking on:  4/24/2017    BACTRIM DS/SEPTRA DS    30 tablet    Take 1 tablet by mouth Every Mon, Tues two times daily       venlafaxine 150 MG Tb24 24 hr tablet    EFFEXOR-ER    30 each    Take 1 tablet (150 mg) by mouth At Bedtime       voriconazole 200 MG tablet    VFEND    60 tablet    Take 1 tablet (200 mg) by mouth every 12 hours

## 2017-03-26 LAB
BLD PROD TYP BPU: NORMAL
NUM BPU REQUESTED: 1

## 2017-03-27 ENCOUNTER — ONCOLOGY VISIT (OUTPATIENT)
Dept: TRANSPLANT | Facility: CLINIC | Age: 42
End: 2017-03-27
Attending: INTERNAL MEDICINE
Payer: COMMERCIAL

## 2017-03-27 ENCOUNTER — INFUSION THERAPY VISIT (OUTPATIENT)
Dept: TRANSPLANT | Facility: CLINIC | Age: 42
End: 2017-03-27
Attending: PHYSICIAN ASSISTANT
Payer: COMMERCIAL

## 2017-03-27 VITALS
BODY MASS INDEX: 31.12 KG/M2 | TEMPERATURE: 98.5 F | OXYGEN SATURATION: 98 % | WEIGHT: 170.2 LBS | HEART RATE: 142 BPM | RESPIRATION RATE: 20 BRPM | SYSTOLIC BLOOD PRESSURE: 112 MMHG | DIASTOLIC BLOOD PRESSURE: 80 MMHG

## 2017-03-27 VITALS
TEMPERATURE: 98.8 F | HEART RATE: 116 BPM | SYSTOLIC BLOOD PRESSURE: 108 MMHG | DIASTOLIC BLOOD PRESSURE: 65 MMHG | RESPIRATION RATE: 18 BRPM

## 2017-03-27 DIAGNOSIS — C81.10 NODULAR SCLEROSING HODGKIN'S LYMPHOMA, UNSPECIFIED BODY REGION (H): Primary | ICD-10-CM

## 2017-03-27 DIAGNOSIS — Z29.9 NEED FOR PROPHYLACTIC MEASURE: ICD-10-CM

## 2017-03-27 DIAGNOSIS — C81.12 NODULAR SCLEROSIS HODGKIN LYMPHOMA OF INTRATHORACIC LYMPH NODES (H): ICD-10-CM

## 2017-03-27 DIAGNOSIS — C81.00 NODULAR LYMPHOCYTE PREDOMINANT HODGKIN LYMPHOMA, UNSPECIFIED BODY REGION (H): Primary | ICD-10-CM

## 2017-03-27 LAB
ALBUMIN SERPL-MCNC: 3.5 G/DL (ref 3.4–5)
ALP SERPL-CCNC: 144 U/L (ref 40–150)
ALT SERPL W P-5'-P-CCNC: 32 U/L (ref 0–50)
ANION GAP SERPL CALCULATED.3IONS-SCNC: 8 MMOL/L (ref 3–14)
AST SERPL W P-5'-P-CCNC: 16 U/L (ref 0–45)
BILIRUB SERPL-MCNC: 0.5 MG/DL (ref 0.2–1.3)
BLD PROD TYP BPU: NORMAL
BLD UNIT ID BPU: 0
BLOOD PRODUCT CODE: NORMAL
BPU ID: NORMAL
BUN SERPL-MCNC: 13 MG/DL (ref 7–30)
CALCIUM SERPL-MCNC: 8.5 MG/DL (ref 8.5–10.1)
CHLORIDE SERPL-SCNC: 101 MMOL/L (ref 94–109)
CO2 SERPL-SCNC: 30 MMOL/L (ref 20–32)
CREAT SERPL-MCNC: 0.54 MG/DL (ref 0.52–1.04)
DIFFERENTIAL METHOD BLD: ABNORMAL
ERYTHROCYTE [DISTWIDTH] IN BLOOD BY AUTOMATED COUNT: 14.4 % (ref 10–15)
GFR SERPL CREATININE-BSD FRML MDRD: ABNORMAL ML/MIN/1.7M2
GLUCOSE SERPL-MCNC: 104 MG/DL (ref 70–99)
HCT VFR BLD AUTO: 27.6 % (ref 35–47)
HGB BLD-MCNC: 9.7 G/DL (ref 11.7–15.7)
MAGNESIUM SERPL-MCNC: 2 MG/DL (ref 1.6–2.3)
MCH RBC QN AUTO: 32.8 PG (ref 26.5–33)
MCHC RBC AUTO-ENTMCNC: 35.1 G/DL (ref 31.5–36.5)
MCV RBC AUTO: 93 FL (ref 78–100)
PLATELET # BLD AUTO: 7 10E9/L (ref 150–450)
POTASSIUM SERPL-SCNC: 3.5 MMOL/L (ref 3.4–5.3)
PROT SERPL-MCNC: 7.1 G/DL (ref 6.8–8.8)
RBC # BLD AUTO: 2.96 10E12/L (ref 3.8–5.2)
SODIUM SERPL-SCNC: 139 MMOL/L (ref 133–144)
TRANSFUSION STATUS PATIENT QL: NORMAL
TRANSFUSION STATUS PATIENT QL: NORMAL
WBC # BLD AUTO: 0.1 10E9/L (ref 4–11)

## 2017-03-27 PROCEDURE — 80053 COMPREHEN METABOLIC PANEL: CPT | Performed by: PHYSICIAN ASSISTANT

## 2017-03-27 PROCEDURE — 83735 ASSAY OF MAGNESIUM: CPT | Performed by: PHYSICIAN ASSISTANT

## 2017-03-27 PROCEDURE — 25000125 ZZHC RX 250: Mod: ZF | Performed by: PHYSICIAN ASSISTANT

## 2017-03-27 PROCEDURE — 25000128 H RX IP 250 OP 636: Mod: ZF | Performed by: NURSE PRACTITIONER

## 2017-03-27 PROCEDURE — 96372 THER/PROPH/DIAG INJ SC/IM: CPT

## 2017-03-27 PROCEDURE — 85027 COMPLETE CBC AUTOMATED: CPT

## 2017-03-27 RX ORDER — HEPARIN SODIUM,PORCINE 10 UNIT/ML
5 VIAL (ML) INTRAVENOUS
Status: CANCELLED | OUTPATIENT
Start: 2017-03-28

## 2017-03-27 RX ORDER — HEPARIN SODIUM,PORCINE 10 UNIT/ML
5 VIAL (ML) INTRAVENOUS
Status: DISCONTINUED | OUTPATIENT
Start: 2017-03-27 | End: 2017-03-27 | Stop reason: HOSPADM

## 2017-03-27 RX ADMIN — FILGRASTIM 480 MCG: 480 INJECTION, SOLUTION INTRAVENOUS; SUBCUTANEOUS at 10:41

## 2017-03-27 RX ADMIN — SODIUM CHLORIDE, PRESERVATIVE FREE 5 ML: 5 INJECTION INTRAVENOUS at 09:51

## 2017-03-27 RX ADMIN — SODIUM CHLORIDE, PRESERVATIVE FREE 5 ML: 5 INJECTION INTRAVENOUS at 09:53

## 2017-03-27 NOTE — PROGRESS NOTES
BMT Daily Progress Note     Steph Agee is a 42 year old female with Hodgkins disease who is day+5 pre auto for Hodgkins disease.  CC: Hospital d/c f/u    HPI: Steph is tired but otherwise feeling okay.she doesn't have much of an appetite, but tolerating oral intake. New vaginal irritation - tender to wiping, slight end of stream dysuria. Questions if she has yeast infection but no odor and overall minimally symptomatic. NO increased frequency or other urinary symptoms- more vaginal irritation. Afebrile, no mucositis- persistent feeling of dry skin on gums.     Review of Systems: 10 point ROS negative except as noted above.    Physical Exam:   Blood pressure 112/80, pulse 142, temperature 98.5  F (36.9  C), temperature source Oral, resp. rate 20, weight 77.2 kg (170 lb 3.2 oz), SpO2 98 %.  General: NAD   Eyes: CALEB, sclera anicteric   Nose/Mouth/Throat: OP clear, buccal mucosa moist, no ulcerations. White ridging along gingivea upper>lower   Lungs: CTA bilaterally  Cardiovascular: RRR, no M/R/G   Abdominal/Rectal: +BS, soft, NT, ND, No HSM   : No odor- vaginal mucosa dry appearing, no erythema or discharge.   Lymphatics: no edema  Skin: no rashes or petechaie  Neuro: A&O   Additional Findings: Barbour site NT, no drainage.  Labs:  Lab Results   Component Value Date    WBC 0.1 (LL) 03/24/2017    ANEU 0.4 (LL) 03/23/2017    HGB 10.3 (L) 03/24/2017    HCT 30.5 (L) 03/24/2017    PLT 32 (LL) 03/24/2017     03/24/2017    POTASSIUM 3.6 03/24/2017    CHLORIDE 104 03/24/2017    CO2 30 03/24/2017    GLC 80 03/24/2017    BUN 13 03/24/2017    CR 0.48 (L) 03/24/2017    MAG 2.4 (H) 03/22/2017    INR 1.01 03/20/2017         Imaging: Reviewed  Microbiology:  Reviewed    Assessment and Plan:   Steph Agee is a 42 year old female with Hodgkins disease who is day+5 pre auto for Hodgkins disease.        1. BMT: Completed BEAM prep without issue. Cell dose=1.84 X10^6 CD34/kg from both PBSCT and bone marrow.   -GCSF today  d+5 (3/27/17) and cont to until ANC>1500 x3 consecutive days. Re-stage per protocol.      2. HEME: Keep Hgb>8 and plts>10K. No pre-meds.   -Plts 7k - receive plts today   -Had been on Lovenox 40mg QD d/t hx of chronic IJ clot recently noted on 3/3 U/s. Platelets trending down. Per Dr. Gill ok to permanently d/c lovenox as chronic clot (last dose 3/22).       3. ID: Afebrile.  -Cont vfend(hx of marijuana use), HD ACV (CMV+) and levaquin as prophy.   -Bactrim or appropriate PCP therapy to start d+28.       4.  GI: Has had persistent nausea even before transplant that she would improved with smoking marijuana. Continues to have mostly nighttime nausea- controlled with prn ativan/zofran.   - No scheduled anti-emetics currently. Stools soft but not diarrhea.   - Monitor closely for mucosits- ridging along gingiva currently but no pain/ulcerations  - Protonix for GI prophy.  Diarrhea: likely from chemotherapy. 3/21 C diff=neg. Prn imodium.      5. FEN/Renal:   -Cr and lytes stable  - BP soft- encourage oral intake.  Monitor closely as resume cozaar (see CV).       6. CV: Cleared by cardiology d/t drop in EF. Cardiac MRI confirmed EF 47%.   - Had been on Continue cozaar 12.5 daily (cardiomyopathy). Resume cozaar today as pt bp has been stably 100/60 - ensure BP is not lower.     7. Psych: Hx of anxiety. Previously smoked daily marijuana and took ativan qHS. Ativan too sedating for daily use-per pt. Already on effexor, increase to 150mg daily (75).  - No complaints of this today.     8.   Vaginal irritation: per exam appears vaginal dryness - no concern for yeast infection, no erythema or discharge  - recommend vaseline to vagina/external labia majora/minora prn for comfort.     - RTC daily for labs, gcsf, provider visit  - f/u plts tomorrow  - ensure BP stable despite starting cozaar.       MARILY Ruiz-C  [phone]

## 2017-03-27 NOTE — NURSING NOTE
BMT Heme Malignancy Rooming Note    Steph LUNDBERG Anuel - 3/27/2017 10:28 AM     Chief Complaint   Patient presents with     Blood Draw     Pt with hx of lymphoma labs collected via central line.         /80  Pulse 142  Temp 98.5  F (36.9  C) (Oral)  Resp 20  Wt 77.2 kg (170 lb 3.2 oz)  SpO2 98%  BMI 31.12 kg/m2    Data Unavailable     Medications reviewed: Yes    Labs drawn: Yes    Drawn by: Clinic Staff  Via: central venous catheter  See Doc Flowsheet for details.      Dressing changed: No     Medications given: Yes - See MAR    Staff time:0    Additional information if applicable: 0    Nidhi Chun MA

## 2017-03-27 NOTE — PROGRESS NOTES
Infusion Nursing Note:  Steph Agee presents today for add-on transfusion.    Patient seen by provider today: Yes: Charlene Lambert   present during visit today: Not Applicable.    Note: Labs were monitored.  Consent to Receive a Blood Transfusion was obtained by Provider and signed by Patient.    Intravenous Access:  Barbour.    Treatment Conditions:  Patient received an add-on platelet transfusion for a platelet count of 7.      Post Infusion Assessment:  Patient tolerated infusion without incident.    Discharge Plan:   Patient discharged in stable condition accompanied by: family.    JERONIMO RUELAS RN

## 2017-03-27 NOTE — NURSING NOTE
Chief Complaint   Patient presents with     Blood Draw     Pt with hx of lymphoma labs collected via central line.

## 2017-03-27 NOTE — MR AVS SNAPSHOT
After Visit Summary   3/27/2017    Steph Agee    MRN: [id number]           Patient Information     Date Of Birth          1975        Visit Information        Provider Department      3/27/2017 10:00 AM UC BMT CECILIA #3 [facility] Blood and Marrow Transplant        Today's Diagnoses     Nodular sclerosing Hodgkin's lymphoma, unspecified body region (H)    -  1    Need for prophylactic measure              [facility] ([facility]  [address] [place] [address]  Phone: [phone]  Clinic Hours:   Monday-Friday:    8am to 4:30pm   Weekends and holidays:    8am to noon (in general)  If your fever is 100.5  or greater,   call the clinic.  After hours call the   hospital at [phone] or   [phone]. Ask for the BMT   fellow for pediatric or adult patients            Follow-ups after your visit        Your next 10 appointments already scheduled     Mar 27, 2017  1:30 PM CDT   Infusion 120 with UC BMT INFUSION, UC 2 ATC   [facility] Blood and Marrow Transplant ([facility])    [address] [place] [address] [phone]            Mar 28, 2017 10:00 AM CDT   Masonic Lab Draw with UC MASONIC LAB DRAW   [facility] Masonic Lab Draw ([facility])    [address] [place] [address]   [phone]            Mar 28, 2017 10:30 AM CDT   Return with UC BMT CECILIA #1   [facility] Blood and Marrow Transplant ([facility])    [address] [place] [address]   [phone]            Mar 29, 2017 10:00 AM CDT   Masonic Lab Draw with UC MASONIC LAB DRAW   [facility] Masonic Lab Draw ([facility])    [address] [place] [address]   [phone]            Mar 29, 2017 10:30 AM CDT   Return with UC BMT CECILIA #4   [facility] Blood and Marrow Transplant ([facility])    [facility]  [address] [address]   [phone]            Mar 29, 2017 11:00 AM CDT   Infusion 120 with UC BMT INFUSION, UC 4 ATC   [facility] Blood and Marrow Transplant ([facility])    [address] [address]   [phone]            Mar 30, 2017  9:30 AM CDT   Masonic Lab Draw with UC MASONIC LAB DRAW   [facility] Masonic Lab Draw ([facility])    [address] [address]   [phone]            Mar 30, 2017 10:00 AM CDT   Return with UC BMT CECILIA #2   [facility] Blood and Marrow Transplant ([facility])    [address] [place] [address] [phone]              Future tests that were ordered for you today     Open Standing Orders        Priority Remaining Interval Expires Ordered    Transfuse platelets unit Routine 99/100 TRANSFUSE 1 DOSE  3/27/2017    Platelets prepare order unit Routine 99/100 CONDITIONAL (SPECIFY) BLOOD  3/26/2017          Open Future Orders        Priority Expected Expires Ordered    CBC with platelets differential Routine 3/28/2017 3/31/2017 3/27/2017    Basic metabolic panel Routine 3/28/2017 3/31/2017 3/27/2017            Who to contact     If you have questions or need follow up information about today's clinic visit or your schedule please contact [facility] BLOOD AND MARROW TRANSPLANT directly at [phone].  Normal or non-critical lab and imaging results will be communicated to you by [employer]arrative[employer], letter or phone within 4 business days after the clinic has received the results. If you do not hear from us within 7 days, please contact the clinic through [employer] or phone. If you have a critical or abnormal lab result, we will notify you by phone as soon as possible.  Submit refill requests through [employer] or call your pharmacy and they will forward the refill request to us. Please allow 3 business days  for your refill to be completed.          Additional Information About Your Visit        [employer] Information     [employer] gives you secure access to your electronic health record. If you see a primary care provider, you can also send messages to your care team and make appointments. If you have questions, please call your primary care clinic.  If you do not have a primary care provider, please call [phone] and they will assist you.        Care EveryWhere ID     This is your Care EveryWhere ID. This could be used by other organizations to access your [facility] medical records  [id number]        Your Vitals Were     Pulse Temperature Respirations Pulse Oximetry BMI (Body Mass Index)       142 98.5  F (36.9  C) (Oral) 20 98% 31.12 kg/m2        Blood Pressure from Last 3 Encounters:   03/27/17 112/80   03/24/17 103/65   03/23/17 116/70    Weight from Last 3 Encounters:   03/27/17 77.2 kg (170 lb 3.2 oz)   03/24/17 78.5 kg (173 lb)   03/23/17 78.3 kg (172 lb 11.2 oz)               Recent Review Flowsheet Data     BMT Recent Results Latest Ref Rng & Units 3/20/2017 3/20/2017 3/21/2017 3/22/2017 3/23/2017 3/24/2017 3/27/2017    WBC 4.0 - 11.0 10e9/L 2.8(L) - 2.4(L) 2.3(L) 0.5(LL) 0.1(LL) 0.1(LL)    Hemoglobin 11.7 - 15.7 g/dL 9.9(L) - 10.9(L) 10.6(L) 9.4(L) 10.3(L) 9.7(L)    Platelet Count 150 - 450 10e9/L 123(L) - 97(L) 75(L) 53(L) 32(LL) 7(LL)    Neutrophils (Absolute) 1.6 - 8.3 10e9/L 2.7 - 2.3 2.3 0.4(LL) - -    INR 0.86 - 1.14 1.01 - - - - - -    Sodium 133 - 144 mmol/L 143 141 142 139 144 141 139    Potassium 3.4 - 5.3 mmol/L 3.4 3.8 3.8 4.2 3.9 3.6 3.5    Chloride 94 - 109 mmol/L 108 - 107 104 110(H) 104 101    Glucose 70 - 99 mg/dL 94 - 82 93 96 80 104(H)    Urea Nitrogen 7 - 30 mg/dL 8 - 12 14 14 13 13    Creatinine 0.52 - 1.04 mg/dL 0.61 - 0.59 0.56 0.58 0.48(L) 0.54    Calcium (Total) 8.5 - 10.1 mg/dL 8.2(L) - 8.9 8.6 8.0(L) 8.4(L) 8.5    Protein (Total) 6.8 - 8.8 g/dL 6.0(L) - - - - - 7.1    Albumin 3.4 -  5.0 g/dL 2.9(L) - - - - - 3.5    Bilirubin (Direct) 0.0 - 0.2 mg/dL 0.1 - - - - - -    Alkaline Phosphatase 40 - 150 U/L 110 - - - - - 144    AST 0 - 45 U/L 62(H) - - - - - 16    ALT 0 - 50 U/L 67(H) - - - - - 32    MCV 78 - 100 fl 96 - 97 96 96 98 93               Primary Care Provider Office Phone # Fax #    Travis Carpenter [phone]       [facility] [address]  [place] [address]        Thank you!     Thank you for choosing [facility] BLOOD AND MARROW TRANSPLANT  for your care. Our goal is always to provide you with excellent care. Hearing back from our patients is one way we can continue to improve our services. Please take a few minutes to complete the written survey that you may receive in the mail after your visit with us. Thank you!             Your Updated Medication List - Protect others around you: Learn how to safely use, store and throw away your medicines at www.disposemymeds.org.          This list is accurate as of: 3/27/17  1:28 PM.  Always use your most recent med list.                   Brand Name Dispense Instructions for use    acyclovir 800 MG tablet    ZOVIRAX    150 tablet    Take 1 tablet (800 mg) by mouth 5 times daily       gabapentin 100 MG capsule    NEURONTIN    90 capsule    Take 2 capsules (200 mg) by mouth 3 times daily       levofloxacin 250 MG tablet    LEVAQUIN    30 tablet    Take 1 tablet (250 mg) by mouth daily       LORazepam 0.5 MG tablet    ATIVAN    60 tablet    Take 0.5 tablets (0.25 mg) by mouth 3 times daily (before meals)       losartan 25 MG tablet    COZAAR    30 tablet    Take 0.5 tablets (12.5 mg) by mouth At Bedtime       ondansetron 4 MG tablet    ZOFRAN    18 tablet    Take 1 tablet (4 mg) by mouth every 6 hours as needed for nausea or vomiting       oxyCODONE 5 MG IR tablet    ROXICODONE    30 tablet    Take 1-2 tablets (5-10 mg) by mouth every 4 hours as needed for moderate to severe pain       pantoprazole 40 MG EC  tablet    PROTONIX    30 tablet    Take 1 tablet (40 mg) by mouth daily       sulfamethoxazole-trimethoprim 800-160 MG per tablet   Start taking on:  4/24/2017    BACTRIM DS/SEPTRA DS    30 tablet    Take 1 tablet by mouth Every Mon, Tues two times daily       venlafaxine 150 MG Tb24 24 hr tablet    EFFEXOR-ER    30 each    Take 1 tablet (150 mg) by mouth At Bedtime       voriconazole 200 MG tablet    VFEND    60 tablet    Take 1 tablet (200 mg) by mouth every 12 hours

## 2017-03-27 NOTE — MR AVS SNAPSHOT
After Visit Summary   3/27/2017    Steph Agee    MRN: [id number]           Patient Information     Date Of Birth          1975        Visit Information        Provider Department      3/27/2017 1:30 PM UC 2 ATC; UC BMT INFUSION [facility] Blood and Marrow Transplant        Today's Diagnoses     Nodular lymphocyte predominant Hodgkin lymphoma, unspecified body region (H)    -  1          [facility] [place] [place]  [address] [place] [address]  Phone: [phone]  Clinic Hours:   Monday-Friday:    8am to 4:30pm   Weekends and holidays:    8am to noon (in general)  If your fever is 100.5  or greater,   call the clinic.  After hours call the   hospital at [phone] or   [phone]. Ask for the BMT   fellow for pediatric or adult patients            Follow-ups after your visit        Your next 10 appointments already scheduled     Mar 28, 2017 10:00 AM CDT   Masonic Lab Draw with UC MASONIC LAB DRAW   [facility] Masonic Lab Draw ([facility])    [address] [place] [address] [phone]            Mar 28, 2017 10:30 AM CDT   Return with UC BMT CECILIA #1   [facility] Blood and Marrow Transplant ([facility])    [address] [place] [address] [phone]            Mar 29, 2017 10:00 AM CDT   Masonic Lab Draw with UC MASONIC LAB DRAW   [facility] Masonic Lab Draw ([facility])    [address] [place] [address]   [phone]            Mar 29, 2017 10:30 AM CDT   Return with UC BMT CECILIA #4   [facility] Blood and Marrow Transplant ([facility])    [address] [place] [address]   [phone]            Mar 29, 2017 11:00 AM CDT   Infusion 120 with UC BMT INFUSION, UC 4 ATC   [facility] Blood and Marrow Transplant ([facility])    [address] [place] [address]  Se  2nd [facility] [address]   [phone]            Mar 30, 2017  9:30 AM CDT   Masonic Lab Draw with UC MASONIC LAB DRAW   [facility] Masonic Lab Draw ([facility])    [address] [address]   [phone]            Mar 30, 2017 10:00 AM CDT   Return with UC BMT CECILIA #2   [facility] Blood and Marrow Transplant ([facility])    [address] [address]   [phone]            Mar 31, 2017  9:30 AM CDT   Masonic Lab Draw with UC MASONIC LAB DRAW   [facility] Masonic Lab Draw ([facility])    [address] [address] [phone]            Mar 31, 2017 10:00 AM CDT   Return with UC BMT CECILIA #1   [facility] Blood and Marrow Transplant ([facility])    [address] [address] [phone]              Future tests that were ordered for you today     Open Standing Orders        Priority Remaining Interval Expires Ordered    Transfuse platelets unit Routine 99/100 TRANSFUSE 1 DOSE  3/27/2017    Platelets prepare order unit Routine 99/100 CONDITIONAL (SPECIFY) BLOOD  3/26/2017            Who to contact     If you have questions or need follow up information about today's clinic visit or your schedule please contact [facility] BLOOD AND MARROW TRANSPLANT directly at [phone].  Normal or non-critical lab and imaging results will be communicated to you by [employer], letter or phone within 4 business days after the clinic has received the results. If you do not hear from us within 7 days, please contact the clinic through [employer] or phone. If you have a critical or abnormal lab result, we will notify you by phone as soon as possible.  Submit refill requests through [employer] or call your pharmacy and they will forward the refill request to us. Please allow 3 business days for your  refill to be completed.          Additional Information About Your Visit        [employer] Information     [employer] gives you secure access to your electronic health record. If you see a primary care provider, you can also send messages to your care team and make appointments. If you have questions, please call your primary care clinic.  If you do not have a primary care provider, please call [phone] and they will assist you.        Care EveryWhere ID     This is your Care EveryWhere ID. This could be used by other organizations to access your [facility] medical records  [id number]        Your Vitals Were     Pulse Temperature Respirations             116 98.8  F (37.1  C) 18          Blood Pressure from Last 3 Encounters:   03/27/17 108/65   03/27/17 112/80   03/24/17 103/65    Weight from Last 3 Encounters:   03/27/17 77.2 kg (170 lb 3.2 oz)   03/24/17 78.5 kg (173 lb)   03/23/17 78.3 kg (172 lb 11.2 oz)              We Performed the Following     Blood component     Platelets prepare order unit     Transfuse platelets unit        Recent Review Flowsheet Data     BMT Recent Results Latest Ref Rng & Units 3/20/2017 3/20/2017 3/21/2017 3/22/2017 3/23/2017 3/24/2017 3/27/2017    WBC 4.0 - 11.0 10e9/L 2.8(L) - 2.4(L) 2.3(L) 0.5(LL) 0.1(LL) 0.1(LL)    Hemoglobin 11.7 - 15.7 g/dL 9.9(L) - 10.9(L) 10.6(L) 9.4(L) 10.3(L) 9.7(L)    Platelet Count 150 - 450 10e9/L 123(L) - 97(L) 75(L) 53(L) 32(LL) 7(LL)    Neutrophils (Absolute) 1.6 - 8.3 10e9/L 2.7 - 2.3 2.3 0.4(LL) - -    INR 0.86 - 1.14 1.01 - - - - - -    Sodium 133 - 144 mmol/L 143 141 142 139 144 141 139    Potassium 3.4 - 5.3 mmol/L 3.4 3.8 3.8 4.2 3.9 3.6 3.5    Chloride 94 - 109 mmol/L 108 - 107 104 110(H) 104 101    Glucose 70 - 99 mg/dL 94 - 82 93 96 80 104(H)    Urea Nitrogen 7 - 30 mg/dL 8 - 12 14 14 13 13    Creatinine 0.52 - 1.04 mg/dL 0.61 - 0.59 0.56 0.58 0.48(L) 0.54    Calcium (Total) 8.5 - 10.1 mg/dL 8.2(L) - 8.9 8.6 8.0(L) 8.4(L) 8.5    Protein (Total)  6.8 - 8.8 g/dL 6.0(L) - - - - - 7.1    Albumin 3.4 - 5.0 g/dL 2.9(L) - - - - - 3.5    Bilirubin (Direct) 0.0 - 0.2 mg/dL 0.1 - - - - - -    Alkaline Phosphatase 40 - 150 U/L 110 - - - - - 144    AST 0 - 45 U/L 62(H) - - - - - 16    ALT 0 - 50 U/L 67(H) - - - - - 32    MCV 78 - 100 fl 96 - 97 96 96 98 93               Primary Care Provider Office Phone # Fax #    Travis Carpenter [phone]       [facility] [address] [address]        Thank you!     Thank you for choosing [facility] BLOOD AND MARROW TRANSPLANT  for your care. Our goal is always to provide you with excellent care. Hearing back from our patients is one way we can continue to improve our services. Please take a few minutes to complete the written survey that you may receive in the mail after your visit with us. Thank you!             Your Updated Medication List - Protect others around you: Learn how to safely use, store and throw away your medicines at www.disposemymeds.org.          This list is accurate as of: 3/27/17  2:10 PM.  Always use your most recent med list.                   Brand Name Dispense Instructions for use    acyclovir 800 MG tablet    ZOVIRAX    150 tablet    Take 1 tablet (800 mg) by mouth 5 times daily       gabapentin 100 MG capsule    NEURONTIN    90 capsule    Take 2 capsules (200 mg) by mouth 3 times daily       levofloxacin 250 MG tablet    LEVAQUIN    30 tablet    Take 1 tablet (250 mg) by mouth daily       LORazepam 0.5 MG tablet    ATIVAN    60 tablet    Take 0.5 tablets (0.25 mg) by mouth 3 times daily (before meals)       losartan 25 MG tablet    COZAAR    30 tablet    Take 0.5 tablets (12.5 mg) by mouth At Bedtime       ondansetron 4 MG tablet    ZOFRAN    18 tablet    Take 1 tablet (4 mg) by mouth every 6 hours as needed for nausea or vomiting       oxyCODONE 5 MG IR tablet    ROXICODONE    30 tablet    Take 1-2 tablets (5-10 mg) by mouth every 4 hours as needed for  moderate to severe pain       pantoprazole 40 MG EC tablet    PROTONIX    30 tablet    Take 1 tablet (40 mg) by mouth daily       sulfamethoxazole-trimethoprim 800-160 MG per tablet   Start taking on:  4/24/2017    BACTRIM DS/SEPTRA DS    30 tablet    Take 1 tablet by mouth Every Mon, Tues two times daily       venlafaxine 150 MG Tb24 24 hr tablet    EFFEXOR-ER    30 each    Take 1 tablet (150 mg) by mouth At Bedtime       voriconazole 200 MG tablet    VFEND    60 tablet    Take 1 tablet (200 mg) by mouth every 12 hours

## 2017-03-28 ENCOUNTER — APPOINTMENT (OUTPATIENT)
Dept: LAB | Facility: CLINIC | Age: 42
End: 2017-03-28
Attending: INTERNAL MEDICINE
Payer: COMMERCIAL

## 2017-03-28 ENCOUNTER — ONCOLOGY VISIT (OUTPATIENT)
Dept: TRANSPLANT | Facility: CLINIC | Age: 42
End: 2017-03-28
Attending: INTERNAL MEDICINE
Payer: COMMERCIAL

## 2017-03-28 VITALS
HEART RATE: 125 BPM | OXYGEN SATURATION: 97 % | SYSTOLIC BLOOD PRESSURE: 97 MMHG | BODY MASS INDEX: 31.2 KG/M2 | WEIGHT: 170.6 LBS | TEMPERATURE: 98.7 F | DIASTOLIC BLOOD PRESSURE: 65 MMHG | RESPIRATION RATE: 16 BRPM

## 2017-03-28 DIAGNOSIS — Z29.9 NEED FOR PROPHYLACTIC MEASURE: ICD-10-CM

## 2017-03-28 DIAGNOSIS — C81.10 NODULAR SCLEROSING HODGKIN'S LYMPHOMA, UNSPECIFIED BODY REGION (H): Primary | ICD-10-CM

## 2017-03-28 LAB
ANION GAP SERPL CALCULATED.3IONS-SCNC: 7 MMOL/L (ref 3–14)
BLD PROD TYP BPU: NORMAL
BUN SERPL-MCNC: 12 MG/DL (ref 7–30)
CALCIUM SERPL-MCNC: 8.3 MG/DL (ref 8.5–10.1)
CHLORIDE SERPL-SCNC: 100 MMOL/L (ref 94–109)
CO2 SERPL-SCNC: 31 MMOL/L (ref 20–32)
CREAT SERPL-MCNC: 0.57 MG/DL (ref 0.52–1.04)
DIFFERENTIAL METHOD BLD: ABNORMAL
ERYTHROCYTE [DISTWIDTH] IN BLOOD BY AUTOMATED COUNT: 14.1 % (ref 10–15)
GFR SERPL CREATININE-BSD FRML MDRD: ABNORMAL ML/MIN/1.7M2
GLUCOSE SERPL-MCNC: 93 MG/DL (ref 70–99)
HCT VFR BLD AUTO: 22.2 % (ref 35–47)
HGB BLD-MCNC: 7.9 G/DL (ref 11.7–15.7)
MCH RBC QN AUTO: 32.1 PG (ref 26.5–33)
MCHC RBC AUTO-ENTMCNC: 35.6 G/DL (ref 31.5–36.5)
MCV RBC AUTO: 90 FL (ref 78–100)
NUM BPU REQUESTED: 1
PLATELET # BLD AUTO: 17 10E9/L (ref 150–450)
POTASSIUM SERPL-SCNC: 3.4 MMOL/L (ref 3.4–5.3)
RBC # BLD AUTO: 2.46 10E12/L (ref 3.8–5.2)
SODIUM SERPL-SCNC: 139 MMOL/L (ref 133–144)
WBC # BLD AUTO: 0.1 10E9/L (ref 4–11)

## 2017-03-28 PROCEDURE — 86901 BLOOD TYPING SEROLOGIC RH(D): CPT | Performed by: PHYSICIAN ASSISTANT

## 2017-03-28 PROCEDURE — 86923 COMPATIBILITY TEST ELECTRIC: CPT | Performed by: PHYSICIAN ASSISTANT

## 2017-03-28 PROCEDURE — 96372 THER/PROPH/DIAG INJ SC/IM: CPT

## 2017-03-28 PROCEDURE — 80048 BASIC METABOLIC PNL TOTAL CA: CPT | Performed by: PHYSICIAN ASSISTANT

## 2017-03-28 PROCEDURE — 25000128 H RX IP 250 OP 636: Mod: ZF | Performed by: NURSE PRACTITIONER

## 2017-03-28 PROCEDURE — 86850 RBC ANTIBODY SCREEN: CPT | Performed by: PHYSICIAN ASSISTANT

## 2017-03-28 PROCEDURE — 25000125 ZZHC RX 250: Mod: ZF | Performed by: INTERNAL MEDICINE

## 2017-03-28 PROCEDURE — 86900 BLOOD TYPING SEROLOGIC ABO: CPT | Performed by: PHYSICIAN ASSISTANT

## 2017-03-28 PROCEDURE — 85027 COMPLETE CBC AUTOMATED: CPT

## 2017-03-28 RX ORDER — HEPARIN SODIUM,PORCINE 10 UNIT/ML
5 VIAL (ML) INTRAVENOUS
Status: CANCELLED | OUTPATIENT
Start: 2017-03-29

## 2017-03-28 RX ORDER — HEPARIN SODIUM,PORCINE 10 UNIT/ML
5 VIAL (ML) INTRAVENOUS ONCE
Status: COMPLETED | OUTPATIENT
Start: 2017-03-28 | End: 2017-03-28

## 2017-03-28 RX ADMIN — SODIUM CHLORIDE, PRESERVATIVE FREE 5 ML: 5 INJECTION INTRAVENOUS at 10:50

## 2017-03-28 RX ADMIN — FILGRASTIM 480 MCG: 480 INJECTION, SOLUTION INTRAVENOUS; SUBCUTANEOUS at 10:58

## 2017-03-28 RX ADMIN — SODIUM CHLORIDE, PRESERVATIVE FREE 5 ML: 5 INJECTION INTRAVENOUS at 10:49

## 2017-03-28 NOTE — MR AVS SNAPSHOT
After Visit Summary   3/28/2017    Steph Agee    MRN: [id number]           Patient Information     Date Of Birth          1975        Visit Information        Provider Department      3/28/2017 10:30 AM UC BMT CECILIA #1 [facility] Blood and Marrow Transplant        Today's Diagnoses     Nodular sclerosing Hodgkin's lymphoma, unspecified body region (H)    -  1    Need for prophylactic measure              [facility] ([facility]  [address] [place] [address]  Phone: [phone]  Clinic Hours:   Monday-Friday:    8am to 4:30pm   Weekends and holidays:    8am to noon (in general)  If your fever is 100.5  or greater,   call the clinic.  After hours call the   hospital at [phone] or   [phone]. Ask for the BMT   fellow for pediatric or adult patients            Follow-ups after your visit        Your next 10 appointments already scheduled     Mar 29, 2017 10:00 AM CDT   Masonic Lab Draw with UC MASONIC LAB DRAW   [facility] Masonic Lab Draw ([facility])    [address] [place] [address]   [phone]            Mar 29, 2017 10:30 AM CDT   Return with UC BMT CECILIA #4   [facility] Blood and Marrow Transplant ([facility])    [address] [place] [address]   [phone]            Mar 29, 2017 11:00 AM CDT   Infusion 120 with UC BMT INFUSION, UC 4 ATC   [facility] Blood and Marrow Transplant ([facility])    [address] [place] [address]   [phone]            Mar 30, 2017  9:30 AM CDT   Masonic Lab Draw with UC MASONIC LAB DRAW   [facility] Masonic Lab Draw ([facility])    [address] [address]   [phone]            Mar 30, 2017 10:00 AM CDT   Return with UC BMT CECILIA #2   [facility] Blood and Marrow Transplant ([facility])    [facility]  [address] [facility] [address]   [phone]            Mar 31, 2017  9:30 AM CDT   Masonic Lab Draw with  MASONIC LAB DRAW   [facility] Masonic Lab Draw ([facility])    [address] [facility]  [address] [facility] [address]   [phone]            Mar 31, 2017 10:00 AM CDT   Return with  BMT CECILIA #1   [facility] Blood and Marrow Transplant ([facility])    [address] [place] [address]  [address] [facility] [address]   [phone]            Apr 19, 2017 11:40 AM CDT   (Arrive by 11:25 AM)   CT CHEST/ABDOMEN/PELVIS W CONTRAST with UCCT2   [facility] CT ([facility])    [address] [place] [address]  [address]st [facility] [address]   [phone]           Please bring any scans or X-rays taken at other hospitals, if similar tests were done. Also bring a list of your medicines, including vitamins, minerals and over-the-counter drugs. It is safest to leave personal items at home.  Be sure to tell your doctor:   If you have any allergies.   If there s any chance you are pregnant.   If you are breastfeeding.   If you have any special needs.  You may have contrast for this exam. To prepare:   Do not eat or drink for 2 hours before your exam. If you need to take medicine, you may take it with small sips of water. (We may ask you to take liquid medicine as well.)   The day before your exam, drink extra fluids at least six 8-ounce glasses (unless your doctor tells you to restrict your fluids).  Patients over 70 or patients with diabetes or kidney problems:   If you haven t had a blood test (creatinine test) within the last 30 days, go to your clinic or Diagnostic Imaging Department for this test.  If you have diabetes:   If your kidney function is normal, continue taking your metformin (Avandamet, Glucophage, Glucovance, Metaglip) on the day of your exam.   If your kidney function is abnormal, wait 48 hours before  restarting this medicine.  You will have oral contrast for this exam:   You will drink the contrast at home. Get this from your clinic or Diagnostic Imaging Department. Please follow the directions given.  Please wear loose clothing, such as a sweat suit or jogging clothes. Avoid snaps, zippers and other metal. We may ask you to undress and put on a hospital gown.  If you have any questions, please call the Imaging Department where you will have your exam.            Apr 19, 2017 12:30 PM [facility]   Masonic Lab Draw with  MASONIC LAB DRAW   [facility] Masonic Lab Draw ([facility])    [address] [facility]  2nd Floor  [facility] [address] [phone]              Future tests that were ordered for you today     Open Future Orders        Priority Expected Expires Ordered    CBC with platelets differential Routine 3/29/2017 3/29/2017 3/28/2017    Basic metabolic panel Routine 3/29/2017 3/29/2017 3/28/2017            Who to contact     If you have questions or need follow up information about today's clinic visit or your schedule please contact [facility] BLOOD AND MARROW TRANSPLANT directly at [phone].  Normal or non-critical lab and imaging results will be communicated to you by MyChart, letter or phone within 4 business days after the clinic has received the results. If you do not hear from us within 7 days, please contact the clinic through [employer] or phone. If you have a critical or abnormal lab result, we will notify you by phone as soon as possible.  Submit refill requests through [employer] or call your pharmacy and they will forward the refill request to us. Please allow 3 business days for your refill to be completed.          Additional Information About Your Visit        [employer] Information     [employer] gives you secure access to your electronic health record. If you see a primary care provider, you can also send messages to your care team and make appointments. If you have  questions, please call your primary care clinic.  If you do not have a primary care provider, please call [phone] and they will assist you.        Care EveryWhere ID     This is your Care EveryWhere ID. This could be used by other organizations to access your [facility] medical records  [id number]        Your Vitals Were     Pulse Temperature Respirations Pulse Oximetry BMI (Body Mass Index)       125 98.7  F (37.1  C) 16 97% 31.2 kg/m2        Blood Pressure from Last 3 Encounters:   03/28/17 97/65   03/27/17 108/65   03/27/17 112/80    Weight from Last 3 Encounters:   03/28/17 77.4 kg (170 lb 9.6 oz)   03/27/17 77.2 kg (170 lb 3.2 oz)   03/24/17 78.5 kg (173 lb)              We Performed the Following     ABO/Rh type and screen     Basic metabolic panel     CBC with platelets differential        Recent Review Flowsheet Data     BMT Recent Results Latest Ref Rng & Units 3/20/2017 3/21/2017 3/22/2017 3/23/2017 3/24/2017 3/27/2017 3/28/2017    WBC 4.0 - 11.0 10e9/L - 2.4(L) 2.3(L) 0.5(LL) 0.1(LL) 0.1(LL) 0.1(LL)    Hemoglobin 11.7 - 15.7 g/dL - 10.9(L) 10.6(L) 9.4(L) 10.3(L) 9.7(L) 7.9(L)    Platelet Count 150 - 450 10e9/L - 97(L) 75(L) 53(L) 32(LL) 7(LL) 17(LL)    Neutrophils (Absolute) 1.6 - 8.3 10e9/L - 2.3 2.3 0.4(LL) - - -    INR 0.86 - 1.14 - - - - - - -    Sodium 133 - 144 mmol/L 141 142 139 144 141 139 139    Potassium 3.4 - 5.3 mmol/L 3.8 3.8 4.2 3.9 3.6 3.5 3.4    Chloride 94 - 109 mmol/L - 107 104 110(H) 104 101 100    Glucose 70 - 99 mg/dL - 82 93 96 80 104(H) 93    Urea Nitrogen 7 - 30 mg/dL - 12 14 14 13 13 12    Creatinine 0.52 - 1.04 mg/dL - 0.59 0.56 0.58 0.48(L) 0.54 0.57    Calcium (Total) 8.5 - 10.1 mg/dL - 8.9 8.6 8.0(L) 8.4(L) 8.5 8.3(L)    Protein (Total) 6.8 - 8.8 g/dL - - - - - 7.1 -    Albumin 3.4 - 5.0 g/dL - - - - - 3.5 -    Bilirubin (Direct) 0.0 - 0.2 mg/dL - - - - - - -    Alkaline Phosphatase 40 - 150 U/L - - - - - 144 -    AST 0 - 45 U/L - - - - - 16 -    ALT 0 - 50 U/L - - - - -  32 -    MCV 78 - 100 fl - 97 96 96 98 93 90               Primary Care Provider Office Phone # Fax #    Travis Carpenter [phone]       [facility] [address] [address]        Thank you!     Thank you for choosing [facility] BLOOD AND MARROW TRANSPLANT  for your care. Our goal is always to provide you with excellent care. Hearing back from our patients is one way we can continue to improve our services. Please take a few minutes to complete the written survey that you may receive in the mail after your visit with us. Thank you!             Your Updated Medication List - Protect others around you: Learn how to safely use, store and throw away your medicines at www.disposemymeds.org.          This list is accurate as of: 3/28/17 11:26 AM.  Always use your most recent med list.                   Brand Name Dispense Instructions for use    acyclovir 800 MG tablet    ZOVIRAX    150 tablet    Take 1 tablet (800 mg) by mouth 5 times daily       gabapentin 100 MG capsule    NEURONTIN    90 capsule    Take 2 capsules (200 mg) by mouth 3 times daily       levofloxacin 250 MG tablet    LEVAQUIN    30 tablet    Take 1 tablet (250 mg) by mouth daily       LORazepam 0.5 MG tablet    ATIVAN    60 tablet    Take 0.5 tablets (0.25 mg) by mouth 3 times daily (before meals)       losartan 25 MG tablet    COZAAR    30 tablet    Take 0.5 tablets (12.5 mg) by mouth At Bedtime       ondansetron 4 MG tablet    ZOFRAN    18 tablet    Take 1 tablet (4 mg) by mouth every 6 hours as needed for nausea or vomiting       oxyCODONE 5 MG IR tablet    ROXICODONE    30 tablet    Take 1-2 tablets (5-10 mg) by mouth every 4 hours as needed for moderate to severe pain       pantoprazole 40 MG EC tablet    PROTONIX    30 tablet    Take 1 tablet (40 mg) by mouth daily       sulfamethoxazole-trimethoprim 800-160 MG per tablet   Start taking on:  4/24/2017    BACTRIM DS/SEPTRA DS    30 tablet    Take 1  tablet by mouth Every Mon, Tues two times daily       venlafaxine 150 MG Tb24 24 hr tablet    EFFEXOR-ER    30 each    Take 1 tablet (150 mg) by mouth At Bedtime       voriconazole 200 MG tablet    VFEND    60 tablet    Take 1 tablet (200 mg) by mouth every 12 hours

## 2017-03-28 NOTE — NURSING NOTE
BMT Heme Malignancy Rooming Note    Steph LUNDBERG Anuel - 3/28/2017 10:54 AM     Chief Complaint   Patient presents with     Port Draw     labs drawn from CVC line by RN     RECHECK     HODGKINS-GCSF        BP 97/65  Pulse 125  Temp 98.7  F (37.1  C)  Resp 16  Wt 77.4 kg (170 lb 9.6 oz)  SpO2 97%  BMI 31.2 kg/m2     Medications reviewed: Yes    Labs drawn: No    Dressing changed: No     Medications given: Yes - See MAR    Staff time:0    Additional information if applicable: n/a    MACIE ANDUJAR, CMA

## 2017-03-28 NOTE — PROGRESS NOTES
BMT Daily Progress Note     Steph Agee is a 42 year old female with Hodgkins disease who is day +6 pre auto for Hodgkins disease.  CC: Hospital d/c f/u    HPI: tSeph is tired.  She has some intermittent nausea, but ate a good dinner of arlene ron, last night.  Takes an antiemetic between 1 and 2am, which seems to help her feel okay when she wakes up in the morning. Anxiety is tolerable, taking some ativan here and there to help with it, since she can't smoke pot.  No vomiting.  A little diarrhea here and there, but not much to speak of, she states.  No rash.  No bleeding.  No bone pain.  Some scalp tenderness.  Stable vaginal irritation, not worse.   Sometimes a little dizzy upon standing.     Review of Systems: 10 point ROS negative except as noted above.    Physical Exam:   Blood pressure 97/65, pulse 125, temperature 98.7  F (37.1  C), resp. rate 16, weight 77.4 kg (170 lb 9.6 oz), SpO2 97 %.  General: NAD   Eyes: CALEB, sclera anicteric   Nose/Mouth/Throat: OP clear, buccal mucosa moist, no ulcerations.   Lungs: CTA bilaterally  Cardiovascular: RRR, no M/R/G   Abdominal/Rectal: +BS, soft, NT, ND, No HSM  Lymphatics: no edema  Skin: no rashes or petechaie  Neuro: A&O   Additional Findings: Barbour site NT, no drainage.  Labs:  Lab Results   Component Value Date    WBC 0.1 (LL) 03/27/2017    ANEU 0.4 (LL) 03/23/2017    HGB 9.7 (L) 03/27/2017    HCT 27.6 (L) 03/27/2017    PLT 7 (LL) 03/27/2017     03/27/2017    POTASSIUM 3.5 03/27/2017    CHLORIDE 101 03/27/2017    CO2 30 03/27/2017     (H) 03/27/2017    BUN 13 03/27/2017    CR 0.54 03/27/2017    MAG 2.0 03/27/2017    INR 1.01 03/20/2017         Imaging: Reviewed  Microbiology:  Reviewed    Assessment and Plan:   Steph Agee is a 42 year old female with Hodgkins disease who is day +6 pre auto for Hodgkins disease.        1. BMT: Completed BEAM prep without issue. Cell dose=1.84 X10^6 CD34/kg from both PBSCT and bone marrow.   -GCSF daily  until ANC>1500 x3 consecutive days. Re-stage per protocol.      2. HEME: Keep Hgb>8 and plts>10K. No pre-meds.   - hgb 7.9; no blood preordered (drop not anticipated).  Will add on ABO 3/28 (confirmed with lab) and plan for red blood cell transfusion 3/29.  Possible plts 3/29 as well.     -Had been on Lovenox 40mg QD d/t hx of chronic IJ clot recently noted on 3/3 U/s. Platelets trending down. Per Dr. Gill, ok to permanently d/c lovenox as chronic clot (last dose 3/22).       3. ID: Afebrile.  -Cont vfend(hx of marijuana use), HD ACV (CMV+) and levaquin as prophy.   -Bactrim or appropriate PCP therapy to start d+28.       4.  GI: Has had persistent nausea even before transplant that she would improved with smoking marijuana. Continues to have mostly nighttime nausea- controlled with prn ativan/zofran.   - No scheduled anti-emetics currently.  Monitor stools.   - Protonix for GI prophy.      5. FEN/Renal:   -Cr and lytes stable  - BP soft- encourage oral intake.  Monitor closely as resume cozaar (see CV).       6. CV: Cleared by cardiology d/t drop in EF. Cardiac MRI confirmed EF 47%.   - Had been on Continue cozaar 12.5 daily (cardiomyopathy). Resume cozaar 3/27 as pt bp has been stably 100/60 - ensure BP is not lower.     7. Psych: Hx of anxiety. Previously smoked daily marijuana and took ativan qHS. Ativan too sedating for daily use-per pt. Already on effexor, increase to 150mg daily (75).  - No complaints of this today.     8.   Vaginal irritation: per exam on 3/27, vaginal dryness - no concern for yeast infection, no erythema or discharge; recommend vaseline to vagina/external labia majora/minora prn for comfort.     Lluvia Javier

## 2017-03-28 NOTE — NURSING NOTE
Chief Complaint   Patient presents with     Port Draw     labs drawn from CVC line by RN     Labs drawn from blue lumen of CVC line.  Both lumens flushed with NS & heparin.  Patient checked in for BMT visit.  Keturah Macario RN

## 2017-03-29 ENCOUNTER — ONCOLOGY VISIT (OUTPATIENT)
Dept: TRANSPLANT | Facility: CLINIC | Age: 42
End: 2017-03-29
Attending: INTERNAL MEDICINE
Payer: COMMERCIAL

## 2017-03-29 ENCOUNTER — APPOINTMENT (OUTPATIENT)
Dept: LAB | Facility: CLINIC | Age: 42
End: 2017-03-29
Attending: INTERNAL MEDICINE
Payer: COMMERCIAL

## 2017-03-29 VITALS
WEIGHT: 172.18 LBS | HEART RATE: 128 BPM | TEMPERATURE: 98.4 F | DIASTOLIC BLOOD PRESSURE: 66 MMHG | SYSTOLIC BLOOD PRESSURE: 104 MMHG | RESPIRATION RATE: 16 BRPM | BODY MASS INDEX: 31.48 KG/M2 | OXYGEN SATURATION: 98 %

## 2017-03-29 VITALS
SYSTOLIC BLOOD PRESSURE: 90 MMHG | RESPIRATION RATE: 16 BRPM | HEART RATE: 97 BPM | DIASTOLIC BLOOD PRESSURE: 57 MMHG | TEMPERATURE: 97.9 F

## 2017-03-29 DIAGNOSIS — C81.10 NODULAR SCLEROSING HODGKIN'S LYMPHOMA, UNSPECIFIED BODY REGION (H): ICD-10-CM

## 2017-03-29 DIAGNOSIS — Z29.9 NEED FOR PROPHYLACTIC MEASURE: ICD-10-CM

## 2017-03-29 DIAGNOSIS — C81.00 NODULAR LYMPHOCYTE PREDOMINANT HODGKIN LYMPHOMA, UNSPECIFIED BODY REGION (H): Primary | ICD-10-CM

## 2017-03-29 LAB
ANION GAP SERPL CALCULATED.3IONS-SCNC: 8 MMOL/L (ref 3–14)
BLD PROD TYP BPU: NORMAL
BLD UNIT ID BPU: 0
BLOOD PRODUCT CODE: NORMAL
BPU ID: NORMAL
BUN SERPL-MCNC: 13 MG/DL (ref 7–30)
CALCIUM SERPL-MCNC: 8 MG/DL (ref 8.5–10.1)
CHLORIDE SERPL-SCNC: 103 MMOL/L (ref 94–109)
CO2 SERPL-SCNC: 31 MMOL/L (ref 20–32)
CREAT SERPL-MCNC: 0.57 MG/DL (ref 0.52–1.04)
DIFFERENTIAL METHOD BLD: ABNORMAL
ERYTHROCYTE [DISTWIDTH] IN BLOOD BY AUTOMATED COUNT: 13.8 % (ref 10–15)
GFR SERPL CREATININE-BSD FRML MDRD: ABNORMAL ML/MIN/1.7M2
GLUCOSE SERPL-MCNC: 101 MG/DL (ref 70–99)
HCT VFR BLD AUTO: 20.2 % (ref 35–47)
HGB BLD-MCNC: 7.2 G/DL (ref 11.7–15.7)
MCH RBC QN AUTO: 32.6 PG (ref 26.5–33)
MCHC RBC AUTO-ENTMCNC: 35.6 G/DL (ref 31.5–36.5)
MCV RBC AUTO: 91 FL (ref 78–100)
PLATELET # BLD AUTO: 9 10E9/L (ref 150–450)
POTASSIUM SERPL-SCNC: 3 MMOL/L (ref 3.4–5.3)
RBC # BLD AUTO: 2.21 10E12/L (ref 3.8–5.2)
SODIUM SERPL-SCNC: 142 MMOL/L (ref 133–144)
TRANSFUSION STATUS PATIENT QL: NORMAL
WBC # BLD AUTO: 0.1 10E9/L (ref 4–11)

## 2017-03-29 PROCEDURE — 80048 BASIC METABOLIC PNL TOTAL CA: CPT | Performed by: PHYSICIAN ASSISTANT

## 2017-03-29 PROCEDURE — 85027 COMPLETE CBC AUTOMATED: CPT

## 2017-03-29 PROCEDURE — 87040 BLOOD CULTURE FOR BACTERIA: CPT | Performed by: STUDENT IN AN ORGANIZED HEALTH CARE EDUCATION/TRAINING PROGRAM

## 2017-03-29 PROCEDURE — 40000268 ZZH STATISTIC NO CHARGES: Mod: ZF

## 2017-03-29 PROCEDURE — 25000128 H RX IP 250 OP 636: Mod: ZF | Performed by: STUDENT IN AN ORGANIZED HEALTH CARE EDUCATION/TRAINING PROGRAM

## 2017-03-29 PROCEDURE — 25000125 ZZHC RX 250: Mod: ZF | Performed by: INTERNAL MEDICINE

## 2017-03-29 RX ORDER — ACETAMINOPHEN 325 MG/1
650 TABLET ORAL ONCE
Status: COMPLETED | OUTPATIENT
Start: 2017-03-29 | End: 2017-03-29

## 2017-03-29 RX ORDER — HEPARIN SODIUM,PORCINE 10 UNIT/ML
5 VIAL (ML) INTRAVENOUS
Status: DISCONTINUED | OUTPATIENT
Start: 2017-03-29 | End: 2017-03-29 | Stop reason: HOSPADM

## 2017-03-29 RX ORDER — ACETAMINOPHEN 325 MG/1
650 TABLET ORAL ONCE
Status: CANCELLED
Start: 2017-03-29 | End: 2017-03-29

## 2017-03-29 RX ORDER — DIPHENHYDRAMINE HCL 25 MG
25 CAPSULE ORAL ONCE
Status: COMPLETED | OUTPATIENT
Start: 2017-03-29 | End: 2017-03-29

## 2017-03-29 RX ORDER — DIPHENHYDRAMINE HCL 25 MG
25 CAPSULE ORAL ONCE
Status: CANCELLED
Start: 2017-03-29 | End: 2017-03-29

## 2017-03-29 RX ORDER — POTASSIUM CHLORIDE 1500 MG/1
40 TABLET, EXTENDED RELEASE ORAL ONCE
Status: COMPLETED | OUTPATIENT
Start: 2017-03-29 | End: 2017-03-29

## 2017-03-29 RX ORDER — HEPARIN SODIUM,PORCINE 10 UNIT/ML
5 VIAL (ML) INTRAVENOUS
Status: CANCELLED | OUTPATIENT
Start: 2017-03-30

## 2017-03-29 RX ADMIN — SODIUM CHLORIDE 4 MG: 9 INJECTION, SOLUTION INTRAVENOUS at 11:00

## 2017-03-29 RX ADMIN — SODIUM CHLORIDE, PRESERVATIVE FREE 5 ML: 5 INJECTION INTRAVENOUS at 14:52

## 2017-03-29 RX ADMIN — SODIUM CHLORIDE, PRESERVATIVE FREE 5 ML: 5 INJECTION INTRAVENOUS at 14:51

## 2017-03-29 RX ADMIN — DIPHENHYDRAMINE HYDROCHLORIDE 25 MG: 25 CAPSULE ORAL at 10:55

## 2017-03-29 RX ADMIN — FILGRASTIM 480 MCG: 480 INJECTION, SOLUTION INTRAVENOUS; SUBCUTANEOUS at 11:26

## 2017-03-29 RX ADMIN — POTASSIUM CHLORIDE 40 MEQ: 1500 TABLET, EXTENDED RELEASE ORAL at 11:26

## 2017-03-29 RX ADMIN — ACETAMINOPHEN 650 MG: 325 TABLET ORAL at 10:54

## 2017-03-29 ASSESSMENT — PAIN SCALES - GENERAL: PAINLEVEL: NO PAIN (0)

## 2017-03-29 NOTE — PROGRESS NOTES
BMT Daily Progress Note     ID: Steph Agee is a 42 year old female with Hodgkins disease who is day +7 pre auto for Hodgkins disease.      HPI: Steph returns for daily growth factor and follow up. She is feeling nauseated today. Vomited on the way over and had to go back home to change clothes. She took an ativan just after that episode. Notes she didn't take zofran when she woke up in the middle of the night as she had been doing. She denies diarrhea. She denies fevers, chills or sweats. No cough or cold symptoms. Vaginal dryness not an issue today.     Review of Systems: 10 point ROS negative except as noted above.    Physical Exam:   Blood pressure 104/66, pulse 128, temperature 98.4  F (36.9  C), resp. rate 16, weight 78.1 kg (172 lb 2.9 oz), SpO2 98 %.  General: NAD   Eyes: CALEB, sclera anicteric   Nose/Mouth/Throat: OP clear, buccal mucosa moist, no ulcerations.   Lungs: CTA bilaterally  Cardiovascular: RRR, no M/R/G   Abdominal/Rectal: +BS, soft, NT, ND, No HSM  Lymphatics: no edema  Skin: no rashes or petechaie  Neuro: A&O   Additional Findings: Barbour site NT, no drainage.  Labs:  Lab Results   Component Value Date    WBC 0.1 (LL) 03/29/2017    ANEU 0.4 (LL) 03/23/2017    HGB 7.2 (L) 03/29/2017    HCT 20.2 (L) 03/29/2017    PLT 9 (LL) 03/29/2017     03/29/2017    POTASSIUM 3.0 (L) 03/29/2017    CHLORIDE 103 03/29/2017    CO2 31 03/29/2017     (H) 03/29/2017    BUN 13 03/29/2017    CR 0.57 03/29/2017    MAG 2.0 03/27/2017    INR 1.01 03/20/2017         Assessment and Plan:   Steph Agee is a 42 year old female with Hodgkins disease who is day +7 pre auto for Hodgkins disease.      1. BMT: Completed BEAM prep without issue. Cell dose=1.84 X10^6 CD34/kg from both PBSCT and bone marrow.   -GCSF daily until ANC>1500 x3 consecutive days. Re-stage per protocol.      2. HEME: Keep Hgb>8 and plts>10K. No pre-meds  -hgb 7.2, gave 1 unit RBCs and plan for second unit tomorrow (lower EF), Plt 9,  gave plt 3/29  -Had been on Lovenox 40mg QD d/t hx of chronic IJ clot recently noted on 3/3 U/s. Platelets trending down. Per trini De Guzman to permanently d/c lovenox as chronic clot (last dose 3/22).       3. ID: Afebrile  -Low pressures today, blood cx x2, pt is asymptomatic besides GI sx below.   -Cont vfend(hx of marijuana use), HD ACV (CMV+) and levaquin as prophy.   -Bactrim or appropriate PCP therapy to start d+28.       4.  GI: n/v this morning, took ativan last this morning. Gave IV zofran in infusion. Should schedule zofran for now  - Has had persistent nausea even before transplant that she would improved with smoking marijuana. Continues to have mostly nighttime nausea- controlled with prn ativan/zofran  - No scheduled anti-emetics currently.  Monitor stools.   - Protonix for GI prophy.      5. FEN/Renal:   -Cr stable, K low today at 3.0, gave 40meq and will recheck tomorrow  - BP soft- encourage oral intake.  Monitor closely as resume cozaar (see CV).       6. CV: Cleared by cardiology d/t drop in EF. Cardiac MRI confirmed EF 47%. 3/29 Blood given at decreased rate, no issues  - Had been on Continue cozaar 12.5 daily (cardiomyopathy). Resume cozaar 3/27, now held with low pressures    7. Psych: Hx of anxiety. Previously smoked daily marijuana and took ativan qHS. Ativan too sedating for daily use-per pt. Already on effexor, increase to 150mg daily (75).  - No complaints of this today.     8.   Vaginal irritation: per exam on 3/27, vaginal dryness - no concern for yeast infection, no erythema or discharge; recommend vaseline to vagina/external labia majora/minora prn for comfort.     Plan: Hold cozaar, schedule zofran 3x/day for now  RTC: daily for gcsf, labs and provider visit. Pt to take her temp sooner for worsening n/v, fevers, chills, etc    Viviane Beltran PAC  [phone]

## 2017-03-29 NOTE — PROGRESS NOTES
Infusion Nursing Note:  Steph Agee presents today for PRBC, plts.    Patient seen by provider today: Yes: MARILY Olivo   present during visit today: Not Applicable.    Note: Patient given 1 unit PRBC and 1 unit Platelets today. Due to the patient's reduced EF, Viviane asked that the blood and platelets were run more slowly.  1 unit PRBC given over 2 hours and platelets given over 1 hour.  Viviane aware of patient's low BPs today.  Blood cultures ordered and drawn from both lumens of jiang.  Patient denied any dizziness or feeling lightheaded.  She is aware of the need to hold her BP medication per Viviane.   The patient was also given 40 meq oral potassium for a level of 3.0.  GCSF was given SQ x1 in left arm.  IV Zofran was given for nausea.    Intravenous Access:  Jiang.    Treatment Conditions:  Lab Results   Component Value Date    HGB 7.2 03/29/2017     Lab Results   Component Value Date    WBC 0.1 03/29/2017      Lab Results   Component Value Date    ANEU 0.4 03/23/2017     Lab Results   Component Value Date    PLT 9 03/29/2017      Lab Results   Component Value Date     03/29/2017                   Lab Results   Component Value Date    POTASSIUM 3.0 03/29/2017           Lab Results   Component Value Date    MAG 2.0 03/27/2017            Lab Results   Component Value Date    CR 0.57 03/29/2017                   Lab Results   Component Value Date    VALERIA 8.0 03/29/2017                Lab Results   Component Value Date    BILITOTAL 0.5 03/27/2017           Lab Results   Component Value Date    ALBUMIN 3.5 03/27/2017                    Lab Results   Component Value Date    ALT 32 03/27/2017           Lab Results   Component Value Date    AST 16 03/27/2017         Post Infusion Assessment:  Patient tolerated infusion without incident.  Patient tolerated injection without incident.    Discharge Plan:   AVS to patient via [employer].  Patient will return tomorrow for next appointment.   Patient  discharged in stable condition accompanied by: cousin.  Departure Mode: Ambulatory.    MONI CEDENO RN

## 2017-03-29 NOTE — MR AVS SNAPSHOT
After Visit Summary   3/29/2017    Steph Agee    MRN: [id number]           Patient Information     Date Of Birth          1975        Visit Information        Provider Department      3/29/2017 10:30 AM UC BMT CECILIA #4 [facility] Blood and Marrow Transplant        Today's Diagnoses     Nodular sclerosing Hodgkin's lymphoma, unspecified body region (H)              [facility] ([facility])  [address] [place] [address]  Phone: [phone]  Clinic Hours:   Monday-Friday:    8am to 4:30pm   Weekends and holidays:    8am to noon (in general)  If your fever is 100.5  or greater,   call the clinic.  After hours call the   hospital at [phone] or   [phone]. Ask for the BMT   fellow for pediatric or adult patients           Care Instructions    She's scheduled tomorrow 930am arrival    Charleen  BMT        Follow-ups after your visit        Your next 10 appointments already scheduled     Mar 30, 2017  9:30 AM CDT   [facility] Lab Draw with  MASONIC LAB DRAW   [facility] Mason[facility] Lab Draw ([facility])    [address] [place] [address]   [phone]            Mar 30, 2017 10:00 AM CDT   Return with UC BMT CECILIA #2   [facility] Blood and Marrow Transplant ([facility])    [address] [place] [address]   [phone]            Mar 31, 2017  9:30 AM CDT   Masonic Lab Draw with  MASONIC LAB DRAW   [facility] Mason[facility] Lab Draw ([facility])    [address] [place] [address]   [phone]            Mar 31, 2017 10:00 AM CDT   Return with UC BMT CECILIA #1   [facility] Blood and Marrow Transplant ([facility])    [address] [place] [address]   [phone]            Apr 19, 2017 11:40 AM CDT   (Arrive by 11:25 AM)   CT CHEST/ABDOMEN/PELVIS W CONTRAST with UCCT2   [facility]  CT ([facility])    [address] [address] [phone]           Please bring any scans or X-rays taken at other hospitals, if similar tests were done. Also bring a list of your medicines, including vitamins, minerals and over-the-counter drugs. It is safest to leave personal items at home.  Be sure to tell your doctor:   If you have any allergies.   If there s any chance you are pregnant.   If you are breastfeeding.   If you have any special needs.  You may have contrast for this exam. To prepare:   Do not eat or drink for 2 hours before your exam. If you need to take medicine, you may take it with small sips of water. (We may ask you to take liquid medicine as well.)   The day before your exam, drink extra fluids at least six 8-ounce glasses (unless your doctor tells you to restrict your fluids).  Patients over 70 or patients with diabetes or kidney problems:   If you haven t had a blood test (creatinine test) within the last 30 days, go to your clinic or Diagnostic Imaging Department for this test.  If you have diabetes:   If your kidney function is normal, continue taking your metformin (Avandamet, Glucophage, Glucovance, Metaglip) on the day of your exam.   If your kidney function is abnormal, wait 48 hours before restarting this medicine.  You will have oral contrast for this exam:   You will drink the contrast at home. Get this from your clinic or Diagnostic Imaging Department. Please follow the directions given.  Please wear loose clothing, such as a sweat suit or jogging clothes. Avoid snaps, zippers and other metal. We may ask you to undress and put on a hospital gown.  If you have any questions, please call the Imaging Department where you will have your exam.            Apr 19, 2017 12:30 PM T   Masonic Lab Draw with  MASONIC LAB DRAW   [facility] Masonic Lab Draw ([facility])    [address] [place] [address]  [facility] [address]   [phone]            Apr 19, 2017  1:00 PM CDT   Bone Marrow Biopsy with  BMT CECILIA #3, UU BONE MARROW BIOPSY   [facility] Blood and Marrow Transplant ([facility])    [address]  [address]  [facility] [address]   [phone]            Apr 26, 2017 11:30 AM CDT   BMT Anniversary Visit with Obed Chambers MD   [facility] Blood and Marrow Transplant ([facility])    [address]  [address]  [facility] [address]   [phone]            May 24, 2017  9:30 AM CDT   Masonic Lab Draw with  MASONIC LAB DRAW   [facility] Masonic Lab Draw ([facility])    [address]  [address] [facility] [address]   [phone]              Future tests that were ordered for you today     Open Standing Orders        Priority Remaining Interval Expires Ordered    Transfuse platelets unit Routine 99/100 TRANSFUSE 1 DOSE  3/29/2017    Transfuse red blood cell unit Routine 1/2 TRANSFUSE 2 UNITS  3/29/2017    Red blood cell prepare order unit Routine 99/100 CONDITIONAL (SPECIFY) BLOOD  3/28/2017    Platelets prepare order unit Routine 99/100 CONDITIONAL (SPECIFY) BLOOD  3/28/2017          Open Future Orders        Priority Expected Expires Ordered    CBC with platelets differential Routine 3/30/2017 4/5/2017 3/29/2017    Basic metabolic panel Routine 3/30/2017 4/5/2017 3/29/2017            Who to contact     If you have questions or need follow up information about today's clinic visit or your schedule please contact [facility] BLOOD AND MARROW TRANSPLANT directly at [phone].  Normal or non-critical lab and imaging results will be communicated to you by MyChart, letter or phone within 4 business days after the clinic has received the results. If you do not hear from us within 7 days, please contact the clinic through MyChart or phone. If you have a critical or abnormal lab result, we will  notify you by phone as soon as possible.  Submit refill requests through [employer] or call your pharmacy and they will forward the refill request to us. Please allow 3 business days for your refill to be completed.          Additional Information About Your Visit        [employer] Information     [employer] gives you secure access to your electronic health record. If you see a primary care provider, you can also send messages to your care team and make appointments. If you have questions, please call your primary care clinic.  If you do not have a primary care provider, please call [phone] and they will assist you.        Care EveryWhere ID     This is your Care EveryWhere ID. This could be used by other organizations to access your [facility] medical records  [id number]        Your Vitals Were     Pulse Temperature Respirations Pulse Oximetry BMI (Body Mass Index)       128 98.4  F (36.9  C) 16 98% 31.48 kg/m2        Blood Pressure from Last 3 Encounters:   03/29/17 90/57   03/29/17 104/66   03/28/17 97/65    Weight from Last 3 Encounters:   03/29/17 78.1 kg (172 lb 2.9 oz)   03/28/17 77.4 kg (170 lb 9.6 oz)   03/27/17 77.2 kg (170 lb 3.2 oz)              We Performed the Following     Basic metabolic panel     Blood culture - NOW     Blood culture - NOW     CBC with platelets differential        Recent Review Flowsheet Data     BMT Recent Results Latest Ref Rng & Units 3/21/2017 3/22/2017 3/23/2017 3/24/2017 3/27/2017 3/28/2017 3/29/2017    WBC 4.0 - 11.0 10e9/L 2.4(L) 2.3(L) 0.5(LL) 0.1(LL) 0.1(LL) 0.1(LL) 0.1(LL)    Hemoglobin 11.7 - 15.7 g/dL 10.9(L) 10.6(L) 9.4(L) 10.3(L) 9.7(L) 7.9(L) 7.2(L)    Platelet Count 150 - 450 10e9/L 97(L) 75(L) 53(L) 32(LL) 7(LL) 17(LL) 9(LL)    Neutrophils (Absolute) 1.6 - 8.3 10e9/L 2.3 2.3 0.4(LL) - - - -    INR 0.86 - 1.14 - - - - - - -    Sodium 133 - 144 mmol/L 142 139 144 141 139 139 142    Potassium 3.4 - 5.3 mmol/L 3.8 4.2 3.9 3.6 3.5 3.4 3.0(L)    Chloride 94 - 109 mmol/L 107  104 110(H) 104 101 100 103    Glucose 70 - 99 mg/dL 82 93 96 80 104(H) 93 101(H)    Urea Nitrogen 7 - 30 mg/dL 12 14 14 13 13 12 13    Creatinine 0.52 - 1.04 mg/dL 0.59 0.56 0.58 0.48(L) 0.54 0.57 0.57    Calcium (Total) 8.5 - 10.1 mg/dL 8.9 8.6 8.0(L) 8.4(L) 8.5 8.3(L) 8.0(L)    Protein (Total) 6.8 - 8.8 g/dL - - - - 7.1 - -    Albumin 3.4 - 5.0 g/dL - - - - 3.5 - -    Bilirubin (Direct) 0.0 - 0.2 mg/dL - - - - - - -    Alkaline Phosphatase 40 - 150 U/L - - - - 144 - -    AST 0 - 45 U/L - - - - 16 - -    ALT 0 - 50 U/L - - - - 32 - -    MCV 78 - 100 fl 97 96 96 98 93 90 91               Primary Care Provider Office Phone # Fax #    Travis Carpenter [phone]       [facility] [address] [address]        Thank you!     Thank you for choosing [facility] BLOOD AND MARROW TRANSPLANT  for your care. Our goal is always to provide you with excellent care. Hearing back from our patients is one way we can continue to improve our services. Please take a few minutes to complete the written survey that you may receive in the mail after your visit with us. Thank you!             Your Updated Medication List - Protect others around you: Learn how to safely use, store and throw away your medicines at www.disposemymeds.org.          This list is accurate as of: 3/29/17  4:39 PM.  Always use your most recent med list.                   Brand Name Dispense Instructions for use    acyclovir 800 MG tablet    ZOVIRAX    150 tablet    Take 1 tablet (800 mg) by mouth 5 times daily       gabapentin 100 MG capsule    NEURONTIN    90 capsule    Take 2 capsules (200 mg) by mouth 3 times daily       levofloxacin 250 MG tablet    LEVAQUIN    30 tablet    Take 1 tablet (250 mg) by mouth daily       LORazepam 0.5 MG tablet    ATIVAN    60 tablet    Take 0.5 tablets (0.25 mg) by mouth 3 times daily (before meals)       losartan 25 MG tablet    COZAAR    30 tablet    Take 0.5 tablets (12.5 mg) by  mouth At Bedtime       ondansetron 4 MG tablet    ZOFRAN    18 tablet    Take 1 tablet (4 mg) by mouth every 6 hours as needed for nausea or vomiting       oxyCODONE 5 MG IR tablet    ROXICODONE    30 tablet    Take 1-2 tablets (5-10 mg) by mouth every 4 hours as needed for moderate to severe pain       pantoprazole 40 MG EC tablet    PROTONIX    30 tablet    Take 1 tablet (40 mg) by mouth daily       sulfamethoxazole-trimethoprim 800-160 MG per tablet   Start taking on:  4/24/2017    BACTRIM DS/SEPTRA DS    30 tablet    Take 1 tablet by mouth Every Mon, Tues two times daily       venlafaxine 150 MG Tb24 24 hr tablet    EFFEXOR-ER    30 each    Take 1 tablet (150 mg) by mouth At Bedtime       voriconazole 200 MG tablet    VFEND    60 tablet    Take 1 tablet (200 mg) by mouth every 12 hours

## 2017-03-29 NOTE — NURSING NOTE
BMT Heme Malignancy Rooming Note    Steph LUNDBERG Anuel - 3/29/2017 11:09 AM     Chief Complaint   Patient presents with     Blood Draw     Labs drawn by RN from de[facility]re. VS taken.      RECHECK     provider appt, labs, infusion, hodgkins        /66  Pulse 128  Temp 98.4  F (36.9  C)  Resp 16  Wt 78.1 kg (172 lb 2.9 oz)  SpO2 98%  BMI 31.48 kg/m2     Medications reviewed: Yes    Labs drawn: Yes    Drawn by: Lab Staff  Via: central venous catheter  See Doc Flowsheet for details.      Dressing changed: No     Medications given: Yes - See MAR    Staff time:0    Additional information if applicable: scheduled injection and infusion    MONI CEDENO RN

## 2017-03-29 NOTE — MR AVS SNAPSHOT
After Visit Summary   3/29/2017    Steph Agee    MRN: [id number]           Patient Information     Date Of Birth          1975        Visit Information        Provider Department      3/29/2017 11:00 AM UC 4 ATC; UC BMT INFUSION [facility] Blood and Marrow Transplant        Today's Diagnoses     Nodular lymphocyte predominant Hodgkin lymphoma, unspecified body region (H)    -  1    Need for prophylactic measure              [facility] [place]  [address] [place] [address]  Phone: [phone]  Clinic Hours:   Monday-Friday:    8am to 4:30pm   Weekends and holidays:    8am to noon (in general)  If your fever is 100.5  or greater,   call the clinic.  After hours call the   hospital at [phone] or   [phone]. Ask for the BMT   fellow for pediatric or adult patients            Follow-ups after your visit        Your next 10 appointments already scheduled     Mar 30, 2017  9:30 AM CDT   Masonic Lab Draw with UC MASONIC LAB DRAW   [facility] Masonic Lab Draw ([facility])    [address] [place] [address]   [phone]            Mar 30, 2017 10:00 AM CDT   Return with UC BMT CECILIA #2   [facility] Blood and Marrow Transplant ([facility])    [address] [place] [address]   [phone]            Mar 31, 2017  9:30 AM CDT   Masonic Lab Draw with UC MASONIC LAB DRAW   [facility] Masonic Lab Draw ([facility])    [address] [place] [address]   [phone]            Mar 31, 2017 10:00 AM CDT   Return with UC BMT CECILIA #1   [facility] Blood and Marrow Transplant ([facility])    [address] [place] [address]   [phone]            Apr 19, 2017 11:40 AM CDT   (Arrive by 11:25 AM)   CT CHEST/ABDOMEN/PELVIS W CONTRAST with UCCT2   [facility] ([facility]  [facility])    [address] [place] [address] [phone]           Please bring any scans or X-rays taken at other hospitals, if similar tests were done. Also bring a list of your medicines, including vitamins, minerals and over-the-counter drugs. It is safest to leave personal items at home.  Be sure to tell your doctor:   If you have any allergies.   If there s any chance you are pregnant.   If you are breastfeeding.   If you have any special needs.  You may have contrast for this exam. To prepare:   Do not eat or drink for 2 hours before your exam. If you need to take medicine, you may take it with small sips of water. (We may ask you to take liquid medicine as well.)   The day before your exam, drink extra fluids at least six 8-ounce glasses (unless your doctor tells you to restrict your fluids).  Patients over 70 or patients with diabetes or kidney problems:   If you haven t had a blood test (creatinine test) within the last 30 days, go to your clinic or Diagnostic Imaging Department for this test.  If you have diabetes:   If your kidney function is normal, continue taking your metformin (Avandamet, Glucophage, Glucovance, Metaglip) on the day of your exam.   If your kidney function is abnormal, wait 48 hours before restarting this medicine.  You will have oral contrast for this exam:   You will drink the contrast at home. Get this from your clinic or Diagnostic Imaging Department. Please follow the directions given.  Please wear loose clothing, such as a sweat suit or jogging clothes. Avoid snaps, zippers and other metal. We may ask you to undress and put on a hospital gown.  If you have any questions, please call the Imaging Department where you will have your exam.            Apr 19, 2017 12:30 PM T   Masonic Lab Draw with  MASONIC LAB DRAW   [facility] Masonic Lab Draw ([facility])    [address] [place] [address]  [address]   [phone]            Apr 19, 2017  1:00 PM CDT   Bone Marrow Biopsy with  BMT CECILIA #3, UU BONE MARROW BIOPSY   [facility] Blood and Marrow Transplant ([facility])    [address] [address]   [phone]            Apr 26, 2017 11:30 AM CDT   BMT Anniversary Visit with Obed Chambers MD   [facility] Blood and Marrow Transplant ([facility])    [address] [facility] [address] [phone]            May 24, 2017  9:30 AM CDT   Masonic Lab Draw with  MASONIC LAB DRAW   [facility] Masonic Lab Draw ([facility])    [address] [address] [phone]              Future tests that were ordered for you today     Open Standing Orders        Priority Remaining Interval Expires Ordered    Transfuse platelets unit Routine 99/100 TRANSFUSE 1 DOSE  3/29/2017    Transfuse red blood cell unit Routine 1/2 TRANSFUSE 2 UNITS  3/29/2017    Red blood cell prepare order unit Routine 99/100 CONDITIONAL (SPECIFY) BLOOD  3/28/2017    Platelets prepare order unit Routine 99/100 CONDITIONAL (SPECIFY) BLOOD  3/28/2017            Who to contact     If you have questions or need follow up information about today's clinic visit or your schedule please contact [facility] BLOOD AND MARROW TRANSPLANT directly at [phone].  Normal or non-critical lab and imaging results will be communicated to you by MyChart, letter or phone within 4 business days after the clinic has received the results. If you do not hear from us within 7 days, please contact the clinic through MyChart or phone. If you have a critical or abnormal lab result, we will notify you by phone as soon as possible.  Submit refill requests through CritiSense or call your pharmacy and they will forward the refill request to us. Please allow 3 business days for your refill to be completed.           Additional Information About Your Visit        Tooblahart Information     Uppidy gives you secure access to your electronic health record. If you see a primary care provider, you can also send messages to your care team and make appointments. If you have questions, please call your primary care clinic.  If you do not have a primary care provider, please call [phone] and they will assist you.        Care EveryWhere ID     This is your Care EveryWhere ID. This could be used by other organizations to access your [facility] medical records  [id number]        Your Vitals Were     Pulse Temperature Respirations             97 97.9  F (36.6  C) (Oral) 16          Blood Pressure from Last 3 Encounters:   03/29/17 90/57   03/29/17 104/66   03/28/17 97/65    Weight from Last 3 Encounters:   03/29/17 78.1 kg (172 lb 2.9 oz)   03/28/17 77.4 kg (170 lb 9.6 oz)   03/27/17 77.2 kg (170 lb 3.2 oz)              We Performed the Following     Blood component     Platelets prepare order unit     Red blood cell prepare order unit     Transfuse platelets unit     Transfuse red blood cell unit        Recent Review Flowsheet Data     BMT Recent Results Latest Ref Rng & Units 3/21/2017 3/22/2017 3/23/2017 3/24/2017 3/27/2017 3/28/2017 3/29/2017    WBC 4.0 - 11.0 10e9/L 2.4(L) 2.3(L) 0.5(LL) 0.1(LL) 0.1(LL) 0.1(LL) 0.1(LL)    Hemoglobin 11.7 - 15.7 g/dL 10.9(L) 10.6(L) 9.4(L) 10.3(L) 9.7(L) 7.9(L) 7.2(L)    Platelet Count 150 - 450 10e9/L 97(L) 75(L) 53(L) 32(LL) 7(LL) 17(LL) 9(LL)    Neutrophils (Absolute) 1.6 - 8.3 10e9/L 2.3 2.3 0.4(LL) - - - -    INR 0.86 - 1.14 - - - - - - -    Sodium 133 - 144 mmol/L 142 139 144 141 139 139 142    Potassium 3.4 - 5.3 mmol/L 3.8 4.2 3.9 3.6 3.5 3.4 3.0(L)    Chloride 94 - 109 mmol/L 107 104 110(H) 104 101 100 103    Glucose 70 - 99 mg/dL 82 93 96 80 104(H) 93 101(H)    Urea Nitrogen 7 - 30 mg/dL 12 14 14 13 13 12 13    Creatinine 0.52 - 1.04 mg/dL 0.59 0.56 0.58 0.48(L) 0.54 0.57 0.57    Calcium  (Total) 8.5 - 10.1 mg/dL 8.9 8.6 8.0(L) 8.4(L) 8.5 8.3(L) 8.0(L)    Protein (Total) 6.8 - 8.8 g/dL - - - - 7.1 - -    Albumin 3.4 - 5.0 g/dL - - - - 3.5 - -    Bilirubin (Direct) 0.0 - 0.2 mg/dL - - - - - - -    Alkaline Phosphatase 40 - 150 U/L - - - - 144 - -    AST 0 - 45 U/L - - - - 16 - -    ALT 0 - 50 U/L - - - - 32 - -    MCV 78 - 100 fl 97 96 96 98 93 90 91               Primary Care Provider Office Phone # Fax #    Travis Carpenter [phone]       [facility] [address]  [place] [address]        Thank you!     Thank you for choosing [facility] BLOOD AND MARROW TRANSPLANT  for your care. Our goal is always to provide you with excellent care. Hearing back from our patients is one way we can continue to improve our services. Please take a few minutes to complete the written survey that you may receive in the mail after your visit with us. Thank you!             Your Updated Medication List - Protect others around you: Learn how to safely use, store and throw away your medicines at www.disposemymeds.org.          This list is accurate as of: 3/29/17  3:07 PM.  Always use your most recent med list.                   Brand Name Dispense Instructions for use    acyclovir 800 MG tablet    ZOVIRAX    150 tablet    Take 1 tablet (800 mg) by mouth 5 times daily       gabapentin 100 MG capsule    NEURONTIN    90 capsule    Take 2 capsules (200 mg) by mouth 3 times daily       levofloxacin 250 MG tablet    LEVAQUIN    30 tablet    Take 1 tablet (250 mg) by mouth daily       LORazepam 0.5 MG tablet    ATIVAN    60 tablet    Take 0.5 tablets (0.25 mg) by mouth 3 times daily (before meals)       losartan 25 MG tablet    COZAAR    30 tablet    Take 0.5 tablets (12.5 mg) by mouth At Bedtime       ondansetron 4 MG tablet    ZOFRAN    18 tablet    Take 1 tablet (4 mg) by mouth every 6 hours as needed for nausea or vomiting       oxyCODONE 5 MG IR tablet    ROXICODONE    30 tablet     Take 1-2 tablets (5-10 mg) by mouth every 4 hours as needed for moderate to severe pain       pantoprazole 40 MG EC tablet    PROTONIX    30 tablet    Take 1 tablet (40 mg) by mouth daily       sulfamethoxazole-trimethoprim 800-160 MG per tablet   Start taking on:  4/24/2017    BACTRIM DS/SEPTRA DS    30 tablet    Take 1 tablet by mouth Every Mon, Tues two times daily       venlafaxine 150 MG Tb24 24 hr tablet    EFFEXOR-ER    30 each    Take 1 tablet (150 mg) by mouth At Bedtime       voriconazole 200 MG tablet    VFEND    60 tablet    Take 1 tablet (200 mg) by mouth every 12 hours

## 2017-03-29 NOTE — NURSING NOTE
Chief Complaint   Patient presents with     Blood Draw     Labs drawn by RN from deidre. VS taken.      CHITO KUHN RN

## 2017-03-30 ENCOUNTER — APPOINTMENT (OUTPATIENT)
Dept: LAB | Facility: CLINIC | Age: 42
End: 2017-03-30
Attending: INTERNAL MEDICINE
Payer: COMMERCIAL

## 2017-03-30 ENCOUNTER — INFUSION THERAPY VISIT (OUTPATIENT)
Dept: TRANSPLANT | Facility: CLINIC | Age: 42
End: 2017-03-30
Attending: OBSTETRICS & GYNECOLOGY
Payer: COMMERCIAL

## 2017-03-30 ENCOUNTER — ONCOLOGY VISIT (OUTPATIENT)
Dept: TRANSPLANT | Facility: CLINIC | Age: 42
End: 2017-03-30
Attending: INTERNAL MEDICINE
Payer: COMMERCIAL

## 2017-03-30 VITALS
OXYGEN SATURATION: 97 % | RESPIRATION RATE: 16 BRPM | SYSTOLIC BLOOD PRESSURE: 93 MMHG | BODY MASS INDEX: 31.93 KG/M2 | WEIGHT: 174.6 LBS | HEART RATE: 93 BPM | TEMPERATURE: 97.3 F | DIASTOLIC BLOOD PRESSURE: 52 MMHG

## 2017-03-30 VITALS
RESPIRATION RATE: 16 BRPM | DIASTOLIC BLOOD PRESSURE: 64 MMHG | TEMPERATURE: 98.1 F | SYSTOLIC BLOOD PRESSURE: 99 MMHG | HEART RATE: 95 BPM

## 2017-03-30 DIAGNOSIS — C81.10 NODULAR SCLEROSING HODGKIN'S LYMPHOMA, UNSPECIFIED BODY REGION (H): Primary | ICD-10-CM

## 2017-03-30 DIAGNOSIS — C81.00 NODULAR LYMPHOCYTE PREDOMINANT HODGKIN LYMPHOMA, UNSPECIFIED BODY REGION (H): ICD-10-CM

## 2017-03-30 DIAGNOSIS — Z29.9 NEED FOR PROPHYLACTIC MEASURE: ICD-10-CM

## 2017-03-30 DIAGNOSIS — C81.00 NODULAR LYMPHOCYTE PREDOMINANT HODGKIN LYMPHOMA, UNSPECIFIED BODY REGION (H): Primary | ICD-10-CM

## 2017-03-30 LAB
ABO + RH BLD: NORMAL
ABO + RH BLD: NORMAL
ANION GAP SERPL CALCULATED.3IONS-SCNC: 8 MMOL/L (ref 3–14)
BLD GP AB SCN SERPL QL: NORMAL
BLD PROD TYP BPU: NORMAL
BLD UNIT ID BPU: 0
BLD UNIT ID BPU: 0
BLOOD BANK CMNT PATIENT-IMP: NORMAL
BLOOD PRODUCT CODE: NORMAL
BLOOD PRODUCT CODE: NORMAL
BPU ID: NORMAL
BPU ID: NORMAL
BUN SERPL-MCNC: 9 MG/DL (ref 7–30)
CALCIUM SERPL-MCNC: 8.1 MG/DL (ref 8.5–10.1)
CHLORIDE SERPL-SCNC: 103 MMOL/L (ref 94–109)
CO2 SERPL-SCNC: 30 MMOL/L (ref 20–32)
CREAT SERPL-MCNC: 0.59 MG/DL (ref 0.52–1.04)
DIFFERENTIAL METHOD BLD: ABNORMAL
ERYTHROCYTE [DISTWIDTH] IN BLOOD BY AUTOMATED COUNT: 13.8 % (ref 10–15)
GFR SERPL CREATININE-BSD FRML MDRD: ABNORMAL ML/MIN/1.7M2
GLUCOSE SERPL-MCNC: 91 MG/DL (ref 70–99)
HCT VFR BLD AUTO: 21.7 % (ref 35–47)
HGB BLD-MCNC: 7.8 G/DL (ref 11.7–15.7)
MCH RBC QN AUTO: 32.4 PG (ref 26.5–33)
MCHC RBC AUTO-ENTMCNC: 35.9 G/DL (ref 31.5–36.5)
MCV RBC AUTO: 90 FL (ref 78–100)
NUM BPU REQUESTED: 3
PLATELET # BLD AUTO: 29 10E9/L (ref 150–450)
POTASSIUM SERPL-SCNC: 3.2 MMOL/L (ref 3.4–5.3)
RBC # BLD AUTO: 2.41 10E12/L (ref 3.8–5.2)
SODIUM SERPL-SCNC: 141 MMOL/L (ref 133–144)
SPECIMEN EXP DATE BLD: NORMAL
TRANSFUSION STATUS PATIENT QL: NORMAL
WBC # BLD AUTO: 0.1 10E9/L (ref 4–11)

## 2017-03-30 PROCEDURE — P9040 RBC LEUKOREDUCED IRRADIATED: HCPCS | Performed by: PHYSICIAN ASSISTANT

## 2017-03-30 PROCEDURE — 25000125 ZZHC RX 250: Mod: ZF | Performed by: PHYSICIAN ASSISTANT

## 2017-03-30 PROCEDURE — 25000128 H RX IP 250 OP 636: Mod: ZF | Performed by: NURSE PRACTITIONER

## 2017-03-30 PROCEDURE — 36430 TRANSFUSION BLD/BLD COMPNT: CPT

## 2017-03-30 PROCEDURE — 96372 THER/PROPH/DIAG INJ SC/IM: CPT

## 2017-03-30 PROCEDURE — 85027 COMPLETE CBC AUTOMATED: CPT

## 2017-03-30 PROCEDURE — 25000128 H RX IP 250 OP 636: Mod: ZF | Performed by: INTERNAL MEDICINE

## 2017-03-30 PROCEDURE — 80048 BASIC METABOLIC PNL TOTAL CA: CPT | Performed by: STUDENT IN AN ORGANIZED HEALTH CARE EDUCATION/TRAINING PROGRAM

## 2017-03-30 PROCEDURE — 25000132 ZZH RX MED GY IP 250 OP 250 PS 637: Mod: ZF | Performed by: INTERNAL MEDICINE

## 2017-03-30 RX ORDER — DIPHENHYDRAMINE HCL 25 MG
25 CAPSULE ORAL ONCE
Status: COMPLETED | OUTPATIENT
Start: 2017-03-30 | End: 2017-03-30

## 2017-03-30 RX ORDER — ACETAMINOPHEN 325 MG/1
650 TABLET ORAL ONCE
Status: CANCELLED
Start: 2017-03-30 | End: 2017-03-30

## 2017-03-30 RX ORDER — POTASSIUM CHLORIDE 1500 MG/1
40 TABLET, EXTENDED RELEASE ORAL ONCE
Status: COMPLETED | OUTPATIENT
Start: 2017-03-30 | End: 2017-03-30

## 2017-03-30 RX ORDER — HEPARIN SODIUM,PORCINE 10 UNIT/ML
5 VIAL (ML) INTRAVENOUS
Status: DISCONTINUED | OUTPATIENT
Start: 2017-03-30 | End: 2017-03-30 | Stop reason: HOSPADM

## 2017-03-30 RX ORDER — HEPARIN SODIUM,PORCINE 10 UNIT/ML
5 VIAL (ML) INTRAVENOUS
Status: CANCELLED | OUTPATIENT
Start: 2017-03-31

## 2017-03-30 RX ORDER — DIPHENHYDRAMINE HCL 25 MG
25 CAPSULE ORAL ONCE
Status: CANCELLED
Start: 2017-03-30 | End: 2017-03-30

## 2017-03-30 RX ORDER — HEPARIN SODIUM (PORCINE) LOCK FLUSH IV SOLN 100 UNIT/ML 100 UNIT/ML
5 SOLUTION INTRAVENOUS EVERY 8 HOURS
Status: DISCONTINUED | OUTPATIENT
Start: 2017-03-30 | End: 2017-03-30 | Stop reason: HOSPADM

## 2017-03-30 RX ORDER — ACETAMINOPHEN 325 MG/1
650 TABLET ORAL ONCE
Status: COMPLETED | OUTPATIENT
Start: 2017-03-30 | End: 2017-03-30

## 2017-03-30 RX ADMIN — FILGRASTIM 480 MCG: 480 INJECTION, SOLUTION INTRAVENOUS; SUBCUTANEOUS at 10:24

## 2017-03-30 RX ADMIN — SODIUM CHLORIDE, PRESERVATIVE FREE 5 ML: 5 INJECTION INTRAVENOUS at 09:48

## 2017-03-30 RX ADMIN — ACETAMINOPHEN 650 MG: 325 TABLET ORAL at 11:18

## 2017-03-30 RX ADMIN — SODIUM CHLORIDE, PRESERVATIVE FREE 5 ML: 5 INJECTION INTRAVENOUS at 10:25

## 2017-03-30 RX ADMIN — DIPHENHYDRAMINE HYDROCHLORIDE 25 MG: 25 CAPSULE ORAL at 11:18

## 2017-03-30 RX ADMIN — SODIUM CHLORIDE, PRESERVATIVE FREE 5 ML: 5 INJECTION INTRAVENOUS at 09:47

## 2017-03-30 RX ADMIN — POTASSIUM CHLORIDE 40 MEQ: 20 TABLET, EXTENDED RELEASE ORAL at 11:17

## 2017-03-30 ASSESSMENT — PAIN SCALES - GENERAL: PAINLEVEL: NO PAIN (0)

## 2017-03-30 NOTE — MR AVS SNAPSHOT
After Visit Summary   3/30/2017    Steph Agee    MRN: [id number]           Patient Information     Date Of Birth          1975        Visit Information        Provider Department      3/30/2017 11:00 AM UC 8 ATC; UC BMT INFUSION [facility] Blood and Marrow Transplant        Today's Diagnoses     Nodular lymphocyte predominant Hodgkin lymphoma, unspecified body region (H)    -  1          [facility] ([facility]  [address] [place] [address]  Phone: [phone]  Clinic Hours:   Monday-Friday:    8am to 4:30pm   Weekends and holidays:    8am to noon (in general)  If your fever is 100.5  or greater,   call the clinic.  After hours call the   hospital at [phone] or   [phone]. Ask for the BMT   fellow for pediatric or adult patients            Follow-ups after your visit        Your next 10 appointments already scheduled     Mar 31, 2017  9:30 AM CDT   Masonic Lab Draw with UC MASONIC LAB DRAW   [facility] Masonic Lab Draw ([facility])    [address] [place] [address] [phone]            Mar 31, 2017 10:00 AM CDT   Return with UC BMT CECILIA #1   [facility] Blood and Marrow Transplant ([facility])    [address] [place] [id number] [phone]            Apr 01, 2017 10:00 AM CDT   Masonic Lab Draw with UC MASONIC LAB DRAW   [facility] Masonic Lab Draw ([facility])    [address] [place] [address]   [phone]            Apr 01, 2017 10:30 AM CDT   Return with UC BMT DOM   [facility] Blood and Marrow Transplant ([facility])    [address] [place] [address]   [phone]            Apr 02, 2017  9:30 AM CDT   Masonic Lab Draw with  MASONIC LAB DRAW   [facility] Masonic Lab Draw ([facility])    [address] [place] [address]  Floor  [facility] [address]   [phone]            Apr 02, 2017 10:00 AM CDT   Return with UC BMT DOM   [facility] Blood and Marrow Transplant ([facility])    [address] [facility]  2nd Floor  [facility] [address]   [phone]            Apr 03, 2017 10:00 AM CDT   Masonic Lab Draw with UC MASONIC LAB DRAW   [facility] Masonic Lab Draw ([facility])    [address] [facility]  2nd Floor  [facility] [address]   [phone]            Apr 03, 2017 10:30 AM CDT   Return with UC BMT CECILIA #2   [facility] Blood and Marrow Transplant ([facility])    [address] [facility]  2nd Floor  [facility] [address]   [phone]            Apr 19, 2017 12:30 PM CDT   Masonic Lab Draw with UC MASONIC LAB DRAW   [facility] Masonic Lab Draw ([facility])    [address] [facility]  2nd [facility] [address]   [phone]            Apr 19, 2017  1:00 PM CDT   Bone Marrow Biopsy with UC BMT CECILIA #3   [facility] Blood and Marrow Transplant ([facility])    [address] [facility]  2nd [facility] [address]   [phone]              Future tests that were ordered for you today     Open Standing Orders        Priority Remaining Interval Expires Ordered    Red blood cell prepare order unit Routine 99/100 CONDITIONAL (SPECIFY) BLOOD  3/30/2017    Transfuse red blood cell unit Routine 1/2 TRANSFUSE 2 UNITS  3/30/2017          Open Future Orders        Priority Expected Expires Ordered    CBC with platelets differential Routine 3/31/2017 9/26/2017 3/30/2017    Basic metabolic panel Routine 3/31/2017 9/26/2017 3/30/2017            Who to contact     If you have questions or need follow up information about today's clinic visit or your schedule please contact [facility] BLOOD AND MARROW TRANSPLANT directly at [phone].  Normal or non-critical lab and imaging results will be communicated to you by  MyChart, letter or phone within 4 business days after the clinic has received the results. If you do not hear from us within 7 days, please contact the clinic through Animoto or phone. If you have a critical or abnormal lab result, we will notify you by phone as soon as possible.  Submit refill requests through Animoto or call your pharmacy and they will forward the refill request to us. Please allow 3 business days for your refill to be completed.          Additional Information About Your Visit        Animoto Information     Animoto gives you secure access to your electronic health record. If you see a primary care provider, you can also send messages to your care team and make appointments. If you have questions, please call your primary care clinic.  If you do not have a primary care provider, please call [phone] and they will assist you.        Care EveryWhere ID     This is your Care EveryWhere ID. This could be used by other organizations to access your [facility] medical records  [id number]        Your Vitals Were     Pulse Temperature Respirations             95 98.1  F (36.7  C) (Tympanic) 16          Blood Pressure from Last 3 Encounters:   03/30/17 99/64   03/30/17 93/52   03/29/17 90/57    Weight from Last 3 Encounters:   03/30/17 79.2 kg (174 lb 9.7 oz)   03/29/17 78.1 kg (172 lb 2.9 oz)   03/28/17 77.4 kg (170 lb 9.6 oz)              Today, you had the following     No orders found for display       Recent Review Flowsheet Data     BMT Recent Results Latest Ref Rng & Units 3/22/2017 3/23/2017 3/24/2017 3/27/2017 3/28/2017 3/29/2017 3/30/2017    WBC 4.0 - 11.0 10e9/L 2.3(L) 0.5(LL) 0.1(LL) 0.1(LL) 0.1(LL) 0.1(LL) 0.1(LL)    Hemoglobin 11.7 - 15.7 g/dL 10.6(L) 9.4(L) 10.3(L) 9.7(L) 7.9(L) 7.2(L) 7.8(L)    Platelet Count 150 - 450 10e9/L 75(L) 53(L) 32(LL) 7(LL) 17(LL) 9(LL) 29(LL)    Neutrophils (Absolute) 1.6 - 8.3 10e9/L 2.3 0.4(LL) - - - - -    INR 0.86 - 1.14 - - - - - - -    Sodium 133 - 144  mmol/L 139 144 141 139 139 142 141    Potassium 3.4 - 5.3 mmol/L 4.2 3.9 3.6 3.5 3.4 3.0(L) 3.2(L)    Chloride 94 - 109 mmol/L 104 110(H) 104 101 100 103 103    Glucose 70 - 99 mg/dL 93 96 80 104(H) 93 101(H) 91    Urea Nitrogen 7 - 30 mg/dL 14 14 13 13 12 13 9    Creatinine 0.52 - 1.04 mg/dL 0.56 0.58 0.48(L) 0.54 0.57 0.57 0.59    Calcium (Total) 8.5 - 10.1 mg/dL 8.6 8.0(L) 8.4(L) 8.5 8.3(L) 8.0(L) 8.1(L)    Protein (Total) 6.8 - 8.8 g/dL - - - 7.1 - - -    Albumin 3.4 - 5.0 g/dL - - - 3.5 - - -    Bilirubin (Direct) 0.0 - 0.2 mg/dL - - - - - - -    Alkaline Phosphatase 40 - 150 U/L - - - 144 - - -    AST 0 - 45 U/L - - - 16 - - -    ALT 0 - 50 U/L - - - 32 - - -    MCV 78 - 100 fl 96 96 98 93 90 91 90               Primary Care Provider Office Phone # Fax #    Travis Carpenter [phone]       [place] [address]  [place] [address]        Thank you!     Thank you for choosing [facility] BLOOD AND MARROW TRANSPLANT  for your care. Our goal is always to provide you with excellent care. Hearing back from our patients is one way we can continue to improve our services. Please take a few minutes to complete the written survey that you may receive in the mail after your visit with us. Thank you!             Your Updated Medication List - Protect others around you: Learn how to safely use, store and throw away your medicines at www.disposemymeds.org.          This list is accurate as of: 3/30/17  1:20 PM.  Always use your most recent med list.                   Brand Name Dispense Instructions for use    acyclovir 800 MG tablet    ZOVIRAX    150 tablet    Take 1 tablet (800 mg) by mouth 5 times daily       gabapentin 100 MG capsule    NEURONTIN    90 capsule    Take 2 capsules (200 mg) by mouth 3 times daily       levofloxacin 250 MG tablet    LEVAQUIN    30 tablet    Take 1 tablet (250 mg) by mouth daily       LORazepam 0.5 MG tablet    ATIVAN    60 tablet    Take 0.5  tablets (0.25 mg) by mouth 3 times daily (before meals)       losartan 25 MG tablet    COZAAR    30 tablet    Take 0.5 tablets (12.5 mg) by mouth At Bedtime       ondansetron 4 MG tablet    ZOFRAN    18 tablet    Take 1 tablet (4 mg) by mouth every 6 hours as needed for nausea or vomiting       oxyCODONE 5 MG IR tablet    ROXICODONE    30 tablet    Take 1-2 tablets (5-10 mg) by mouth every 4 hours as needed for moderate to severe pain       pantoprazole 40 MG EC tablet    PROTONIX    30 tablet    Take 1 tablet (40 mg) by mouth daily       sulfamethoxazole-trimethoprim 800-160 MG per tablet   Start taking on:  4/24/2017    BACTRIM DS/SEPTRA DS    30 tablet    Take 1 tablet by mouth Every Mon, Tues two times daily       venlafaxine 150 MG Tb24 24 hr tablet    EFFEXOR-ER    30 each    Take 1 tablet (150 mg) by mouth At Bedtime       voriconazole 200 MG tablet    VFEND    60 tablet    Take 1 tablet (200 mg) by mouth every 12 hours

## 2017-03-30 NOTE — PROGRESS NOTES
Infusion Nursing Note:  Stephbreann Agee presents today for RBCs.  Patient seen by provider today: Yes: MARILY Elizabeth   present during visit today: Not Applicable.    Note: Pt given 1u pRBCs.  Tolerated well.     Intravenous Access:  [place].    Treatment Conditions:  Lab Results   Component Value Date    HGB 7.8 03/30/2017     Lab Results   Component Value Date    WBC 0.1 03/30/2017      Lab Results   Component Value Date    ANEU 0.4 03/23/2017     Lab Results   Component Value Date    PLT 29 03/30/2017      Results reviewed, labs MET treatment parameters, ok to proceed with treatment.      Post Infusion Assessment:  Patient tolerated infusion without incident.  Blood return noted pre and post infusion.  Site patent and intact, free from redness, edema or discomfort.  No evidence of extravasations.    Discharge Plan:   Discharge instructions reviewed with: Patient.  Patient and/or family verbalized understanding of discharge instructions and all questions answered.    Fatimah Ross RN

## 2017-03-30 NOTE — MR AVS SNAPSHOT
After Visit Summary   3/30/2017    Steph Agee    MRN: [id number]           Patient Information     Date Of Birth          1975        Visit Information        Provider Department      3/30/2017 10:00 AM  BMT CECILIA #2 [facility] Blood and Marrow Transplant        Today's Diagnoses     Nodular sclerosing Hodgkin's lymphoma, unspecified body region (H)    -  1    Need for prophylactic measure        Nodular lymphocyte predominant Hodgkin lymphoma, unspecified body region (H)              [facility] [place] [place]  [address] [place] [address]  Phone: [phone]  Clinic Hours:   Monday-Friday:    8am to 4:30pm   Weekends and holidays:    8am to noon (in general)  If your fever is 100.5  or greater,   call the clinic.  After hours call the   hospital at [phone] or   [phone]. Ask for the BMT   fellow for pediatric or adult patients            Follow-ups after your visit        Your next 10 appointments already scheduled     Mar 31, 2017  9:30 AM CDT   Masonic Lab Draw with  MASONIC LAB DRAW   [facility] Masonic Lab Draw ([facility])    [address] [place] [id number] [phone]            Mar 31, 2017 10:00 AM CDT   Return with  BMT CECILIA #1   [facility] Blood and Marrow Transplant ([facility])    [address] [place] [id number] [phone]            Apr 19, 2017 11:40 AM CDT   (Arrive by 11:25 AM)   CT CHEST/ABDOMEN/PELVIS W CONTRAST with UCCT2   [facility] Imaging [place] ([facility])    [address] [place] [phone]           Please bring any scans or X-rays taken at other hospitals, if similar tests were done. Also bring a list of your medicines, including vitamins, minerals and over-the-counter drugs. It is safest to leave personal items at home.  Be sure to tell your doctor:   If you  have any allergies.   If there s any chance you are pregnant.   If you are breastfeeding.   If you have any special needs.  You may have contrast for this exam. To prepare:   Do not eat or drink for 2 hours before your exam. If you need to take medicine, you may take it with small sips of water. (We may ask you to take liquid medicine as well.)   The day before your exam, drink extra fluids at least six 8-ounce glasses (unless your doctor tells you to restrict your fluids).  Patients over 70 or patients with diabetes or kidney problems:   If you haven t had a blood test (creatinine test) within the last 30 days, go to your clinic or Diagnostic Imaging Department for this test.  If you have diabetes:   If your kidney function is normal, continue taking your metformin (Avandamet, Glucophage, Glucovance, Metaglip) on the day of your exam.   If your kidney function is abnormal, wait 48 hours before restarting this medicine.  You will have oral contrast for this exam:   You will drink the contrast at home. Get this from your clinic or Diagnostic Imaging Department. Please follow the directions given.  Please wear loose clothing, such as a sweat suit or jogging clothes. Avoid snaps, zippers and other metal. We may ask you to undress and put on a hospital gown.  If you have any questions, please call the Imaging Department where you will have your exam.            Apr 19, 2017 12:30 PM CDT   Masonic Lab Draw with  MASONIC LAB DRAW   [facility] Masonic Lab Draw ([facility])    [address] [place] [id number]   [phone]            Apr 19, 2017  1:00 PM CDT   Bone Marrow Biopsy with  BMT CECILIA #3, UU BONE MARROW BIOPSY   [facility] Blood and Marrow Transplant ([facility])    [address] [address]   [phone]            Apr 26, 2017 11:30 AM CDT   BMT Anniversary Visit with Obed Chambers MD   [facility]  and Marrow Transplant ([facility])    [address] [address]   [phone]            May 24, 2017  9:30 AM CDT   Masonic Lab Draw with  MASONIC LAB DRAW   [facility] Masonic Lab Draw ([facility])    [address] [address]   [phone]            May 24, 2017 10:00 AM CDT   BMT Anniversary Visit with Obed Chambers MD   [facility] Blood and Marrow Transplant ([facility])    [address] [place] [phone]   [phone]            Aug 09, 2017 10:30 AM CDT   Masonic Lab Draw with  MASONIC LAB DRAW   [facility] Masonic Lab Draw ([facility])    [address] [place] [phone]   [phone]              Future tests that were ordered for you today     Open Standing Orders        Priority Remaining Interval Expires Ordered    Red blood cell prepare order unit Routine 99/100 CONDITIONAL (SPECIFY) BLOOD  3/30/2017    Transfuse red blood cell unit Routine 1/2 TRANSFUSE 2 UNITS  3/30/2017          Open Future Orders        Priority Expected Expires Ordered    CBC with platelets differential Routine 3/31/2017 9/26/2017 3/30/2017    Basic metabolic panel Routine 3/31/2017 9/26/2017 3/30/2017            Who to contact     If you have questions or need follow up information about today's clinic visit or your schedule please contact [facility] BLOOD AND MARROW TRANSPLANT directly at [phone].  Normal or non-critical lab and imaging results will be communicated to you by MyChart, letter or phone within 4 business days after the clinic has received the results. If you do not hear from us within 7 days, please contact the clinic through MyChart or phone. If you have a critical or abnormal lab result, we will notify you by phone as soon as possible.  Submit refill requests through [employer] or call your  pharmacy and they will forward the refill request to us. Please allow 3 business days for your refill to be completed.          Additional Information About Your Visit        [employer] Information     [email or web address] gives you secure access to your electronic health record. If you see a primary care provider, you can also send messages to your care team and make appointments. If you have questions, please call your primary care clinic.  If you do not have a primary care provider, please call [phone] and they will assist you.        Care EveryWhere ID     This is your Care EveryWhere ID. This could be used by other organizations to access your [place] medical records  [id number]        Your Vitals Were     Pulse Temperature Respirations Pulse Oximetry BMI (Body Mass Index)       93 98.5  F (36.9  C) (Tympanic) 16 97% 31.93 kg/m2        Blood Pressure from Last 3 Encounters:   03/30/17 99/64   03/29/17 90/57   03/29/17 104/66    Weight from Last 3 Encounters:   03/30/17 79.2 kg (174 lb 9.7 oz)   03/29/17 78.1 kg (172 lb 2.9 oz)   03/28/17 77.4 kg (170 lb 9.6 oz)              We Performed the Following     Basic metabolic panel     CBC with platelets differential     Red blood cell prepare order unit     Transfuse red blood cell unit        Recent Review Flowsheet Data     BMT Recent Results Latest Ref Rng & Units 3/22/2017 3/23/2017 3/24/2017 3/27/2017 3/28/2017 3/29/2017 3/30/2017    WBC 4.0 - 11.0 10e9/L 2.3(L) 0.5(LL) 0.1(LL) 0.1(LL) 0.1(LL) 0.1(LL) 0.1(LL)    Hemoglobin 11.7 - 15.7 g/dL 10.6(L) 9.4(L) 10.3(L) 9.7(L) 7.9(L) 7.2(L) 7.8(L)    Platelet Count 150 - 450 10e9/L 75(L) 53(L) 32(LL) 7(LL) 17(LL) 9(LL) 29(LL)    Neutrophils (Absolute) 1.6 - 8.3 10e9/L 2.3 0.4(LL) - - - - -    INR 0.86 - 1.14 - - - - - - -    Sodium 133 - 144 mmol/L 139 144 141 139 139 142 141    Potassium 3.4 - 5.3 mmol/L 4.2 3.9 3.6 3.5 3.4 3.0(L) 3.2(L)    Chloride 94 - 109 mmol/L 104 110(H) 104 101 100 103 103    Glucose 70 - 99 mg/dL 93 96  80 104(H) 93 101(H) 91    Urea Nitrogen 7 - 30 mg/dL 14 14 13 13 12 13 9    Creatinine 0.52 - 1.04 mg/dL 0.56 0.58 0.48(L) 0.54 0.57 0.57 0.59    Calcium (Total) 8.5 - 10.1 mg/dL 8.6 8.0(L) 8.4(L) 8.5 8.3(L) 8.0(L) 8.1(L)    Protein (Total) 6.8 - 8.8 g/dL - - - 7.1 - - -    Albumin 3.4 - 5.0 g/dL - - - 3.5 - - -    Bilirubin (Direct) 0.0 - 0.2 mg/dL - - - - - - -    Alkaline Phosphatase 40 - 150 U/L - - - 144 - - -    AST 0 - 45 U/L - - - 16 - - -    ALT 0 - 50 U/L - - - 32 - - -    MCV 78 - 100 fl 96 96 98 93 90 91 90               Primary Care Provider Office Phone # Fax #    Travis Carpenter [phone]       [facility] [address]  [facility] [place] [address]        Thank you!     Thank you for choosing [facility] BLOOD AND MARROW TRANSPLANT  for your care. Our goal is always to provide you with excellent care. Hearing back from our patients is one way we can continue to improve our services. Please take a few minutes to complete the written survey that you may receive in the mail after your visit with us. Thank you!             Your Updated Medication List - Protect others around you: Learn how to safely use, store and throw away your medicines at www.disposemymeds.org.          This list is accurate as of: 3/30/17 11:26 AM.  Always use your most recent med list.                   Brand Name Dispense Instructions for use    acyclovir 800 MG tablet    ZOVIRAX    150 tablet    Take 1 tablet (800 mg) by mouth 5 times daily       gabapentin 100 MG capsule    NEURONTIN    90 capsule    Take 2 capsules (200 mg) by mouth 3 times daily       levofloxacin 250 MG tablet    LEVAQUIN    30 tablet    Take 1 tablet (250 mg) by mouth daily       LORazepam 0.5 MG tablet    ATIVAN    60 tablet    Take 0.5 tablets (0.25 mg) by mouth 3 times daily (before meals)       losartan 25 MG tablet    COZAAR    30 tablet    Take 0.5 tablets (12.5 mg) by mouth At Bedtime       ondansetron 4 MG tablet     ZOFRAN    18 tablet    Take 1 tablet (4 mg) by mouth every 6 hours as needed for nausea or vomiting       oxyCODONE 5 MG IR tablet    ROXICODONE    30 tablet    Take 1-2 tablets (5-10 mg) by mouth every 4 hours as needed for moderate to severe pain       pantoprazole 40 MG EC tablet    PROTONIX    30 tablet    Take 1 tablet (40 mg) by mouth daily       sulfamethoxazole-trimethoprim 800-160 MG per tablet   Start taking on:  4/24/2017    BACTRIM DS/SEPTRA DS    30 tablet    Take 1 tablet by mouth Every Mon, Tues two times daily       venlafaxine 150 MG Tb24 24 hr tablet    EFFEXOR-ER    30 each    Take 1 tablet (150 mg) by mouth At Bedtime       voriconazole 200 MG tablet    VFEND    60 tablet    Take 1 tablet (200 mg) by mouth every 12 hours

## 2017-03-30 NOTE — NURSING NOTE
BMT Heme Malignancy Rooming Note    Steph LUNDBERG Anuel - 3/30/2017 10:09 AM     Chief Complaint   Patient presents with     Blood Draw     Labs drawn from Right Chest Davol Catheter and line flushed with Heparin.      RECHECK     post bmt for hodgkin lymphoma - here for provider visit and gcsf        /56  Pulse 111  Temp 98.8  F (37.1  C) (Oral)  Resp 16  Wt 79.2 kg (174 lb 9.7 oz)  SpO2 97%  BMI 31.93 kg/m2     Medications reviewed: Yes    Labs drawn: No    Dressing changed: Yes - See doc flowsheet for details.     Medications given: Yes - See MAR    Staff time:0 minutes    Additional information if applicable: TONNY Lopez MA

## 2017-03-30 NOTE — PROGRESS NOTES
BMT Daily Progress Note     ID: Steph Agee is a 42 year old female with Hodgkins disease who is day +8 s/p auto for Hodgkins disease.      HPI: Steph returns for daily growth factor and follow up. She feels much better today.   No vomiting today.  Nausea is better.  Still with 2-3 loost BM/day.  Energy improving.  No fever.  No SOB/SINGLETON.      Review of Systems: 10 point ROS negative except as noted above.    Physical Exam:   Blood pressure 101/56, pulse 111, temperature 98.8  F (37.1  C), temperature source Oral, resp. rate 16, weight 79.2 kg (174 lb 9.7 oz), SpO2 97 %.  General: NAD   Eyes: CALEB, sclera anicteric   Nose/Mouth/Throat: OP clear, buccal mucosa moist, no ulcerations.   Lungs: CTA bilaterally  Cardiovascular: mildly tachycardic, regular rhythm, no M/R/G   Abdominal/Rectal: +BS, soft, NT, ND, No HSM  Lymphatics: no edema  Skin: no rashes or petechaie  Neuro: A&O   Additional Findings: Barbour site NT, no drainage.  Labs:  Lab Results   Component Value Date    WBC 0.1 (LL) 03/29/2017    ANEU 0.4 (LL) 03/23/2017    HGB 7.2 (L) 03/29/2017    HCT 20.2 (L) 03/29/2017    PLT 9 (LL) 03/29/2017     03/29/2017    POTASSIUM 3.0 (L) 03/29/2017    CHLORIDE 103 03/29/2017    CO2 31 03/29/2017     (H) 03/29/2017    BUN 13 03/29/2017    CR 0.57 03/29/2017    MAG 2.0 03/27/2017    INR 1.01 03/20/2017         Assessment and Plan:   Steph Agee is a 42 year old female with Hodgkins disease who is day +8 s/p auto for Hodgkins disease.      1. BMT: Completed BEAM prep without issue. Cell dose=1.84 X10^6 CD34/kg from both PBSCT and bone marrow.   -GCSF daily until ANC>1500 x3 consecutive days. Re-stage per protocol.      2. HEME: Keep Hgb>8 and plts>10K. No pre-meds  -hgb 7.8, will give 1 unit RBCs (s/p 1 unit yesterday); note of low EF  -Had been on Lovenox 40mg QD d/t hx of chronic IJ clot recently noted on 3/3 U/s. Now thrombocytopenic.  Per Dr. Gill, ok to permanently d/c lovenox as chronic  clot (last dose 3/22).       3. ID: Afebrile  - Low BP 3/29, blood cx x2, NGTD.  pt is asymptomatic besides GI sx below.   -Cont vfend(hx of marijuana use), HD ACV (CMV+) and levaquin as prophy.   -Bactrim or appropriate PCP therapy to start d+28.       4.  GI: n/v better on scheduled zofran for now   - Has had persistent nausea even before transplant that she would improved with smoking marijuana. Continues to have mostly nighttime nausea- controlled with prn ativan/zofran  - No scheduled anti-emetics currently.  Monitor stools.   - Protonix for GI prophy.      5. FEN/Renal:   -Cr stable, K low today at 3.2, gave 40meq PO and will recheck tomorrow  - BP soft- encourage oral intake.  Monitor closely as resume cozaar (see CV).       6. CV: Cleared by cardiology d/t drop in EF. Cardiac MRI confirmed EF 47%. 3/29 Blood given at decreased rate, no issues  - Had been on Continue cozaar 12.5 daily (cardiomyopathy). Resume cozaar 3/27, now held with low pressures    7. Psych: Hx of anxiety. Previously smoked daily marijuana and took ativan qHS. Ativan too sedating for daily use-per pt. Already on effexor, increase to 150mg daily (75).  - No complaints of this today.     8.   Vaginal irritation: per exam on 3/27, vaginal dryness - no concern for yeast infection, no erythema or discharge; recommend vaseline to vagina/external labia majora/minora prn for comfort.     Plan: continue to hold cozaar, schedule zofran 3x/day for now  RTC: daily for gcsf, labs and provider visit. Pt to take her temp sooner for worsening n/v, fevers, chills, etc    Jolynn Sutherland pa-c  [phone]

## 2017-03-30 NOTE — NURSING NOTE
Chief Complaint   Patient presents with     Blood Draw     Labs drawn from Right Chest Davol Catheter and line flushed with Heparin.      Lu aHhn RN

## 2017-03-31 ENCOUNTER — ONCOLOGY VISIT (OUTPATIENT)
Dept: TRANSPLANT | Facility: CLINIC | Age: 42
End: 2017-03-31
Attending: INTERNAL MEDICINE
Payer: COMMERCIAL

## 2017-03-31 ENCOUNTER — APPOINTMENT (OUTPATIENT)
Dept: LAB | Facility: CLINIC | Age: 42
End: 2017-03-31
Attending: INTERNAL MEDICINE
Payer: COMMERCIAL

## 2017-03-31 VITALS
HEART RATE: 90 BPM | TEMPERATURE: 98.3 F | WEIGHT: 175.04 LBS | SYSTOLIC BLOOD PRESSURE: 107 MMHG | OXYGEN SATURATION: 99 % | RESPIRATION RATE: 16 BRPM | BODY MASS INDEX: 32.01 KG/M2 | DIASTOLIC BLOOD PRESSURE: 64 MMHG

## 2017-03-31 DIAGNOSIS — C81.00 NODULAR LYMPHOCYTE PREDOMINANT HODGKIN LYMPHOMA, UNSPECIFIED BODY REGION (H): ICD-10-CM

## 2017-03-31 DIAGNOSIS — Z29.9 NEED FOR PROPHYLACTIC MEASURE: ICD-10-CM

## 2017-03-31 DIAGNOSIS — C81.10 NODULAR SCLEROSING HODGKIN'S LYMPHOMA, UNSPECIFIED BODY REGION (H): Primary | ICD-10-CM

## 2017-03-31 LAB
ANION GAP SERPL CALCULATED.3IONS-SCNC: 8 MMOL/L (ref 3–14)
BLD PROD TYP BPU: NORMAL
BUN SERPL-MCNC: 9 MG/DL (ref 7–30)
CALCIUM SERPL-MCNC: 7.9 MG/DL (ref 8.5–10.1)
CHLORIDE SERPL-SCNC: 109 MMOL/L (ref 94–109)
CO2 SERPL-SCNC: 28 MMOL/L (ref 20–32)
CREAT SERPL-MCNC: 0.57 MG/DL (ref 0.52–1.04)
DIFFERENTIAL METHOD BLD: ABNORMAL
ERYTHROCYTE [DISTWIDTH] IN BLOOD BY AUTOMATED COUNT: 13.8 % (ref 10–15)
GFR SERPL CREATININE-BSD FRML MDRD: ABNORMAL ML/MIN/1.7M2
GLUCOSE SERPL-MCNC: 82 MG/DL (ref 70–99)
HCT VFR BLD AUTO: 25.2 % (ref 35–47)
HGB BLD-MCNC: 9 G/DL (ref 11.7–15.7)
MCH RBC QN AUTO: 32.1 PG (ref 26.5–33)
MCHC RBC AUTO-ENTMCNC: 35.7 G/DL (ref 31.5–36.5)
MCV RBC AUTO: 90 FL (ref 78–100)
NUM BPU REQUESTED: 1
PLATELET # BLD AUTO: 17 10E9/L (ref 150–450)
POTASSIUM SERPL-SCNC: 3.6 MMOL/L (ref 3.4–5.3)
RBC # BLD AUTO: 2.8 10E12/L (ref 3.8–5.2)
SODIUM SERPL-SCNC: 145 MMOL/L (ref 133–144)
WBC # BLD AUTO: 0.3 10E9/L (ref 4–11)

## 2017-03-31 PROCEDURE — 25000128 H RX IP 250 OP 636: Mod: ZF | Performed by: NURSE PRACTITIONER

## 2017-03-31 PROCEDURE — 25000125 ZZHC RX 250: Mod: ZF | Performed by: INTERNAL MEDICINE

## 2017-03-31 PROCEDURE — 85027 COMPLETE CBC AUTOMATED: CPT

## 2017-03-31 PROCEDURE — 86900 BLOOD TYPING SEROLOGIC ABO: CPT | Performed by: INTERNAL MEDICINE

## 2017-03-31 PROCEDURE — 96372 THER/PROPH/DIAG INJ SC/IM: CPT

## 2017-03-31 PROCEDURE — 80048 BASIC METABOLIC PNL TOTAL CA: CPT | Performed by: PHYSICIAN ASSISTANT

## 2017-03-31 PROCEDURE — 86901 BLOOD TYPING SEROLOGIC RH(D): CPT | Performed by: INTERNAL MEDICINE

## 2017-03-31 PROCEDURE — 86850 RBC ANTIBODY SCREEN: CPT | Performed by: INTERNAL MEDICINE

## 2017-03-31 PROCEDURE — 86923 COMPATIBILITY TEST ELECTRIC: CPT | Performed by: INTERNAL MEDICINE

## 2017-03-31 RX ORDER — ACETAMINOPHEN 325 MG/1
650 TABLET ORAL ONCE
Status: CANCELLED
Start: 2017-03-31 | End: 2017-03-31

## 2017-03-31 RX ORDER — HEPARIN SODIUM,PORCINE 10 UNIT/ML
5 VIAL (ML) INTRAVENOUS
Status: DISCONTINUED | OUTPATIENT
Start: 2017-03-31 | End: 2017-03-31 | Stop reason: HOSPADM

## 2017-03-31 RX ORDER — DIPHENHYDRAMINE HCL 25 MG
25 CAPSULE ORAL ONCE
Status: CANCELLED
Start: 2017-03-31 | End: 2017-03-31

## 2017-03-31 RX ORDER — HEPARIN SODIUM,PORCINE 10 UNIT/ML
5 VIAL (ML) INTRAVENOUS
Status: CANCELLED | OUTPATIENT
Start: 2017-04-01

## 2017-03-31 RX ADMIN — SODIUM CHLORIDE, PRESERVATIVE FREE 5 ML: 5 INJECTION INTRAVENOUS at 09:50

## 2017-03-31 RX ADMIN — FILGRASTIM 480 MCG: 480 INJECTION, SOLUTION INTRAVENOUS; SUBCUTANEOUS at 10:15

## 2017-03-31 ASSESSMENT — PAIN SCALES - GENERAL: PAINLEVEL: NO PAIN (0)

## 2017-03-31 NOTE — MR AVS SNAPSHOT
After Visit Summary   3/31/2017    Steph Agee    MRN: [id number]           Patient Information     Date Of Birth          1975        Visit Information        Provider Department      3/31/2017 10:00 AM UC BMT CECILIA #1 [facility] Blood and Marrow Transplant        Today's Diagnoses     Nodular sclerosing Hodgkin's lymphoma, unspecified body region (H)    -  1    Need for prophylactic measure              [facility] ([facility])  [address] [place] [id number]  Phone: [phone]  Clinic Hours:   Monday-Friday:    8am to 4:30pm   Weekends and holidays:    8am to noon (in general)  If your fever is 100.5  or greater,   call the clinic.  After hours call the   hospital at [phone] or   [phone]. Ask for the BMT   fellow for pediatric or adult patients            Follow-ups after your visit        Your next 10 appointments already scheduled     Apr 01, 2017 10:00 AM CDT   M[facility] Lab Draw with  MASONIC LAB DRAW   [facility] Masonic Lab Draw ([facility])    [address] [place] [id number] [phone]            Apr 01, 2017 10:30 AM CDT   Return with  BMT DOM   [facility] Blood and Marrow Transplant ([facility])    [address] [place] [id number] [phone]            Apr 02, 2017  9:30 AM CDT   Masonic Lab Draw with  MASONIC LAB DRAW   [facility] Masonic Lab Draw ([facility])    [address] [place] [address] [phone]            Apr 02, 2017 10:00 AM CDT   Return with UC BMT DOM   [facility] Blood and Marrow Transplant ([facility])    [address] [place] [id number] [phone]            Apr 03, 2017 10:00 AM CDT   Masonic Lab Draw with  MASONIC LAB DRAW   [facility] Masonic Lab Draw ([facility])    [address] [place] [address]  [facility] [address]   [phone]            Apr 03, 2017 10:30 AM CDT   Return with  BMT CECILIA #2   [facility] Blood and Marrow Transplant ([facility])    [address]  [address] [facility] [address]   [phone]            Apr 19, 2017 11:40 AM CDT   (Arrive by 11:25 AM)   CT CHEST/ABDOMEN/PELVIS W CONTRAST with UCCT2   [facility] CT ([facility])    [address]  [address] [facility] [address]   [phone]           Please bring any scans or X-rays taken at other hospitals, if similar tests were done. Also bring a list of your medicines, including vitamins, minerals and over-the-counter drugs. It is safest to leave personal items at home.  Be sure to tell your doctor:   If you have any allergies.   If there s any chance you are pregnant.   If you are breastfeeding.   If you have any special needs.  You may have contrast for this exam. To prepare:   Do not eat or drink for 2 hours before your exam. If you need to take medicine, you may take it with small sips of water. (We may ask you to take liquid medicine as well.)   The day before your exam, drink extra fluids at least six 8-ounce glasses (unless your doctor tells you to restrict your fluids).  Patients over 70 or patients with diabetes or kidney problems:   If you haven t had a blood test (creatinine test) within the last 30 days, go to your clinic or Diagnostic Imaging Department for this test.  If you have diabetes:   If your kidney function is normal, continue taking your metformin (Avandamet, Glucophage, Glucovance, Metaglip) on the day of your exam.   If your kidney function is abnormal, wait 48 hours before restarting this medicine.  You will have oral contrast for this exam:   You will drink the contrast at home. Get this from your clinic or Diagnostic Imaging Department. Please follow the directions given.  Please wear loose clothing, such as a  sweat suit or jogging clothes. Avoid snaps, zippers and other metal. We may ask you to undress and put on a hospital gown.  If you have any questions, please call the Imaging Department where you will have your exam.            Apr 19, 2017 12:30 PM CDT   Masonic Lab Draw with  MASONIC LAB DRAW   [facility] Masonic Lab Draw ([facility])    [address] [address] [phone]            Apr 19, 2017  1:00 PM CDT   Bone Marrow Biopsy with  BMT CECILIA #3, UU BONE MARROW BIOPSY   [facility] Blood and Marrow Transplant ([facility])    [address] [address] [phone]              Future tests that were ordered for you today     Open Future Orders        Priority Expected Expires Ordered    CBC with platelets differential Routine 4/1/2017 9/27/2017 3/31/2017    Basic metabolic panel Routine 4/1/2017 9/27/2017 3/31/2017            Who to contact     If you have questions or need follow up information about today's clinic visit or your schedule please contact [facility] BLOOD AND MARROW TRANSPLANT directly at [phone].  Normal or non-critical lab and imaging results will be communicated to you by [employer], letter or phone within 4 business days after the clinic has received the results. If you do not hear from us within 7 days, please contact the clinic through [employer] or phone. If you have a critical or abnormal lab result, we will notify you by phone as soon as possible.  Submit refill requests through [facility] or call your pharmacy and they will forward the refill request to us. Please allow 3 business days for your refill to be completed.          Additional Information About Your Visit        [facility] [facility] Information     [facility] gives you secure access to your electronic health record. If you see a primary care provider, you can also send messages to your care team and make appointments. If you have  questions, please call your primary care clinic.  If you do not have a primary care provider, please call [phone] and they will assist you.        Care EveryWhere ID     This is your Care EveryWhere ID. This could be used by other organizations to access your [place] medical records  [id number]        Your Vitals Were     Pulse Temperature Respirations Pulse Oximetry BMI (Body Mass Index)       90 98.3  F (36.8  C) 16 99% 32.01 kg/m2        Blood Pressure from Last 3 Encounters:   03/31/17 107/64   03/30/17 99/64   03/30/17 93/52    Weight from Last 3 Encounters:   03/31/17 79.4 kg (175 lb 0.7 oz)   03/30/17 79.2 kg (174 lb 9.7 oz)   03/29/17 78.1 kg (172 lb 2.9 oz)              We Performed the Following     Basic metabolic panel     CBC with platelets differential        Recent Review Flowsheet Data     BMT Recent Results Latest Ref Rng & Units 3/23/2017 3/24/2017 3/27/2017 3/28/2017 3/29/2017 3/30/2017 3/31/2017    WBC 4.0 - 11.0 10e9/L 0.5(LL) 0.1(LL) 0.1(LL) 0.1(LL) 0.1(LL) 0.1(LL) 0.3(LL)    Hemoglobin 11.7 - 15.7 g/dL 9.4(L) 10.3(L) 9.7(L) 7.9(L) 7.2(L) 7.8(L) 9.0(L)    Platelet Count 150 - 450 10e9/L 53(L) 32(LL) 7(LL) 17(LL) 9(LL) 29(LL) 17(LL)    Neutrophils (Absolute) 1.6 - 8.3 10e9/L 0.4(LL) - - - - - -    INR 0.86 - 1.14 - - - - - - -    Sodium 133 - 144 mmol/L 144 141 139 139 142 141 145(H)    Potassium 3.4 - 5.3 mmol/L 3.9 3.6 3.5 3.4 3.0(L) 3.2(L) 3.6    Chloride 94 - 109 mmol/L 110(H) 104 101 100 103 103 109    Glucose 70 - 99 mg/dL 96 80 104(H) 93 101(H) 91 82    Urea Nitrogen 7 - 30 mg/dL 14 13 13 12 13 9 9    Creatinine 0.52 - 1.04 mg/dL 0.58 0.48(L) 0.54 0.57 0.57 0.59 0.57    Calcium (Total) 8.5 - 10.1 mg/dL 8.0(L) 8.4(L) 8.5 8.3(L) 8.0(L) 8.1(L) 7.9(L)    Protein (Total) 6.8 - 8.8 g/dL - - 7.1 - - - -    Albumin 3.4 - 5.0 g/dL - - 3.5 - - - -    Bilirubin (Direct) 0.0 - 0.2 mg/dL - - - - - - -    Alkaline Phosphatase 40 - 150 U/L - - 144 - - - -    AST 0 - 45 U/L - - 16 - - - -    ALT  0 - 50 U/L - - 32 - - - -    MCV 78 - 100 fl 96 98 93 90 91 90 90               Primary Care Provider Office Phone # Fax #    Travis Carpenter [phone]       [facility] [address] [place] [address]        Thank you!     Thank you for choosing [facility] BLOOD AND MARROW TRANSPLANT  for your care. Our goal is always to provide you with excellent care. Hearing back from our patients is one way we can continue to improve our services. Please take a few minutes to complete the written survey that you may receive in the mail after your visit with us. Thank you!             Your Updated Medication List - Protect others around you: Learn how to safely use, store and throw away your medicines at www.disposemymeds.org.          This list is accurate as of: 3/31/17 10:43 AM.  Always use your most recent med list.                   Brand Name Dispense Instructions for use    acyclovir 800 MG tablet    ZOVIRAX    150 tablet    Take 1 tablet (800 mg) by mouth 5 times daily       gabapentin 100 MG capsule    NEURONTIN    90 capsule    Take 2 capsules (200 mg) by mouth 3 times daily       levofloxacin 250 MG tablet    LEVAQUIN    30 tablet    Take 1 tablet (250 mg) by mouth daily       LORazepam 0.5 MG tablet    ATIVAN    60 tablet    Take 0.5 tablets (0.25 mg) by mouth 3 times daily (before meals)       losartan 25 MG tablet    COZAAR    30 tablet    Take 0.5 tablets (12.5 mg) by mouth At Bedtime       ondansetron 4 MG tablet    ZOFRAN    18 tablet    Take 1 tablet (4 mg) by mouth every 6 hours as needed for nausea or vomiting       oxyCODONE 5 MG IR tablet    ROXICODONE    30 tablet    Take 1-2 tablets (5-10 mg) by mouth every 4 hours as needed for moderate to severe pain       pantoprazole 40 MG EC tablet    PROTONIX    30 tablet    Take 1 tablet (40 mg) by mouth daily       sulfamethoxazole-trimethoprim 800-160 MG per tablet   Start taking on:  4/24/2017    BACTRIM DS/SEPTRA DS     30 tablet    Take 1 tablet by mouth Every Mon, Tues two times daily       venlafaxine 150 MG Tb24 24 hr tablet    EFFEXOR-ER    30 each    Take 1 tablet (150 mg) by mouth At Bedtime       voriconazole 200 MG tablet    VFEND    60 tablet    Take 1 tablet (200 mg) by mouth every 12 hours

## 2017-03-31 NOTE — PROGRESS NOTES
BMT Daily Progress Note     ID: Steph Agee is a 42 year old female with Hodgkins disease who is day +9 s/p auto for Hodgkins disease.    HPI: Steph returns for daily growth factor and follow up. She continues to improve.  No n/v/d or abd pain/cramping.  Still with 2-3 loost BM/day.  Energy improving.  No fever.  No SOB/SINGLETON.      Review of Systems: 10 point ROS negative except as noted above.    Physical Exam:   Blood pressure 107/64, pulse 90, temperature 98.3  F (36.8  C), resp. rate 16, weight 79.4 kg (175 lb 0.7 oz), SpO2 99 %.  General: NAD   Eyes: CALEB, sclera anicteric   Nose/Mouth/Throat: OP clear, buccal mucosa moist, no ulcerations.   Lungs: CTA bilaterally  Cardiovascular: mildly tachycardic, regular rhythm, no M/R/G   Abdominal/Rectal: +BS, soft, NT, ND, No HSM  Lymphatics: no edema  Skin: no rashes or petechaie  Neuro: A&O   Additional Findings: Barbour site NT, no drainage.  Labs:  Lab Results   Component Value Date    WBC 0.1 (LL) 03/30/2017    ANEU 0.4 (LL) 03/23/2017    HGB 7.8 (L) 03/30/2017    HCT 21.7 (L) 03/30/2017    PLT 29 (LL) 03/30/2017     03/30/2017    POTASSIUM 3.2 (L) 03/30/2017    CHLORIDE 103 03/30/2017    CO2 30 03/30/2017    GLC 91 03/30/2017    BUN 9 03/30/2017    CR 0.59 03/30/2017    MAG 2.0 03/27/2017    INR 1.01 03/20/2017       Assessment and Plan:   Steph Agee is a 42 year old female with Hodgkins disease who is day +9 s/p auto for Hodgkins disease.      1. BMT: Completed BEAM prep without issue. Cell dose=1.84 X10^6 CD34/kg from both PBSCT and bone marrow.   -GCSF daily until ANC>1500 x3 consecutive days. Re-stage per protocol.      2. HEME: Keep Hgb>8 and plts>10K. No pre-meds  -hgb 7.8, will give 1 unit RBCs (s/p 1 unit yesterday); note of low EF  -Had been on Lovenox 40mg QD d/t hx of chronic IJ clot recently noted on 3/3 U/s. Now thrombocytopenic.  Per Dr. Gill, ok to permanently d/c lovenox as chronic clot (last dose 3/22).       3. ID: Afebrile  -  Low BP 3/29, blood cx x2, NGTD.  pt is asymptomatic besides GI sx below.   -Cont vfend(hx of marijuana use), HD ACV (CMV+) and levaquin as prophy.   -Bactrim or appropriate PCP therapy to start d+28.       4.  GI: n/v better on scheduled zofran for now--if better tomorrow, can decrease to prn.  - Has had persistent nausea even before transplant that she would improved with smoking marijuana. Continues to have mostly nighttime nausea- controlled with prn ativan/zofran  - No scheduled anti-emetics currently.  Monitor stools.   - Protonix for GI prophy.      5. FEN/Renal:   -Cr stable, K improved.  - BP soft- encourage oral intake.  Monitor closely as resume cozaar (see CV).       6. CV: Cleared by cardiology d/t drop in EF. Cardiac MRI confirmed EF 47%. 3/29 Blood given at decreased rate, no issues  - Had been on Continue cozaar 12.5 daily (cardiomyopathy). Resume cozaar 3/27, now held with low pressures    7. Psych: Hx of anxiety. Previously smoked daily marijuana and took ativan qHS. Ativan too sedating for daily use-per pt. Already on effexor, increase to 150mg daily (75).  - No complaints of this today.     8.   Vaginal irritation: per exam on 3/27, vaginal dryness - no concern for yeast infection, no erythema or discharge; recommend vaseline to vagina/external labia majora/minora prn for comfort.     Plan:   continue to hold cozaar, schedule zofran 3x/day for now  RTC: daily for gcsf, labs and provider visit.  Has appt through 4/3.  Added infusion appt to sat/sun visits for possible plt transfusion    Jolynn Sutherland pa-c  [phone]

## 2017-03-31 NOTE — NURSING NOTE
BMT Heme Malignancy Rooming Note    Steph Agee - 3/31/2017 9:46 AM     Chief Complaint   Patient presents with     RECHECK     Patinet here for labs, provider visit and GCSF r/t hodgkins post transplant.         /64  Pulse 90  Temp 98.3  F (36.8  C)  Resp 16  Wt 79.4 kg (175 lb 0.7 oz)  SpO2 99%  BMI 32.01 kg/m2     Medications reviewed: Yes    Labs drawn: Yes    Drawn by: Clinic Staff  Via: central venous catheter  See Doc Flowsheet for details.      Dressing changed: No     Medications given: Yes - See MAR    Staff time:0    Additional information if applicable: TONNY Farias RN

## 2017-04-01 ENCOUNTER — APPOINTMENT (OUTPATIENT)
Dept: LAB | Facility: CLINIC | Age: 42
End: 2017-04-01
Attending: INTERNAL MEDICINE
Payer: COMMERCIAL

## 2017-04-01 ENCOUNTER — INFUSION THERAPY VISIT (OUTPATIENT)
Dept: TRANSPLANT | Facility: CLINIC | Age: 42
End: 2017-04-01
Attending: INTERNAL MEDICINE
Payer: COMMERCIAL

## 2017-04-01 VITALS
RESPIRATION RATE: 18 BRPM | SYSTOLIC BLOOD PRESSURE: 109 MMHG | WEIGHT: 174.9 LBS | TEMPERATURE: 98.7 F | HEART RATE: 89 BPM | OXYGEN SATURATION: 98 % | DIASTOLIC BLOOD PRESSURE: 64 MMHG | BODY MASS INDEX: 31.98 KG/M2

## 2017-04-01 VITALS
RESPIRATION RATE: 18 BRPM | DIASTOLIC BLOOD PRESSURE: 57 MMHG | SYSTOLIC BLOOD PRESSURE: 102 MMHG | HEART RATE: 88 BPM | TEMPERATURE: 98.5 F

## 2017-04-01 DIAGNOSIS — C81.10 NODULAR SCLEROSING HODGKIN'S LYMPHOMA, UNSPECIFIED BODY REGION (H): Primary | ICD-10-CM

## 2017-04-01 DIAGNOSIS — C81.00 NODULAR LYMPHOCYTE PREDOMINANT HODGKIN LYMPHOMA, UNSPECIFIED BODY REGION (H): Primary | ICD-10-CM

## 2017-04-01 DIAGNOSIS — Z29.9 NEED FOR PROPHYLACTIC MEASURE: ICD-10-CM

## 2017-04-01 LAB
ANION GAP SERPL CALCULATED.3IONS-SCNC: 5 MMOL/L (ref 3–14)
BASOPHILS # BLD AUTO: 0 10E9/L (ref 0–0.2)
BASOPHILS NFR BLD AUTO: 0 %
BLD PROD TYP BPU: NORMAL
BLD PROD TYP BPU: NORMAL
BLD UNIT ID BPU: 0
BLOOD PRODUCT CODE: NORMAL
BPU ID: NORMAL
BUN SERPL-MCNC: 10 MG/DL (ref 7–30)
CALCIUM SERPL-MCNC: 8.1 MG/DL (ref 8.5–10.1)
CHLORIDE SERPL-SCNC: 106 MMOL/L (ref 94–109)
CO2 SERPL-SCNC: 32 MMOL/L (ref 20–32)
CREAT SERPL-MCNC: 0.62 MG/DL (ref 0.52–1.04)
DIFFERENTIAL METHOD BLD: ABNORMAL
EOSINOPHIL # BLD AUTO: 0 10E9/L (ref 0–0.7)
EOSINOPHIL NFR BLD AUTO: 0 %
ERYTHROCYTE [DISTWIDTH] IN BLOOD BY AUTOMATED COUNT: 13.8 % (ref 10–15)
GFR SERPL CREATININE-BSD FRML MDRD: ABNORMAL ML/MIN/1.7M2
GLUCOSE SERPL-MCNC: 83 MG/DL (ref 70–99)
HCT VFR BLD AUTO: 24.7 % (ref 35–47)
HGB BLD-MCNC: 8.7 G/DL (ref 11.7–15.7)
LYMPHOCYTES # BLD AUTO: 0.2 10E9/L (ref 0.8–5.3)
LYMPHOCYTES NFR BLD AUTO: 28.3 %
MCH RBC QN AUTO: 32 PG (ref 26.5–33)
MCHC RBC AUTO-ENTMCNC: 35.2 G/DL (ref 31.5–36.5)
MCV RBC AUTO: 91 FL (ref 78–100)
MONOCYTES # BLD AUTO: 0 10E9/L (ref 0–1.3)
MONOCYTES NFR BLD AUTO: 4.3 %
MYELOCYTES # BLD: 0 10E9/L
MYELOCYTES NFR BLD MANUAL: 0.7 %
NEUTROPHILS # BLD AUTO: 0.4 10E9/L (ref 1.6–8.3)
NEUTROPHILS NFR BLD AUTO: 66.7 %
NUM BPU REQUESTED: 1
PLATELET # BLD AUTO: 9 10E9/L (ref 150–450)
POTASSIUM SERPL-SCNC: 3.8 MMOL/L (ref 3.4–5.3)
RBC # BLD AUTO: 2.72 10E12/L (ref 3.8–5.2)
RBC MORPH BLD: NORMAL
SODIUM SERPL-SCNC: 144 MMOL/L (ref 133–144)
TRANSFUSION STATUS PATIENT QL: NORMAL
TRANSFUSION STATUS PATIENT QL: NORMAL
WBC # BLD AUTO: 0.6 10E9/L (ref 4–11)

## 2017-04-01 PROCEDURE — 85025 COMPLETE CBC W/AUTO DIFF WBC: CPT | Performed by: PHYSICIAN ASSISTANT

## 2017-04-01 PROCEDURE — 36430 TRANSFUSION BLD/BLD COMPNT: CPT

## 2017-04-01 PROCEDURE — 80048 BASIC METABOLIC PNL TOTAL CA: CPT | Performed by: PHYSICIAN ASSISTANT

## 2017-04-01 PROCEDURE — 25000128 H RX IP 250 OP 636: Mod: ZF | Performed by: NURSE PRACTITIONER

## 2017-04-01 PROCEDURE — 96372 THER/PROPH/DIAG INJ SC/IM: CPT

## 2017-04-01 PROCEDURE — 25000125 ZZHC RX 250: Mod: ZF | Performed by: PHYSICIAN ASSISTANT

## 2017-04-01 PROCEDURE — P9037 PLATE PHERES LEUKOREDU IRRAD: HCPCS | Performed by: PHYSICIAN ASSISTANT

## 2017-04-01 PROCEDURE — 25000132 ZZH RX MED GY IP 250 OP 250 PS 637: Mod: ZF | Performed by: INTERNAL MEDICINE

## 2017-04-01 RX ORDER — DIPHENHYDRAMINE HCL 25 MG
25 CAPSULE ORAL ONCE
Status: COMPLETED | OUTPATIENT
Start: 2017-04-01 | End: 2017-04-01

## 2017-04-01 RX ORDER — ACETAMINOPHEN 325 MG/1
650 TABLET ORAL ONCE
Status: COMPLETED | OUTPATIENT
Start: 2017-04-01 | End: 2017-04-01

## 2017-04-01 RX ORDER — HEPARIN SODIUM,PORCINE 10 UNIT/ML
5 VIAL (ML) INTRAVENOUS
Status: CANCELLED | OUTPATIENT
Start: 2017-04-02

## 2017-04-01 RX ORDER — DIPHENHYDRAMINE HCL 25 MG
25 CAPSULE ORAL ONCE
Status: CANCELLED
Start: 2017-04-01 | End: 2017-04-01

## 2017-04-01 RX ORDER — HEPARIN SODIUM,PORCINE 10 UNIT/ML
5 VIAL (ML) INTRAVENOUS
Status: DISCONTINUED | OUTPATIENT
Start: 2017-04-01 | End: 2017-04-01 | Stop reason: HOSPADM

## 2017-04-01 RX ORDER — ACETAMINOPHEN 325 MG/1
650 TABLET ORAL ONCE
Status: CANCELLED
Start: 2017-04-01 | End: 2017-04-01

## 2017-04-01 RX ADMIN — DIPHENHYDRAMINE HYDROCHLORIDE 25 MG: 25 CAPSULE ORAL at 11:08

## 2017-04-01 RX ADMIN — SODIUM CHLORIDE, PRESERVATIVE FREE 5 ML: 5 INJECTION INTRAVENOUS at 10:31

## 2017-04-01 RX ADMIN — SODIUM CHLORIDE, PRESERVATIVE FREE 5 ML: 5 INJECTION INTRAVENOUS at 10:30

## 2017-04-01 RX ADMIN — ACETAMINOPHEN 650 MG: 325 TABLET ORAL at 11:08

## 2017-04-01 RX ADMIN — FILGRASTIM 480 MCG: 480 INJECTION, SOLUTION INTRAVENOUS; SUBCUTANEOUS at 10:42

## 2017-04-01 NOTE — NURSING NOTE
Chief Complaint   Patient presents with     RECHECK     S/P BMT for Hodgkins--here for labs and to see MD     Blood Draw     Pt with hx of lymphoma labs collected via central line.

## 2017-04-01 NOTE — PROGRESS NOTES
Chief Complaint   Patient presents with     Infusion     Post BMT for      Infusion Nursing Note:  Steph Agee presents today for Blood products.    Patient seen by provider today: Yes: Dr. Aragon   present during visit today: Not Applicable.    Note: Pt received 1 unit of platelets today.    Intravenous Access:  Barbour.    Treatment Conditions:  Lab Results   Component Value Date    HGB 8.7 04/01/2017     Lab Results   Component Value Date    WBC 0.6 04/01/2017      Lab Results   Component Value Date    ANEU 0.4 04/01/2017     Lab Results   Component Value Date    PLT 9 04/01/2017          Post Infusion Assessment:  Patient tolerated infusion without incident.    Discharge Plan:   Patient discharged in stable condition accompanied by: cousin.    SHERYL SMITH RN

## 2017-04-01 NOTE — PROGRESS NOTES
BMT Daily Progress Note     ID: Steph Agee is a 42 year old female with Hodgkins disease who is day +10 s/p auto for Hodgkins disease.    HPI: Steph returns for daily growth factor and follow up. She continues to feel well and denies really any complaints. No n/v/d or abd pain/cramping.  Still with some loose BMs daily but no overt running, frequent diarrhea. No fever.  No SOB/SINGLETON.  No swelling. Will shave head today since hair is coming out.   Review of Systems: 10 point ROS negative except as noted above.  Physical Exam:   Blood pressure 109/64, pulse 89, temperature 98.7  F (37.1  C), temperature source Oral, resp. rate 18, weight 79.3 kg (174 lb 14.4 oz), SpO2 98 %.    Wt Readings from Last 5 Encounters:   04/01/17 79.3 kg (174 lb 14.4 oz)   03/31/17 79.4 kg (175 lb 0.7 oz)   03/30/17 79.2 kg (174 lb 9.7 oz)   03/29/17 78.1 kg (172 lb 2.9 oz)   03/28/17 77.4 kg (170 lb 9.6 oz)     General: NAD  Eyes: CALEB, sclera anicteric  Nose/Mouth/Throat: OP clear, buccal mucosa moist, no ulcerations.  Lungs: CTA bilaterally Cardiovascular: mildly tachycardic, regular rhythm, no M/R/G  Abdominal/Rectal: +BS, soft, NT, ND, No HSM.   Lymphatics: no edema.   Skin: no rashes or petechiae.   Neuro: A&O.    Additional Findings: Barbour site NT, no drainage.     Labs:  Lab Results   Component Value Date    WBC 0.6 (LL) 04/01/2017    ANEU 0.4 (LL) 03/23/2017    HGB 8.7 (L) 04/01/2017    HCT 24.7 (L) 04/01/2017    PLT 9 (LL) 04/01/2017     04/01/2017    POTASSIUM 3.8 04/01/2017    CHLORIDE 106 04/01/2017    CO2 32 04/01/2017    GLC 83 04/01/2017    BUN 10 04/01/2017    CR 0.62 04/01/2017    MAG 2.0 03/27/2017    INR 1.01 03/20/2017       Assessment and Plan:   Steph Agee is a 42 year old female with Hodgkins disease who is day +10 s/p auto for Hodgkins disease.      1. BMT: Completed BEAM prep without issue. Cell dose=1.84 X10^6 CD34/kg from both PBSCT and bone marrow.   -GCSF daily until ANC>1500 x3 consecutive days.  Re-stage per protocol.      2. HEME: Keep Hgb>8 and plts>10K. No pre-meds  -Will give Plts today for low value. Good response to blood. Note of low EF  -Had been on Lovenox 40mg QD d/t hx of chronic IJ clot recently noted on 3/3 U/s. Now thrombocytopenic.  Per Dr. Gill ok to permanently d/c lovenox as chronic clot (last dose 3/22).       3. ID: Afebrile  - Low BP 3/29, blood cx x2, NGTD.  pt is asymptomatic besides GI sx below.   -Cont vfend (hx of marijuana use), HD ACV (CMV+) and levaquin as prophy.   -Bactrim or appropriate PCP therapy to start d+28.       4.  GI: n/v better on scheduled zofran for now--if better tomorrow, can decrease to prn.  - Has had persistent nausea even before transplant that she would improved with smoking marijuana. Continues to have mostly nighttime nausea- controlled with prn ativan/zofran  - No scheduled anti-emetics currently.  Monitor stools.   - Protonix for GI prophy.      5. FEN/Renal:   -Cr stable, K adequate today.   - BP soft- encourage oral intake.  Monitor closely as resume cozaar (see CV).       6. CV: Cleared by cardiology d/t drop in EF. Cardiac MRI confirmed EF 47%. 3/29 Blood given at decreased rate, no issues  - Had been on Continue cozaar 12.5 daily (cardiomyopathy). Resume cozaar 3/27, now held with low pressures    7. Psych: Hx of anxiety. Previously smoked daily marijuana and took ativan qHS. Ativan too sedating for daily use-per pt. Already on effexor, increase to 150mg daily (75).  - No complaints of this today.     8.   Vaginal irritation: per exam on 3/27, vaginal dryness - no concern for yeast infection, no erythema or discharge; recommend vaseline to vagina/external labia majora/minora prn for comfort.     Plan:   Plts today.   RTC: daily for gcsf, labs and provider visit.  Has appt through 4/3.  Added infusion appt to sat/sun visits for possible plt transfusion.    Guillermina,

## 2017-04-01 NOTE — MR AVS SNAPSHOT
After Visit Summary   4/1/2017    Steph Agee    MRN: [id number]           Patient Information     Date Of Birth          1975        Visit Information        Provider Department      4/1/2017 10:30 AM UC BMT DOM [facility] Blood and Marrow Transplant        Today's Diagnoses     Nodular sclerosing Hodgkin's lymphoma, unspecified body region (H)    -  1    Need for prophylactic measure              [facility] ([facility])  [address] [place] [address]  Phone: [phone]  Clinic Hours:   Monday-Friday:    8am to 4:30pm   Weekends and holidays:    8am to noon (in general)  If your fever is 100.5  or greater,   call the clinic.  After hours call the   hospital at [phone] or   [phone]. Ask for the BMT   fellow for pediatric or adult patients            Follow-ups after your visit        Your next 10 appointments already scheduled     Apr 02, 2017  9:30 AM CDT   Masonic Lab Draw with UC MASONIC LAB DRAW   [facility] Masonic Lab Draw ([facility])    [address] [place] [id number] [phone]            Apr 02, 2017 10:00 AM CDT   Return with UC BMT DOM   [facility] Blood and Marrow Transplant ([facility])    [address] [place] [place] [id number] [phone]            Apr 02, 2017 10:30 AM CDT   Infusion 60 with UC BMT INFUSION, UC 1 ATC   [facility] Blood and Marrow Transplant ([facility])    [address] [place] [place] [id number] [phone]            Apr 03, 2017 10:00 AM CDT   Masonic Lab Draw with UC MASONIC LAB DRAW   [facility] Masonic Lab Draw ([facility])    [address] [place] [place] [address] [phone]            Apr 03, 2017 10:30 AM CDT   Return with UC BMT CECILIA #2   [facility] Blood and Marrow Transplant ([facility])    [address] [place] [address]  [address]  2[address] Floor  [facility] [address]   [phone]            Apr 19, 2017 11:40 AM CDT   (Arrive by 11:25 AM)   CT CHEST/ABDOMEN/PELVIS W CONTRAST with UCCT2   [facility] Imaging Center CT ([facility])    [address] [facility]  [address] [facility] [address]   [phone]           Please bring any scans or X-rays taken at other hospitals, if similar tests were done. Also bring a list of your medicines, including vitamins, minerals and over-the-counter drugs. It is safest to leave personal items at home.  Be sure to tell your doctor:   If you have any allergies.   If there s any chance you are pregnant.   If you are breastfeeding.   If you have any special needs.  You may have contrast for this exam. To prepare:   Do not eat or drink for 2 hours before your exam. If you need to take medicine, you may take it with small sips of water. (We may ask you to take liquid medicine as well.)   The day before your exam, drink extra fluids at least six 8-ounce glasses (unless your doctor tells you to restrict your fluids).  Patients over 70 or patients with diabetes or kidney problems:   If you haven t had a blood test (creatinine test) within the last 30 days, go to your clinic or Diagnostic Imaging Department for this test.  If you have diabetes:   If your kidney function is normal, continue taking your metformin (Avandamet, Glucophage, Glucovance, Metaglip) on the day of your exam.   If your kidney function is abnormal, wait 48 hours before restarting this medicine.  You will have oral contrast for this exam:   You will drink the contrast at home. Get this from your clinic or Diagnostic Imaging Department. Please follow the directions given.  Please wear loose clothing, such as a sweat suit or jogging clothes. Avoid snaps, zippers and other metal. We may ask you to undress and put on a hospital gown.  If you have any questions, please call the Imaging Department where you will have  your exam.            Apr 19, 2017 12:30 PM CDT   Masonic Lab Draw with  MASONIC LAB DRAW   [facility] Masonic Lab Draw ([facility])    [address] [facility]  [address] [facility] [id number] [phone]            Apr 19, 2017  1:00 PM CDT   Bone Marrow Biopsy with  BMT CECILIA #3, UU BONE MARROW BIOPSY   [facility] Blood and Marrow Transplant ([facility])    [address]  [address] [facility] [id number] [phone]              Future tests that were ordered for you today     Open Standing Orders        Priority Remaining Interval Expires Ordered    Platelets prepare order unit Routine 99/100 CONDITIONAL (SPECIFY) BLOOD  4/1/2017    Transfuse platelets unit Routine 99/100 TRANSFUSE 1 DOSE  4/1/2017    Platelets prepare order unit Routine 99/100 CONDITIONAL (SPECIFY) BLOOD  3/31/2017          Open Future Orders        Priority Expected Expires Ordered    CBC with platelets differential Routine 4/2/2017 4/8/2017 4/1/2017    Basic metabolic panel Routine 4/2/2017 4/8/2017 4/1/2017            Who to contact     If you have questions or need follow up information about today's clinic visit or your schedule please contact [facility] BLOOD AND MARROW TRANSPLANT directly at [phone].  Normal or non-critical lab and imaging results will be communicated to you by Cubikalhart, letter or phone within 4 business days after the clinic has received the results. If you do not hear from us within 7 days, please contact the clinic through [employer] or phone. If you have a critical or abnormal lab result, we will notify you by phone as soon as possible.  Submit refill requests through [employer] or call your pharmacy and they will forward the refill request to us. Please allow 3 business days for your refill to be completed.          Additional Information About Your Visit        [employer] Information     [employer] gives you secure access to your electronic health record. If  you see a primary care provider, you can also send messages to your care team and make appointments. If you have questions, please call your primary care clinic.  If you do not have a primary care provider, please call [phone] and they will assist you.        Care EveryWhere ID     This is your Care EveryWhere ID. This could be used by other organizations to access your [place] medical records  [id number]        Your Vitals Were     Pulse Temperature Respirations Pulse Oximetry BMI (Body Mass Index)       89 98.7  F (37.1  C) (Oral) 18 98% 31.98 kg/m2        Blood Pressure from Last 3 Encounters:   04/01/17 102/57   04/01/17 109/64   03/31/17 107/64    Weight from Last 3 Encounters:   04/01/17 79.3 kg (174 lb 14.4 oz)   03/31/17 79.4 kg (175 lb 0.7 oz)   03/30/17 79.2 kg (174 lb 9.7 oz)              We Performed the Following     Basic metabolic panel     CBC with platelets differential        Recent Review Flowsheet Data     BMT Recent Results Latest Ref Rng & Units 3/24/2017 3/27/2017 3/28/2017 3/29/2017 3/30/2017 3/31/2017 4/1/2017    WBC 4.0 - 11.0 10e9/L 0.1(LL) 0.1(LL) 0.1(LL) 0.1(LL) 0.1(LL) 0.3(LL) 0.6(LL)    Hemoglobin 11.7 - 15.7 g/dL 10.3(L) 9.7(L) 7.9(L) 7.2(L) 7.8(L) 9.0(L) 8.7(L)    Platelet Count 150 - 450 10e9/L 32(LL) 7(LL) 17(LL) 9(LL) 29(LL) 17(LL) 9(LL)    Neutrophils (Absolute) 1.6 - 8.3 10e9/L - - - - - - 0.4(LL)    INR 0.86 - 1.14 - - - - - - -    Sodium 133 - 144 mmol/L 141 139 139 142 141 145(H) 144    Potassium 3.4 - 5.3 mmol/L 3.6 3.5 3.4 3.0(L) 3.2(L) 3.6 3.8    Chloride 94 - 109 mmol/L 104 101 100 103 103 109 106    Glucose 70 - 99 mg/dL 80 104(H) 93 101(H) 91 82 83    Urea Nitrogen 7 - 30 mg/dL 13 13 12 13 9 9 10    Creatinine 0.52 - 1.04 mg/dL 0.48(L) 0.54 0.57 0.57 0.59 0.57 0.62    Calcium (Total) 8.5 - 10.1 mg/dL 8.4(L) 8.5 8.3(L) 8.0(L) 8.1(L) 7.9(L) 8.1(L)    Protein (Total) 6.8 - 8.8 g/dL - 7.1 - - - - -    Albumin 3.4 - 5.0 g/dL - 3.5 - - - - -    Bilirubin (Direct) 0.0  - 0.2 mg/dL - - - - - - -    Alkaline Phosphatase 40 - 150 U/L - 144 - - - - -    AST 0 - 45 U/L - 16 - - - - -    ALT 0 - 50 U/L - 32 - - - - -    MCV 78 - 100 fl 98 93 90 91 90 90 91               Primary Care Provider Office Phone # Fax #    Travis Carpenter [phone]       [facility] [address] [place] [address]        Thank you!     Thank you for choosing [facility] BLOOD AND MARROW TRANSPLANT  for your care. Our goal is always to provide you with excellent care. Hearing back from our patients is one way we can continue to improve our services. Please take a few minutes to complete the written survey that you may receive in the mail after your visit with us. Thank you!             Your Updated Medication List - Protect others around you: Learn how to safely use, store and throw away your medicines at www.disposemymeds.org.          This list is accurate as of: 4/1/17  1:18 PM.  Always use your most recent med list.                   Brand Name Dispense Instructions for use    acyclovir 800 MG tablet    ZOVIRAX    150 tablet    Take 1 tablet (800 mg) by mouth 5 times daily       gabapentin 100 MG capsule    NEURONTIN    90 capsule    Take 2 capsules (200 mg) by mouth 3 times daily       levofloxacin 250 MG tablet    LEVAQUIN    30 tablet    Take 1 tablet (250 mg) by mouth daily       LORazepam 0.5 MG tablet    ATIVAN    60 tablet    Take 0.5 tablets (0.25 mg) by mouth 3 times daily (before meals)       losartan 25 MG tablet    COZAAR    30 tablet    Take 0.5 tablets (12.5 mg) by mouth At Bedtime       ondansetron 4 MG tablet    ZOFRAN    18 tablet    Take 1 tablet (4 mg) by mouth every 6 hours as needed for nausea or vomiting       oxyCODONE 5 MG IR tablet    ROXICODONE    30 tablet    Take 1-2 tablets (5-10 mg) by mouth every 4 hours as needed for moderate to severe pain       pantoprazole 40 MG EC tablet    PROTONIX    30 tablet    Take 1 tablet (40 mg) by mouth  daily       sulfamethoxazole-trimethoprim 800-160 MG per tablet   Start taking on:  4/24/2017    BACTRIM DS/SEPTRA DS    30 tablet    Take 1 tablet by mouth Every Mon, Tues two times daily       venlafaxine 150 MG Tb24 24 hr tablet    EFFEXOR-ER    30 each    Take 1 tablet (150 mg) by mouth At Bedtime       voriconazole 200 MG tablet    VFEND    60 tablet    Take 1 tablet (200 mg) by mouth every 12 hours

## 2017-04-01 NOTE — MR AVS SNAPSHOT
After Visit Summary   4/1/2017    Steph Agee    MRN: [id number]           Patient Information     Date Of Birth          1975        Visit Information        Provider Department      4/1/2017 11:00 AM UC 1 ATC; UC BMT INFUSION [facility] Blood and Marrow Transplant        Today's Diagnoses     Nodular lymphocyte predominant Hodgkin lymphoma, unspecified body region (H)    -  1          [facility] ([facility])  [address] [place] [address]  Phone: [phone]  Clinic Hours:   Monday-Friday:    8am to 4:30pm   Weekends and holidays:    8am to noon (in general)  If your fever is 100.5  or greater,   call the clinic.  After hours call the   hospital at [phone] or   [phone]. Ask for the BMT   fellow for pediatric or adult patients            Follow-ups after your visit        Your next 10 appointments already scheduled     Apr 02, 2017  9:30 AM CDT   Masonic Lab Draw with UC MASONIC LAB DRAW   [facility] Masonic Lab Draw ([facility])    [address] [place] [address] [phone]            Apr 02, 2017 10:00 AM CDT   Return with UC BMT DOM   [facility] Blood and Marrow Transplant ([facility])    [address] [place] [address] [phone]            Apr 02, 2017 10:30 AM CDT   Infusion 60 with UC BMT INFUSION, UC 1 ATC   [facility] Blood and Marrow Transplant ([facility])    [address] [place] [address] [phone]            Apr 03, 2017 10:00 AM CDT   Masonic Lab Draw with UC MASONIC LAB DRAW   [facility] Masonic Lab Draw ([facility])    [address] [place] [address] [phone]            Apr 03, 2017 10:30 AM CDT   Return with UC BMT CECILIA #2   [facility] Blood and Marrow Transplant ([facility])    [address] [place] [address]  [facility] [address]   [phone]            Apr 19, 2017 11:40 AM CDT   (Arrive by 11:25 AM)   CT CHEST/ABDOMEN/PELVIS W CONTRAST with UCCT2   [facility] Imaging Center CT ([facility])    [address] [facility]  [address] [facility] [address]   [phone]           Please bring any scans or X-rays taken at other hospitals, if similar tests were done. Also bring a list of your medicines, including vitamins, minerals and over-the-counter drugs. It is safest to leave personal items at home.  Be sure to tell your doctor:   If you have any allergies.   If there s any chance you are pregnant.   If you are breastfeeding.   If you have any special needs.  You may have contrast for this exam. To prepare:   Do not eat or drink for 2 hours before your exam. If you need to take medicine, you may take it with small sips of water. (We may ask you to take liquid medicine as well.)   The day before your exam, drink extra fluids at least six 8-ounce glasses (unless your doctor tells you to restrict your fluids).  Patients over 70 or patients with diabetes or kidney problems:   If you haven t had a blood test (creatinine test) within the last 30 days, go to your clinic or Diagnostic Imaging Department for this test.  If you have diabetes:   If your kidney function is normal, continue taking your metformin (Avandamet, Glucophage, Glucovance, Metaglip) on the day of your exam.   If your kidney function is abnormal, wait 48 hours before restarting this medicine.  You will have oral contrast for this exam:   You will drink the contrast at home. Get this from your clinic or Diagnostic Imaging Department. Please follow the directions given.  Please wear loose clothing, such as a sweat suit or jogging clothes. Avoid snaps, zippers and other metal. We may ask you to undress and put on a hospital gown.  If you have any questions, please call the Imaging Department where you will have your exam.             Apr 19, 2017 12:30 PM CDT   Masonic Lab Draw with  MASONIC LAB DRAW   [facility] Masonic Lab Draw ([facility])    [address] [facility] [address]  2nd Floor  [facility] [id number] [phone]            Apr 19, 2017  1:00 PM CDT   Bone Marrow Biopsy with  BMT CECILIA #3, UU BONE MARROW BIOPSY   [facility] Blood and Marrow Transplant ([facility])    [address] [facility]  2nd Floor  [facility] [id number] [phone]              Future tests that were ordered for you today     Open Standing Orders        Priority Remaining Interval Expires Ordered    Platelets prepare order unit Routine 99/100 CONDITIONAL (SPECIFY) BLOOD  4/1/2017    Transfuse platelets unit Routine 99/100 TRANSFUSE 1 DOSE  4/1/2017    Platelets prepare order unit Routine 99/100 CONDITIONAL (SPECIFY) BLOOD  3/31/2017            Who to contact     If you have questions or need follow up information about today's clinic visit or your schedule please contact [facility] BLOOD AND MARROW TRANSPLANT directly at [phone].  Normal or non-critical lab and imaging results will be communicated to you by GRAVIDIhart, letter or phone within 4 business days after the clinic has received the results. If you do not hear from us within 7 days, please contact the clinic through [employer] or phone. If you have a critical or abnormal lab result, we will notify you by phone as soon as possible.  Submit refill requests through [employer] or call your pharmacy and they will forward the refill request to us. Please allow 3 business days for your refill to be completed.          Additional Information About Your Visit        [employer] Information     [employer] gives you secure access to your electronic health record. If you see a primary care provider, you can also send messages to your care team and make appointments. If you have questions, please call your primary care clinic.  If you do not have a primary care provider, please  call [phone] and they will assist you.        Care EveryWhere ID     This is your Care EveryWhere ID. This could be used by other organizations to access your [place] medical records  [id number]        Your Vitals Were     Pulse Temperature Respirations             88 98.5  F (36.9  C) (Oral) 18          Blood Pressure from Last 3 Encounters:   04/01/17 102/57   04/01/17 109/64   03/31/17 107/64    Weight from Last 3 Encounters:   04/01/17 79.3 kg (174 lb 14.4 oz)   03/31/17 79.4 kg (175 lb 0.7 oz)   03/30/17 79.2 kg (174 lb 9.7 oz)              We Performed the Following     Blood component     Platelets prepare order unit     Transfuse platelets unit        Recent Review Flowsheet Data     BMT Recent Results Latest Ref Rng & Units 3/24/2017 3/27/2017 3/28/2017 3/29/2017 3/30/2017 3/31/2017 4/1/2017    WBC 4.0 - 11.0 10e9/L 0.1(LL) 0.1(LL) 0.1(LL) 0.1(LL) 0.1(LL) 0.3(LL) 0.6(LL)    Hemoglobin 11.7 - 15.7 g/dL 10.3(L) 9.7(L) 7.9(L) 7.2(L) 7.8(L) 9.0(L) 8.7(L)    Platelet Count 150 - 450 10e9/L 32(LL) 7(LL) 17(LL) 9(LL) 29(LL) 17(LL) 9(LL)    Neutrophils (Absolute) 1.6 - 8.3 10e9/L - - - - - - 0.4(LL)    INR 0.86 - 1.14 - - - - - - -    Sodium 133 - 144 mmol/L 141 139 139 142 141 145(H) 144    Potassium 3.4 - 5.3 mmol/L 3.6 3.5 3.4 3.0(L) 3.2(L) 3.6 3.8    Chloride 94 - 109 mmol/L 104 101 100 103 103 109 106    Glucose 70 - 99 mg/dL 80 104(H) 93 101(H) 91 82 83    Urea Nitrogen 7 - 30 mg/dL 13 13 12 13 9 9 10    Creatinine 0.52 - 1.04 mg/dL 0.48(L) 0.54 0.57 0.57 0.59 0.57 0.62    Calcium (Total) 8.5 - 10.1 mg/dL 8.4(L) 8.5 8.3(L) 8.0(L) 8.1(L) 7.9(L) 8.1(L)    Protein (Total) 6.8 - 8.8 g/dL - 7.1 - - - - -    Albumin 3.4 - 5.0 g/dL - 3.5 - - - - -    Bilirubin (Direct) 0.0 - 0.2 mg/dL - - - - - - -    Alkaline Phosphatase 40 - 150 U/L - 144 - - - - -    AST 0 - 45 U/L - 16 - - - - -    ALT 0 - 50 U/L - 32 - - - - -    MCV 78 - 100 fl 98 93 90 91 90 90 91               Primary Care Provider Office Phone # Fax  #    Travis Carpenter [phone]       [facility] [address]  [place] [address]        Thank you!     Thank you for choosing [facility] BLOOD AND MARROW TRANSPLANT  for your care. Our goal is always to provide you with excellent care. Hearing back from our patients is one way we can continue to improve our services. Please take a few minutes to complete the written survey that you may receive in the mail after your visit with us. Thank you!             Your Updated Medication List - Protect others around you: Learn how to safely use, store and throw away your medicines at www.disposemymeds.org.          This list is accurate as of: 4/1/17 12:16 PM.  Always use your most recent med list.                   Brand Name Dispense Instructions for use    acyclovir 800 MG tablet    ZOVIRAX    150 tablet    Take 1 tablet (800 mg) by mouth 5 times daily       gabapentin 100 MG capsule    NEURONTIN    90 capsule    Take 2 capsules (200 mg) by mouth 3 times daily       levofloxacin 250 MG tablet    LEVAQUIN    30 tablet    Take 1 tablet (250 mg) by mouth daily       LORazepam 0.5 MG tablet    ATIVAN    60 tablet    Take 0.5 tablets (0.25 mg) by mouth 3 times daily (before meals)       losartan 25 MG tablet    COZAAR    30 tablet    Take 0.5 tablets (12.5 mg) by mouth At Bedtime       ondansetron 4 MG tablet    ZOFRAN    18 tablet    Take 1 tablet (4 mg) by mouth every 6 hours as needed for nausea or vomiting       oxyCODONE 5 MG IR tablet    ROXICODONE    30 tablet    Take 1-2 tablets (5-10 mg) by mouth every 4 hours as needed for moderate to severe pain       pantoprazole 40 MG EC tablet    PROTONIX    30 tablet    Take 1 tablet (40 mg) by mouth daily       sulfamethoxazole-trimethoprim 800-160 MG per tablet   Start taking on:  4/24/2017    BACTRIM DS/SEPTRA DS    30 tablet    Take 1 tablet by mouth Every Mon, Tues two times daily       venlafaxine 150 MG Tb24 24 hr tablet     EFFEXOR-ER    30 each    Take 1 tablet (150 mg) by mouth At Bedtime       voriconazole 200 MG tablet    VFEND    60 tablet    Take 1 tablet (200 mg) by mouth every 12 hours

## 2017-04-02 ENCOUNTER — APPOINTMENT (OUTPATIENT)
Dept: LAB | Facility: CLINIC | Age: 42
End: 2017-04-02
Attending: INTERNAL MEDICINE
Payer: COMMERCIAL

## 2017-04-02 ENCOUNTER — INFUSION THERAPY VISIT (OUTPATIENT)
Dept: TRANSPLANT | Facility: CLINIC | Age: 42
End: 2017-04-02
Attending: INTERNAL MEDICINE
Payer: COMMERCIAL

## 2017-04-02 VITALS
TEMPERATURE: 97.9 F | DIASTOLIC BLOOD PRESSURE: 52 MMHG | HEART RATE: 85 BPM | RESPIRATION RATE: 18 BRPM | SYSTOLIC BLOOD PRESSURE: 104 MMHG

## 2017-04-02 VITALS
SYSTOLIC BLOOD PRESSURE: 109 MMHG | DIASTOLIC BLOOD PRESSURE: 63 MMHG | BODY MASS INDEX: 32.24 KG/M2 | WEIGHT: 176.3 LBS | HEART RATE: 96 BPM | TEMPERATURE: 97.8 F

## 2017-04-02 DIAGNOSIS — C81.00 NODULAR LYMPHOCYTE PREDOMINANT HODGKIN LYMPHOMA, UNSPECIFIED BODY REGION (H): Primary | ICD-10-CM

## 2017-04-02 DIAGNOSIS — C81.10 NODULAR SCLEROSING HODGKIN'S LYMPHOMA, UNSPECIFIED BODY REGION (H): Primary | ICD-10-CM

## 2017-04-02 DIAGNOSIS — Z29.9 NEED FOR PROPHYLACTIC MEASURE: ICD-10-CM

## 2017-04-02 DIAGNOSIS — Z94.81 S/P AUTOLOGOUS BONE MARROW TRANSPLANTATION (H): ICD-10-CM

## 2017-04-02 DIAGNOSIS — C81.10 NODULAR SCLEROSING HODGKIN'S LYMPHOMA, UNSPECIFIED BODY REGION (H): ICD-10-CM

## 2017-04-02 LAB
ABO + RH BLD: NORMAL
ABO + RH BLD: NORMAL
ANION GAP SERPL CALCULATED.3IONS-SCNC: 6 MMOL/L (ref 3–14)
BASOPHILS # BLD AUTO: 0 10E9/L (ref 0–0.2)
BASOPHILS NFR BLD AUTO: 0 %
BLD GP AB SCN SERPL QL: NORMAL
BLD PROD TYP BPU: NORMAL
BLD UNIT ID BPU: 0
BLOOD BANK CMNT PATIENT-IMP: NORMAL
BLOOD PRODUCT CODE: NORMAL
BPU ID: NORMAL
BUN SERPL-MCNC: 12 MG/DL (ref 7–30)
CALCIUM SERPL-MCNC: 7.8 MG/DL (ref 8.5–10.1)
CHLORIDE SERPL-SCNC: 108 MMOL/L (ref 94–109)
CO2 SERPL-SCNC: 32 MMOL/L (ref 20–32)
CREAT SERPL-MCNC: 0.75 MG/DL (ref 0.52–1.04)
DIFFERENTIAL METHOD BLD: ABNORMAL
EOSINOPHIL # BLD AUTO: 0 10E9/L (ref 0–0.7)
EOSINOPHIL NFR BLD AUTO: 0 %
ERYTHROCYTE [DISTWIDTH] IN BLOOD BY AUTOMATED COUNT: 13.8 % (ref 10–15)
GFR SERPL CREATININE-BSD FRML MDRD: 84 ML/MIN/1.7M2
GLUCOSE SERPL-MCNC: 86 MG/DL (ref 70–99)
HCT VFR BLD AUTO: 24.8 % (ref 35–47)
HGB BLD-MCNC: 8.5 G/DL (ref 11.7–15.7)
LYMPHOCYTES # BLD AUTO: 0.2 10E9/L (ref 0.8–5.3)
LYMPHOCYTES NFR BLD AUTO: 19.3 %
MCH RBC QN AUTO: 31.3 PG (ref 26.5–33)
MCHC RBC AUTO-ENTMCNC: 34.3 G/DL (ref 31.5–36.5)
MCV RBC AUTO: 91 FL (ref 78–100)
MONOCYTES # BLD AUTO: 0.1 10E9/L (ref 0–1.3)
MONOCYTES NFR BLD AUTO: 7 %
NEUTROPHILS # BLD AUTO: 0.9 10E9/L (ref 1.6–8.3)
NEUTROPHILS NFR BLD AUTO: 72.8 %
NUM BPU REQUESTED: 1
NUM BPU REQUESTED: 2
PLATELET # BLD AUTO: 6 10E9/L (ref 150–450)
POTASSIUM SERPL-SCNC: 3.5 MMOL/L (ref 3.4–5.3)
PROMYELOCYTES # BLD MANUAL: 0 10E9/L
PROMYELOCYTES NFR BLD MANUAL: 0.9 %
RBC # BLD AUTO: 2.72 10E12/L (ref 3.8–5.2)
RBC MORPH BLD: NORMAL
SODIUM SERPL-SCNC: 145 MMOL/L (ref 133–144)
SPECIMEN EXP DATE BLD: NORMAL
TRANSFUSION STATUS PATIENT QL: NORMAL
TRANSFUSION STATUS PATIENT QL: NORMAL
WBC # BLD AUTO: 1.2 10E9/L (ref 4–11)

## 2017-04-02 PROCEDURE — 36430 TRANSFUSION BLD/BLD COMPNT: CPT

## 2017-04-02 PROCEDURE — 80048 BASIC METABOLIC PNL TOTAL CA: CPT | Performed by: INTERNAL MEDICINE

## 2017-04-02 PROCEDURE — 85025 COMPLETE CBC W/AUTO DIFF WBC: CPT | Performed by: INTERNAL MEDICINE

## 2017-04-02 PROCEDURE — 96372 THER/PROPH/DIAG INJ SC/IM: CPT

## 2017-04-02 PROCEDURE — 86900 BLOOD TYPING SEROLOGIC ABO: CPT | Performed by: INTERNAL MEDICINE

## 2017-04-02 PROCEDURE — 25000128 H RX IP 250 OP 636: Mod: ZF | Performed by: NURSE PRACTITIONER

## 2017-04-02 PROCEDURE — 25000132 ZZH RX MED GY IP 250 OP 250 PS 637: Mod: ZF | Performed by: INTERNAL MEDICINE

## 2017-04-02 PROCEDURE — 86901 BLOOD TYPING SEROLOGIC RH(D): CPT | Performed by: INTERNAL MEDICINE

## 2017-04-02 PROCEDURE — 86923 COMPATIBILITY TEST ELECTRIC: CPT | Performed by: INTERNAL MEDICINE

## 2017-04-02 PROCEDURE — P9037 PLATE PHERES LEUKOREDU IRRAD: HCPCS | Performed by: PHYSICIAN ASSISTANT

## 2017-04-02 PROCEDURE — 86850 RBC ANTIBODY SCREEN: CPT | Performed by: INTERNAL MEDICINE

## 2017-04-02 RX ORDER — DIPHENHYDRAMINE HCL 25 MG
25 CAPSULE ORAL ONCE
Status: COMPLETED | OUTPATIENT
Start: 2017-04-02 | End: 2017-04-02

## 2017-04-02 RX ORDER — ACETAMINOPHEN 325 MG/1
650 TABLET ORAL ONCE
Status: COMPLETED | OUTPATIENT
Start: 2017-04-02 | End: 2017-04-02

## 2017-04-02 RX ORDER — HEPARIN SODIUM,PORCINE 10 UNIT/ML
5 VIAL (ML) INTRAVENOUS
Status: CANCELLED | OUTPATIENT
Start: 2017-04-03

## 2017-04-02 RX ORDER — POTASSIUM CHLORIDE 1500 MG/1
20 TABLET, EXTENDED RELEASE ORAL DAILY
Status: DISCONTINUED | OUTPATIENT
Start: 2017-04-02 | End: 2017-04-02 | Stop reason: HOSPADM

## 2017-04-02 RX ORDER — DIPHENHYDRAMINE HCL 25 MG
25 CAPSULE ORAL ONCE
Status: CANCELLED
Start: 2017-04-02 | End: 2017-04-02

## 2017-04-02 RX ORDER — HEPARIN SODIUM,PORCINE 10 UNIT/ML
5 VIAL (ML) INTRAVENOUS
Status: DISCONTINUED | OUTPATIENT
Start: 2017-04-02 | End: 2017-04-02 | Stop reason: HOSPADM

## 2017-04-02 RX ORDER — ACETAMINOPHEN 325 MG/1
650 TABLET ORAL ONCE
Status: CANCELLED
Start: 2017-04-02 | End: 2017-04-02

## 2017-04-02 RX ADMIN — DIPHENHYDRAMINE HYDROCHLORIDE 25 MG: 25 CAPSULE ORAL at 10:21

## 2017-04-02 RX ADMIN — FILGRASTIM 480 MCG: 480 INJECTION, SOLUTION INTRAVENOUS; SUBCUTANEOUS at 09:54

## 2017-04-02 RX ADMIN — ACETAMINOPHEN 650 MG: 325 TABLET ORAL at 10:21

## 2017-04-02 RX ADMIN — POTASSIUM CHLORIDE 20 MEQ: 1500 TABLET, EXTENDED RELEASE ORAL at 11:29

## 2017-04-02 NOTE — MR AVS SNAPSHOT
After Visit Summary   4/2/2017    Steph Agee    MRN: [id number]           Patient Information     Date Of Birth          1975        Visit Information        Provider Department      4/2/2017 10:00 AM  BMT DOM [facility] Blood and Marrow Transplant        Today's Diagnoses     Nodular sclerosing Hodgkin's lymphoma, unspecified body region (H)    -  1    Need for prophylactic measure              [facility] ([facility])  [address] [place] [address]  Phone: [phone]  Clinic Hours:   Monday-Friday:    8am to 4:30pm   Weekends and holidays:    8am to noon (in general)  If your fever is 100.5  or greater,   call the clinic.  After hours call the   hospital at [phone] or   [phone]. Ask for the BMT   fellow for pediatric or adult patients            Follow-ups after your visit        Your next 10 appointments already scheduled     Apr 03, 2017 10:00 AM CDT   Masonic Lab Draw with  MASONIC LAB DRAW   [facility] Masonic Lab Draw ([facility])    [address] [place] [id number] [phone]            Apr 03, 2017 10:30 AM CDT   Return with  BMT CECILIA #2   [facility] Blood and Marrow Transplant ([facility])    [address] [place] [address] [id number] [phone]            Apr 19, 2017 11:40 AM CDT   (Arrive by 11:25 AM)   CT CHEST/ABDOMEN/PELVIS W CONTRAST with UCCT2   [facility] Imaging [place] [facility] ([facility])    [address] [place] [address] [id number] [phone]           Please bring any scans or X-rays taken at other hospitals, if similar tests were done. Also bring a list of your medicines, including vitamins, minerals and over-the-counter drugs. It is safest to leave personal items at home.  Be sure to tell your doctor:   If you have any allergies.   If there s any chance you are pregnant.   If you are breastfeeding.    If you have any special needs.  You may have contrast for this exam. To prepare:   Do not eat or drink for 2 hours before your exam. If you need to take medicine, you may take it with small sips of water. (We may ask you to take liquid medicine as well.)   The day before your exam, drink extra fluids at least six 8-ounce glasses (unless your doctor tells you to restrict your fluids).  Patients over 70 or patients with diabetes or kidney problems:   If you haven t had a blood test (creatinine test) within the last 30 days, go to your clinic or Diagnostic Imaging Department for this test.  If you have diabetes:   If your kidney function is normal, continue taking your metformin (Avandamet, Glucophage, Glucovance, Metaglip) on the day of your exam.   If your kidney function is abnormal, wait 48 hours before restarting this medicine.  You will have oral contrast for this exam:   You will drink the contrast at home. Get this from your clinic or Diagnostic Imaging Department. Please follow the directions given.  Please wear loose clothing, such as a sweat suit or jogging clothes. Avoid snaps, zippers and other metal. We may ask you to undress and put on a hospital gown.  If you have any questions, please call the Imaging Department where you will have your exam.            Apr 19, 2017 12:30 PM T   Masonic Lab Draw with  MASONIC LAB DRAW   [facility] Masonic Lab Draw ([facility])    [address] [place] [id number]   [phone]            Apr 19, 2017  1:00 PM CDT   Bone Marrow Biopsy with  BMT CECILIA #3, UU BONE MARROW BIOPSY   [facility] Blood and Marrow Transplant ([facility])    [address] [place] [phone]   [phone]            Apr 26, 2017 11:30 AM CDT   BMT Anniversary Visit with Obed Chambers MD   [facility] Blood and Marrow Transplant ([facility])    [address] [place] [address]  [facility] [id number]   [phone]            May 24, 2017  9:30 AM CDT   Masonic Lab Draw with  MASONIC LAB DRAW   [facility] Masonic Lab Draw ([facility])    [address] [facility]  [address] [facility] [address]   [phone]            May 24, 2017 10:00 AM CDT   BMT Anniversary Visit with Obed Chambers MD   [facility] Blood and Marrow Transplant ([facility])    [address] [place] [address]  [address] [facility] [address]   [phone]            Aug 09, 2017 10:30 AM CDT   Masonic Lab Draw with  MASONIC LAB DRAW   [facility] Masonic Lab Draw ([facility])    [address] [place] [phone]   [phone]              Future tests that were ordered for you today     Open Standing Orders        Priority Remaining Interval Expires Ordered    Transfuse platelets unit Routine 99/100 TRANSFUSE 1 DOSE  4/2/2017    Platelets prepare order unit Routine 99/100 CONDITIONAL (SPECIFY) BLOOD  4/1/2017            Who to contact     If you have questions or need follow up information about today's clinic visit or your schedule please contact [facility] BLOOD AND MARROW TRANSPLANT directly at [phone].  Normal or non-critical lab and imaging results will be communicated to you by [employer], letter or phone within 4 business days after the clinic has received the results. If you do not hear from us within 7 days, please contact the clinic through [employer] or phone. If you have a critical or abnormal lab result, we will notify you by phone as soon as possible.  Submit refill requests through [employer] or call your pharmacy and they will forward the refill request to us. Please allow 3 business days for your refill to be completed.          Additional Information About Your Visit        [employer] Information     [employer] gives you secure access to your electronic health record. If you see a primary care provider, you can  also send messages to your care team and make appointments. If you have questions, please call your primary care clinic.  If you do not have a primary care provider, please call [phone] and they will assist you.        Care EveryWhere ID     This is your Care EveryWhere ID. This could be used by other organizations to access your [place] medical records  [id number]        Your Vitals Were     Pulse Temperature BMI (Body Mass Index)             96 97.8  F (36.6  C) (Oral) 32.24 kg/m2          Blood Pressure from Last 3 Encounters:   04/02/17 (P) 98/49   04/02/17 109/63   04/01/17 102/57    Weight from Last 3 Encounters:   04/02/17 80 kg (176 lb 4.8 oz)   04/01/17 79.3 kg (174 lb 14.4 oz)   03/31/17 79.4 kg (175 lb 0.7 oz)              We Performed the Following     Basic metabolic panel     CBC with platelets differential          Today's Medication Changes          These changes are accurate as of: 4/2/17 11:11 AM.  If you have any questions, ask your nurse or doctor.               These medicines have changed or have updated prescriptions.        Dose/Directions    losartan 25 MG tablet   Commonly known as:  COZAAR   This may have changed:  additional instructions   Used for:  Chemotherapy induced cardiomyopathy (H)        Dose:  12.5 mg   Take 0.5 tablets (12.5 mg) by mouth At Bedtime   Quantity:  30 tablet   Refills:  0                Recent Review Flowsheet Data     BMT Recent Results Latest Ref Rng & Units 3/27/2017 3/28/2017 3/29/2017 3/30/2017 3/31/2017 4/1/2017 4/2/2017    WBC 4.0 - 11.0 10e9/L 0.1(LL) 0.1(LL) 0.1(LL) 0.1(LL) 0.3(LL) 0.6(LL) 1.2(L)    Hemoglobin 11.7 - 15.7 g/dL 9.7(L) 7.9(L) 7.2(L) 7.8(L) 9.0(L) 8.7(L) 8.5(L)    Platelet Count 150 - 450 10e9/L 7(LL) 17(LL) 9(LL) 29(LL) 17(LL) 9(LL) 6(LL)    Neutrophils (Absolute) 1.6 - 8.3 10e9/L - - - - - 0.4(LL) 0.9(L)    INR 0.86 - 1.14 - - - - - - -    Sodium 133 - 144 mmol/L 139 139 142 141 145(H) 144 145(H)    Potassium 3.4 - 5.3 mmol/L 3.5  3.4 3.0(L) 3.2(L) 3.6 3.8 3.5    Chloride 94 - 109 mmol/L 101 100 103 103 109 106 108    Glucose 70 - 99 mg/dL 104(H) 93 101(H) 91 82 83 86    Urea Nitrogen 7 - 30 mg/dL 13 12 13 9 9 10 12    Creatinine 0.52 - 1.04 mg/dL 0.54 0.57 0.57 0.59 0.57 0.62 0.75    Calcium (Total) 8.5 - 10.1 mg/dL 8.5 8.3(L) 8.0(L) 8.1(L) 7.9(L) 8.1(L) 7.8(L)    Protein (Total) 6.8 - 8.8 g/dL 7.1 - - - - - -    Albumin 3.4 - 5.0 g/dL 3.5 - - - - - -    Bilirubin (Direct) 0.0 - 0.2 mg/dL - - - - - - -    Alkaline Phosphatase 40 - 150 U/L 144 - - - - - -    AST 0 - 45 U/L 16 - - - - - -    ALT 0 - 50 U/L 32 - - - - - -    MCV 78 - 100 fl 93 90 91 90 90 91 91               Primary Care Provider Office Phone # Fax #    Travis Carpenter [phone]       [place] [id number]  [place] [address]        Thank you!     Thank you for choosing [facility] BLOOD AND MARROW TRANSPLANT  for your care. Our goal is always to provide you with excellent care. Hearing back from our patients is one way we can continue to improve our services. Please take a few minutes to complete the written survey that you may receive in the mail after your visit with us. Thank you!             Your Updated Medication List - Protect others around you: Learn how to safely use, store and throw away your medicines at www.disposemymeds.org.          This list is accurate as of: 4/2/17 11:11 AM.  Always use your most recent med list.                   Brand Name Dispense Instructions for use    acyclovir 800 MG tablet    ZOVIRAX    150 tablet    Take 1 tablet (800 mg) by mouth 5 times daily       gabapentin 100 MG capsule    NEURONTIN    90 capsule    Take 2 capsules (200 mg) by mouth 3 times daily       levofloxacin 250 MG tablet    LEVAQUIN    30 tablet    Take 1 tablet (250 mg) by mouth daily       LORazepam 0.5 MG tablet    ATIVAN    60 tablet    Take 0.5 tablets (0.25 mg) by mouth 3 times daily (before meals)       losartan 25 MG  tablet    COZAAR    30 tablet    Take 0.5 tablets (12.5 mg) by mouth At Bedtime       ondansetron 4 MG tablet    ZOFRAN    18 tablet    Take 1 tablet (4 mg) by mouth every 6 hours as needed for nausea or vomiting       oxyCODONE 5 MG IR tablet    ROXICODONE    30 tablet    Take 1-2 tablets (5-10 mg) by mouth every 4 hours as needed for moderate to severe pain       pantoprazole 40 MG EC tablet    PROTONIX    30 tablet    Take 1 tablet (40 mg) by mouth daily       sulfamethoxazole-trimethoprim 800-160 MG per tablet   Start taking on:  4/24/2017    BACTRIM DS/SEPTRA DS    30 tablet    Take 1 tablet by mouth Every Mon, Tues two times daily       venlafaxine 150 MG Tb24 24 hr tablet    EFFEXOR-ER    30 each    Take 1 tablet (150 mg) by mouth At Bedtime       voriconazole 200 MG tablet    VFEND    60 tablet    Take 1 tablet (200 mg) by mouth every 12 hours

## 2017-04-02 NOTE — PROGRESS NOTES
Infusion Nursing Note:  Steph LUNDBERG Anuel presents today for PLT's.    Patient seen by provider today: No   present during visit today: Not Applicable.    Note: PLT's replaced per protocol for PLT count of 6 K, please see flowsheet for details.    Intravenous Access:  Barbour.    Treatment Conditions:  Lab Results   Component Value Date    HGB 8.5 04/02/2017     Lab Results   Component Value Date    WBC 1.2 04/02/2017      Lab Results   Component Value Date    ANEU 0.4 04/01/2017     Lab Results   Component Value Date    PLT 6 04/02/2017      Results reviewed, labs MET treatment parameters, ok to proceed with treatment.      Post Infusion Assessment:  Patient tolerated infusion without incident.  Blood return noted pre and post infusion.  Site patent and intact, free from redness, edema or discomfort.  No evidence of extravasations.  Access discontinued per protocol.    Discharge Plan:   Discharge instructions reviewed with: Patient and Family.  Patient and/or family verbalized understanding of discharge instructions and all questions answered.  Patient discharged in stable condition accompanied by: self and friend.  Departure Mode: Ambulatory.    Mary Salguero RN, OCN, BMTCN

## 2017-04-02 NOTE — PROGRESS NOTES
BMT Daily Progress Note     ID: Steph Agee is a 42 year old female with Hodgkins disease who is day +11 s/p auto for Hodgkins disease.    HPI: Steph returns for daily growth factor and follow up. She continues to feel well and had no issues after leaving yesterday. No n/v/d or abd pain/cramping.  Still with some loose BMs daily but no overt runny stools stills and remains with daily nausea. Remains without fever.  No SOB/SINGLETON.  No swelling.   Review of Systems: 10 point ROS negative except as noted above.  Physical Exam:   Blood pressure 109/63, pulse 96, temperature 97.8  F (36.6  C), temperature source Oral, weight 80 kg (176 lb 4.8 oz).    Wt Readings from Last 5 Encounters:   04/02/17 80 kg (176 lb 4.8 oz)   04/01/17 79.3 kg (174 lb 14.4 oz)   03/31/17 79.4 kg (175 lb 0.7 oz)   03/30/17 79.2 kg (174 lb 9.7 oz)   03/29/17 78.1 kg (172 lb 2.9 oz)     General: NAD  Eyes: CALEB, sclera anicteric  Nose/Mouth/Throat: OP clear, buccal mucosa moist, no ulcerations.  Lungs: CTA bilaterally Cardiovascular: regular rhythm, no M/R/G  Abdominal/Rectal: +BS, soft, NT, ND, No HSM.   Lymphatics: no edema.   Skin: no rashes or petechiae.   Neuro: A&O.    Additional Findings: Barbour site NT, no drainage.     Labs:  Lab Results   Component Value Date    WBC 1.2 (L) 04/02/2017    ANEU 0.9 (L) 04/02/2017    HGB 8.5 (L) 04/02/2017    HCT 24.8 (L) 04/02/2017    PLT 6 (LL) 04/02/2017     (H) 04/02/2017    POTASSIUM 3.5 04/02/2017    CHLORIDE 108 04/02/2017    CO2 32 04/02/2017    GLC 86 04/02/2017    BUN 12 04/02/2017    CR 0.75 04/02/2017    MAG 2.0 03/27/2017    INR 1.01 03/20/2017       Assessment and Plan:   Steph Agee is a 42 year old female with Hodgkins disease who is day +11 s/p auto for Hodgkins disease.      1. BMT: Completed BEAM prep without issue. Cell dose=1.84 X10^6 CD34/kg from both PBSCT and bone marrow.   -GCSF daily until ANC>1500 x3 consecutive days. Re-stage per protocol.      2. HEME: Keep Hgb>8 and  plts>10K. No pre-meds  -Will give Plts today for low value again. Monitor closely given somewhat poor response yesterday.   -Had been on Lovenox 40mg QD d/t hx of chronic IJ clot recently noted on 3/3 U/s. Now thrombocytopenic.  Per Dr. Gill ok to permanently d/c lovenox as chronic clot (last dose 3/22).       3. ID: Afebrile  - Low BP 3/29, blood cx x2, NGTD.  pt is asymptomatic besides GI sx below.   -Cont vfend (hx of marijuana use), HD ACV (CMV+) and levaquin as prophy.   -Bactrim or appropriate PCP therapy to start d+28.       4.  GI: n/v better on scheduled zofran for now--if better tomorrow, can decrease to prn.  - Has had persistent nausea even before transplant that she would improved with smoking marijuana. Continues to have mostly nighttime nausea- controlled with prn ativan/zofran  - No scheduled anti-emetics currently.  Monitor stools.   - Protonix for GI prophy.      5. FEN/Renal:   -Cr stable, K will be supplemented today given borderline value.   - BP soft- encourage oral intake.  Monitor closely as resume cozaar (see CV).       6. CV: Cleared by cardiology d/t drop in EF. Cardiac MRI confirmed EF 47%. 3/29 Blood given at decreased rate  - Had been on Continue cozaar 12.5 daily (cardiomyopathy). Currently on hold per previous note.     7. Psych: Hx of anxiety. Previously smoked daily marijuana and took ativan qHS. Ativan too sedating for daily use-per pt. Already on effexor, increase to 150mg daily (75).  - No complaints of this today.     8. : NO issues today.   Vaginal irritation: per exam on 3/27, vaginal dryness - no concern for yeast infection, no erythema or discharge; recommend vaseline to vagina/external labia majora/minora prn for comfort.     Plan:   Plts today.   RTC: daily for gcsf, labs and provider visit.  Has appt through 4/3.      Noeune, DO

## 2017-04-02 NOTE — NURSING NOTE
BMT Heme Malignancy Rooming Note    Steph LUNDBERG Anuel - 4/2/2017 10:28 AM     Chief Complaint   Patient presents with     RECHECK     S/P bMt for Hodgkins--here for labs and possible infusion        /63 (BP Location: Left arm, Patient Position: Left side, Cuff Size: Adult Regular)  Pulse 96  Temp 97.8  F (36.6  C) (Oral)  Wt 80 kg (176 lb 4.8 oz)  BMI 32.24 kg/m2     Medications reviewed: Yes    Labs drawn: No    Dressing changed: No     Medications given: Yes - See MAR    Staff time:0    Additional information if applicable: Patient denies any complaints    Mary Salguero RN

## 2017-04-02 NOTE — MR AVS SNAPSHOT
After Visit Summary   4/2/2017    Steph Agee    MRN: [id number]           Patient Information     Date Of Birth          1975        Visit Information        Provider Department      4/2/2017 10:30 AM UC 1 ATC; UC BMT INFUSION [facility] Blood and Marrow Transplant        Today's Diagnoses     Nodular lymphocyte predominant Hodgkin lymphoma, unspecified body region (H)    -  1    Nodular sclerosing Hodgkin's lymphoma, unspecified body region (H)              [facility] ([facility] [place] [facility])  [address] [place] [address]  Phone: [phone]  Clinic Hours:   Monday-Friday:    8am to 4:30pm   Weekends and holidays:    8am to noon (in general)  If your fever is 100.5  or greater,   call the clinic.  After hours call the   hospital at [phone] or   [phone]. Ask for the BMT   fellow for pediatric or adult patients            Follow-ups after your visit        Your next 10 appointments already scheduled     Apr 03, 2017 10:00 AM CDT   [facility] Lab Draw with UC MASONIC LAB DRAW   [facility] Mason[facility] Lab Draw ([facility])    [address] [place] [id number]   [phone]            Apr 03, 2017 10:30 AM CDT   Return with UC BMT CECILIA #2   [facility] Blood and Marrow Transplant ([facility])    [address] [place] [id number]   [phone]            Apr 04, 2017  9:30 AM CDT   Return with UC BMT CECILIA #2   [facility] Blood and Marrow Transplant ([facility])    [address] [place] [address]   [phone]            Apr 05, 2017 10:00 AM CDT   Return with UC BMT CECILIA #2   [facility] Blood and Marrow Transplant ([facility])    [address] [place] [address]   [phone]            Apr 06, 2017  9:30 AM CDT   Return with UC BMT CECILIA #1   [facility] Blood and Marrow Transplant ([facility]  [facility])    [address]  [address] [facility] [address]   [phone]            Apr 19, 2017 11:40 AM CDT   (Arrive by 11:25 AM)   CT CHEST/ABDOMEN/PELVIS W CONTRAST with UCCT2   [facility] CT ([facility] [place])    [address]  [address] [facility] [address]   [phone]           Please bring any scans or X-rays taken at other hospitals, if similar tests were done. Also bring a list of your medicines, including vitamins, minerals and over-the-counter drugs. It is safest to leave personal items at home.  Be sure to tell your doctor:   If you have any allergies.   If there s any chance you are pregnant.   If you are breastfeeding.   If you have any special needs.  You may have contrast for this exam. To prepare:   Do not eat or drink for 2 hours before your exam. If you need to take medicine, you may take it with small sips of water. (We may ask you to take liquid medicine as well.)   The day before your exam, drink extra fluids at least six 8-ounce glasses (unless your doctor tells you to restrict your fluids).  Patients over 70 or patients with diabetes or kidney problems:   If you haven t had a blood test (creatinine test) within the last 30 days, go to your clinic or Diagnostic Imaging Department for this test.  If you have diabetes:   If your kidney function is normal, continue taking your metformin (Avandamet, Glucophage, Glucovance, Metaglip) on the day of your exam.   If your kidney function is abnormal, wait 48 hours before restarting this medicine.  You will have oral contrast for this exam:   You will drink the contrast at home. Get this from your clinic or Diagnostic Imaging Department. Please follow the directions given.  Please wear loose clothing, such as a sweat suit or jogging clothes. Avoid snaps, zippers and other metal. We may ask you to undress and put on a hospital gown.  If you have any questions, please call  the Imaging Department where you will have your exam.            Apr 19, 2017 12:30 PM CDT   M[facility]ic Lab Draw with  MASONIC LAB DRAW   [facility] Mason[facility] Lab Draw ([facility])    [address] [id number] [phone]            Apr 19, 2017  1:00 PM CDT   Bone Marrow Biopsy with  BMT CECILIA #3, UU BONE MARROW BIOPSY   [facility] Blood and Marrow Transplant ([facility])    [address] [place] [id number] [phone]            Apr 26, 2017 11:30 AM CDT   BMT Anniversary Visit with Obed Chambers MD   [facility] Blood and Marrow Transplant ([facility])    [address] [place] [id number] [phone]              Future tests that were ordered for you today     Open Standing Orders        Priority Remaining Interval Expires Ordered    Transfuse platelets unit Routine 99/100 TRANSFUSE 1 DOSE  4/2/2017    Platelets prepare order unit Routine 99/100 CONDITIONAL (SPECIFY) BLOOD  4/1/2017            Who to contact     If you have questions or need follow up information about today's clinic visit or your schedule please contact [facility] BLOOD AND MARROW TRANSPLANT directly at [phone].  Normal or non-critical lab and imaging results will be communicated to you by [employer], letter or phone within 4 business days after the clinic has received the results. If you do not hear from us within 7 days, please contact the clinic through [employer]t or phone. If you have a critical or abnormal lab result, we will notify you by phone as soon as possible.  Submit refill requests through [employer] or call your pharmacy and they will forward the refill request to us. Please allow 3 business days for your refill to be completed.          Additional Information About Your Visit        [employer] Information     [employer] gives you secure access to your electronic health record. If you  see a primary care provider, you can also send messages to your care team and make appointments. If you have questions, please call your primary care clinic.  If you do not have a primary care provider, please call [phone] and they will assist you.        Care EveryWhere ID     This is your Care EveryWhere ID. This could be used by other organizations to access your [facility] medical records  [id number]        Your Vitals Were     Pulse Temperature Respirations             85 97.9  F (36.6  C) (Oral) 18          Blood Pressure from Last 3 Encounters:   04/02/17 104/52   04/02/17 109/63   04/01/17 102/57    Weight from Last 3 Encounters:   04/02/17 80 kg (176 lb 4.8 oz)   04/01/17 79.3 kg (174 lb 14.4 oz)   03/31/17 79.4 kg (175 lb 0.7 oz)              We Performed the Following     Blood component     Platelets prepare order unit     Transfuse platelets unit          Today's Medication Changes          These changes are accurate as of: 4/2/17 11:55 AM.  If you have any questions, ask your nurse or doctor.               These medicines have changed or have updated prescriptions.        Dose/Directions    losartan 25 MG tablet   Commonly known as:  COZAAR   This may have changed:  additional instructions   Used for:  Chemotherapy induced cardiomyopathy (H)        Dose:  12.5 mg   Take 0.5 tablets (12.5 mg) by mouth At Bedtime   Quantity:  30 tablet   Refills:  0                Recent Review Flowsheet Data     BMT Recent Results Latest Ref Rng & Units 3/27/2017 3/28/2017 3/29/2017 3/30/2017 3/31/2017 4/1/2017 4/2/2017    WBC 4.0 - 11.0 10e9/L 0.1(LL) 0.1(LL) 0.1(LL) 0.1(LL) 0.3(LL) 0.6(LL) 1.2(L)    Hemoglobin 11.7 - 15.7 g/dL 9.7(L) 7.9(L) 7.2(L) 7.8(L) 9.0(L) 8.7(L) 8.5(L)    Platelet Count 150 - 450 10e9/L 7(LL) 17(LL) 9(LL) 29(LL) 17(LL) 9(LL) 6(LL)    Neutrophils (Absolute) 1.6 - 8.3 10e9/L - - - - - 0.4(LL) 0.9(L)    INR 0.86 - 1.14 - - - - - - -    Sodium 133 - 144 mmol/L 139 139 142 141 145(H) 144 145(H)     Potassium 3.4 - 5.3 mmol/L 3.5 3.4 3.0(L) 3.2(L) 3.6 3.8 3.5    Chloride 94 - 109 mmol/L 101 100 103 103 109 106 108    Glucose 70 - 99 mg/dL 104(H) 93 101(H) 91 82 83 86    Urea Nitrogen 7 - 30 mg/dL 13 12 13 9 9 10 12    Creatinine 0.52 - 1.04 mg/dL 0.54 0.57 0.57 0.59 0.57 0.62 0.75    Calcium (Total) 8.5 - 10.1 mg/dL 8.5 8.3(L) 8.0(L) 8.1(L) 7.9(L) 8.1(L) 7.8(L)    Protein (Total) 6.8 - 8.8 g/dL 7.1 - - - - - -    Albumin 3.4 - 5.0 g/dL 3.5 - - - - - -    Bilirubin (Direct) 0.0 - 0.2 mg/dL - - - - - - -    Alkaline Phosphatase 40 - 150 U/L 144 - - - - - -    AST 0 - 45 U/L 16 - - - - - -    ALT 0 - 50 U/L 32 - - - - - -    MCV 78 - 100 fl 93 90 91 90 90 91 91               Primary Care Provider Office Phone # Fax #    Travis Carpenter [phone]       [facility] [address] [facility] [address]        Thank you!     Thank you for choosing [facility] BLOOD AND MARROW TRANSPLANT  for your care. Our goal is always to provide you with excellent care. Hearing back from our patients is one way we can continue to improve our services. Please take a few minutes to complete the written survey that you may receive in the mail after your visit with us. Thank you!             Your Updated Medication List - Protect others around you: Learn how to safely use, store and throw away your medicines at www.disposemymeds.org.          This list is accurate as of: 4/2/17 11:55 AM.  Always use your most recent med list.                   Brand Name Dispense Instructions for use    acyclovir 800 MG tablet    ZOVIRAX    150 tablet    Take 1 tablet (800 mg) by mouth 5 times daily       gabapentin 100 MG capsule    NEURONTIN    90 capsule    Take 2 capsules (200 mg) by mouth 3 times daily       levofloxacin 250 MG tablet    LEVAQUIN    30 tablet    Take 1 tablet (250 mg) by mouth daily       LORazepam 0.5 MG tablet    ATIVAN    60 tablet    Take 0.5 tablets (0.25 mg) by mouth 3 times daily  (before meals)       losartan 25 MG tablet    COZAAR    30 tablet    Take 0.5 tablets (12.5 mg) by mouth At Bedtime       ondansetron 4 MG tablet    ZOFRAN    18 tablet    Take 1 tablet (4 mg) by mouth every 6 hours as needed for nausea or vomiting       oxyCODONE 5 MG IR tablet    ROXICODONE    30 tablet    Take 1-2 tablets (5-10 mg) by mouth every 4 hours as needed for moderate to severe pain       pantoprazole 40 MG EC tablet    PROTONIX    30 tablet    Take 1 tablet (40 mg) by mouth daily       sulfamethoxazole-trimethoprim 800-160 MG per tablet   Start taking on:  4/24/2017    BACTRIM DS/SEPTRA DS    30 tablet    Take 1 tablet by mouth Every Mon, Tues two times daily       venlafaxine 150 MG Tb24 24 hr tablet    EFFEXOR-ER    30 each    Take 1 tablet (150 mg) by mouth At Bedtime       voriconazole 200 MG tablet    VFEND    60 tablet    Take 1 tablet (200 mg) by mouth every 12 hours

## 2017-04-03 ENCOUNTER — INFUSION THERAPY VISIT (OUTPATIENT)
Dept: TRANSPLANT | Facility: CLINIC | Age: 42
End: 2017-04-03
Attending: INTERNAL MEDICINE
Payer: COMMERCIAL

## 2017-04-03 ENCOUNTER — APPOINTMENT (OUTPATIENT)
Dept: LAB | Facility: CLINIC | Age: 42
End: 2017-04-03
Attending: INTERNAL MEDICINE
Payer: COMMERCIAL

## 2017-04-03 VITALS
BODY MASS INDEX: 32.38 KG/M2 | RESPIRATION RATE: 14 BRPM | SYSTOLIC BLOOD PRESSURE: 102 MMHG | HEART RATE: 100 BPM | OXYGEN SATURATION: 98 % | DIASTOLIC BLOOD PRESSURE: 69 MMHG | WEIGHT: 177.1 LBS | TEMPERATURE: 97.3 F

## 2017-04-03 DIAGNOSIS — Z29.9 NEED FOR PROPHYLACTIC MEASURE: ICD-10-CM

## 2017-04-03 DIAGNOSIS — C81.10 NODULAR SCLEROSING HODGKIN'S LYMPHOMA, UNSPECIFIED BODY REGION (H): Primary | ICD-10-CM

## 2017-04-03 DIAGNOSIS — C81.00 NODULAR LYMPHOCYTE PREDOMINANT HODGKIN LYMPHOMA, UNSPECIFIED BODY REGION (H): Primary | ICD-10-CM

## 2017-04-03 DIAGNOSIS — Z94.81 S/P AUTOLOGOUS BONE MARROW TRANSPLANTATION (H): ICD-10-CM

## 2017-04-03 LAB
ALBUMIN SERPL-MCNC: 3 G/DL (ref 3.4–5)
ALP SERPL-CCNC: 143 U/L (ref 40–150)
ALT SERPL W P-5'-P-CCNC: 20 U/L (ref 0–50)
ANION GAP SERPL CALCULATED.3IONS-SCNC: 7 MMOL/L (ref 3–14)
AST SERPL W P-5'-P-CCNC: 12 U/L (ref 0–45)
BASOPHILS # BLD AUTO: 0 10E9/L (ref 0–0.2)
BASOPHILS NFR BLD AUTO: 0 %
BILIRUB SERPL-MCNC: 0.4 MG/DL (ref 0.2–1.3)
BLD PROD TYP BPU: NORMAL
BLD UNIT ID BPU: 0
BLOOD PRODUCT CODE: NORMAL
BPU ID: NORMAL
BUN SERPL-MCNC: 11 MG/DL (ref 7–30)
CALCIUM SERPL-MCNC: 8.4 MG/DL (ref 8.5–10.1)
CHLORIDE SERPL-SCNC: 109 MMOL/L (ref 94–109)
CO2 SERPL-SCNC: 30 MMOL/L (ref 20–32)
CREAT SERPL-MCNC: 0.55 MG/DL (ref 0.52–1.04)
DIFFERENTIAL METHOD BLD: ABNORMAL
EOSINOPHIL # BLD AUTO: 0 10E9/L (ref 0–0.7)
EOSINOPHIL NFR BLD AUTO: 0 %
ERYTHROCYTE [DISTWIDTH] IN BLOOD BY AUTOMATED COUNT: 13.8 % (ref 10–15)
GFR SERPL CREATININE-BSD FRML MDRD: ABNORMAL ML/MIN/1.7M2
GLUCOSE SERPL-MCNC: 89 MG/DL (ref 70–99)
HCT VFR BLD AUTO: 24.1 % (ref 35–47)
HGB BLD-MCNC: 8.5 G/DL (ref 11.7–15.7)
LYMPHOCYTES # BLD AUTO: 0.3 10E9/L (ref 0.8–5.3)
LYMPHOCYTES NFR BLD AUTO: 13.2 %
MCH RBC QN AUTO: 32.2 PG (ref 26.5–33)
MCHC RBC AUTO-ENTMCNC: 35.3 G/DL (ref 31.5–36.5)
MCV RBC AUTO: 91 FL (ref 78–100)
MONOCYTES # BLD AUTO: 0.1 10E9/L (ref 0–1.3)
MONOCYTES NFR BLD AUTO: 6.1 %
NEUTROPHILS # BLD AUTO: 1.9 10E9/L (ref 1.6–8.3)
NEUTROPHILS NFR BLD AUTO: 80.7 %
NUM BPU REQUESTED: 1
PLATELET # BLD AUTO: 27 10E9/L (ref 150–450)
POTASSIUM SERPL-SCNC: 3.4 MMOL/L (ref 3.4–5.3)
PROT SERPL-MCNC: 6.2 G/DL (ref 6.8–8.8)
RBC # BLD AUTO: 2.64 10E12/L (ref 3.8–5.2)
RBC MORPH BLD: NORMAL
SODIUM SERPL-SCNC: 145 MMOL/L (ref 133–144)
TRANSFUSION STATUS PATIENT QL: NORMAL
WBC # BLD AUTO: 2.3 10E9/L (ref 4–11)

## 2017-04-03 PROCEDURE — 80053 COMPREHEN METABOLIC PANEL: CPT | Performed by: INTERNAL MEDICINE

## 2017-04-03 PROCEDURE — 85025 COMPLETE CBC W/AUTO DIFF WBC: CPT | Performed by: INTERNAL MEDICINE

## 2017-04-03 PROCEDURE — 96372 THER/PROPH/DIAG INJ SC/IM: CPT

## 2017-04-03 PROCEDURE — 25000128 H RX IP 250 OP 636: Mod: ZF | Performed by: NURSE PRACTITIONER

## 2017-04-03 PROCEDURE — 25000125 ZZHC RX 250: Mod: ZF | Performed by: INTERNAL MEDICINE

## 2017-04-03 PROCEDURE — 40000269 ZZH STATISTIC NO CHARGE FACILITY FEE

## 2017-04-03 PROCEDURE — 99211 OFF/OP EST MAY X REQ PHY/QHP: CPT | Mod: ZF

## 2017-04-03 RX ORDER — HEPARIN SODIUM,PORCINE 10 UNIT/ML
5 VIAL (ML) INTRAVENOUS ONCE
Status: COMPLETED | OUTPATIENT
Start: 2017-04-03 | End: 2017-04-03

## 2017-04-03 RX ORDER — ACETAMINOPHEN 325 MG/1
650 TABLET ORAL ONCE
Status: CANCELLED
Start: 2017-04-03 | End: 2017-04-03

## 2017-04-03 RX ORDER — HEPARIN SODIUM,PORCINE 10 UNIT/ML
5 VIAL (ML) INTRAVENOUS
Status: CANCELLED | OUTPATIENT
Start: 2017-04-04

## 2017-04-03 RX ORDER — DIPHENHYDRAMINE HCL 25 MG
25 CAPSULE ORAL ONCE
Status: CANCELLED
Start: 2017-04-03 | End: 2017-04-03

## 2017-04-03 RX ADMIN — FILGRASTIM 480 MCG: 480 INJECTION, SOLUTION INTRAVENOUS; SUBCUTANEOUS at 10:57

## 2017-04-03 RX ADMIN — SODIUM CHLORIDE, PRESERVATIVE FREE 5 ML: 5 INJECTION INTRAVENOUS at 10:07

## 2017-04-03 NOTE — PROGRESS NOTES
BMT Daily Progress Note     ID: Steph Agee is a 42 year old female with Hodgkins disease who is day +12 s/p auto for Hodgkins disease.    HPI: Steph returns for daily growth factor and follow up. She continues to feel well. No n/v/d or abd pain/cramping.  PO intake improving.  Afebrile.  No SOB/SINGLETON.  Review of Systems: 10 point ROS negative except as noted above.  Physical Exam:   Blood pressure 102/69, pulse 100, temperature 97.3  F (36.3  C), temperature source Oral, resp. rate 14, weight 80.3 kg (177 lb 1.6 oz), SpO2 98 %.    Wt Readings from Last 5 Encounters:   04/02/17 80 kg (176 lb 4.8 oz)   04/01/17 79.3 kg (174 lb 14.4 oz)   03/31/17 79.4 kg (175 lb 0.7 oz)   03/30/17 79.2 kg (174 lb 9.7 oz)   03/29/17 78.1 kg (172 lb 2.9 oz)     General: NAD  Eyes: CALEB, sclera anicteric  Nose/Mouth/Throat: OP clear, buccal mucosa moist, no ulcerations.  Lungs: CTA bilaterally Cardiovascular: regular rhythm, no M/R/G  Abdominal/Rectal: +BS, soft, NT, ND, No HSM.   Lymphatics: no edema.   Skin: no rashes or petechiae.   Neuro: A&O.    Additional Findings: Barbour site NT, no drainage.     Labs:  Lab Results   Component Value Date    WBC 1.2 (L) 04/02/2017    ANEU 0.9 (L) 04/02/2017    HGB 8.5 (L) 04/02/2017    HCT 24.8 (L) 04/02/2017    PLT 6 (LL) 04/02/2017     (H) 04/02/2017    POTASSIUM 3.5 04/02/2017    CHLORIDE 108 04/02/2017    CO2 32 04/02/2017    GLC 86 04/02/2017    BUN 12 04/02/2017    CR 0.75 04/02/2017    MAG 2.0 03/27/2017    INR 1.01 03/20/2017       Assessment and Plan:   Steph Agee is a 42 year old female with Hodgkins disease who is day +12 s/p auto for Hodgkins disease.      1. BMT: Completed BEAM prep without issue. Cell dose=1.84 X10^6 CD34/kg from both PBSCT and bone marrow.   -GCSF daily until ANC>1500 x3 consecutive days. Re-stage per protocol.      2. HEME: Keep Hgb>8 and plts>10K. No pre-meds  - no transfusions today  -Had been on Lovenox 40mg QD d/t hx of chronic IJ clot recently  noted on 3/3 U/s. Now thrombocytopenic.  Per Dr. Gill, ok to permanently d/c lovenox as chronic clot (last dose 3/22).       3. ID: Afebrile  - Low BP 3/29, blood cx x2, NGTD.    -Cont vfend (hx of marijuana use), HD ACV (CMV+) and levaquin as prophy.   -Bactrim or appropriate PCP therapy to start d+28.       4.  GI: n/v better on scheduled zofran for now--if better tomorrow, can decrease to prn.  - Has had persistent nausea even before transplant that she would improved with smoking marijuana. Continues to have mostly nighttime nausea- controlled with prn ativan/zofran  - No scheduled anti-emetics currently.  Monitor stools.   - Protonix for GI prophy.      5. FEN/Renal:   - Cr stable; encouraged increase dietary K intake   - BP soft- encourage oral intake.  Monitor closely as resume cozaar (see CV).       6. CV: Cleared by cardiology d/t drop in EF. Cardiac MRI confirmed EF 47%. 3/29 Blood given at decreased rate  - Had been on Continue cozaar 12.5 daily (cardiomyopathy). Currently on hold per previous note.     7. Psych: Hx of anxiety. Previously smoked daily marijuana and took ativan qHS. Ativan too sedating for daily use-per pt. Already on effexor, increase to 150mg daily (75).    8. : NO issues today.   Vaginal irritation: per exam on 3/27, vaginal dryness - no concern for yeast infection, no erythema or discharge; recommend vaseline to vagina/external labia majora/minora prn for comfort.     Plan:   RTC: daily for gcsf, labs and provider visit.  Has appt through 4/7, with possible plt infusion appt (can cancel as needed).      Jolynn Sutherland pa-c  [phone]

## 2017-04-03 NOTE — NURSING NOTE
BMT Heme Malignancy Rooming Note    Steph Agee - 4/3/2017 10:26 AM     Chief Complaint   Patient presents with     Blood Draw     Labs Drawn      RECHECK     here for provider visit HX: HL        /69  Pulse 100  Temp 97.3  F (36.3  C) (Oral)  Resp 14  Wt 80.3 kg (177 lb 1.6 oz)  SpO2 98%  BMI 32.38 kg/m2     Medications reviewed: Yes    Labs drawn: Yes    Drawn by: Lab Staff  Via: central venous catheter  See Doc Flowsheet for details.      Dressing changed: No     Medications given: No    Staff time:3 min    Additional information if applicable: Pt arrived, ambulatory, accompanied by female caregiver.  Pt denies any current complaints.    Ana Paula Hansen RN

## 2017-04-03 NOTE — MR AVS SNAPSHOT
After Visit Summary   4/3/2017    Steph Agee    MRN: [id number]           Patient Information     Date Of Birth          1975        Visit Information        Provider Department      4/3/2017 10:30 AM UC BMT CECILIA #2 [facility] Blood and Marrow Transplant        Today's Diagnoses     Nodular sclerosing Hodgkin's lymphoma, unspecified body region (H)    -  1    Need for prophylactic measure        S/P autologous bone marrow transplantation (H)              [facility] ([facility])  [address] [place] [address]  Phone: [phone]  Clinic Hours:   Monday-Friday:    8am to 4:30pm   Weekends and holidays:    8am to noon (in general)  If your fever is 100.5  or greater,   call the clinic.  After hours call the   hospital at [phone] or   [phone]. Ask for the BMT   fellow for pediatric or adult patients           Care Instructions    Tomorrow: 9am arrival labs,visit and poss inf        Follow-ups after your visit        Your next 10 appointments already scheduled     Apr 04, 2017  9:00 AM CDT   Masonic Lab Draw with UC MASONIC LAB DRAW   [facility] Masonic Lab Draw ([facility])    [address] [place] [address]   [phone]            Apr 04, 2017  9:30 AM CDT   Return with UC BMT CECILIA #2   [facility] Blood and Marrow Transplant ([facility])    [address] [place] [address]   [phone]            Apr 04, 2017 10:00 AM CDT   Infusion 120 with UC BMT INFUSION, UC 7 ATC   [facility] Blood and Marrow Transplant ([facility])    [address] [place] [address]   [phone]            Apr 05, 2017  9:30 AM CDT   Masonic Lab Draw with UC MASONIC LAB DRAW   [facility] Masonic Lab Draw ([facility])    [address] [place] [address]   [phone]            Apr 05, 2017  10:00 AM CDT   Return with UC BMT CECILIA #2   [facility] Blood and Marrow Transplant ([facility])    [address]  2nd Floor  [facility] [address]   [phone]            Apr 05, 2017 10:30 AM CDT   Infusion 120 with UC BMT INFUSION, UC 6 ATC   [facility] Blood and Marrow Transplant ([facility])    [address]  2nd Floor  [facility] [address]   [phone]            Apr 06, 2017  9:00 AM CDT   Masonic Lab Draw with UC MASONIC LAB DRAW   [facility] Masonic Lab Draw ([facility])    [address]  2nd [facility] [address]   [phone]            Apr 06, 2017  9:30 AM CDT   Return with UC BMT CECILIA #1   [facility] Blood and Marrow Transplant ([facility])    [address]  [address]nd [facility] [address] [phone]              Future tests that were ordered for you today     Open Standing Orders        Priority Remaining Interval Expires Ordered    Red blood cell prepare order unit Routine 99/100 CONDITIONAL (SPECIFY) BLOOD  4/2/2017    Platelets prepare order unit Routine 99/100 CONDITIONAL (SPECIFY) BLOOD  4/2/2017          Open Future Orders        Priority Expected Expires Ordered    Comprehensive metabolic panel Routine 4/4/2017 9/29/2017 4/3/2017    CBC with platelets differential Routine 4/4/2017 9/29/2017 4/3/2017            Who to contact     If you have questions or need follow up information about today's clinic visit or your schedule please contact [facility] BLOOD AND MARROW TRANSPLANT directly at [phone].  Normal or non-critical lab and imaging results will be communicated to you by MyChart, letter or phone within 4 business days after the clinic has received the results. If you do not hear from us within 7 days, please contact the clinic through MyChart or phone. If you have a critical or abnormal lab result, we will notify you by phone as soon as  possible.  Submit refill requests through [employer] or call your pharmacy and they will forward the refill request to us. Please allow 3 business days for your refill to be completed.          Additional Information About Your Visit        Keyade Information     [employer] gives you secure access to your electronic health record. If you see a primary care provider, you can also send messages to your care team and make appointments. If you have questions, please call your primary care clinic.  If you do not have a primary care provider, please call [phone] and they will assist you.        Care EveryWhere ID     This is your Care EveryWhere ID. This could be used by other organizations to access your [place] medical records  [id number]        Your Vitals Were     Pulse Temperature Respirations Pulse Oximetry BMI (Body Mass Index)       100 97.3  F (36.3  C) (Oral) 14 98% 32.38 kg/m2        Blood Pressure from Last 3 Encounters:   04/03/17 102/69   04/02/17 104/52   04/02/17 109/63    Weight from Last 3 Encounters:   04/03/17 80.3 kg (177 lb 1.6 oz)   04/02/17 80 kg (176 lb 4.8 oz)   04/01/17 79.3 kg (174 lb 14.4 oz)              We Performed the Following     CBC with platelets differential     Comprehensive metabolic panel        Recent Review Flowsheet Data     BMT Recent Results Latest Ref Rng & Units 3/28/2017 3/29/2017 3/30/2017 3/31/2017 4/1/2017 4/2/2017 4/3/2017    WBC 4.0 - 11.0 10e9/L 0.1(LL) 0.1(LL) 0.1(LL) 0.3(LL) 0.6(LL) 1.2(L) 2.3(L)    Hemoglobin 11.7 - 15.7 g/dL 7.9(L) 7.2(L) 7.8(L) 9.0(L) 8.7(L) 8.5(L) 8.5(L)    Platelet Count 150 - 450 10e9/L 17(LL) 9(LL) 29(LL) 17(LL) 9(LL) 6(LL) 27(LL)    Neutrophils (Absolute) 1.6 - 8.3 10e9/L - - - - 0.4(LL) 0.9(L) 1.9    INR 0.86 - 1.14 - - - - - - -    Sodium 133 - 144 mmol/L 139 142 141 145(H) 144 145(H) 145(H)    Potassium 3.4 - 5.3 mmol/L 3.4 3.0(L) 3.2(L) 3.6 3.8 3.5 3.4    Chloride 94 - 109 mmol/L 100 103 103 109 106 108 109    Glucose 70 - 99 mg/dL 93  101(H) 91 82 83 86 89    Urea Nitrogen 7 - 30 mg/dL 12 13 9 9 10 12 11    Creatinine 0.52 - 1.04 mg/dL 0.57 0.57 0.59 0.57 0.62 0.75 0.55    Calcium (Total) 8.5 - 10.1 mg/dL 8.3(L) 8.0(L) 8.1(L) 7.9(L) 8.1(L) 7.8(L) 8.4(L)    Protein (Total) 6.8 - 8.8 g/dL - - - - - - 6.2(L)    Albumin 3.4 - 5.0 g/dL - - - - - - 3.0(L)    Bilirubin (Direct) 0.0 - 0.2 mg/dL - - - - - - -    Alkaline Phosphatase 40 - 150 U/L - - - - - - 143    AST 0 - 45 U/L - - - - - - 12    ALT 0 - 50 U/L - - - - - - 20    MCV 78 - 100 fl 90 91 90 90 91 91 91               Primary Care Provider Office Phone # Fax #    Travis Carpenter [phone]       [facility] [address] [place] [address]        Thank you!     Thank you for choosing [facility] BLOOD AND MARROW TRANSPLANT  for your care. Our goal is always to provide you with excellent care. Hearing back from our patients is one way we can continue to improve our services. Please take a few minutes to complete the written survey that you may receive in the mail after your visit with us. Thank you!             Your Updated Medication List - Protect others around you: Learn how to safely use, store and throw away your medicines at www.disposemymeds.org.          This list is accurate as of: 4/3/17 11:13 AM.  Always use your most recent med list.                   Brand Name Dispense Instructions for use    acyclovir 800 MG tablet    ZOVIRAX    150 tablet    Take 1 tablet (800 mg) by mouth 5 times daily       gabapentin 100 MG capsule    NEURONTIN    90 capsule    Take 2 capsules (200 mg) by mouth 3 times daily       levofloxacin 250 MG tablet    LEVAQUIN    30 tablet    Take 1 tablet (250 mg) by mouth daily       LORazepam 0.5 MG tablet    ATIVAN    60 tablet    Take 0.5 tablets (0.25 mg) by mouth 3 times daily (before meals)       losartan 25 MG tablet    COZAAR    30 tablet    Take 0.5 tablets (12.5 mg) by mouth At Bedtime       ondansetron 4 MG tablet     ZOFRAN    18 tablet    Take 1 tablet (4 mg) by mouth every 6 hours as needed for nausea or vomiting       oxyCODONE 5 MG IR tablet    ROXICODONE    30 tablet    Take 1-2 tablets (5-10 mg) by mouth every 4 hours as needed for moderate to severe pain       pantoprazole 40 MG EC tablet    PROTONIX    30 tablet    Take 1 tablet (40 mg) by mouth daily       sulfamethoxazole-trimethoprim 800-160 MG per tablet   Start taking on:  4/24/2017    BACTRIM DS/SEPTRA DS    30 tablet    Take 1 tablet by mouth Every Mon, Tues two times daily       venlafaxine 150 MG Tb24 24 hr tablet    EFFEXOR-ER    30 each    Take 1 tablet (150 mg) by mouth At Bedtime       voriconazole 200 MG tablet    VFEND    60 tablet    Take 1 tablet (200 mg) by mouth every 12 hours

## 2017-04-03 NOTE — MR AVS SNAPSHOT
After Visit Summary   4/3/2017    Steph Agee    MRN: [id number]           Patient Information     Date Of Birth          1975        Visit Information        Provider Department      4/3/2017 11:00 AM UC 8 ATC; UC BMT INFUSION [facility] Blood and Marrow Transplant        Today's Diagnoses     Nodular lymphocyte predominant Hodgkin lymphoma, unspecified body region (H)    -  1          [facility] ([facility])  [address] [place] [address]  Phone: [phone]  Clinic Hours:   Monday-Friday:    8am to 4:30pm   Weekends and holidays:    8am to noon (in general)  If your fever is 100.5  or greater,   call the clinic.  After hours call the   hospital at [phone] or   [phone]. Ask for the BMT   fellow for pediatric or adult patients            Follow-ups after your visit        Your next 10 appointments already scheduled     Apr 04, 2017  9:00 AM CDT   Masonic Lab Draw with UC MASONIC LAB DRAW   [facility] Masonic Lab Draw ([facility] [place] [facility])    [address] [place] [address] [phone]            Apr 04, 2017  9:30 AM CDT   Return with UC BMT CECILIA #2   [facility] Blood and Marrow Transplant ([facility] [place] [facility])    [address] [place] [address] [phone]            Apr 04, 2017 10:00 AM CDT   Infusion 120 with UC BMT INFUSION, UC 7 ATC   [facility] Blood and Marrow Transplant ([facility] [place])    [address] [place] [address] [phone]            Apr 05, 2017  9:30 AM CDT   Masonic Lab Draw with UC MASONIC LAB DRAW   [facility] Masonic Lab Draw ([facility] [place] [facility])    [address] [place] [address] [phone]            Apr 05, 2017 10:00 AM CDT   Return with UC BMT CECILIA #2   [facility] Blood and Marrow Transplant ([facility])    [address] [place] [address]  [address]  [address] [facility] [address]   [phone]            Apr 05, 2017 10:30 AM CDT   Infusion 120 with UC BMT INFUSION, UC 6 ATC   [facility] Blood and Marrow Transplant ([facility])    [address] [place] [address]   [phone]            Apr 06, 2017  9:00 AM CDT   Masonic Lab Draw with UC MASONIC LAB DRAW   [facility] Masonic Lab Draw ([facility])    [address] [place] [address]   [phone]            Apr 06, 2017  9:30 AM CDT   Return with UC BMT CECILIA #1   [facility] Blood and Marrow Transplant ([facility])    [address] [place] [address] [phone]              Future tests that were ordered for you today     Open Standing Orders        Priority Remaining Interval Expires Ordered    Red blood cell prepare order unit Routine 99/100 CONDITIONAL (SPECIFY) BLOOD  4/2/2017    Platelets prepare order unit Routine 99/100 CONDITIONAL (SPECIFY) BLOOD  4/2/2017          Open Future Orders        Priority Expected Expires Ordered    Comprehensive metabolic panel Routine 4/4/2017 9/29/2017 4/3/2017    CBC with platelets differential Routine 4/4/2017 9/29/2017 4/3/2017            Who to contact     If you have questions or need follow up information about today's clinic visit or your schedule please contact [facility] BLOOD AND MARROW TRANSPLANT directly at [phone].  Normal or non-critical lab and imaging results will be communicated to you by [employer], letter or phone within 4 business days after the clinic has received the results. If you do not hear from us within 7 days, please contact the clinic through [employer] or phone. If you have a critical or abnormal lab result, we will notify you by phone as soon as possible.  Submit refill requests through [employer] or call your pharmacy and they will forward the refill request to us. Please allow 3  business days for your refill to be completed.          Additional Information About Your Visit        [employer] Information     BigTip gives you secure access to your electronic health record. If you see a primary care provider, you can also send messages to your care team and make appointments. If you have questions, please call your primary care clinic.  If you do not have a primary care provider, please call [phone] and they will assist you.        Care EveryWhere ID     This is your Care EveryWhere ID. This could be used by other organizations to access your [place] medical records  [id number]         Blood Pressure from Last 3 Encounters:   04/03/17 102/69   04/02/17 104/52   04/02/17 109/63    Weight from Last 3 Encounters:   04/03/17 80.3 kg (177 lb 1.6 oz)   04/02/17 80 kg (176 lb 4.8 oz)   04/01/17 79.3 kg (174 lb 14.4 oz)              We Performed the Following     ABO/Rh type and screen     Blood component     Platelets prepare order unit     Red blood cell prepare order unit        Recent Review Flowsheet Data     BMT Recent Results Latest Ref Rng & Units 3/28/2017 3/29/2017 3/30/2017 3/31/2017 4/1/2017 4/2/2017 4/3/2017    WBC 4.0 - 11.0 10e9/L 0.1(LL) 0.1(LL) 0.1(LL) 0.3(LL) 0.6(LL) 1.2(L) 2.3(L)    Hemoglobin 11.7 - 15.7 g/dL 7.9(L) 7.2(L) 7.8(L) 9.0(L) 8.7(L) 8.5(L) 8.5(L)    Platelet Count 150 - 450 10e9/L 17(LL) 9(LL) 29(LL) 17(LL) 9(LL) 6(LL) 27(LL)    Neutrophils (Absolute) 1.6 - 8.3 10e9/L - - - - 0.4(LL) 0.9(L) 1.9    INR 0.86 - 1.14 - - - - - - -    Sodium 133 - 144 mmol/L 139 142 141 145(H) 144 145(H) 145(H)    Potassium 3.4 - 5.3 mmol/L 3.4 3.0(L) 3.2(L) 3.6 3.8 3.5 3.4    Chloride 94 - 109 mmol/L 100 103 103 109 106 108 109    Glucose 70 - 99 mg/dL 93 101(H) 91 82 83 86 89    Urea Nitrogen 7 - 30 mg/dL 12 13 9 9 10 12 11    Creatinine 0.52 - 1.04 mg/dL 0.57 0.57 0.59 0.57 0.62 0.75 0.55    Calcium (Total) 8.5 - 10.1 mg/dL 8.3(L) 8.0(L) 8.1(L) 7.9(L) 8.1(L) 7.8(L) 8.4(L)    Protein  (Total) 6.8 - 8.8 g/dL - - - - - - 6.2(L)    Albumin 3.4 - 5.0 g/dL - - - - - - 3.0(L)    Bilirubin (Direct) 0.0 - 0.2 mg/dL - - - - - - -    Alkaline Phosphatase 40 - 150 U/L - - - - - - 143    AST 0 - 45 U/L - - - - - - 12    ALT 0 - 50 U/L - - - - - - 20    MCV 78 - 100 fl 90 91 90 90 91 91 91               Primary Care Provider Office Phone # Fax #    Travis Carpenter [phone]       [facility] [address] [place] [address]        Thank you!     Thank you for choosing [facility] BLOOD AND MARROW TRANSPLANT  for your care. Our goal is always to provide you with excellent care. Hearing back from our patients is one way we can continue to improve our services. Please take a few minutes to complete the written survey that you may receive in the mail after your visit with us. Thank you!             Your Updated Medication List - Protect others around you: Learn how to safely use, store and throw away your medicines at www.disposemymeds.org.          This list is accurate as of: 4/3/17 12:30 PM.  Always use your most recent med list.                   Brand Name Dispense Instructions for use    acyclovir 800 MG tablet    ZOVIRAX    150 tablet    Take 1 tablet (800 mg) by mouth 5 times daily       gabapentin 100 MG capsule    NEURONTIN    90 capsule    Take 2 capsules (200 mg) by mouth 3 times daily       levofloxacin 250 MG tablet    LEVAQUIN    30 tablet    Take 1 tablet (250 mg) by mouth daily       LORazepam 0.5 MG tablet    ATIVAN    60 tablet    Take 0.5 tablets (0.25 mg) by mouth 3 times daily (before meals)       losartan 25 MG tablet    COZAAR    30 tablet    Take 0.5 tablets (12.5 mg) by mouth At Bedtime       ondansetron 4 MG tablet    ZOFRAN    18 tablet    Take 1 tablet (4 mg) by mouth every 6 hours as needed for nausea or vomiting       oxyCODONE 5 MG IR tablet    ROXICODONE    30 tablet    Take 1-2 tablets (5-10 mg) by mouth every 4 hours as needed for moderate  to severe pain       pantoprazole 40 MG EC tablet    PROTONIX    30 tablet    Take 1 tablet (40 mg) by mouth daily       sulfamethoxazole-trimethoprim 800-160 MG per tablet   Start taking on:  4/24/2017    BACTRIM DS/SEPTRA DS    30 tablet    Take 1 tablet by mouth Every Mon, Tues two times daily       venlafaxine 150 MG Tb24 24 hr tablet    EFFEXOR-ER    30 each    Take 1 tablet (150 mg) by mouth At Bedtime       voriconazole 200 MG tablet    VFEND    60 tablet    Take 1 tablet (200 mg) by mouth every 12 hours

## 2017-04-04 ENCOUNTER — INFUSION THERAPY VISIT (OUTPATIENT)
Dept: TRANSPLANT | Facility: CLINIC | Age: 42
End: 2017-04-04
Attending: STUDENT IN AN ORGANIZED HEALTH CARE EDUCATION/TRAINING PROGRAM
Payer: COMMERCIAL

## 2017-04-04 ENCOUNTER — ONCOLOGY VISIT (OUTPATIENT)
Dept: TRANSPLANT | Facility: CLINIC | Age: 42
End: 2017-04-04
Attending: INTERNAL MEDICINE
Payer: COMMERCIAL

## 2017-04-04 ENCOUNTER — APPOINTMENT (OUTPATIENT)
Dept: LAB | Facility: CLINIC | Age: 42
End: 2017-04-04
Attending: INTERNAL MEDICINE
Payer: COMMERCIAL

## 2017-04-04 VITALS
WEIGHT: 176.6 LBS | OXYGEN SATURATION: 97 % | BODY MASS INDEX: 32.29 KG/M2 | DIASTOLIC BLOOD PRESSURE: 57 MMHG | SYSTOLIC BLOOD PRESSURE: 104 MMHG | TEMPERATURE: 97.8 F | HEART RATE: 102 BPM

## 2017-04-04 DIAGNOSIS — C81.10 NODULAR SCLEROSING HODGKIN'S LYMPHOMA, UNSPECIFIED BODY REGION (H): Primary | ICD-10-CM

## 2017-04-04 DIAGNOSIS — C81.00 NODULAR LYMPHOCYTE PREDOMINANT HODGKIN LYMPHOMA, UNSPECIFIED BODY REGION (H): Primary | ICD-10-CM

## 2017-04-04 DIAGNOSIS — Z29.9 NEED FOR PROPHYLACTIC MEASURE: ICD-10-CM

## 2017-04-04 LAB
ABO + RH BLD: NORMAL
ABO + RH BLD: NORMAL
ALBUMIN SERPL-MCNC: 3.1 G/DL (ref 3.4–5)
ALP SERPL-CCNC: 141 U/L (ref 40–150)
ALT SERPL W P-5'-P-CCNC: 20 U/L (ref 0–50)
ANION GAP SERPL CALCULATED.3IONS-SCNC: 7 MMOL/L (ref 3–14)
ANISOCYTOSIS BLD QL SMEAR: SLIGHT
AST SERPL W P-5'-P-CCNC: 16 U/L (ref 0–45)
BACTERIA SPEC CULT: NO GROWTH
BACTERIA SPEC CULT: NO GROWTH
BASOPHILS # BLD AUTO: 0 10E9/L (ref 0–0.2)
BASOPHILS NFR BLD AUTO: 0 %
BILIRUB SERPL-MCNC: 0.4 MG/DL (ref 0.2–1.3)
BLD GP AB SCN SERPL QL: NORMAL
BLD PROD TYP BPU: NORMAL
BLD UNIT ID BPU: 0
BLOOD BANK CMNT PATIENT-IMP: NORMAL
BLOOD PRODUCT CODE: NORMAL
BPU ID: NORMAL
BUN SERPL-MCNC: 10 MG/DL (ref 7–30)
CALCIUM SERPL-MCNC: 8.2 MG/DL (ref 8.5–10.1)
CHLORIDE SERPL-SCNC: 108 MMOL/L (ref 94–109)
CO2 SERPL-SCNC: 30 MMOL/L (ref 20–32)
CREAT SERPL-MCNC: 0.64 MG/DL (ref 0.52–1.04)
DIFFERENTIAL METHOD BLD: ABNORMAL
EOSINOPHIL # BLD AUTO: 0 10E9/L (ref 0–0.7)
EOSINOPHIL NFR BLD AUTO: 0 %
ERYTHROCYTE [DISTWIDTH] IN BLOOD BY AUTOMATED COUNT: 14.2 % (ref 10–15)
GFR SERPL CREATININE-BSD FRML MDRD: ABNORMAL ML/MIN/1.7M2
GLUCOSE SERPL-MCNC: 87 MG/DL (ref 70–99)
HCT VFR BLD AUTO: 24.5 % (ref 35–47)
HGB BLD-MCNC: 8.3 G/DL (ref 11.7–15.7)
LYMPHOCYTES # BLD AUTO: 0.4 10E9/L (ref 0.8–5.3)
LYMPHOCYTES NFR BLD AUTO: 10.1 %
MCH RBC QN AUTO: 31.4 PG (ref 26.5–33)
MCHC RBC AUTO-ENTMCNC: 33.9 G/DL (ref 31.5–36.5)
MCV RBC AUTO: 93 FL (ref 78–100)
METAMYELOCYTES # BLD: 0.1 10E9/L
METAMYELOCYTES NFR BLD MANUAL: 1.8 %
MICRO REPORT STATUS: NORMAL
MICRO REPORT STATUS: NORMAL
MONOCYTES # BLD AUTO: 0.1 10E9/L (ref 0–1.3)
MONOCYTES NFR BLD AUTO: 1.8 %
NEUTROPHILS # BLD AUTO: 3.6 10E9/L (ref 1.6–8.3)
NEUTROPHILS NFR BLD AUTO: 86.3 %
NRBC # BLD AUTO: 0 10*3/UL
NRBC BLD AUTO-RTO: 1 /100
NUM BPU REQUESTED: 1
NUM BPU REQUESTED: 2
PLATELET # BLD AUTO: 18 10E9/L (ref 150–450)
POTASSIUM SERPL-SCNC: 3.5 MMOL/L (ref 3.4–5.3)
PROT SERPL-MCNC: 6.3 G/DL (ref 6.8–8.8)
RBC # BLD AUTO: 2.64 10E12/L (ref 3.8–5.2)
SODIUM SERPL-SCNC: 146 MMOL/L (ref 133–144)
SPECIMEN EXP DATE BLD: NORMAL
SPECIMEN SOURCE: NORMAL
SPECIMEN SOURCE: NORMAL
STOMATOCYTES BLD QL SMEAR: SLIGHT
TRANSFUSION STATUS PATIENT QL: NORMAL
TRANSFUSION STATUS PATIENT QL: NORMAL
WBC # BLD AUTO: 4.2 10E9/L (ref 4–11)

## 2017-04-04 PROCEDURE — 80053 COMPREHEN METABOLIC PANEL: CPT | Performed by: PHYSICIAN ASSISTANT

## 2017-04-04 PROCEDURE — 85025 COMPLETE CBC W/AUTO DIFF WBC: CPT | Performed by: PHYSICIAN ASSISTANT

## 2017-04-04 PROCEDURE — 25000125 ZZHC RX 250: Mod: ZF | Performed by: PHYSICIAN ASSISTANT

## 2017-04-04 PROCEDURE — 96372 THER/PROPH/DIAG INJ SC/IM: CPT

## 2017-04-04 PROCEDURE — 40000268 ZZH STATISTIC NO CHARGES: Mod: ZF

## 2017-04-04 PROCEDURE — 25000128 H RX IP 250 OP 636: Mod: ZF | Performed by: NURSE PRACTITIONER

## 2017-04-04 RX ORDER — ACETAMINOPHEN 325 MG/1
650 TABLET ORAL ONCE
Status: CANCELLED
Start: 2017-04-04 | End: 2017-04-04

## 2017-04-04 RX ORDER — DIPHENHYDRAMINE HCL 25 MG
25 CAPSULE ORAL ONCE
Status: CANCELLED
Start: 2017-04-04 | End: 2017-04-04

## 2017-04-04 RX ORDER — HEPARIN SODIUM,PORCINE 10 UNIT/ML
5 VIAL (ML) INTRAVENOUS
Status: DISCONTINUED | OUTPATIENT
Start: 2017-04-04 | End: 2017-04-04 | Stop reason: HOSPADM

## 2017-04-04 RX ORDER — HEPARIN SODIUM,PORCINE 10 UNIT/ML
5 VIAL (ML) INTRAVENOUS
Status: CANCELLED | OUTPATIENT
Start: 2017-04-05

## 2017-04-04 RX ADMIN — SODIUM CHLORIDE, PRESERVATIVE FREE 5 ML: 5 INJECTION INTRAVENOUS at 08:50

## 2017-04-04 RX ADMIN — SODIUM CHLORIDE, PRESERVATIVE FREE 5 ML: 5 INJECTION INTRAVENOUS at 08:52

## 2017-04-04 RX ADMIN — FILGRASTIM 480 MCG: 480 INJECTION, SOLUTION INTRAVENOUS; SUBCUTANEOUS at 09:55

## 2017-04-04 NOTE — MR AVS SNAPSHOT
After Visit Summary   4/4/2017    Steph Agee    MRN: [id number]           Patient Information     Date Of Birth          1975        Visit Information        Provider Department      4/4/2017 9:30 AM UC BMT CECILIA #2 [facility] Blood and Marrow Transplant        Today's Diagnoses     Nodular sclerosing Hodgkin's lymphoma, unspecified body region (H)    -  1    Need for prophylactic measure              [facility] ([facility] [place]  [address] [place] [address]  Phone: [phone]  Clinic Hours:   Monday-Friday:    8am to 4:30pm   Weekends and holidays:    8am to noon (in general)  If your fever is 100.5  or greater,   call the clinic.  After hours call the   hospital at [phone] or   [phone]. Ask for the BMT   fellow for pediatric or adult patients            Follow-ups after your visit        Your next 10 appointments already scheduled     Apr 05, 2017  9:30 AM CDT   Masonic Lab Draw with UC MASONIC LAB DRAW   [facility] Masonic Lab Draw ([facility])    [address] [place] [address]   [phone]            Apr 05, 2017 10:00 AM CDT   Return with UC BMT CECILIA #2   [facility] Blood and Marrow Transplant ([facility])    [address] [place] [address] [phone]            Apr 05, 2017 10:30 AM CDT   Infusion 120 with UC BMT INFUSION, UC 6 ATC   [facility] Blood and Marrow Transplant ([facility])    [address] [place] [address] [phone]            Apr 06, 2017  9:00 AM CDT   Masonic Lab Draw with UC MASONIC LAB DRAW   [facility] Masonic Lab Draw ([facility])    [address] [place] [address]   [phone]            Apr 06, 2017  9:30 AM CDT   Return with UC BMT CECILIA #1   [facility] Blood and Marrow Transplant ([facility])    [address] [place] [address]  [address] [address]   [phone]            Apr 06, 2017 10:00 AM CDT   Infusion 120 with UC BMT INFUSION, UC 6 ATC   [facility] Blood and Marrow Transplant ([facility])    [address] [place] [address]   [phone]            Apr 07, 2017  9:30 AM CDT   Masonic Lab Draw with UC MASONIC LAB DRAW   [facility] Masonic Lab Draw ([facility])    [address] [place] [address]   [phone]            Apr 07, 2017 10:00 AM CDT   Return with UC BMT CECILIA #2   [facility] Blood and Marrow Transplant ([facility])    [address] [place] [address] [phone]              Future tests that were ordered for you today     Open Standing Orders        Priority Remaining Interval Expires Ordered    Red blood cell prepare order unit Routine 99/100 CONDITIONAL (SPECIFY) BLOOD  4/4/2017    Platelets prepare order unit Routine 99/100 CONDITIONAL (SPECIFY) BLOOD  4/4/2017    Platelets prepare order unit Routine 99/100 CONDITIONAL (SPECIFY) BLOOD  4/3/2017            Who to contact     If you have questions or need follow up information about today's clinic visit or your schedule please contact [facility] BLOOD AND MARROW TRANSPLANT directly at [phone].  Normal or non-critical lab and imaging results will be communicated to you by sifonrhart, letter or phone within 4 business days after the clinic has received the results. If you do not hear from us within 7 days, please contact the clinic through sifonrhart or phone. If you have a critical or abnormal lab result, we will notify you by phone as soon as possible.  Submit refill requests through Factery or call your pharmacy and they will forward the refill request to us. Please allow 3 business days for your refill to be completed.          Additional Information About Your Visit        [employer] Information      [employer] gives you secure access to your electronic health record. If you see a primary care provider, you can also send messages to your care team and make appointments. If you have questions, please call your primary care clinic.  If you do not have a primary care provider, please call [phone] and they will assist you.        Care EveryWhere ID     This is your Care EveryWhere ID. This could be used by other organizations to access your [facility] medical records  [id number]        Your Vitals Were     Pulse Temperature Pulse Oximetry BMI (Body Mass Index)          102 97.8  F (36.6  C) (Oral) 97% 32.29 kg/m2         Blood Pressure from Last 3 Encounters:   04/04/17 104/57   04/03/17 102/69   04/02/17 104/52    Weight from Last 3 Encounters:   04/04/17 80.1 kg (176 lb 9.6 oz)   04/03/17 80.3 kg (177 lb 1.6 oz)   04/02/17 80 kg (176 lb 4.8 oz)              We Performed the Following     CBC with platelets differential     Comprehensive metabolic panel        Recent Review Flowsheet Data     BMT Recent Results Latest Ref Rng & Units 3/29/2017 3/30/2017 3/31/2017 4/1/2017 4/2/2017 4/3/2017 4/4/2017    WBC 4.0 - 11.0 10e9/L 0.1(LL) 0.1(LL) 0.3(LL) 0.6(LL) 1.2(L) 2.3(L) 4.2    Hemoglobin 11.7 - 15.7 g/dL 7.2(L) 7.8(L) 9.0(L) 8.7(L) 8.5(L) 8.5(L) 8.3(L)    Platelet Count 150 - 450 10e9/L 9(LL) 29(LL) 17(LL) 9(LL) 6(LL) 27(LL) 18(LL)    Neutrophils (Absolute) 1.6 - 8.3 10e9/L - - - 0.4(LL) 0.9(L) 1.9 3.6    INR 0.86 - 1.14 - - - - - - -    Sodium 133 - 144 mmol/L 142 141 145(H) 144 145(H) 145(H) 146(H)    Potassium 3.4 - 5.3 mmol/L 3.0(L) 3.2(L) 3.6 3.8 3.5 3.4 3.5    Chloride 94 - 109 mmol/L 103 103 109 106 108 109 108    Glucose 70 - 99 mg/dL 101(H) 91 82 83 86 89 87    Urea Nitrogen 7 - 30 mg/dL 13 9 9 10 12 11 10    Creatinine 0.52 - 1.04 mg/dL 0.57 0.59 0.57 0.62 0.75 0.55 0.64    Calcium (Total) 8.5 - 10.1 mg/dL 8.0(L) 8.1(L) 7.9(L) 8.1(L) 7.8(L) 8.4(L) 8.2(L)    Protein (Total) 6.8 - 8.8 g/dL - - - - -  6.2(L) 6.3(L)    Albumin 3.4 - 5.0 g/dL - - - - - 3.0(L) 3.1(L)    Bilirubin (Direct) 0.0 - 0.2 mg/dL - - - - - - -    Alkaline Phosphatase 40 - 150 U/L - - - - - 143 141    AST 0 - 45 U/L - - - - - 12 16    ALT 0 - 50 U/L - - - - - 20 20    MCV 78 - 100 fl 91 90 90 91 91 91 93               Primary Care Provider Office Phone # Fax #    Travis Carpenter [phone]       [facility] [address] [place] [address]        Thank you!     Thank you for choosing [facility] BLOOD AND MARROW TRANSPLANT  for your care. Our goal is always to provide you with excellent care. Hearing back from our patients is one way we can continue to improve our services. Please take a few minutes to complete the written survey that you may receive in the mail after your visit with us. Thank you!             Your Updated Medication List - Protect others around you: Learn how to safely use, store and throw away your medicines at www.disposemymeds.org.          This list is accurate as of: 4/4/17 11:28 AM.  Always use your most recent med list.                   Brand Name Dispense Instructions for use    acyclovir 800 MG tablet    ZOVIRAX    150 tablet    Take 1 tablet (800 mg) by mouth 5 times daily       gabapentin 100 MG capsule    NEURONTIN    90 capsule    Take 2 capsules (200 mg) by mouth 3 times daily       levofloxacin 250 MG tablet    LEVAQUIN    30 tablet    Take 1 tablet (250 mg) by mouth daily       LORazepam 0.5 MG tablet    ATIVAN    60 tablet    Take 0.5 tablets (0.25 mg) by mouth 3 times daily (before meals)       losartan 25 MG tablet    COZAAR    30 tablet    Take 0.5 tablets (12.5 mg) by mouth At Bedtime       ondansetron 4 MG tablet    ZOFRAN    18 tablet    Take 1 tablet (4 mg) by mouth every 6 hours as needed for nausea or vomiting       oxyCODONE 5 MG IR tablet    ROXICODONE    30 tablet    Take 1-2 tablets (5-10 mg) by mouth every 4 hours as needed for moderate to severe pain        pantoprazole 40 MG EC tablet    PROTONIX    30 tablet    Take 1 tablet (40 mg) by mouth daily       sulfamethoxazole-trimethoprim 800-160 MG per tablet   Start taking on:  4/24/2017    BACTRIM DS/SEPTRA DS    30 tablet    Take 1 tablet by mouth Every Mon, Tues two times daily       venlafaxine 150 MG Tb24 24 hr tablet    EFFEXOR-ER    30 each    Take 1 tablet (150 mg) by mouth At Bedtime       voriconazole 200 MG tablet    VFEND    60 tablet    Take 1 tablet (200 mg) by mouth every 12 hours

## 2017-04-04 NOTE — NURSING NOTE
BMT Heme Malignancy Rooming Note    Steph LUNDBERG Anuel - 4/4/2017 9:42 AM     Chief Complaint   Patient presents with     Blood Draw     Pt with hx of lymphoma labs collected via central line.     RECHECK     S/P bMT for Hodgkins--here for labs and to see NP        /57  Pulse 102  Temp 97.8  F (36.6  C) (Oral)  Wt 80.1 kg (176 lb 9.6 oz)  SpO2 97%  BMI 32.29 kg/m2    Data Unavailable     Medications reviewed: Yes    Labs drawn: Yes    Drawn by: Clinic Staff  Via: central venous catheter  See Doc Flowsheet for details.      Dressing changed: No     Medications given: Yes - See MAR    Staff time:0    Additional information if applicable: Pt is here for provider visit    Nidhi Chun MA

## 2017-04-04 NOTE — PROGRESS NOTES
BMT Daily Progress Note     ID: Steph Agee is a 42 year old female with Hodgkins disease who is day +13 s/p auto for Hodgkins disease.    HPI: Steph returns for daily growth factor and follow up. She continues to feel well, really only h as nausea in the morning- qAM zofran and then eating before meds helps a lot. Daytime nausea is much improved. No diarrhea or abdominal pain.  PO intake improving as is energy. No infectious concerns. Some scattered bruising without injury but no bleeding.   Review of Systems: 10 point ROS negative except as noted above.  Physical Exam:   Blood pressure 104/57, pulse 102, temperature 97.8  F (36.6  C), temperature source Oral, weight 80.1 kg (176 lb 9.6 oz), SpO2 97 %.    Wt Readings from Last 5 Encounters:   04/04/17 80.1 kg (176 lb 9.6 oz)   04/03/17 80.3 kg (177 lb 1.6 oz)   04/02/17 80 kg (176 lb 4.8 oz)   04/01/17 79.3 kg (174 lb 14.4 oz)   03/31/17 79.4 kg (175 lb 0.7 oz)     General: NAD  Eyes: CALEB, sclera anicteric  Nose/Mouth/Throat: OP clear, buccal mucosa moist, no ulcerations.  Lungs: CTA bilaterally Cardiovascular: regular rhythm, no M/R/G  Abdominal/Rectal: +BS, soft, NT, ND, No HSM.   Lymphatics: no edema.   Skin: no rashes or petechiae.   Neuro: A&O.    Additional Findings: Barbour site NT, no drainage.     Labs:  Lab Results   Component Value Date    WBC 4.2 04/04/2017    ANEU 3.6 04/04/2017    HGB 8.3 (L) 04/04/2017    HCT 24.5 (L) 04/04/2017    PLT 18 (LL) 04/04/2017     (H) 04/04/2017    POTASSIUM 3.5 04/04/2017    CHLORIDE 108 04/04/2017    CO2 30 04/04/2017    GLC 87 04/04/2017    BUN 10 04/04/2017    CR 0.64 04/04/2017    MAG 2.0 03/27/2017    INR 1.01 03/20/2017       Assessment and Plan:   Steph Agee is a 42 year old female with Hodgkins disease who is day +13 s/p auto for Hodgkins disease.      1. BMT: Completed BEAM prep without issue. Cell dose=1.84 X10^6 CD34/kg from both PBSCT and bone marrow.   -GCSF daily until ANC>1500 x3 consecutive  days. Last day gcsf today 4/4/17.       2. HEME: Keep Hgb>8 and plts>10K. No pre-meds  - no transfusions today but plts are yet to start trending up (18k) - possible plts and rbcs tomorrow (hgb 8.3).   -Had been on Lovenox 40mg QD d/t hx of chronic IJ clot recently noted on 3/3 U/s. Now thrombocytopenic.  Per trini De Guzman to permanently d/c lovenox as chronic clot (last dose 3/22).       3. ID: Afebrile  - Low BP 3/29, blood cx negative.    -Cont vfend (hx of marijuana use), HD ACV (CMV+) and levaquin as prophy.   -Bactrim or appropriate PCP therapy to start d+28.       4.  GI: Continues to take qAM zofran but remainder of day no nausea    - Has had persistent nausea even before transplant that she would improved with smoking marijuana. Continues to have mostly nighttime nausea- controlled with prn ativan/zofran  - Stools are regular.    - Protonix for GI prophy.      5. FEN/Renal:   - Cr stable; encouraged increase dietary K intake.   - Given labs have been stable no need for BMP tomorrow.       6. CV: Cleared by cardiology d/t drop in EF. Cardiac MRI confirmed EF 47%. 3/29 Blood given at decreased rate  - Had been on Continue cozaar 12.5 daily (cardiomyopathy)- resumed 3/29 and bp not effected.     7. Psych: Hx of anxiety. Previously smoked daily marijuana and took ativan qHS. Ativan too sedating for daily use-per pt. Already on effexor, increase to 150mg daily (75).    8. : No issues today.   Vaginal irritation: per exam on 3/27, vaginal dryness - no concern for yeast infection, no erythema or discharge; recommend vaseline to vagina/external labia majora/minora prn for comfort.     Plan:   - Last day gcsf today. No BMP tomorrow as cr/lytes have been stable.   - RTC tomorrow as plts/hgb continue to trend down - possible  plts tomorrow.   - If counts look okay or if given plts likely can have Thursday off then RTC Friday.       Martha Lambert PA-C  [phone]

## 2017-04-04 NOTE — MR AVS SNAPSHOT
After Visit Summary   4/4/2017    Steph Agee    MRN: [id number]           Patient Information     Date Of Birth          1975        Visit Information        Provider Department      4/4/2017 10:00 AM UC 7 ATC; UC BMT INFUSION [facility] Blood and Marrow Transplant        Today's Diagnoses     Nodular lymphocyte predominant Hodgkin lymphoma, unspecified body region (H)    -  1          [facility] ([facility] [place]  [address] [place] [address]  Phone: [phone]  Clinic Hours:   Monday-Friday:    8am to 4:30pm   Weekends and holidays:    8am to noon (in general)  If your fever is 100.5  or greater,   call the clinic.  After hours call the   hospital at [phone] or   [phone]. Ask for the BMT   fellow for pediatric or adult patients            Follow-ups after your visit        Your next 10 appointments already scheduled     Apr 05, 2017  9:30 AM CDT   Masonic Lab Draw with UC MASONIC LAB DRAW   [facility] Masonic Lab Draw ([facility])    [address] [place] [address] [phone]            Apr 05, 2017 10:00 AM CDT   Return with UC BMT CECILIA #2   [facility] Blood and Marrow Transplant ([facility])    [address] [place] [address] [phone]            Apr 05, 2017 10:30 AM CDT   Infusion 120 with UC BMT INFUSION, UC 6 ATC   [facility] Blood and Marrow Transplant ([facility])    [address] [place] [address] [phone]            Apr 06, 2017  9:00 AM CDT   Masonic Lab Draw with UC MASONIC LAB DRAW   [facility] Masonic Lab Draw ([facility])    [address] [place] [address] [phone]            Apr 06, 2017  9:30 AM CDT   Return with UC BMT CECILIA #1   [facility] Blood and Marrow Transplant ([facility])    [address] [place] [address]  [address]  [address] [place] [address]   [phone]            Apr 06, 2017 10:00 AM CDT   Infusion 120 with UC BMT INFUSION, UC 6 ATC   [facility] Blood and Marrow Transplant ([facility])    [address] [place] [address]   [phone]            Apr 07, 2017  9:30 AM CDT   Masonic Lab Draw with UC MASONIC LAB DRAW   [facility] Masonic Lab Draw ([facility])    [address] [place] [address]   [phone]            Apr 07, 2017 10:00 AM CDT   Return with UC BMT CECILIA #2   [facility] Blood and Marrow Transplant ([facility])    [address] [place] [address]   [phone]              Future tests that were ordered for you today     Open Standing Orders        Priority Remaining Interval Expires Ordered    Platelets prepare order unit Routine 99/100 CONDITIONAL (SPECIFY) BLOOD  4/3/2017          Open Future Orders        Priority Expected Expires Ordered    ABO/Rh type and screen Routine 4/5/2017 4/6/2017 4/4/2017    CBC with platelets differential Routine 4/5/2017 4/6/2017 4/4/2017            Who to contact     If you have questions or need follow up information about today's clinic visit or your schedule please contact [facility] BLOOD AND MARROW TRANSPLANT directly at [phone].  Normal or non-critical lab and imaging results will be communicated to you by MyChart, letter or phone within 4 business days after the clinic has received the results. If you do not hear from us within 7 days, please contact the clinic through Xenaptohart or phone. If you have a critical or abnormal lab result, we will notify you by phone as soon as possible.  Submit refill requests through Kedzoh or call your pharmacy and they will forward the refill request to us. Please allow 3 business days for your refill to be completed.          Additional Information About Your Visit         [employer] Information     [employer] gives you secure access to your electronic health record. If you see a primary care provider, you can also send messages to your care team and make appointments. If you have questions, please call your primary care clinic.  If you do not have a primary care provider, please call [phone] and they will assist you.        Care EveryWhere ID     This is your Care EveryWhere ID. This could be used by other organizations to access your [facility] medical records  [id number]         Blood Pressure from Last 3 Encounters:   04/04/17 104/57   04/03/17 102/69   04/02/17 104/52    Weight from Last 3 Encounters:   04/04/17 80.1 kg (176 lb 9.6 oz)   04/03/17 80.3 kg (177 lb 1.6 oz)   04/02/17 80 kg (176 lb 4.8 oz)              We Performed the Following     Blood component     Platelets prepare order unit        Recent Review Flowsheet Data     BMT Recent Results Latest Ref Rng & Units 3/29/2017 3/30/2017 3/31/2017 4/1/2017 4/2/2017 4/3/2017 4/4/2017    WBC 4.0 - 11.0 10e9/L 0.1(LL) 0.1(LL) 0.3(LL) 0.6(LL) 1.2(L) 2.3(L) 4.2    Hemoglobin 11.7 - 15.7 g/dL 7.2(L) 7.8(L) 9.0(L) 8.7(L) 8.5(L) 8.5(L) 8.3(L)    Platelet Count 150 - 450 10e9/L 9(LL) 29(LL) 17(LL) 9(LL) 6(LL) 27(LL) 18(LL)    Neutrophils (Absolute) 1.6 - 8.3 10e9/L - - - 0.4(LL) 0.9(L) 1.9 3.6    INR 0.86 - 1.14 - - - - - - -    Sodium 133 - 144 mmol/L 142 141 145(H) 144 145(H) 145(H) 146(H)    Potassium 3.4 - 5.3 mmol/L 3.0(L) 3.2(L) 3.6 3.8 3.5 3.4 3.5    Chloride 94 - 109 mmol/L 103 103 109 106 108 109 108    Glucose 70 - 99 mg/dL 101(H) 91 82 83 86 89 87    Urea Nitrogen 7 - 30 mg/dL 13 9 9 10 12 11 10    Creatinine 0.52 - 1.04 mg/dL 0.57 0.59 0.57 0.62 0.75 0.55 0.64    Calcium (Total) 8.5 - 10.1 mg/dL 8.0(L) 8.1(L) 7.9(L) 8.1(L) 7.8(L) 8.4(L) 8.2(L)    Protein (Total) 6.8 - 8.8 g/dL - - - - - 6.2(L) 6.3(L)    Albumin 3.4 - 5.0 g/dL - - - - - 3.0(L) 3.1(L)    Bilirubin (Direct) 0.0 - 0.2 mg/dL - - - - - - -    Alkaline  Phosphatase 40 - 150 U/L - - - - - 143 141    AST 0 - 45 U/L - - - - - 12 16    ALT 0 - 50 U/L - - - - - 20 20    MCV 78 - 100 fl 91 90 90 91 91 91 93               Primary Care Provider Office Phone # Fax #    Travis Carpenter [phone]       [facility] [address] [place] [address]        Thank you!     Thank you for choosing [facility] BLOOD AND MARROW TRANSPLANT  for your care. Our goal is always to provide you with excellent care. Hearing back from our patients is one way we can continue to improve our services. Please take a few minutes to complete the written survey that you may receive in the mail after your visit with us. Thank you!             Your Updated Medication List - Protect others around you: Learn how to safely use, store and throw away your medicines at www.disposemymeds.org.          This list is accurate as of: 4/4/17 10:10 AM.  Always use your most recent med list.                   Brand Name Dispense Instructions for use    acyclovir 800 MG tablet    ZOVIRAX    150 tablet    Take 1 tablet (800 mg) by mouth 5 times daily       gabapentin 100 MG capsule    NEURONTIN    90 capsule    Take 2 capsules (200 mg) by mouth 3 times daily       levofloxacin 250 MG tablet    LEVAQUIN    30 tablet    Take 1 tablet (250 mg) by mouth daily       LORazepam 0.5 MG tablet    ATIVAN    60 tablet    Take 0.5 tablets (0.25 mg) by mouth 3 times daily (before meals)       losartan 25 MG tablet    COZAAR    30 tablet    Take 0.5 tablets (12.5 mg) by mouth At Bedtime       ondansetron 4 MG tablet    ZOFRAN    18 tablet    Take 1 tablet (4 mg) by mouth every 6 hours as needed for nausea or vomiting       oxyCODONE 5 MG IR tablet    ROXICODONE    30 tablet    Take 1-2 tablets (5-10 mg) by mouth every 4 hours as needed for moderate to severe pain       pantoprazole 40 MG EC tablet    PROTONIX    30 tablet    Take 1 tablet (40 mg) by mouth daily        sulfamethoxazole-trimethoprim 800-160 MG per tablet   Start taking on:  4/24/2017    BACTRIM DS/SEPTRA DS    30 tablet    Take 1 tablet by mouth Every Mon, Tues two times daily       venlafaxine 150 MG Tb24 24 hr tablet    EFFEXOR-ER    30 each    Take 1 tablet (150 mg) by mouth At Bedtime       voriconazole 200 MG tablet    VFEND    60 tablet    Take 1 tablet (200 mg) by mouth every 12 hours

## 2017-04-05 ENCOUNTER — ONCOLOGY VISIT (OUTPATIENT)
Dept: TRANSPLANT | Facility: CLINIC | Age: 42
End: 2017-04-05
Attending: INTERNAL MEDICINE
Payer: COMMERCIAL

## 2017-04-05 ENCOUNTER — INFUSION THERAPY VISIT (OUTPATIENT)
Dept: TRANSPLANT | Facility: CLINIC | Age: 42
End: 2017-04-05
Attending: STUDENT IN AN ORGANIZED HEALTH CARE EDUCATION/TRAINING PROGRAM
Payer: COMMERCIAL

## 2017-04-05 VITALS
HEART RATE: 100 BPM | OXYGEN SATURATION: 94 % | DIASTOLIC BLOOD PRESSURE: 63 MMHG | SYSTOLIC BLOOD PRESSURE: 109 MMHG | BODY MASS INDEX: 32.6 KG/M2 | TEMPERATURE: 97.9 F | RESPIRATION RATE: 18 BRPM | WEIGHT: 178.3 LBS

## 2017-04-05 VITALS
SYSTOLIC BLOOD PRESSURE: 109 MMHG | DIASTOLIC BLOOD PRESSURE: 63 MMHG | WEIGHT: 178.3 LBS | RESPIRATION RATE: 18 BRPM | TEMPERATURE: 97.9 F | HEART RATE: 100 BPM | BODY MASS INDEX: 32.6 KG/M2 | OXYGEN SATURATION: 94 %

## 2017-04-05 DIAGNOSIS — C81.00 NODULAR LYMPHOCYTE PREDOMINANT HODGKIN LYMPHOMA, UNSPECIFIED BODY REGION (H): Primary | ICD-10-CM

## 2017-04-05 DIAGNOSIS — C81.10 NODULAR SCLEROSING HODGKIN'S LYMPHOMA, UNSPECIFIED BODY REGION (H): ICD-10-CM

## 2017-04-05 LAB
BASOPHILS # BLD AUTO: 0 10E9/L (ref 0–0.2)
BASOPHILS NFR BLD AUTO: 0 %
BLD PROD TYP BPU: NORMAL
BLD UNIT ID BPU: 0
BLOOD PRODUCT CODE: NORMAL
BPU ID: NORMAL
DIFFERENTIAL METHOD BLD: ABNORMAL
EOSINOPHIL # BLD AUTO: 0 10E9/L (ref 0–0.7)
EOSINOPHIL NFR BLD AUTO: 0 %
ERYTHROCYTE [DISTWIDTH] IN BLOOD BY AUTOMATED COUNT: 14.3 % (ref 10–15)
HCT VFR BLD AUTO: 24.6 % (ref 35–47)
HGB BLD-MCNC: 8.4 G/DL (ref 11.7–15.7)
LYMPHOCYTES # BLD AUTO: 0.7 10E9/L (ref 0.8–5.3)
LYMPHOCYTES NFR BLD AUTO: 8.7 %
MCH RBC QN AUTO: 31.7 PG (ref 26.5–33)
MCHC RBC AUTO-ENTMCNC: 34.1 G/DL (ref 31.5–36.5)
MCV RBC AUTO: 93 FL (ref 78–100)
METAMYELOCYTES # BLD: 0.1 10E9/L
METAMYELOCYTES NFR BLD MANUAL: 1.7 %
MONOCYTES # BLD AUTO: 0.3 10E9/L (ref 0–1.3)
MONOCYTES NFR BLD AUTO: 4.3 %
NEUTROPHILS # BLD AUTO: 6.4 10E9/L (ref 1.6–8.3)
NEUTROPHILS NFR BLD AUTO: 85.3 %
PLATELET # BLD AUTO: 16 10E9/L (ref 150–450)
RBC # BLD AUTO: 2.65 10E12/L (ref 3.8–5.2)
RBC MORPH BLD: NORMAL
TRANSFUSION STATUS PATIENT QL: NORMAL
WBC # BLD AUTO: 7.5 10E9/L (ref 4–11)

## 2017-04-05 PROCEDURE — 85025 COMPLETE CBC W/AUTO DIFF WBC: CPT | Performed by: PHYSICIAN ASSISTANT

## 2017-04-05 PROCEDURE — 86901 BLOOD TYPING SEROLOGIC RH(D): CPT | Performed by: PHYSICIAN ASSISTANT

## 2017-04-05 PROCEDURE — 86923 COMPATIBILITY TEST ELECTRIC: CPT | Performed by: PHYSICIAN ASSISTANT

## 2017-04-05 PROCEDURE — 40000268 ZZH STATISTIC NO CHARGES: Mod: ZF

## 2017-04-05 PROCEDURE — 86900 BLOOD TYPING SEROLOGIC ABO: CPT | Performed by: PHYSICIAN ASSISTANT

## 2017-04-05 PROCEDURE — 25000125 ZZHC RX 250: Performed by: INTERNAL MEDICINE

## 2017-04-05 PROCEDURE — 99212 OFFICE O/P EST SF 10 MIN: CPT

## 2017-04-05 PROCEDURE — 86850 RBC ANTIBODY SCREEN: CPT | Performed by: PHYSICIAN ASSISTANT

## 2017-04-05 PROCEDURE — 36592 COLLECT BLOOD FROM PICC: CPT

## 2017-04-05 RX ORDER — HEPARIN SODIUM,PORCINE 10 UNIT/ML
5 VIAL (ML) INTRAVENOUS
Status: DISCONTINUED | OUTPATIENT
Start: 2017-04-05 | End: 2017-04-13 | Stop reason: HOSPADM

## 2017-04-05 RX ADMIN — SODIUM CHLORIDE, PRESERVATIVE FREE 5 ML: 5 INJECTION INTRAVENOUS at 10:06

## 2017-04-05 RX ADMIN — SODIUM CHLORIDE, PRESERVATIVE FREE 5 ML: 5 INJECTION INTRAVENOUS at 10:05

## 2017-04-05 ASSESSMENT — PAIN SCALES - GENERAL: PAINLEVEL: NO PAIN (0)

## 2017-04-05 NOTE — NURSING NOTE
BMT Heme Malignancy Rooming Note    Steph LUNDBERG Anuel - 4/5/2017 10:16 AM     Chief Complaint   Patient presents with     RECHECK     post bmt for hodgkin lymphoma - here for provider visit follow up        /63 (BP Location: Right arm)  Pulse 100  Temp 97.9  F (36.6  C) (Oral)  Resp 18  Wt 80.9 kg (178 lb 4.8 oz)  SpO2 94%  BMI 32.6 kg/m2     Medications reviewed: Yes    Labs drawn: No    Dressing changed: No     Medications given: No    Staff time:6 minutes    Additional information if applicable: TONNY Lopez MA

## 2017-04-05 NOTE — NURSING NOTE
Chief Complaint   Patient presents with     Blood Draw     hx hodgkin's lymphoma.  labs drawn by rn from cvc.  vs taken.     Pt with history of hodgkin's lymphoma. [facility] accessed by RN, labs drawn.  Flushed with saline and heparin.  Vs taken.  Pt checked in to next appt.  Alycia Collins RN

## 2017-04-05 NOTE — PROGRESS NOTES
BMT Daily Progress Note     ID: Steph Agee is a 42 year old female with Hodgkins disease who is day +14 s/p auto for Hodgkins disease.    HPI: Steph is feeling quite well.  She has no infectious concerns. Some scattered bruising without injury but no bleeding; she has a large bruise on her posterior upper arm on the left, unsure how she got it, really.  No petechiae or other bleeding.  Eating and drinking fine.  No fevers. No rash.  No GIT.   Review of Systems: 10 point ROS negative except as noted above.  Physical Exam:   Blood pressure 109/63, pulse 100, temperature 97.9  F (36.6  C), temperature source Oral, resp. rate 18, weight 80.9 kg (178 lb 4.8 oz), SpO2 94 %.    Wt Readings from Last 5 Encounters:   04/05/17 80.9 kg (178 lb 4.8 oz)   04/05/17 80.9 kg (178 lb 4.8 oz)   04/04/17 80.1 kg (176 lb 9.6 oz)   04/03/17 80.3 kg (177 lb 1.6 oz)   04/02/17 80 kg (176 lb 4.8 oz)     General: NAD  Eyes: CALEB, sclera anicteric  Nose/Mouth/Throat: OP clear, buccal mucosa moist, no ulcerations.  Lungs: CTA bilaterally Cardiovascular: regular rhythm, no M/R/G  Abdominal/Rectal: +BS, soft, NT, ND, No HSM.   Lymphatics: no edema.   Skin: no rashes or petechiae.  Large bruising left posterior upper arm.  Neuro: A&O.    Additional Findings: Barbour site NT, no drainage.     Labs:  Lab Results   Component Value Date    WBC 7.5 04/05/2017    ANEU 6.4 04/05/2017    HGB 8.4 (L) 04/05/2017    HCT 24.6 (L) 04/05/2017    PLT 16 (LL) 04/05/2017     (H) 04/04/2017    POTASSIUM 3.5 04/04/2017    CHLORIDE 108 04/04/2017    CO2 30 04/04/2017    GLC 87 04/04/2017    BUN 10 04/04/2017    CR 0.64 04/04/2017    MAG 2.0 03/27/2017    INR 1.01 03/20/2017       Assessment and Plan:   Steph Agee is a 42 year old female with Hodgkins disease who is day +14 s/p auto for Hodgkins disease.      1. BMT: Completed BEAM prep without issue. Cell dose=1.84 X10^6 CD34/kg from both PBSCT and bone marrow.   -GCSF done 4/4/17.       2. HEME:  Keep Hgb>8 and plts>10K. No pre-meds  - no transfusions today   -Had been on Lovenox 40mg QD d/t hx of chronic IJ clot recently noted on 3/3 U/s. Now thrombocytopenic.  Per Dr. Gill, ok to permanently d/c lovenox as chronic clot (last dose 3/22).       3. ID: Afebrile  - Low BP 3/29, blood cx finalized negative.   -Cont vfend (hx of marijuana use), HD ACV (CMV+) and levaquin as prophy.   -Bactrim or appropriate PCP therapy to start d+28.       4.  GI: Continues to take qAM zofran and q middle of the night ativan, but remainder of day no nausea  - Stools are regular.    - Protonix for GI prophy.      5. FEN/Renal:   - no BMP today      6. CV: Cleared by cardiology d/t drop in EF. Cardiac MRI confirmed EF 47%. 3/29 Blood given at decreased rate  - Had been on Continue cozaar 12.5 daily (cardiomyopathy)- resumed 3/29 and bp not effected.     7. Psych: Hx of anxiety. Previously smoked daily marijuana and took ativan qHS. Ativan too sedating for daily use-per pt. Already on effexor, increase to 150mg daily (75).    8. : Not discussed 4/5.   Vaginal irritation: per exam on 3/27, vaginal dryness - no concern for yeast infection, no erythema or discharge; recommend vaseline to vagina/external labia majora/minora prn for comfort.     Plan:   - if doing well, plan for this weekend off.     Lluvia AKBAR Carrier  4/5/2017

## 2017-04-05 NOTE — PROGRESS NOTES
Chief Complaint   Patient presents with     Infusion     possible transfusion s/p bmt txp for Hodgkin's lymphoma     Labs were monitored and no replacements needed.

## 2017-04-05 NOTE — MR AVS SNAPSHOT
After Visit Summary   4/5/2017    Steph Agee    MRN: [id number]           Patient Information     Date Of Birth          1975        Visit Information        Provider Department      4/5/2017 10:30 AM UC 6 ATC; UC BMT INFUSION [facility] Blood and Marrow Transplant        Today's Diagnoses     Nodular lymphocyte predominant Hodgkin lymphoma, unspecified body region (H)    -  1          [facility] ([facility])  [address] [place] [address]  Phone: [phone]  Clinic Hours:   Monday-Friday:    8am to 4:30pm   Weekends and holidays:    8am to noon (in general)  If your fever is 100.5  or greater,   call the clinic.  After hours call the   hospital at [phone] or   [phone]. Ask for the BMT   fellow for pediatric or adult patients            Follow-ups after your visit        Your next 10 appointments already scheduled     Apr 07, 2017  9:30 AM CDT   Masonic Lab Draw with UC MASONIC LAB DRAW   [facility] Masonic Lab Draw ([facility])    [address] [place] [id number] [phone]            Apr 07, 2017 10:00 AM CDT   Return with UC BMT CECILIA #2   [facility] Blood and Marrow Transplant ([facility])    [address] [place] [id number] [phone]            Apr 07, 2017 10:30 AM CDT   Infusion 120 with UC BMT INFUSION, UC 8 ATC   [facility] Blood and Marrow Transplant ([facility])    [address] [place] [id number] [phone]            Apr 19, 2017 11:40 AM CDT   (Arrive by 11:25 AM)   CT CHEST/ABDOMEN/PELVIS W CONTRAST with UCCT2   [facility] Imaging [place] [facility] ([facility])    [address] [place] [address]  1st [facility] [id number] [phone]           Please bring any scans or X-rays taken at other hospitals, if similar tests were done. Also bring a list of your medicines,  including vitamins, minerals and over-the-counter drugs. It is safest to leave personal items at home.  Be sure to tell your doctor:   If you have any allergies.   If there s any chance you are pregnant.   If you are breastfeeding.   If you have any special needs.  You may have contrast for this exam. To prepare:   Do not eat or drink for 2 hours before your exam. If you need to take medicine, you may take it with small sips of water. (We may ask you to take liquid medicine as well.)   The day before your exam, drink extra fluids at least six 8-ounce glasses (unless your doctor tells you to restrict your fluids).  Patients over 70 or patients with diabetes or kidney problems:   If you haven t had a blood test (creatinine test) within the last 30 days, go to your clinic or Diagnostic Imaging Department for this test.  If you have diabetes:   If your kidney function is normal, continue taking your metformin (Avandamet, Glucophage, Glucovance, Metaglip) on the day of your exam.   If your kidney function is abnormal, wait 48 hours before restarting this medicine.  You will have oral contrast for this exam:   You will drink the contrast at home. Get this from your clinic or Diagnostic Imaging Department. Please follow the directions given.  Please wear loose clothing, such as a sweat suit or jogging clothes. Avoid snaps, zippers and other metal. We may ask you to undress and put on a hospital gown.  If you have any questions, please call the Imaging Department where you will have your exam.            Apr 19, 2017 12:30 PM CDT   Masonic Lab Draw with  MASONIC LAB DRAW   [facility] Masonic Lab Draw ([facility])    [address] [place] [phone]   [phone]            Apr 19, 2017  1:00 PM CDT   Bone Marrow Biopsy with  BMT CECILIA #3, UU BONE MARROW BIOPSY   [facility] Blood and Marrow Transplant ([facility])    [address] [place] [address]  Floor  [facility] [id number] [phone]            Apr 26, 2017 11:30 AM CDT   BMT Anniversary Visit with Obed Chambers MD   [facility] Blood and Marrow Transplant ([facility])    [address] [place] [address]  2nd [facility] [id number] [phone]            May 24, 2017  9:30 AM CDT   Masonic Lab Draw with  MASONIC LAB DRAW   [facility] Masonic Lab Draw ([facility])    [address] [facility]  2nd [facility] [id number] [phone]              Future tests that were ordered for you today     Open Standing Orders        Priority Remaining Interval Expires Ordered    Red blood cell prepare order unit Routine 99/100 CONDITIONAL (SPECIFY) BLOOD  4/4/2017    Platelets prepare order unit Routine 99/100 CONDITIONAL (SPECIFY) BLOOD  4/4/2017            Who to contact     If you have questions or need follow up information about today's clinic visit or your schedule please contact [facility] BLOOD AND MARROW TRANSPLANT directly at [phone].  Normal or non-critical lab and imaging results will be communicated to you by Epitirohart, letter or phone within 4 business days after the clinic has received the results. If you do not hear from us within 7 days, please contact the clinic through Yuuguu or phone. If you have a critical or abnormal lab result, we will notify you by phone as soon as possible.  Submit refill requests through Yuuguu or call your pharmacy and they will forward the refill request to us. Please allow 3 business days for your refill to be completed.          Additional Information About Your Visit        Yuuguu Information     Yuuguu gives you secure access to your electronic health record. If you see a primary care provider, you can also send messages to your care team and make appointments. If you have questions, please call your primary care clinic.  If you do not have a primary care provider, please call [phone] and they  will assist you.        Care EveryWhere ID     This is your Care EveryWhere ID. This could be used by other organizations to access your [place] medical records  [id number]         Blood Pressure from Last 3 Encounters:   04/05/17 109/63   04/05/17 109/63   04/04/17 104/57    Weight from Last 3 Encounters:   04/05/17 80.9 kg (178 lb 4.8 oz)   04/05/17 80.9 kg (178 lb 4.8 oz)   04/04/17 80.1 kg (176 lb 9.6 oz)              We Performed the Following     Blood component     Platelets prepare order unit     Red blood cell prepare order unit        Recent Review Flowsheet Data     BMT Recent Results Latest Ref Rng & Units 3/30/2017 3/31/2017 4/1/2017 4/2/2017 4/3/2017 4/4/2017 4/5/2017    WBC 4.0 - 11.0 10e9/L 0.1(LL) 0.3(LL) 0.6(LL) 1.2(L) 2.3(L) 4.2 7.5    Hemoglobin 11.7 - 15.7 g/dL 7.8(L) 9.0(L) 8.7(L) 8.5(L) 8.5(L) 8.3(L) 8.4(L)    Platelet Count 150 - 450 10e9/L 29(LL) 17(LL) 9(LL) 6(LL) 27(LL) 18(LL) 16(LL)    Neutrophils (Absolute) 1.6 - 8.3 10e9/L - - 0.4(LL) 0.9(L) 1.9 3.6 6.4    INR 0.86 - 1.14 - - - - - - -    Sodium 133 - 144 mmol/L 141 145(H) 144 145(H) 145(H) 146(H) -    Potassium 3.4 - 5.3 mmol/L 3.2(L) 3.6 3.8 3.5 3.4 3.5 -    Chloride 94 - 109 mmol/L 103 109 106 108 109 108 -    Glucose 70 - 99 mg/dL 91 82 83 86 89 87 -    Urea Nitrogen 7 - 30 mg/dL 9 9 10 12 11 10 -    Creatinine 0.52 - 1.04 mg/dL 0.59 0.57 0.62 0.75 0.55 0.64 -    Calcium (Total) 8.5 - 10.1 mg/dL 8.1(L) 7.9(L) 8.1(L) 7.8(L) 8.4(L) 8.2(L) -    Protein (Total) 6.8 - 8.8 g/dL - - - - 6.2(L) 6.3(L) -    Albumin 3.4 - 5.0 g/dL - - - - 3.0(L) 3.1(L) -    Bilirubin (Direct) 0.0 - 0.2 mg/dL - - - - - - -    Alkaline Phosphatase 40 - 150 U/L - - - - 143 141 -    AST 0 - 45 U/L - - - - 12 16 -    ALT 0 - 50 U/L - - - - 20 20 -    MCV 78 - 100 fl 90 90 91 91 91 93 93               Primary Care Provider Office Phone # Fax #    Travis Carpenter [phone]       [facility] [address]  [place]  [address]        Thank you!     Thank you for choosing [facility] BLOOD AND MARROW TRANSPLANT  for your care. Our goal is always to provide you with excellent care. Hearing back from our patients is one way we can continue to improve our services. Please take a few minutes to complete the written survey that you may receive in the mail after your visit with us. Thank you!             Your Updated Medication List - Protect others around you: Learn how to safely use, store and throw away your medicines at www.disposemymeds.org.          This list is accurate as of: 4/5/17 10:42 AM.  Always use your most recent med list.                   Brand Name Dispense Instructions for use    acyclovir 800 MG tablet    ZOVIRAX    150 tablet    Take 1 tablet (800 mg) by mouth 5 times daily       gabapentin 100 MG capsule    NEURONTIN    90 capsule    Take 2 capsules (200 mg) by mouth 3 times daily       levofloxacin 250 MG tablet    LEVAQUIN    30 tablet    Take 1 tablet (250 mg) by mouth daily       LORazepam 0.5 MG tablet    ATIVAN    60 tablet    Take 0.5 tablets (0.25 mg) by mouth 3 times daily (before meals)       losartan 25 MG tablet    COZAAR    30 tablet    Take 0.5 tablets (12.5 mg) by mouth At Bedtime       ondansetron 4 MG tablet    ZOFRAN    18 tablet    Take 1 tablet (4 mg) by mouth every 6 hours as needed for nausea or vomiting       oxyCODONE 5 MG IR tablet    ROXICODONE    30 tablet    Take 1-2 tablets (5-10 mg) by mouth every 4 hours as needed for moderate to severe pain       pantoprazole 40 MG EC tablet    PROTONIX    30 tablet    Take 1 tablet (40 mg) by mouth daily       sulfamethoxazole-trimethoprim 800-160 MG per tablet   Start taking on:  4/24/2017    BACTRIM DS/SEPTRA DS    30 tablet    Take 1 tablet by mouth Every Mon, Tues two times daily       venlafaxine 150 MG Tb24 24 hr tablet    EFFEXOR-ER    30 each    Take 1 tablet (150 mg) by mouth At Bedtime       voriconazole 200 MG tablet    VFEND    60 tablet     Take 1 tablet (200 mg) by mouth every 12 hours

## 2017-04-05 NOTE — MR AVS SNAPSHOT
After Visit Summary   4/5/2017    Steph Agee    MRN: [id number]           Patient Information     Date Of Birth          1975        Visit Information        Provider Department      4/5/2017 10:00 AM UC BMT CECILIA #2 [facility] Blood and Marrow Transplant        Today's Diagnoses     Nodular sclerosing Hodgkin's lymphoma, unspecified body region (H)              Clinics and Surgery Center ([facility])  [address] [place] [address]  Phone: [phone]  Clinic Hours:   Monday-Friday:    8am to 4:30pm   Weekends and holidays:    8am to noon (in general)  If your fever is 100.5  or greater,   call the clinic.  After hours call the   hospital at [phone] or   [phone]. Ask for the BMT   fellow for pediatric or adult patients           Care Instructions    4/7 930am arrival with labs,visit infusion         Follow-ups after your visit        Your next 10 appointments already scheduled     Apr 07, 2017  9:30 AM CDT   Masonic Lab Draw with  MASONIC LAB DRAW   [facility] Masonic Lab Draw ([facility])    [address] [place] [id number] [phone]            Apr 07, 2017 10:00 AM CDT   Return with  BMT CECILIA #2   [facility] Blood and Marrow Transplant ([facility])    [address] [place] [address]   [phone]            Apr 07, 2017 10:30 AM CDT   Infusion 120 with UC BMT INFUSION, UC 8 ATC   [facility] Blood and Marrow Transplant ([facility])    [address] [place] [address]   [phone]            Apr 19, 2017 11:40 AM CDT   (Arrive by 11:25 AM)   CT CHEST/ABDOMEN/PELVIS W CONTRAST with UCCT2   [facility] Imaging [place] [facility] ([facility])    [address] [place] [phone] [phone]           Please bring any scans or X-rays taken at other hospitals, if similar tests were done.  Also bring a list of your medicines, including vitamins, minerals and over-the-counter drugs. It is safest to leave personal items at home.  Be sure to tell your doctor:   If you have any allergies.   If there s any chance you are pregnant.   If you are breastfeeding.   If you have any special needs.  You may have contrast for this exam. To prepare:   Do not eat or drink for 2 hours before your exam. If you need to take medicine, you may take it with small sips of water. (We may ask you to take liquid medicine as well.)   The day before your exam, drink extra fluids at least six 8-ounce glasses (unless your doctor tells you to restrict your fluids).  Patients over 70 or patients with diabetes or kidney problems:   If you haven t had a blood test (creatinine test) within the last 30 days, go to your clinic or Diagnostic Imaging Department for this test.  If you have diabetes:   If your kidney function is normal, continue taking your metformin (Avandamet, Glucophage, Glucovance, Metaglip) on the day of your exam.   If your kidney function is abnormal, wait 48 hours before restarting this medicine.  You will have oral contrast for this exam:   You will drink the contrast at home. Get this from your clinic or Diagnostic Imaging Department. Please follow the directions given.  Please wear loose clothing, such as a sweat suit or jogging clothes. Avoid snaps, zippers and other metal. We may ask you to undress and put on a hospital gown.  If you have any questions, please call the Imaging Department where you will have your exam.            Apr 19, 2017 12:30 PM CDT   Webjamonic Lab Draw with  YourTeamOnline LAB DRAW   [facility] Masonic Lab Draw ([facility])    [address] [phone] [phone]            Apr 19, 2017  1:00 PM CDT   Bone Marrow Biopsy with  BMT CECILIA #3, UU BONE MARROW BIOPSY   [facility] Blood and Marrow Transplant ([facility])     [address] [phone]            Apr 26, 2017 11:30 AM CDT   BMT Anniversary Visit with Obed Chambers MD   [facility] Blood and Marrow Transplant ([facility])    [address] [place] [phone] [phone]            May 24, 2017  9:30 AM CDT   Masonic Lab Draw with  MASONIC LAB DRAW   [facility] Masonic Lab Draw ([facility])    [address] [phone]              Who to contact     If you have questions or need follow up information about today's clinic visit or your schedule please contact [facility] BLOOD AND MARROW TRANSPLANT directly at [phone].  Normal or non-critical lab and imaging results will be communicated to you by Meijobhart, letter or phone within 4 business days after the clinic has received the results. If you do not hear from us within 7 days, please contact the clinic through Meijobhart or phone. If you have a critical or abnormal lab result, we will notify you by phone as soon as possible.  Submit refill requests through Tok3n or call your pharmacy and they will forward the refill request to us. Please allow 3 business days for your refill to be completed.          Additional Information About Your Visit        MyChart Information     Tok3n gives you secure access to your electronic health record. If you see a primary care provider, you can also send messages to your care team and make appointments. If you have questions, please call your primary care clinic.  If you do not have a primary care provider, please call [phone] and they will assist you.        Care EveryWhere ID     This is your Care EveryWhere ID. This could be used by other organizations to access your [facility] medical records  [id number]        Your Vitals Were     Pulse Temperature Respirations Pulse Oximetry BMI (Body Mass Index)       100  97.9  F (36.6  C) (Oral) 18 94% 32.6 kg/m2        Blood Pressure from Last 3 Encounters:   04/05/17 109/63   04/05/17 109/63   04/04/17 104/57    Weight from Last 3 Encounters:   04/05/17 80.9 kg (178 lb 4.8 oz)   04/05/17 80.9 kg (178 lb 4.8 oz)   04/04/17 80.1 kg (176 lb 9.6 oz)              We Performed the Following     ABO/Rh type and screen     CBC with platelets differential        Recent Review Flowsheet Data     BMT Recent Results Latest Ref Rng & Units 3/30/2017 3/31/2017 4/1/2017 4/2/2017 4/3/2017 4/4/2017 4/5/2017    WBC 4.0 - 11.0 10e9/L 0.1(LL) 0.3(LL) 0.6(LL) 1.2(L) 2.3(L) 4.2 7.5    Hemoglobin 11.7 - 15.7 g/dL 7.8(L) 9.0(L) 8.7(L) 8.5(L) 8.5(L) 8.3(L) 8.4(L)    Platelet Count 150 - 450 10e9/L 29(LL) 17(LL) 9(LL) 6(LL) 27(LL) 18(LL) 16(LL)    Neutrophils (Absolute) 1.6 - 8.3 10e9/L - - 0.4(LL) 0.9(L) 1.9 3.6 6.4    INR 0.86 - 1.14 - - - - - - -    Sodium 133 - 144 mmol/L 141 145(H) 144 145(H) 145(H) 146(H) -    Potassium 3.4 - 5.3 mmol/L 3.2(L) 3.6 3.8 3.5 3.4 3.5 -    Chloride 94 - 109 mmol/L 103 109 106 108 109 108 -    Glucose 70 - 99 mg/dL 91 82 83 86 89 87 -    Urea Nitrogen 7 - 30 mg/dL 9 9 10 12 11 10 -    Creatinine 0.52 - 1.04 mg/dL 0.59 0.57 0.62 0.75 0.55 0.64 -    Calcium (Total) 8.5 - 10.1 mg/dL 8.1(L) 7.9(L) 8.1(L) 7.8(L) 8.4(L) 8.2(L) -    Protein (Total) 6.8 - 8.8 g/dL - - - - 6.2(L) 6.3(L) -    Albumin 3.4 - 5.0 g/dL - - - - 3.0(L) 3.1(L) -    Bilirubin (Direct) 0.0 - 0.2 mg/dL - - - - - - -    Alkaline Phosphatase 40 - 150 U/L - - - - 143 141 -    AST 0 - 45 U/L - - - - 12 16 -    ALT 0 - 50 U/L - - - - 20 20 -    MCV 78 - 100 fl 90 90 91 91 91 93 93               Primary Care Provider Office Phone # Fax #    Travis Carpenter [phone]       [facility] [address] [address]        Thank you!     Thank you for choosing [facility] BLOOD AND MARROW TRANSPLANT  for your care. Our goal is always to provide you with excellent care.  Hearing back from our patients is one way we can continue to improve our services. Please take a few minutes to complete the written survey that you may receive in the mail after your visit with us. Thank you!             Your Updated Medication List - Protect others around you: Learn how to safely use, store and throw away your medicines at www.disposemymeds.org.          This list is accurate as of: 4/5/17  6:05 PM.  Always use your most recent med list.                   Brand Name Dispense Instructions for use    acyclovir 800 MG tablet    ZOVIRAX    150 tablet    Take 1 tablet (800 mg) by mouth 5 times daily       gabapentin 100 MG capsule    NEURONTIN    90 capsule    Take 2 capsules (200 mg) by mouth 3 times daily       levofloxacin 250 MG tablet    LEVAQUIN    30 tablet    Take 1 tablet (250 mg) by mouth daily       LORazepam 0.5 MG tablet    ATIVAN    60 tablet    Take 0.5 tablets (0.25 mg) by mouth 3 times daily (before meals)       losartan 25 MG tablet    COZAAR    30 tablet    Take 0.5 tablets (12.5 mg) by mouth At Bedtime       ondansetron 4 MG tablet    ZOFRAN    18 tablet    Take 1 tablet (4 mg) by mouth every 6 hours as needed for nausea or vomiting       oxyCODONE 5 MG IR tablet    ROXICODONE    30 tablet    Take 1-2 tablets (5-10 mg) by mouth every 4 hours as needed for moderate to severe pain       pantoprazole 40 MG EC tablet    PROTONIX    30 tablet    Take 1 tablet (40 mg) by mouth daily       sulfamethoxazole-trimethoprim 800-160 MG per tablet   Start taking on:  4/24/2017    BACTRIM DS/SEPTRA DS    30 tablet    Take 1 tablet by mouth Every Mon, Tues two times daily       venlafaxine 150 MG Tb24 24 hr tablet    EFFEXOR-ER    30 each    Take 1 tablet (150 mg) by mouth At Bedtime       voriconazole 200 MG tablet    VFEND    60 tablet    Take 1 tablet (200 mg) by mouth every 12 hours

## 2017-04-06 LAB
ABO + RH BLD: NORMAL
ABO + RH BLD: NORMAL
BLD GP AB SCN SERPL QL: NORMAL
BLD PROD TYP BPU: NORMAL
BLD PROD TYP BPU: NORMAL
BLOOD BANK CMNT PATIENT-IMP: NORMAL
NUM BPU REQUESTED: 1
NUM BPU REQUESTED: 2
SPECIMEN EXP DATE BLD: NORMAL

## 2017-04-06 RX ORDER — ACETAMINOPHEN 325 MG/1
650 TABLET ORAL ONCE
Status: CANCELLED
Start: 2017-04-06 | End: 2017-04-06

## 2017-04-06 RX ORDER — DIPHENHYDRAMINE HCL 25 MG
25 CAPSULE ORAL ONCE
Status: CANCELLED
Start: 2017-04-06 | End: 2017-04-06

## 2017-04-07 ENCOUNTER — ONCOLOGY VISIT (OUTPATIENT)
Dept: TRANSPLANT | Facility: CLINIC | Age: 42
End: 2017-04-07
Attending: INTERNAL MEDICINE
Payer: COMMERCIAL

## 2017-04-07 ENCOUNTER — INFUSION THERAPY VISIT (OUTPATIENT)
Dept: TRANSPLANT | Facility: CLINIC | Age: 42
End: 2017-04-07
Attending: STUDENT IN AN ORGANIZED HEALTH CARE EDUCATION/TRAINING PROGRAM
Payer: COMMERCIAL

## 2017-04-07 VITALS
OXYGEN SATURATION: 96 % | HEART RATE: 80 BPM | TEMPERATURE: 97.4 F | RESPIRATION RATE: 14 BRPM | BODY MASS INDEX: 32.02 KG/M2 | SYSTOLIC BLOOD PRESSURE: 98 MMHG | WEIGHT: 175.1 LBS | DIASTOLIC BLOOD PRESSURE: 62 MMHG

## 2017-04-07 DIAGNOSIS — C81.40: ICD-10-CM

## 2017-04-07 DIAGNOSIS — C81.00 NODULAR LYMPHOCYTE PREDOMINANT HODGKIN LYMPHOMA, UNSPECIFIED BODY REGION (H): Primary | ICD-10-CM

## 2017-04-07 DIAGNOSIS — C81.10 NODULAR SCLEROSING HODGKIN'S LYMPHOMA, UNSPECIFIED BODY REGION (H): Primary | ICD-10-CM

## 2017-04-07 LAB
ANION GAP SERPL CALCULATED.3IONS-SCNC: 8 MMOL/L (ref 3–14)
BASOPHILS # BLD AUTO: 0 10E9/L (ref 0–0.2)
BASOPHILS NFR BLD AUTO: 0.3 %
BLD PROD TYP BPU: NORMAL
BLD UNIT ID BPU: 0
BLOOD PRODUCT CODE: NORMAL
BPU ID: NORMAL
BUN SERPL-MCNC: 12 MG/DL (ref 7–30)
CALCIUM SERPL-MCNC: 8.5 MG/DL (ref 8.5–10.1)
CHLORIDE SERPL-SCNC: 108 MMOL/L (ref 94–109)
CO2 SERPL-SCNC: 30 MMOL/L (ref 20–32)
CREAT SERPL-MCNC: 0.59 MG/DL (ref 0.52–1.04)
DIFFERENTIAL METHOD BLD: ABNORMAL
EOSINOPHIL # BLD AUTO: 0 10E9/L (ref 0–0.7)
EOSINOPHIL NFR BLD AUTO: 0 %
ERYTHROCYTE [DISTWIDTH] IN BLOOD BY AUTOMATED COUNT: 14.3 % (ref 10–15)
GFR SERPL CREATININE-BSD FRML MDRD: ABNORMAL ML/MIN/1.7M2
GLUCOSE SERPL-MCNC: 84 MG/DL (ref 70–99)
HCT VFR BLD AUTO: 24.7 % (ref 35–47)
HGB BLD-MCNC: 8.2 G/DL (ref 11.7–15.7)
IMM GRANULOCYTES # BLD: 0.1 10E9/L (ref 0–0.4)
IMM GRANULOCYTES NFR BLD: 1.6 %
LYMPHOCYTES # BLD AUTO: 0.5 10E9/L (ref 0.8–5.3)
LYMPHOCYTES NFR BLD AUTO: 13.6 %
MCH RBC QN AUTO: 31.1 PG (ref 26.5–33)
MCHC RBC AUTO-ENTMCNC: 33.2 G/DL (ref 31.5–36.5)
MCV RBC AUTO: 94 FL (ref 78–100)
MONOCYTES # BLD AUTO: 0.7 10E9/L (ref 0–1.3)
MONOCYTES NFR BLD AUTO: 19.5 %
NEUTROPHILS # BLD AUTO: 2.4 10E9/L (ref 1.6–8.3)
NEUTROPHILS NFR BLD AUTO: 65 %
NRBC # BLD AUTO: 0 10*3/UL
NRBC BLD AUTO-RTO: 0 /100
PLATELET # BLD AUTO: 18 10E9/L (ref 150–450)
POTASSIUM SERPL-SCNC: 3.4 MMOL/L (ref 3.4–5.3)
RBC # BLD AUTO: 2.64 10E12/L (ref 3.8–5.2)
SODIUM SERPL-SCNC: 145 MMOL/L (ref 133–144)
TRANSFUSION STATUS PATIENT QL: NORMAL
WBC # BLD AUTO: 3.7 10E9/L (ref 4–11)

## 2017-04-07 PROCEDURE — 86923 COMPATIBILITY TEST ELECTRIC: CPT | Performed by: INTERNAL MEDICINE

## 2017-04-07 PROCEDURE — 86901 BLOOD TYPING SEROLOGIC RH(D): CPT | Performed by: INTERNAL MEDICINE

## 2017-04-07 PROCEDURE — 99211 OFF/OP EST MAY X REQ PHY/QHP: CPT | Mod: ZF

## 2017-04-07 PROCEDURE — 86850 RBC ANTIBODY SCREEN: CPT | Performed by: INTERNAL MEDICINE

## 2017-04-07 PROCEDURE — 36592 COLLECT BLOOD FROM PICC: CPT

## 2017-04-07 PROCEDURE — 85025 COMPLETE CBC W/AUTO DIFF WBC: CPT | Performed by: PHYSICIAN ASSISTANT

## 2017-04-07 PROCEDURE — 25000125 ZZHC RX 250: Mod: ZF | Performed by: INTERNAL MEDICINE

## 2017-04-07 PROCEDURE — 80048 BASIC METABOLIC PNL TOTAL CA: CPT | Performed by: PHYSICIAN ASSISTANT

## 2017-04-07 PROCEDURE — 86900 BLOOD TYPING SEROLOGIC ABO: CPT | Performed by: INTERNAL MEDICINE

## 2017-04-07 RX ORDER — ONDANSETRON 4 MG/1
4-8 TABLET, FILM COATED ORAL EVERY 6 HOURS PRN
Qty: 60 TABLET | Refills: 0 | Status: SHIPPED | OUTPATIENT
Start: 2017-04-07

## 2017-04-07 RX ORDER — HEPARIN SODIUM,PORCINE 10 UNIT/ML
5 VIAL (ML) INTRAVENOUS ONCE
Status: COMPLETED | OUTPATIENT
Start: 2017-04-07 | End: 2017-04-07

## 2017-04-07 RX ADMIN — SODIUM CHLORIDE, PRESERVATIVE FREE 5 ML: 5 INJECTION INTRAVENOUS at 10:00

## 2017-04-07 NOTE — NURSING NOTE
Chief Complaint   Patient presents with     Blood Draw     Labs Drawn      Labs drawn from central line. Both lumens flushed with heparin.     Lauren Schoen, RN

## 2017-04-07 NOTE — NURSING NOTE
"BMT Heme Malignancy Rooming Note    Steph LUNDBERG Anuel - 4/7/2017 10:12 AM     Chief Complaint   Patient presents with     Blood Draw     Labs Drawn      RECHECK     here for provider visit HX: HL        BP 98/62  Pulse 80  Temp 97.4  F (36.3  C) (Oral)  Resp 14  Wt 79.4 kg (175 lb 1.6 oz)  SpO2 96%  BMI 32.02 kg/m2     Medications reviewed: Yes    Labs drawn: Yes    Drawn by: Lab Staff  Via: central venous catheter  See Doc Flowsheet for details.      Dressing changed: No     Medications given: No    Staff time:3 min    Additional information if applicable: Pt arrived, ambulatory, in care of female caregiver.  PT reports \"rough morning since I didn't take my zofran in the middle of the night\".  PT with poor po intake.    Ana Paula Hansen RN      "

## 2017-04-07 NOTE — PROGRESS NOTES
BMT Daily Progress Note     ID: Steph Agee is a 42 year old female with Hodgkins disease who is day +16 s/p auto for Hodgkins disease.    HPI: Steph is feeling quite well.  She enjoyed having yesterday 'off' from clinic.  She did notice that she fatigued quickly after preparing supper.  She had an episode of n/v this AM.  She thinks she will continue taking zofran for the time being.  No diarrhea.  New bruise on her R forearm.  She does not remember trauma to the arm.  She has an older bruise to her L upper arm.  No other bleeding.  She is afebrile.       Review of Systems: 10 point ROS negative except as noted above.  Physical Exam:   Blood pressure 98/62, pulse 80, temperature 97.4  F (36.3  C), temperature source Oral, resp. rate 14, weight 79.4 kg (175 lb 1.6 oz), SpO2 96 %.    Wt Readings from Last 5 Encounters:   04/05/17 80.9 kg (178 lb 4.8 oz)   04/05/17 80.9 kg (178 lb 4.8 oz)   04/04/17 80.1 kg (176 lb 9.6 oz)   04/03/17 80.3 kg (177 lb 1.6 oz)   04/02/17 80 kg (176 lb 4.8 oz)     General: NAD  Eyes: CALEB, sclera anicteric  Nose/Mouth/Throat: OP clear, buccal mucosa moist, no ulcerations.  Lungs: CTA bilaterally Cardiovascular: regular rhythm, no M/R/G  Abdominal/Rectal: +BS, soft, NT, ND, No HSM.   Lymphatics: no edema.   Skin: no rashes or petechiae.  Large bruising left posterior upper arm, new R anterior forearm ecchymosis.  Neuro: A&O.    Additional Findings: Barbour site NT, no drainage.     Labs:  Lab Results   Component Value Date    WBC 7.5 04/05/2017    ANEU 6.4 04/05/2017    HGB 8.4 (L) 04/05/2017    HCT 24.6 (L) 04/05/2017    PLT 16 (LL) 04/05/2017     (H) 04/04/2017    POTASSIUM 3.5 04/04/2017    CHLORIDE 108 04/04/2017    CO2 30 04/04/2017    GLC 87 04/04/2017    BUN 10 04/04/2017    CR 0.64 04/04/2017    MAG 2.0 03/27/2017    INR 1.01 03/20/2017       Assessment and Plan:   Steph Agee is a 42 year old female with Hodgkins disease who is day +16 s/p auto for Hodgkins  disease.      1. BMT: Completed BEAM prep without issue. Cell dose=1.84 X10^6 CD34/kg from both PBSCT and bone marrow.   -GCSF done 4/4/17.       2. HEME: Keep Hgb>8 and plts>10K. No pre-meds  - no transfusions today; will add infusion appt for Monday for possible plt & pRBC transfusion.  Hgb is 8.2g/dL.  Offered transfusion sooner to pt who declined stating she is not symptomatic and is used to a transfusion parameter to keep Hgb >7g/dL.  -Had been on Lovenox 40mg QD d/t hx of chronic IJ clot recently noted on 3/3 U/s. Now thrombocytopenic.  Per Dr. Gill, ok to permanently d/c lovenox as chronic clot (last dose 3/22).       3. ID: Afebrile  - Cont vfend (hx of marijuana use), HD ACV (CMV+) and levaquin as prophy.   -Bactrim or appropriate PCP therapy to start d+28.        4.  GI: Continues to take qAM zofran and q middle of the night ativan, but remainder of day no nausea  - Stools are regular.    - Protonix for GI prophy.      5. FEN/Renal:   - Cr/lytes OK      6. CV: Cleared by cardiology d/t drop in EF. Cardiac MRI confirmed EF 47%. 3/29 Blood given at decreased rate  - Had been on Continue cozaar 12.5 daily (cardiomyopathy)- resumed 3/29 and bp not effected.     7. Psych: Hx of anxiety. Previously smoked daily marijuana and took ativan qHS. Ativan too sedating for daily use-per pt. Already on effexor, increase to 150mg daily (75).      Plan:   RTC Monday for labs/provider possible transfusion; sooner prn    Jolynn Sutherland pa-c  [phone]

## 2017-04-07 NOTE — MR AVS SNAPSHOT
After Visit Summary   4/7/2017    Steph Agee    MRN: [id number]           Patient Information     Date Of Birth          1975        Visit Information        Provider Department      4/7/2017 10:30 AM UC 8 ATC; UC BMT INFUSION [facility]        Today's Diagnoses     Nodular lymphocyte predominant Hodgkin lymphoma, unspecified body region (H)    -  1          [facility] [place] [facility]  [address] [place] [address]  Phone: [phone]  Clinic Hours:   Monday-Friday:    8am to 4:30pm   Weekends and holidays:    8am to noon (in general)  If your fever is 100.5  or greater,   call the clinic.  After hours call the   hospital at [phone] or   [phone]. Ask for the BMT   fellow for pediatric or adult patients            Follow-ups after your visit        Your next 10 appointments already scheduled     Apr 10, 2017  9:30 AM CDT   [facility] Lab Draw with UC MASON[facility] LAB DRAW   [facility] ([facility])    [address] [place] [id number] [phone]            Apr 10, 2017 10:00 AM CDT   Return with UC BMT DOM   [facility] ([facility])    [address] [place] [id number] [phone]            Apr 10, 2017 10:30 AM CDT   Infusion 180 with UC BMT INFUSION, UC 6 ATC   [facility] ([facility])    [address] [place] [id number] [phone]            Apr 19, 2017 11:40 AM CDT   (Arrive by 11:25 AM)   CT CHEST/ABDOMEN/PELVIS W CONTRAST with UCCT2   [facility] ([facility])    [address] [facility] [id number] [phone]           Please bring any scans or X-rays taken at other hospitals, if similar tests were done. Also bring a list of your medicines, including  vitamins, minerals and over-the-counter drugs. It is safest to leave personal items at home.  Be sure to tell your doctor:   If you have any allergies.   If there s any chance you are pregnant.   If you are breastfeeding.   If you have any special needs.  You may have contrast for this exam. To prepare:   Do not eat or drink for 2 hours before your exam. If you need to take medicine, you may take it with small sips of water. (We may ask you to take liquid medicine as well.)   The day before your exam, drink extra fluids at least six 8-ounce glasses (unless your doctor tells you to restrict your fluids).  Patients over 70 or patients with diabetes or kidney problems:   If you haven t had a blood test (creatinine test) within the last 30 days, go to your clinic or Diagnostic Imaging Department for this test.  If you have diabetes:   If your kidney function is normal, continue taking your metformin (Avandamet, Glucophage, Glucovance, Metaglip) on the day of your exam.   If your kidney function is abnormal, wait 48 hours before restarting this medicine.  You will have oral contrast for this exam:   You will drink the contrast at home. Get this from your clinic or Diagnostic Imaging Department. Please follow the directions given.  Please wear loose clothing, such as a sweat suit or jogging clothes. Avoid snaps, zippers and other metal. We may ask you to undress and put on a hospital gown.  If you have any questions, please call the Imaging Department where you will have your exam.            Apr 19, 2017 12:30 PM CDT   [facility] Lab Draw with  MASONIC LAB DRAW   [facility] Draw ([facility])    [address] [place] [address]   [phone]            Apr 19, 2017  1:00 PM CDT   Bone Marrow Biopsy with  BMT CECILIA #3, UU BONE MARROW BIOPSY   [facility] ([facility])    [address] [place] [address]  [place] [address]   [phone]            Apr 26, 2017 11:30 AM CDT   BMT Anniversary Visit with Obed Chambers MD   [facility] Marrow Trans[facility] ([facility]    [address] [place] [id number]   [phone]            May 24, 2017  9:30 AM CDT   [facility] Lab Draw with  MASONIC LAB DRAW   [facility] Draw ([facility]    [address] [place] [address]   [phone]              Future tests that were ordered for you today     Open Standing Orders        Priority Remaining Interval Expires Ordered    Red blood cell prepare order unit Routine 99/100 CONDITIONAL (SPECIFY) BLOOD  4/6/2017    Platelets prepare order unit Routine 99/100 CONDITIONAL (SPECIFY) BLOOD  4/6/2017          Open Future Orders        Priority Expected Expires Ordered    Basic metabolic panel Routine 4/10/2017 10/4/2017 4/7/2017    CBC with platelets differential Routine 4/10/2017 10/4/2017 4/7/2017            Who to contact     If you have questions or need follow up information about today's clinic visit or your schedule please contact [facility] directly at [phone].  Normal or non-critical lab and imaging results will be communicated to you by DoctorChart, letter or phone within 4 business days after the clinic has received the results. If you do not hear from us within 7 days, please contact the clinic through [employer]t or phone. If you have a critical or abnormal lab result, we will notify you by phone as soon as possible.  Submit refill requests through [employer] or call your pharmacy and they will forward the refill request to us. Please allow 3 business days for your refill to be completed.          Additional Information About Your Visit        [employer] Information     [employer] gives you secure access to your electronic health record. If you see a primary care provider, you can also send messages  to your care team and make appointments. If you have questions, please call your primary care clinic.  If you do not have a primary care provider, please call [phone] and they will assist you.        Care EveryWhere ID     This is your Care EveryWhere ID. This could be used by other organizations to access your [facility] medical records  [id number]         Blood Pressure from Last 3 Encounters:   04/07/17 98/62   04/05/17 109/63   04/05/17 109/63    Weight from Last 3 Encounters:   04/07/17 79.4 kg (175 lb 1.6 oz)   04/05/17 80.9 kg (178 lb 4.8 oz)   04/05/17 80.9 kg (178 lb 4.8 oz)              We Performed the Following     Blood component     Platelets prepare order unit     Red blood cell prepare order unit          Today's Medication Changes          These changes are accurate as of: 4/7/17 12:16 PM.  If you have any questions, ask your nurse or doctor.               These medicines have changed or have updated prescriptions.        Dose/Directions    ondansetron 4 MG tablet   Commonly known as:  ZOFRAN   This may have changed:  how much to take   Used for:  Lymphocyte-rich Hodgkin lymphoma, unspecified body region (H)        Dose:  4-8 mg   Take 1-2 tablets (4-8 mg) by mouth every 6 hours as needed for nausea or vomiting   Quantity:  60 tablet   Refills:  0            Where to get your medicines      These medications were sent to [facility] [address] [address]    Hours:  TRANSPLANT PHONE NUMBER [phone] Phone:  [phone]     ondansetron 4 MG tablet                Recent Review Flowsheet Data     BMT Recent Results Latest Ref Rng & Units 3/31/2017 4/1/2017 4/2/2017 4/3/2017 4/4/2017 4/5/2017 4/7/2017    WBC 4.0 - 11.0 10e9/L 0.3(LL) 0.6(LL) 1.2(L) 2.3(L) 4.2 7.5 3.7(L)    Hemoglobin 11.7 - 15.7 g/dL 9.0(L) 8.7(L) 8.5(L) 8.5(L) 8.3(L) 8.4(L) 8.2(L)    Platelet Count 150 - 450 10e9/L 17(LL) 9(LL)  6(LL) 27(LL) 18(LL) 16(LL) 18(LL)    Neutrophils (Absolute) 1.6 - 8.3 10e9/L - 0.4(LL) 0.9(L) 1.9 3.6 6.4 2.4    INR 0.86 - 1.14 - - - - - - -    Sodium 133 - 144 mmol/L 145(H) 144 145(H) 145(H) 146(H) - 145(H)    Potassium 3.4 - 5.3 mmol/L 3.6 3.8 3.5 3.4 3.5 - 3.4    Chloride 94 - 109 mmol/L 109 106 108 109 108 - 108    Glucose 70 - 99 mg/dL 82 83 86 89 87 - 84    Urea Nitrogen 7 - 30 mg/dL 9 10 12 11 10 - 12    Creatinine 0.52 - 1.04 mg/dL 0.57 0.62 0.75 0.55 0.64 - 0.59    Calcium (Total) 8.5 - 10.1 mg/dL 7.9(L) 8.1(L) 7.8(L) 8.4(L) 8.2(L) - 8.5    Protein (Total) 6.8 - 8.8 g/dL - - - 6.2(L) 6.3(L) - -    Albumin 3.4 - 5.0 g/dL - - - 3.0(L) 3.1(L) - -    Bilirubin (Direct) 0.0 - 0.2 mg/dL - - - - - - -    Alkaline Phosphatase 40 - 150 U/L - - - 143 141 - -    AST 0 - 45 U/L - - - 12 16 - -    ALT 0 - 50 U/L - - - 20 20 - -    MCV 78 - 100 fl 90 91 91 91 93 93 94               Primary Care Provider Office Phone # Fax #    Travis Carpenter [phone]       [facility] [place] [address] [address]        Thank you!     Thank you for choosing [facility]ARR[facility]ANSPL[facility]  for your care. Our goal is always to provide you with excellent care. Hearing back from our patients is one way we can continue to improve our services. Please take a few minutes to complete the written survey that you may receive in the mail after your visit with us. Thank you!             Your Updated Medication List - Protect others around you: Learn how to safely use, store and throw away your medicines at www.disposemymeds.org.          This list is accurate as of: 4/7/17 12:16 PM.  Always use your most recent med list.                   Brand Name Dispense Instructions for use    acyclovir 800 MG tablet    ZOVIRAX    150 tablet    Take 1 tablet (800 mg) by mouth 5 times daily       gabapentin 100 MG capsule    NEURONTIN    90 capsule    Take 2 capsules (200 mg) by mouth 3 times daily        levofloxacin 250 MG tablet    LEVAQUIN    30 tablet    Take 1 tablet (250 mg) by mouth daily       LORazepam 0.5 MG tablet    ATIVAN    60 tablet    Take 0.5 tablets (0.25 mg) by mouth 3 times daily (before meals)       losartan 25 MG tablet    COZAAR    30 tablet    Take 0.5 tablets (12.5 mg) by mouth At Bedtime       ondansetron 4 MG tablet    ZOFRAN    60 tablet    Take 1-2 tablets (4-8 mg) by mouth every 6 hours as needed for nausea or vomiting       oxyCODONE 5 MG IR tablet    ROXICODONE    30 tablet    Take 1-2 tablets (5-10 mg) by mouth every 4 hours as needed for moderate to severe pain       pantoprazole 40 MG EC tablet    PROTONIX    30 tablet    Take 1 tablet (40 mg) by mouth daily       sulfamethoxazole-trimethoprim 800-160 MG per tablet   Start taking on:  4/24/2017    BACTRIM DS/SEPTRA DS    30 tablet    Take 1 tablet by mouth Every Mon, Tues two times daily       venlafaxine 150 MG Tb24 24 hr tablet    EFFEXOR-ER    30 each    Take 1 tablet (150 mg) by mouth At Bedtime       voriconazole 200 MG tablet    VFEND    60 tablet    Take 1 tablet (200 mg) by mouth every 12 hours

## 2017-04-07 NOTE — MR AVS SNAPSHOT
After Visit Summary   4/7/2017    Steph Agee    MRN: [id number]           Patient Information     Date Of Birth          1975        Visit Information        Provider Department      4/7/2017 10:00 AM UC BMT CECILIA #2 [facility] Blood and Marrow Transplant        Today's Diagnoses     Nodular sclerosing Hodgkin's lymphoma, unspecified body region (H)    -  1    Lymphocyte-rich Hodgkin lymphoma, unspecified body region (H)              [facility] ([facility])  [address] [place] [address]  Phone: [phone]  Clinic Hours:   Monday-Friday:    8am to 4:30pm   Weekends and holidays:    8am to noon (in general)  If your fever is 100.5  or greater,   call the clinic.  After hours call the   hospital at [phone] or   [phone]. Ask for the BMT   fellow for pediatric or adult patients           Care Instructions    Monday: 930AM ARRIVAL WITH LABS,MD AND INFUSION         Follow-ups after your visit        Your next 10 appointments already scheduled     Apr 10, 2017  9:30 AM CDT   Masonic Lab Draw with  MASONIC LAB DRAW   [facility] Masonic Lab Draw ([facility])    [address] [place] [id number] [phone]            Apr 10, 2017 10:00 AM CDT   Return with  BMT DOM   [facility] Blood and Marrow Transplant ([facility])    [address] [place] [address]   [phone]            Apr 10, 2017 10:30 AM CDT   Infusion 180 with UC BMT INFUSION, UC 6 ATC   [facility] Blood and Marrow Transplant ([facility])    [address] [place] [address]   [phone]            Apr 19, 2017 11:40 AM CDT   (Arrive by 11:25 AM)   CT CHEST/ABDOMEN/PELVIS W CONTRAST with UCCT2   [facility] ([facility])    [address] [place] [address]  1st [facility] [id number] [phone]           Please  bring any scans or X-rays taken at other hospitals, if similar tests were done. Also bring a list of your medicines, including vitamins, minerals and over-the-counter drugs. It is safest to leave personal items at home.  Be sure to tell your doctor:   If you have any allergies.   If there s any chance you are pregnant.   If you are breastfeeding.   If you have any special needs.  You may have contrast for this exam. To prepare:   Do not eat or drink for 2 hours before your exam. If you need to take medicine, you may take it with small sips of water. (We may ask you to take liquid medicine as well.)   The day before your exam, drink extra fluids at least six 8-ounce glasses (unless your doctor tells you to restrict your fluids).  Patients over 70 or patients with diabetes or kidney problems:   If you haven t had a blood test (creatinine test) within the last 30 days, go to your clinic or Diagnostic Imaging Department for this test.  If you have diabetes:   If your kidney function is normal, continue taking your metformin (Avandamet, Glucophage, Glucovance, Metaglip) on the day of your exam.   If your kidney function is abnormal, wait 48 hours before restarting this medicine.  You will have oral contrast for this exam:   You will drink the contrast at home. Get this from your clinic or Diagnostic Imaging Department. Please follow the directions given.  Please wear loose clothing, such as a sweat suit or jogging clothes. Avoid snaps, zippers and other metal. We may ask you to undress and put on a hospital gown.  If you have any questions, please call the Imaging Department where you will have your exam.            Apr 19, 2017 12:30 PM CDT   Moblyngonic Lab Draw with  Magic [facility]els LAB DRAW   [facility] Masonic Lab Draw ([facility] [place])    [address] [address] [phone]            Apr 19, 2017  1:00 PM CDT   Bone Marrow Biopsy with  BMT CECILIA #3, UU BONE MARROW  BIOPSY   [facility] Blood and Marrow Transplant ([facility])    [address] [facility]  [address] [facility] [address] [phone]            Apr 26, 2017 11:30 AM CDT   BMT Anniversary Visit with Obed Chambers MD   [facility] Blood and Marrow Transplant ([facility])    [address] [facility]  [address] [facility] [address] [phone]            May 24, 2017  9:30 AM CDT   Masonic Lab Draw with  MASONIC LAB DRAW   [facility] Masonic Lab Draw ([facility])    [address] [facility]  [address] [facility] [address] [phone]              Future tests that were ordered for you today     Open Standing Orders        Priority Remaining Interval Expires Ordered    Red blood cell prepare order unit Routine 99/100 CONDITIONAL (SPECIFY) BLOOD  4/6/2017    Platelets prepare order unit Routine 99/100 CONDITIONAL (SPECIFY) BLOOD  4/6/2017          Open Future Orders        Priority Expected Expires Ordered    Basic metabolic panel Routine 4/10/2017 10/4/2017 4/7/2017    CBC with platelets differential Routine 4/10/2017 10/4/2017 4/7/2017            Who to contact     If you have questions or need follow up information about today's clinic visit or your schedule please contact [facility] BLOOD AND MARROW TRANSPLANT directly at [phone].  Normal or non-critical lab and imaging results will be communicated to you by MyChart, letter or phone within 4 business days after the clinic has received the results. If you do not hear from us within 7 days, please contact the clinic through [facility] or phone. If you have a critical or abnormal lab result, we will notify you by phone as soon as possible.  Submit refill requests through [employer] or call your pharmacy and they will forward the refill request to us. Please allow 3 business days for your refill to be completed.          Additional Information About Your Visit        MyChart Information      [facility] gives you secure access to your electronic health record. If you see a primary care provider, you can also send messages to your care team and make appointments. If you have questions, please call your primary care clinic.  If you do not have a primary care provider, please call [phone] and they will assist you.        Care EveryWhere ID     This is your Care EveryWhere ID. This could be used by other organizations to access your [place] medical records  [id number]        Your Vitals Were     Pulse Temperature Respirations Pulse Oximetry BMI (Body Mass Index)       80 97.4  F (36.3  C) (Oral) 14 96% 32.02 kg/m2        Blood Pressure from Last 3 Encounters:   04/07/17 98/62   04/05/17 109/63   04/05/17 109/63    Weight from Last 3 Encounters:   04/07/17 79.4 kg (175 lb 1.6 oz)   04/05/17 80.9 kg (178 lb 4.8 oz)   04/05/17 80.9 kg (178 lb 4.8 oz)              We Performed the Following     Basic metabolic panel - NOW     CBC with platelets differential - NOW          Today's Medication Changes          These changes are accurate as of: 4/7/17 11:22 AM.  If you have any questions, ask your nurse or doctor.               These medicines have changed or have updated prescriptions.        Dose/Directions    ondansetron 4 MG tablet   Commonly known as:  ZOFRAN   This may have changed:  how much to take   Used for:  Lymphocyte-rich Hodgkin lymphoma, unspecified body region (H)        Dose:  4-8 mg   Take 1-2 tablets (4-8 mg) by mouth every 6 hours as needed for nausea or vomiting   Quantity:  60 tablet   Refills:  0            Where to get your medicines      These medications were sent to [facility] [place], [place] - [address] [facility] [address]  [address] [place] [address] 1[address]297, [facility] [address]    Hours:  TRANSPLANT PHONE NUMBER [phone] Phone:  [phone]     ondansetron 4 MG tablet                Recent Review Flowsheet Data     BMT Recent Results Latest Ref Rng & Units  3/31/2017 4/1/2017 4/2/2017 4/3/2017 4/4/2017 4/5/2017 4/7/2017    WBC 4.0 - 11.0 10e9/L 0.3(LL) 0.6(LL) 1.2(L) 2.3(L) 4.2 7.5 3.7(L)    Hemoglobin 11.7 - 15.7 g/dL 9.0(L) 8.7(L) 8.5(L) 8.5(L) 8.3(L) 8.4(L) 8.2(L)    Platelet Count 150 - 450 10e9/L 17(LL) 9(LL) 6(LL) 27(LL) 18(LL) 16(LL) 18(LL)    Neutrophils (Absolute) 1.6 - 8.3 10e9/L - 0.4(LL) 0.9(L) 1.9 3.6 6.4 2.4    INR 0.86 - 1.14 - - - - - - -    Sodium 133 - 144 mmol/L 145(H) 144 145(H) 145(H) 146(H) - 145(H)    Potassium 3.4 - 5.3 mmol/L 3.6 3.8 3.5 3.4 3.5 - 3.4    Chloride 94 - 109 mmol/L 109 106 108 109 108 - 108    Glucose 70 - 99 mg/dL 82 83 86 89 87 - 84    Urea Nitrogen 7 - 30 mg/dL 9 10 12 11 10 - 12    Creatinine 0.52 - 1.04 mg/dL 0.57 0.62 0.75 0.55 0.64 - 0.59    Calcium (Total) 8.5 - 10.1 mg/dL 7.9(L) 8.1(L) 7.8(L) 8.4(L) 8.2(L) - 8.5    Protein (Total) 6.8 - 8.8 g/dL - - - 6.2(L) 6.3(L) - -    Albumin 3.4 - 5.0 g/dL - - - 3.0(L) 3.1(L) - -    Bilirubin (Direct) 0.0 - 0.2 mg/dL - - - - - - -    Alkaline Phosphatase 40 - 150 U/L - - - 143 141 - -    AST 0 - 45 U/L - - - 12 16 - -    ALT 0 - 50 U/L - - - 20 20 - -    MCV 78 - 100 fl 90 91 91 91 93 93 94               Primary Care Provider Office Phone # Fax #    Travis Carpenter [phone]       [facility] [address]  [place] [address]        Thank you!     Thank you for choosing [facility] BLOOD AND MARROW TRANSPLANT  for your care. Our goal is always to provide you with excellent care. Hearing back from our patients is one way we can continue to improve our services. Please take a few minutes to complete the written survey that you may receive in the mail after your visit with us. Thank you!             Your Updated Medication List - Protect others around you: Learn how to safely use, store and throw away your medicines at www.disposemymeds.org.          This list is accurate as of: 4/7/17 11:22 AM.  Always use your most recent med list.                    Brand Name Dispense Instructions for use    acyclovir 800 MG tablet    ZOVIRAX    150 tablet    Take 1 tablet (800 mg) by mouth 5 times daily       gabapentin 100 MG capsule    NEURONTIN    90 capsule    Take 2 capsules (200 mg) by mouth 3 times daily       levofloxacin 250 MG tablet    LEVAQUIN    30 tablet    Take 1 tablet (250 mg) by mouth daily       LORazepam 0.5 MG tablet    ATIVAN    60 tablet    Take 0.5 tablets (0.25 mg) by mouth 3 times daily (before meals)       losartan 25 MG tablet    COZAAR    30 tablet    Take 0.5 tablets (12.5 mg) by mouth At Bedtime       ondansetron 4 MG tablet    ZOFRAN    60 tablet    Take 1-2 tablets (4-8 mg) by mouth every 6 hours as needed for nausea or vomiting       oxyCODONE 5 MG IR tablet    ROXICODONE    30 tablet    Take 1-2 tablets (5-10 mg) by mouth every 4 hours as needed for moderate to severe pain       pantoprazole 40 MG EC tablet    PROTONIX    30 tablet    Take 1 tablet (40 mg) by mouth daily       sulfamethoxazole-trimethoprim 800-160 MG per tablet   Start taking on:  4/24/2017    BACTRIM DS/SEPTRA DS    30 tablet    Take 1 tablet by mouth Every Mon, Tues two times daily       venlafaxine 150 MG Tb24 24 hr tablet    EFFEXOR-ER    30 each    Take 1 tablet (150 mg) by mouth At Bedtime       voriconazole 200 MG tablet    VFEND    60 tablet    Take 1 tablet (200 mg) by mouth every 12 hours

## 2017-04-09 RX ORDER — ACETAMINOPHEN 325 MG/1
650 TABLET ORAL ONCE
Status: CANCELLED
Start: 2017-04-09 | End: 2017-04-09

## 2017-04-09 RX ORDER — DIPHENHYDRAMINE HCL 25 MG
25 CAPSULE ORAL ONCE
Status: CANCELLED
Start: 2017-04-09 | End: 2017-04-09

## 2017-04-10 ENCOUNTER — APPOINTMENT (OUTPATIENT)
Dept: LAB | Facility: CLINIC | Age: 42
End: 2017-04-10
Attending: INTERNAL MEDICINE
Payer: COMMERCIAL

## 2017-04-10 ENCOUNTER — INFUSION THERAPY VISIT (OUTPATIENT)
Dept: TRANSPLANT | Facility: CLINIC | Age: 42
End: 2017-04-10
Attending: INTERNAL MEDICINE
Payer: COMMERCIAL

## 2017-04-10 VITALS
TEMPERATURE: 98 F | DIASTOLIC BLOOD PRESSURE: 69 MMHG | OXYGEN SATURATION: 94 % | HEART RATE: 116 BPM | WEIGHT: 180 LBS | SYSTOLIC BLOOD PRESSURE: 103 MMHG | BODY MASS INDEX: 32.91 KG/M2

## 2017-04-10 VITALS
SYSTOLIC BLOOD PRESSURE: 101 MMHG | TEMPERATURE: 96.7 F | DIASTOLIC BLOOD PRESSURE: 59 MMHG | RESPIRATION RATE: 16 BRPM | HEART RATE: 84 BPM

## 2017-04-10 DIAGNOSIS — C81.00 NODULAR LYMPHOCYTE PREDOMINANT HODGKIN LYMPHOMA, UNSPECIFIED BODY REGION (H): Primary | ICD-10-CM

## 2017-04-10 DIAGNOSIS — C81.10 NODULAR SCLEROSING HODGKIN'S LYMPHOMA, UNSPECIFIED BODY REGION (H): ICD-10-CM

## 2017-04-10 LAB
ANION GAP SERPL CALCULATED.3IONS-SCNC: 4 MMOL/L (ref 3–14)
BASOPHILS # BLD AUTO: 0 10E9/L (ref 0–0.2)
BASOPHILS NFR BLD AUTO: 0.4 %
BLD PROD TYP BPU: NORMAL
BLD UNIT ID BPU: 0
BLOOD PRODUCT CODE: NORMAL
BPU ID: NORMAL
BUN SERPL-MCNC: 10 MG/DL (ref 7–30)
CALCIUM SERPL-MCNC: 8.2 MG/DL (ref 8.5–10.1)
CHLORIDE SERPL-SCNC: 108 MMOL/L (ref 94–109)
CO2 SERPL-SCNC: 30 MMOL/L (ref 20–32)
CREAT SERPL-MCNC: 0.68 MG/DL (ref 0.52–1.04)
DIFFERENTIAL METHOD BLD: ABNORMAL
EOSINOPHIL # BLD AUTO: 0 10E9/L (ref 0–0.7)
EOSINOPHIL NFR BLD AUTO: 0 %
ERYTHROCYTE [DISTWIDTH] IN BLOOD BY AUTOMATED COUNT: 14.1 % (ref 10–15)
GFR SERPL CREATININE-BSD FRML MDRD: ABNORMAL ML/MIN/1.7M2
GLUCOSE SERPL-MCNC: 89 MG/DL (ref 70–99)
HCT VFR BLD AUTO: 23 % (ref 35–47)
HGB BLD-MCNC: 7.8 G/DL (ref 11.7–15.7)
IMM GRANULOCYTES # BLD: 0 10E9/L (ref 0–0.4)
IMM GRANULOCYTES NFR BLD: 1.3 %
LYMPHOCYTES # BLD AUTO: 0.5 10E9/L (ref 0.8–5.3)
LYMPHOCYTES NFR BLD AUTO: 22 %
MCH RBC QN AUTO: 32.1 PG (ref 26.5–33)
MCHC RBC AUTO-ENTMCNC: 33.9 G/DL (ref 31.5–36.5)
MCV RBC AUTO: 95 FL (ref 78–100)
MONOCYTES # BLD AUTO: 0.6 10E9/L (ref 0–1.3)
MONOCYTES NFR BLD AUTO: 24.7 %
NEUTROPHILS # BLD AUTO: 1.2 10E9/L (ref 1.6–8.3)
NEUTROPHILS NFR BLD AUTO: 51.6 %
NRBC # BLD AUTO: 0 10*3/UL
NRBC BLD AUTO-RTO: 0 /100
PLATELET # BLD AUTO: 27 10E9/L (ref 150–450)
POTASSIUM SERPL-SCNC: 4.3 MMOL/L (ref 3.4–5.3)
RBC # BLD AUTO: 2.43 10E12/L (ref 3.8–5.2)
SODIUM SERPL-SCNC: 142 MMOL/L (ref 133–144)
TRANSFUSION STATUS PATIENT QL: NORMAL
WBC # BLD AUTO: 2.3 10E9/L (ref 4–11)

## 2017-04-10 PROCEDURE — P9040 RBC LEUKOREDUCED IRRADIATED: HCPCS | Performed by: INTERNAL MEDICINE

## 2017-04-10 PROCEDURE — 25000132 ZZH RX MED GY IP 250 OP 250 PS 637: Mod: ZF | Performed by: INTERNAL MEDICINE

## 2017-04-10 PROCEDURE — 80048 BASIC METABOLIC PNL TOTAL CA: CPT | Performed by: PHYSICIAN ASSISTANT

## 2017-04-10 PROCEDURE — 86923 COMPATIBILITY TEST ELECTRIC: CPT | Performed by: INTERNAL MEDICINE

## 2017-04-10 PROCEDURE — 25000125 ZZHC RX 250: Mod: ZF | Performed by: PHYSICIAN ASSISTANT

## 2017-04-10 PROCEDURE — 36430 TRANSFUSION BLD/BLD COMPNT: CPT

## 2017-04-10 PROCEDURE — 85025 COMPLETE CBC W/AUTO DIFF WBC: CPT | Performed by: PHYSICIAN ASSISTANT

## 2017-04-10 PROCEDURE — 99212 OFFICE O/P EST SF 10 MIN: CPT | Mod: 25

## 2017-04-10 PROCEDURE — 99212 OFFICE O/P EST SF 10 MIN: CPT | Mod: ZF

## 2017-04-10 PROCEDURE — 86901 BLOOD TYPING SEROLOGIC RH(D): CPT | Performed by: INTERNAL MEDICINE

## 2017-04-10 PROCEDURE — 25000125 ZZHC RX 250: Mod: ZF | Performed by: INTERNAL MEDICINE

## 2017-04-10 PROCEDURE — 86850 RBC ANTIBODY SCREEN: CPT | Performed by: INTERNAL MEDICINE

## 2017-04-10 PROCEDURE — 86900 BLOOD TYPING SEROLOGIC ABO: CPT | Performed by: INTERNAL MEDICINE

## 2017-04-10 RX ORDER — HEPARIN SODIUM,PORCINE 10 UNIT/ML
5 VIAL (ML) INTRAVENOUS
Status: DISCONTINUED | OUTPATIENT
Start: 2017-04-10 | End: 2017-04-10 | Stop reason: HOSPADM

## 2017-04-10 RX ORDER — HEPARIN SODIUM,PORCINE 10 UNIT/ML
5 VIAL (ML) INTRAVENOUS
Status: CANCELLED | OUTPATIENT
Start: 2017-04-10

## 2017-04-10 RX ORDER — ACETAMINOPHEN 325 MG/1
650 TABLET ORAL ONCE
Status: COMPLETED | OUTPATIENT
Start: 2017-04-10 | End: 2017-04-10

## 2017-04-10 RX ORDER — DIPHENHYDRAMINE HCL 25 MG
25 CAPSULE ORAL ONCE
Status: COMPLETED | OUTPATIENT
Start: 2017-04-10 | End: 2017-04-10

## 2017-04-10 RX ADMIN — SODIUM CHLORIDE, PRESERVATIVE FREE 5 ML: 5 INJECTION INTRAVENOUS at 10:08

## 2017-04-10 RX ADMIN — SODIUM CHLORIDE, PRESERVATIVE FREE 5 ML: 5 INJECTION INTRAVENOUS at 12:28

## 2017-04-10 RX ADMIN — DIPHENHYDRAMINE HYDROCHLORIDE 25 MG: 25 CAPSULE ORAL at 10:54

## 2017-04-10 RX ADMIN — ACETAMINOPHEN 650 MG: 325 TABLET ORAL at 10:54

## 2017-04-10 ASSESSMENT — PAIN SCALES - GENERAL: PAINLEVEL: NO PAIN (0)

## 2017-04-10 NOTE — PROGRESS NOTES
Infusion Nursing Note:  Steph TOÑO Agee presents today for add on PRBC's    Patient seen by provider today: Yes: Dr. Greco   present during visit today: Not Applicable.    Note: Patient received 1 unit of PRBC's for hmg , per Dr. Greco's request    Intravenous Access:  [place].    Treatment Conditions:  Lab Results   Component Value Date    HGB 7.8 04/10/2017     Lab Results   Component Value Date    WBC 2.3 04/10/2017      Lab Results   Component Value Date    ANEU 1.2 04/10/2017     Lab Results   Component Value Date    PLT 27 04/10/2017      Results reviewed, labs MET treatment parameters, ok to proceed with treatment.      Post Infusion Assessment:  Patient tolerated infusion without incident.  Blood return noted pre and post infusion.  Site patent and intact, free from redness, edema or discomfort.  No evidence of extravasations.  Access discontinued per protocol.    Discharge Plan:   Discharge instructions reviewed with: Patient.  Patient and/or family verbalized understanding of discharge instructions and all questions answered.  Patient discharged in stable condition accompanied by: self.  Departure Mode: Ambulatory.    Mary Salguero RN, OCN,, BMTCN

## 2017-04-10 NOTE — MR AVS SNAPSHOT
After Visit Summary   4/10/2017    Steph Agee    MRN: [id number]           Patient Information     Date Of Birth          1975        Visit Information        Provider Department      4/10/2017 10:30 AM  6 ATC; UC BMT INFUSION [facility] Blood and Marrow Transplant        Today's Diagnoses     Nodular lymphocyte predominant Hodgkin lymphoma, unspecified body region (H)    -  1          [facility] ([facility]  [address] [place] [address]  Phone: [phone]  Clinic Hours:   Monday-Friday:    8am to 4:30pm   Weekends and holidays:    8am to noon (in general)  If your fever is 100.5  or greater,   call the clinic.  After hours call the   hospital at [phone] or   [phone]. Ask for the BMT   fellow for pediatric or adult patients            Follow-ups after your visit        Your next 10 appointments already scheduled     Apr 13, 2017  9:30 AM CDT   Masonic Lab Draw with  MASONIC LAB DRAW   [facility] Masonic Lab Draw ([facility])    [address] [place] [id number] [phone]            Apr 13, 2017 10:00 AM CDT   Return with  BMT CECILIA #3   [facility] Blood and Marrow Transplant ([facility])    [address] [place] [id number] [phone]            Apr 19, 2017 11:40 AM CDT   (Arrive by 11:25 AM)   CT CHEST/ABDOMEN/PELVIS W CONTRAST with UCCT2   [facility] Imaging [place] [facility] ([facility])    [address] [place] [id number] [phone]           Please bring any scans or X-rays taken at other hospitals, if similar tests were done. Also bring a list of your medicines, including vitamins, minerals and over-the-counter drugs. It is safest to leave personal items at home.  Be sure to tell your doctor:   If you have any allergies.   If there s any chance you are pregnant.   If you are breastfeeding.   If you  have any special needs.  You may have contrast for this exam. To prepare:   Do not eat or drink for 2 hours before your exam. If you need to take medicine, you may take it with small sips of water. (We may ask you to take liquid medicine as well.)   The day before your exam, drink extra fluids at least six 8-ounce glasses (unless your doctor tells you to restrict your fluids).  Patients over 70 or patients with diabetes or kidney problems:   If you haven t had a blood test (creatinine test) within the last 30 days, go to your clinic or Diagnostic Imaging Department for this test.  If you have diabetes:   If your kidney function is normal, continue taking your metformin (Avandamet, Glucophage, Glucovance, Metaglip) on the day of your exam.   If your kidney function is abnormal, wait 48 hours before restarting this medicine.  You will have oral contrast for this exam:   You will drink the contrast at home. Get this from your clinic or Diagnostic Imaging Department. Please follow the directions given.  Please wear loose clothing, such as a sweat suit or jogging clothes. Avoid snaps, zippers and other metal. We may ask you to undress and put on a hospital gown.  If you have any questions, please call the Imaging Department where you will have your exam.            Apr 19, 2017 12:30 PM CDT   Masonic Lab Draw with  MASONIC LAB DRAW   [facility] Masonic Lab Draw ([facility])    [address] [place] [address]   [phone]            Apr 19, 2017  1:00 PM CDT   Bone Marrow Biopsy with  BMT CECILIA #3, UU BONE MARROW BIOPSY   [facility] Blood and Marrow Transplant ([facility])    [address] [place] [address]   [phone]            Apr 26, 2017 11:30 AM CDT   BMT Anniversary Visit with Obed Chambers MD   [facility] Blood and Marrow Transplant ([facility])    [address] [place] [address]  [facility] [address]   [phone]            May 24, 2017  9:30 AM CDT   Masonic Lab Draw with  MASONIC LAB DRAW   [facility] Masonic Lab Draw ([facility])    [address] [facility]  2nd [facility] [address]   [phone]            May 24, 2017 10:00 AM CDT   BMT Anniversary Visit with Obed Chambers MD   [facility] Blood and Marrow Transplant ([facility])    [address] [place] [address]  2nd [facility] [address]   [phone]            Aug 09, 2017 10:30 AM CDT   Masonic Lab Draw with  MASONIC LAB DRAW   [facility] Masonic Lab Draw ([facility])    [address] [place] [address]   [phone]              Future tests that were ordered for you today     Open Standing Orders        Priority Remaining Interval Expires Ordered    Transfuse red blood cell unit Routine 1/2 TRANSFUSE 2 UNITS  4/10/2017    Red blood cell prepare order unit Routine 99/100 CONDITIONAL (SPECIFY) BLOOD  4/9/2017    Platelets prepare order unit Routine 99/100 CONDITIONAL (SPECIFY) BLOOD  4/9/2017            Who to contact     If you have questions or need follow up information about today's clinic visit or your schedule please contact [facility] BLOOD AND MARROW TRANSPLANT directly at [phone].  Normal or non-critical lab and imaging results will be communicated to you by [facility], letter or phone within 4 business days after the clinic has received the results. If you do not hear from us within 7 days, please contact the clinic through [employer] or phone. If you have a critical or abnormal lab result, we will notify you by phone as soon as possible.  Submit refill requests through [employer] or call your pharmacy and they will forward the refill request to us. Please allow 3 business days for your refill to be completed.          Additional Information About Your Visit        [facility] Information     [employer] gives  you secure access to your electronic health record. If you see a primary care provider, you can also send messages to your care team and make appointments. If you have questions, please call your primary care clinic.  If you do not have a primary care provider, please call [phone] and they will assist you.        Care EveryWhere ID     This is your Care EveryWhere ID. This could be used by other organizations to access your [place] medical records  [id number]        Your Vitals Were     Pulse Temperature Respirations             84 96.7  F (35.9  C) (Oral) 16          Blood Pressure from Last 3 Encounters:   04/10/17 101/59   04/10/17 103/69   04/07/17 98/62    Weight from Last 3 Encounters:   04/10/17 81.6 kg (180 lb)   04/07/17 79.4 kg (175 lb 1.6 oz)   04/05/17 80.9 kg (178 lb 4.8 oz)              We Performed the Following     ABO/Rh type and screen     Blood component     Platelets prepare order unit     Red blood cell prepare order unit     Transfuse red blood cell unit        Recent Review Flowsheet Data     BMT Recent Results Latest Ref Rng & Units 4/1/2017 4/2/2017 4/3/2017 4/4/2017 4/5/2017 4/7/2017 4/10/2017    WBC 4.0 - 11.0 10e9/L 0.6(LL) 1.2(L) 2.3(L) 4.2 7.5 3.7(L) 2.3(L)    Hemoglobin 11.7 - 15.7 g/dL 8.7(L) 8.5(L) 8.5(L) 8.3(L) 8.4(L) 8.2(L) 7.8(L)    Platelet Count 150 - 450 10e9/L 9(LL) 6(LL) 27(LL) 18(LL) 16(LL) 18(LL) 27(LL)    Neutrophils (Absolute) 1.6 - 8.3 10e9/L 0.4(LL) 0.9(L) 1.9 3.6 6.4 2.4 1.2(L)    Sodium 133 - 144 mmol/L 144 145(H) 145(H) 146(H) - 145(H) 142    Potassium 3.4 - 5.3 mmol/L 3.8 3.5 3.4 3.5 - 3.4 4.3    Chloride 94 - 109 mmol/L 106 108 109 108 - 108 108    Glucose 70 - 99 mg/dL 83 86 89 87 - 84 89    Urea Nitrogen 7 - 30 mg/dL 10 12 11 10 - 12 10    Creatinine 0.52 - 1.04 mg/dL 0.62 0.75 0.55 0.64 - 0.59 0.68    Calcium (Total) 8.5 - 10.1 mg/dL 8.1(L) 7.8(L) 8.4(L) 8.2(L) - 8.5 8.2(L)    Protein (Total) 6.8 - 8.8 g/dL - - 6.2(L) 6.3(L) - - -    Albumin 3.4 - 5.0  g/dL - - 3.0(L) 3.1(L) - - -    Alkaline Phosphatase 40 - 150 U/L - - 143 141 - - -    AST 0 - 45 U/L - - 12 16 - - -    ALT 0 - 50 U/L - - 20 20 - - -    MCV 78 - 100 fl 91 91 91 93 93 94 95               Primary Care Provider Office Phone # Fax #    Travis Carpenter [phone]       [facility] [address] [place] [place] [address]        Thank you!     Thank you for choosing [facility] BLOOD AND MARROW TRANSPLANT  for your care. Our goal is always to provide you with excellent care. Hearing back from our patients is one way we can continue to improve our services. Please take a few minutes to complete the written survey that you may receive in the mail after your visit with us. Thank you!             Your Updated Medication List - Protect others around you: Learn how to safely use, store and throw away your medicines at www.disposemymeds.org.          This list is accurate as of: 4/10/17 12:25 PM.  Always use your most recent med list.                   Brand Name Dispense Instructions for use    acyclovir 800 MG tablet    ZOVIRAX    150 tablet    Take 1 tablet (800 mg) by mouth 5 times daily       gabapentin 100 MG capsule    NEURONTIN    90 capsule    Take 2 capsules (200 mg) by mouth 3 times daily       levofloxacin 250 MG tablet    LEVAQUIN    30 tablet    Take 1 tablet (250 mg) by mouth daily       LORazepam 0.5 MG tablet    ATIVAN    60 tablet    Take 0.5 tablets (0.25 mg) by mouth 3 times daily (before meals)       losartan 25 MG tablet    COZAAR    30 tablet    Take 0.5 tablets (12.5 mg) by mouth At Bedtime       ondansetron 4 MG tablet    ZOFRAN    60 tablet    Take 1-2 tablets (4-8 mg) by mouth every 6 hours as needed for nausea or vomiting       oxyCODONE 5 MG IR tablet    ROXICODONE    30 tablet    Take 1-2 tablets (5-10 mg) by mouth every 4 hours as needed for moderate to severe pain       pantoprazole 40 MG EC tablet    PROTONIX    30 tablet    Take 1 tablet (40 mg)  by mouth daily       sulfamethoxazole-trimethoprim 800-160 MG per tablet   Start taking on:  4/24/2017    BACTRIM DS/SEPTRA DS    30 tablet    Take 1 tablet by mouth Every Mon, Tues two times daily       venlafaxine 150 MG Tb24 24 hr tablet    EFFEXOR-ER    30 each    Take 1 tablet (150 mg) by mouth At Bedtime       voriconazole 200 MG tablet    VFEND    60 tablet    Take 1 tablet (200 mg) by mouth every 12 hours

## 2017-04-10 NOTE — PROGRESS NOTES
BMT Daily Progress Note     ID: Steph Agee is a 42 year old female with Hodgkins disease who is day +19 s/p auto for Hodgkins disease.    HPI: Steph is feeling quite well.  She enjoyed having yesterday 'off' from clinic.  She did notice that she fatigued quickly after preparing supper.  She had an episode of n/v this AM.  She thinks she will continue taking zofran for the time being.  No diarrhea.  New bruise on her R forearm.  She does not remember trauma to the arm.  She has an older bruise to her L upper arm.  No other bleeding.  She is afebrile.       Review of Systems: 10 point ROS negative except as noted above.  Physical Exam:   There were no vitals taken for this visit.    Wt Readings from Last 5 Encounters:   04/07/17 79.4 kg (175 lb 1.6 oz)   04/05/17 80.9 kg (178 lb 4.8 oz)   04/05/17 80.9 kg (178 lb 4.8 oz)   04/04/17 80.1 kg (176 lb 9.6 oz)   04/03/17 80.3 kg (177 lb 1.6 oz)     General: NAD  Eyes: CALEB, sclera anicteric  Nose/Mouth/Throat: OP clear, buccal mucosa moist, no ulcerations.  Lungs: CTA bilaterally Cardiovascular: regular rhythm, no M/R/G  Abdominal/Rectal: +BS, soft, NT, ND, No HSM.   Lymphatics: no edema.   Skin: no rashes or petechiae.  Large bruising left posterior upper arm, new R anterior forearm ecchymosis.  Neuro: A&O.    Additional Findings: Barbour site NT, no drainage.     Labs:  Lab Results   Component Value Date    WBC 2.3 (L) 04/10/2017    ANEU 2.4 04/07/2017    HGB 7.8 (L) 04/10/2017    HCT 23.0 (L) 04/10/2017    PLT 27 (LL) 04/10/2017     04/10/2017    POTASSIUM 4.3 04/10/2017    CHLORIDE 108 04/10/2017    CO2 30 04/10/2017    GLC 89 04/10/2017    BUN 10 04/10/2017    CR 0.68 04/10/2017    MAG 2.0 03/27/2017    INR 1.01 03/20/2017       Assessment and Plan:   Steph Agee is a 42 year old female with Hodgkins disease who is day +19 s/p auto for Hodgkins disease.      1. BMT: Completed BEAM prep without issue. Cell dose=1.84 X10^6 CD34/kg from both PBSCT and bone  marrow.   -GCSF done 4/4/17.       2. HEME: Keep Hgb>8 and plts>10K. No pre-meds    Hgb is 7.8g/dL.  Transfuse 1 unit prbc.  -Had been on Lovenox 40mg QD d/t hx of chronic IJ clot recently noted on 3/3 U/s. Now thrombocytopenic.  Per Dr. Gill, ok to permanently d/c lovenox as chronic clot (last dose 3/22).       3. ID: Afebrile  - Cont vfend (hx of marijuana use), HD ACV (CMV+) and levaquin as prophy.   -Bactrim or appropriate PCP therapy to start d+28.        4.  GI: Continues to take qAM zofran and q middle of the night ativan, but remainder of day no nausea  - Stools are regular.    - Protonix for GI prophy.      5. FEN/Renal:   - Cr/lytes:  OK      6. CV: Cleared by cardiology d/t drop in EF. Cardiac MRI confirmed EF 47%. 3/29 Blood given at decreased rate  - Had been on Continue cozaar 12.5 daily (cardiomyopathy)- resumed 3/29 and bp not effected.     7. Psych: Hx of anxiety. Previously smoked daily marijuana and took ativan qHS. Ativan too sedating for daily use-per pt. Already on effexor, increase to 150mg daily (75).      Plan:   RTC Thursday.    Obed Chambers M.D.

## 2017-04-10 NOTE — NURSING NOTE
Chief Complaint   Patient presents with     Blood Draw     Right DL CVC line, caps changed for blue/brown line, labs drawn and sent, flushed with saline and heparin, vitals completed, checked into next appointment.   Martha Veronica,RN

## 2017-04-10 NOTE — NURSING NOTE
BMT Heme Malignancy Rooming Note    Steph Agee - 4/10/2017 10:22 AM     Chief Complaint   Patient presents with     Blood Draw     Right DL CVC line, caps changed for blue/brown line, labs drawn and sent, flushed with saline and heparin, vitals completed, checked into next appointment.     RECHECK     here for provider visit HX: HL        /69 (BP Location: Right arm)  Pulse 116  Temp 98  F (36.7  C) (Oral)  Wt 81.6 kg (180 lb)  SpO2 94%  BMI 32.91 kg/m2     Medications reviewed: Yes    Labs drawn: Yes    Drawn by: Lab Staff  Via: central venous catheter  See Doc Flowsheet for details.      Dressing changed: No     Medications given: No    Staff time:5 min    Additional information if applicable: Pt here for provider visit.  Reports continued fatigue.  Denies pain.  Pt requesting refill for Gabapentin today.    Ana Paula Hansen RN

## 2017-04-10 NOTE — MR AVS SNAPSHOT
After Visit Summary   4/10/2017    Steph Agee    MRN: [id number]           Patient Information     Date Of Birth          1975        Visit Information        Provider Department      4/10/2017 10:00 AM  BMT DOM [facility] Blood and Marrow Transplant        Today's Diagnoses     Nodular sclerosing Hodgkin's lymphoma, unspecified body region (H)              [facility] ([facility])  [address] [place] [id number]  Phone: [phone]  Clinic Hours:   Monday-Friday:    8am to 4:30pm   Weekends and holidays:    8am to noon (in general)  If your fever is 100.5  or greater,   call the clinic.  After hours call the   hospital at [phone] or   [phone]. Ask for the BMT   fellow for pediatric or adult patients            Follow-ups after your visit        Your next 10 appointments already scheduled     Apr 19, 2017 11:40 AM CDT   (Arrive by 11:25 AM)   CT CHEST/ABDOMEN/PELVIS W CONTRAST with CT2   [facility] CT ([facility])    [address] [place] [id number] [phone]           Please bring any scans or X-rays taken at other hospitals, if similar tests were done. Also bring a list of your medicines, including vitamins, minerals and over-the-counter drugs. It is safest to leave personal items at home.  Be sure to tell your doctor:   If you have any allergies.   If there s any chance you are pregnant.   If you are breastfeeding.   If you have any special needs.  You may have contrast for this exam. To prepare:   Do not eat or drink for 2 hours before your exam. If you need to take medicine, you may take it with small sips of water. (We may ask you to take liquid medicine as well.)   The day before your exam, drink extra fluids at least six 8-ounce glasses (unless your doctor tells you to restrict your fluids).  Patients over 70 or patients with diabetes or kidney problems:   If you haven t had a blood  test (creatinine test) within the last 30 days, go to your clinic or Diagnostic Imaging Department for this test.  If you have diabetes:   If your kidney function is normal, continue taking your metformin (Avandamet, Glucophage, Glucovance, Metaglip) on the day of your exam.   If your kidney function is abnormal, wait 48 hours before restarting this medicine.  You will have oral contrast for this exam:   You will drink the contrast at home. Get this from your clinic or Diagnostic Imaging Department. Please follow the directions given.  Please wear loose clothing, such as a sweat suit or jogging clothes. Avoid snaps, zippers and other metal. We may ask you to undress and put on a hospital gown.  If you have any questions, please call the Imaging Department where you will have your exam.            Apr 19, 2017 12:30 PM CDT   [facility] Lab Draw with  [facility] LAB DRAW   [facility] Lab Draw ([facility])    [address] [place] [id number]   [phone]            Apr 19, 2017  1:00 PM CDT   Bone Marrow Biopsy with  BMT CECILIA #3, UU BONE MARROW BIOPSY   [facility] Blood and Marrow Transplant ([facility])    [address] [place] [phone]   [phone]            Apr 26, 2017 11:30 AM CDT   BMT Anniversary Visit with Obed Chambers MD   [facility] Blood and Marrow Transplant ([facility])    [address] [place] [id number]   [phone]            May 24, 2017  9:30 AM CDT   [facility] Lab Draw with  MAS[facility] LAB DRAW   [facility] Lab Draw ([facility])    [address] [place] [address]   [phone]            May 24, 2017 10:00 AM CDT   BMT Anniversary Visit with Obed Chambers MD   [facility] Blood and Marrow Transplant ([facility])    [address] [place] [address]  [place] [address]   [phone]            Aug 09, 2017 10:30 AM CDT   [facility] Lab Draw with  MAS[facility] LAB DRAW   [facility] Lab Draw ([facility])    [address] [place] [id number]   [phone]            Aug 09, 2017 11:00 AM CDT   Return with Obed Chambers MD   [facility] Blood and Marrow Transplant ([facility])    [address] [place] [phone]   [phone]              Future tests that were ordered for you today     Open Standing Orders        Priority Remaining Interval Expires Ordered    Transfuse red blood cell unit Routine 1/2 TRANSFUSE 2 UNITS  4/10/2017    Red blood cell prepare order unit Routine 99/100 CONDITIONAL (SPECIFY) BLOOD  4/9/2017    Platelets prepare order unit Routine 99/100 CONDITIONAL (SPECIFY) BLOOD  4/9/2017            Who to contact     If you have questions or need follow up information about today's clinic visit or your schedule please contact [facility] BLOOD AND MARROW TRANSPLANT directly at [phone].  Normal or non-critical lab and imaging results will be communicated to you by Miprotohart, letter or phone within 4 business days after the clinic has received the results. If you do not hear from us within 7 days, please contact the clinic through LabPixiest or phone. If you have a critical or abnormal lab result, we will notify you by phone as soon as possible.  Submit refill requests through [employer] or call your pharmacy and they will forward the refill request to us. Please allow 3 business days for your refill to be completed.          Additional Information About Your Visit        Miprotohart Information     [employer] gives you secure access to your electronic health record. If you see a primary care provider, you can also send messages to your care team and make appointments. If you have questions, please call your primary care clinic.  If you do not have a primary care  provider, please call [phone] and they will assist you.        Care EveryWhere ID     This is your Care EveryWhere ID. This could be used by other organizations to access your [facility] medical records  [id number]        Your Vitals Were     Pulse Temperature Pulse Oximetry BMI (Body Mass Index)          116 98  F (36.7  C) (Oral) 94% 32.91 kg/m2         Blood Pressure from Last 3 Encounters:   04/10/17 103/69   04/07/17 98/62   04/05/17 109/63    Weight from Last 3 Encounters:   04/10/17 81.6 kg (180 lb)   04/07/17 79.4 kg (175 lb 1.6 oz)   04/05/17 80.9 kg (178 lb 4.8 oz)              We Performed the Following     Basic metabolic panel     CBC with platelets differential        Recent Review Flowsheet Data     BMT Recent Results Latest Ref Rng & Units 4/1/2017 4/2/2017 4/3/2017 4/4/2017 4/5/2017 4/7/2017 4/10/2017    WBC 4.0 - 11.0 10e9/L 0.6(LL) 1.2(L) 2.3(L) 4.2 7.5 3.7(L) 2.3(L)    Hemoglobin 11.7 - 15.7 g/dL 8.7(L) 8.5(L) 8.5(L) 8.3(L) 8.4(L) 8.2(L) 7.8(L)    Platelet Count 150 - 450 10e9/L 9(LL) 6(LL) 27(LL) 18(LL) 16(LL) 18(LL) 27(LL)    Neutrophils (Absolute) 1.6 - 8.3 10e9/L 0.4(LL) 0.9(L) 1.9 3.6 6.4 2.4 1.2(L)    Sodium 133 - 144 mmol/L 144 145(H) 145(H) 146(H) - 145(H) 142    Potassium 3.4 - 5.3 mmol/L 3.8 3.5 3.4 3.5 - 3.4 4.3    Chloride 94 - 109 mmol/L 106 108 109 108 - 108 108    Glucose 70 - 99 mg/dL 83 86 89 87 - 84 89    Urea Nitrogen 7 - 30 mg/dL 10 12 11 10 - 12 10    Creatinine 0.52 - 1.04 mg/dL 0.62 0.75 0.55 0.64 - 0.59 0.68    Calcium (Total) 8.5 - 10.1 mg/dL 8.1(L) 7.8(L) 8.4(L) 8.2(L) - 8.5 8.2(L)    Protein (Total) 6.8 - 8.8 g/dL - - 6.2(L) 6.3(L) - - -    Albumin 3.4 - 5.0 g/dL - - 3.0(L) 3.1(L) - - -    Alkaline Phosphatase 40 - 150 U/L - - 143 141 - - -    AST 0 - 45 U/L - - 12 16 - - -    ALT 0 - 50 U/L - - 20 20 - - -    MCV 78 - 100 fl 91 91 91 93 93 94 95               Primary Care Provider Office Phone # Fax #    Travis Carpenter [phone]       [facility]  [facility] [address]  [place] [address]        Thank you!     Thank you for choosing [facility] BLOOD AND MARROW TRANSPLANT  for your care. Our goal is always to provide you with excellent care. Hearing back from our patients is one way we can continue to improve our services. Please take a few minutes to complete the written survey that you may receive in the mail after your visit with us. Thank you!             Your Updated Medication List - Protect others around you: Learn how to safely use, store and throw away your medicines at www.disposemymeds.org.          This list is accurate as of: 4/10/17 10:55 AM.  Always use your most recent med list.                   Brand Name Dispense Instructions for use    acyclovir 800 MG tablet    ZOVIRAX    150 tablet    Take 1 tablet (800 mg) by mouth 5 times daily       gabapentin 100 MG capsule    NEURONTIN    90 capsule    Take 2 capsules (200 mg) by mouth 3 times daily       levofloxacin 250 MG tablet    LEVAQUIN    30 tablet    Take 1 tablet (250 mg) by mouth daily       LORazepam 0.5 MG tablet    ATIVAN    60 tablet    Take 0.5 tablets (0.25 mg) by mouth 3 times daily (before meals)       losartan 25 MG tablet    COZAAR    30 tablet    Take 0.5 tablets (12.5 mg) by mouth At Bedtime       ondansetron 4 MG tablet    ZOFRAN    60 tablet    Take 1-2 tablets (4-8 mg) by mouth every 6 hours as needed for nausea or vomiting       oxyCODONE 5 MG IR tablet    ROXICODONE    30 tablet    Take 1-2 tablets (5-10 mg) by mouth every 4 hours as needed for moderate to severe pain       pantoprazole 40 MG EC tablet    PROTONIX    30 tablet    Take 1 tablet (40 mg) by mouth daily       sulfamethoxazole-trimethoprim 800-160 MG per tablet   Start taking on:  4/24/2017    BACTRIM DS/SEPTRA DS    30 tablet    Take 1 tablet by mouth Every Mon, Tues two times daily       venlafaxine 150 MG Tb24 24 hr tablet    EFFEXOR-ER    30 each    Take 1 tablet (150 mg) by mouth At Bedtime        voriconazole 200 MG tablet    VFEND    60 tablet    Take 1 tablet (200 mg) by mouth every 12 hours

## 2017-04-12 RX ORDER — ACETAMINOPHEN 325 MG/1
650 TABLET ORAL ONCE
Status: CANCELLED
Start: 2017-04-12 | End: 2017-04-12

## 2017-04-12 RX ORDER — DIPHENHYDRAMINE HCL 25 MG
25 CAPSULE ORAL ONCE
Status: CANCELLED
Start: 2017-04-12 | End: 2017-04-12

## 2017-04-13 ENCOUNTER — INFUSION THERAPY VISIT (OUTPATIENT)
Dept: TRANSPLANT | Facility: CLINIC | Age: 42
End: 2017-04-13
Attending: INTERNAL MEDICINE
Payer: COMMERCIAL

## 2017-04-13 ENCOUNTER — APPOINTMENT (OUTPATIENT)
Dept: LAB | Facility: CLINIC | Age: 42
End: 2017-04-13
Attending: NURSE PRACTITIONER
Payer: COMMERCIAL

## 2017-04-13 ENCOUNTER — ONCOLOGY VISIT (OUTPATIENT)
Dept: TRANSPLANT | Facility: CLINIC | Age: 42
End: 2017-04-13
Attending: NURSE PRACTITIONER
Payer: COMMERCIAL

## 2017-04-13 VITALS
OXYGEN SATURATION: 96 % | TEMPERATURE: 98 F | BODY MASS INDEX: 31.82 KG/M2 | HEART RATE: 102 BPM | WEIGHT: 174 LBS | SYSTOLIC BLOOD PRESSURE: 100 MMHG | DIASTOLIC BLOOD PRESSURE: 68 MMHG

## 2017-04-13 DIAGNOSIS — G43.711 INTRACTABLE CHRONIC MIGRAINE WITHOUT AURA AND WITH STATUS MIGRAINOSUS: ICD-10-CM

## 2017-04-13 DIAGNOSIS — C81.10 NODULAR SCLEROSING HODGKIN'S LYMPHOMA, UNSPECIFIED BODY REGION (H): Primary | ICD-10-CM

## 2017-04-13 DIAGNOSIS — C81.00 NODULAR LYMPHOCYTE PREDOMINANT HODGKIN LYMPHOMA, UNSPECIFIED BODY REGION (H): Primary | ICD-10-CM

## 2017-04-13 LAB
ALBUMIN SERPL-MCNC: 3.2 G/DL (ref 3.4–5)
ALP SERPL-CCNC: 151 U/L (ref 40–150)
ALT SERPL W P-5'-P-CCNC: 22 U/L (ref 0–50)
ANION GAP SERPL CALCULATED.3IONS-SCNC: 6 MMOL/L (ref 3–14)
AST SERPL W P-5'-P-CCNC: 25 U/L (ref 0–45)
BASOPHILS # BLD AUTO: 0 10E9/L (ref 0–0.2)
BASOPHILS NFR BLD AUTO: 0.4 %
BILIRUB SERPL-MCNC: 0.8 MG/DL (ref 0.2–1.3)
BLD PROD TYP BPU: NORMAL
BLD UNIT ID BPU: 0
BLOOD PRODUCT CODE: NORMAL
BPU ID: NORMAL
BUN SERPL-MCNC: 13 MG/DL (ref 7–30)
CALCIUM SERPL-MCNC: 8.4 MG/DL (ref 8.5–10.1)
CHLORIDE SERPL-SCNC: 104 MMOL/L (ref 94–109)
CO2 SERPL-SCNC: 30 MMOL/L (ref 20–32)
CREAT SERPL-MCNC: 0.76 MG/DL (ref 0.52–1.04)
DIFFERENTIAL METHOD BLD: ABNORMAL
EOSINOPHIL # BLD AUTO: 0 10E9/L (ref 0–0.7)
EOSINOPHIL NFR BLD AUTO: 0 %
ERYTHROCYTE [DISTWIDTH] IN BLOOD BY AUTOMATED COUNT: 14.4 % (ref 10–15)
GFR SERPL CREATININE-BSD FRML MDRD: 83 ML/MIN/1.7M2
GLUCOSE SERPL-MCNC: 78 MG/DL (ref 70–99)
HCT VFR BLD AUTO: 28.9 % (ref 35–47)
HGB BLD-MCNC: 9.9 G/DL (ref 11.7–15.7)
IMM GRANULOCYTES # BLD: 0 10E9/L (ref 0–0.4)
IMM GRANULOCYTES NFR BLD: 0.4 %
LYMPHOCYTES # BLD AUTO: 0.6 10E9/L (ref 0.8–5.3)
LYMPHOCYTES NFR BLD AUTO: 20 %
MCH RBC QN AUTO: 32.2 PG (ref 26.5–33)
MCHC RBC AUTO-ENTMCNC: 34.3 G/DL (ref 31.5–36.5)
MCV RBC AUTO: 94 FL (ref 78–100)
MONOCYTES # BLD AUTO: 0.5 10E9/L (ref 0–1.3)
MONOCYTES NFR BLD AUTO: 19.3 %
NEUTROPHILS # BLD AUTO: 1.7 10E9/L (ref 1.6–8.3)
NEUTROPHILS NFR BLD AUTO: 59.9 %
NRBC # BLD AUTO: 0 10*3/UL
NRBC BLD AUTO-RTO: 0 /100
PLATELET # BLD AUTO: 42 10E9/L (ref 150–450)
POTASSIUM SERPL-SCNC: 4.2 MMOL/L (ref 3.4–5.3)
PROT SERPL-MCNC: 6.7 G/DL (ref 6.8–8.8)
RBC # BLD AUTO: 3.07 10E12/L (ref 3.8–5.2)
SODIUM SERPL-SCNC: 140 MMOL/L (ref 133–144)
TRANSFUSION STATUS PATIENT QL: NORMAL
WBC # BLD AUTO: 2.8 10E9/L (ref 4–11)

## 2017-04-13 PROCEDURE — 85025 COMPLETE CBC W/AUTO DIFF WBC: CPT | Performed by: STUDENT IN AN ORGANIZED HEALTH CARE EDUCATION/TRAINING PROGRAM

## 2017-04-13 PROCEDURE — 36592 COLLECT BLOOD FROM PICC: CPT

## 2017-04-13 PROCEDURE — 80053 COMPREHEN METABOLIC PANEL: CPT | Performed by: STUDENT IN AN ORGANIZED HEALTH CARE EDUCATION/TRAINING PROGRAM

## 2017-04-13 PROCEDURE — 25000125 ZZHC RX 250: Mod: ZF | Performed by: NURSE PRACTITIONER

## 2017-04-13 PROCEDURE — 40000268 ZZH STATISTIC NO CHARGES: Mod: ZF

## 2017-04-13 PROCEDURE — 99212 OFFICE O/P EST SF 10 MIN: CPT | Mod: ZF

## 2017-04-13 RX ORDER — HEPARIN SODIUM,PORCINE 10 UNIT/ML
5 VIAL (ML) INTRAVENOUS
Status: DISCONTINUED | OUTPATIENT
Start: 2017-04-13 | End: 2017-04-13 | Stop reason: HOSPADM

## 2017-04-13 RX ORDER — HYDROCODONE BITARTRATE AND ACETAMINOPHEN 5; 325 MG/1; MG/1
1 TABLET ORAL EVERY 6 HOURS PRN
Qty: 30 TABLET | Refills: 0 | Status: SHIPPED | OUTPATIENT
Start: 2017-04-13 | End: 2017-07-05

## 2017-04-13 RX ADMIN — SODIUM CHLORIDE, PRESERVATIVE FREE 5 ML: 5 INJECTION INTRAVENOUS at 09:41

## 2017-04-13 RX ADMIN — SODIUM CHLORIDE, PRESERVATIVE FREE 5 ML: 5 INJECTION INTRAVENOUS at 09:42

## 2017-04-13 ASSESSMENT — PAIN SCALES - GENERAL: PAINLEVEL: NO PAIN (0)

## 2017-04-13 NOTE — PATIENT INSTRUCTIONS
- Okay to take 1 norco at night for pain management. Please do not take additional Tylenol with this medication as it contains Tylenol in it.  - RTC on Monday for provider visit, labs, and possible PRBC's  - Call if you develop a fever or new symptoms of infection  - Wear your N 95 mask around children    Melodie Dominguez, MSN, APRN, ACNP-BC    4/17 @ 7:30a

## 2017-04-13 NOTE — MR AVS SNAPSHOT
After Visit Summary   4/13/2017    Steph Agee    MRN: [id number]           Patient Information     Date Of Birth          1975        Visit Information        Provider Department      4/13/2017 10:00 AM  BMT CECILIA #3 [facility] Blood and Marrow Transplant        Today's Diagnoses     Nodular sclerosing Hodgkin's lymphoma, unspecified body region (H)    -  1    Intractable chronic migraine without aura and with status migrainosus              [facility] ([facility])  [address] [place] [address]  Phone: [phone]  Clinic Hours:   Monday-Friday:    8am to 4:30pm   Weekends and holidays:    8am to noon (in general)  If your fever is 100.5  or greater,   call the clinic.  After hours call the   hospital at [phone] or   [phone]. Ask for the BMT   fellow for pediatric or adult patients           Care Instructions    - Okay to take 1 norco at night for pain management. Please do not take additional Tylenol with this medication as it contains Tylenol in it.  - RTC on Monday for provider visit, labs, and possible PRBC's  - Call if you develop a fever or new symptoms of infection  - Wear your N 95 mask around children    Melodie Dominguez, MSN, APRN, ACNP-BC    4/17 @ 7:30a        Follow-ups after your visit        Your next 10 appointments already scheduled     Apr 17, 2017  8:00 AM CDT   Return with  BMT CECILIA #1   [facility] Blood and Marrow Transplant ([facility])    [address]  [address] [facility] [address] [phone]            Apr 19, 2017 11:40 AM CDT   (Arrive by 11:25 AM)   CT CHEST/ABDOMEN/PELVIS W CONTRAST with UCCT2   [facility] Imaging [place] [facility] ([facility])    [address]  [address] [facility] [address] [phone]           Please bring any scans or X-rays taken at other hospitals, if similar tests were done. Also bring a list of your medicines, including vitamins,  minerals and over-the-counter drugs. It is safest to leave personal items at home.  Be sure to tell your doctor:   If you have any allergies.   If there s any chance you are pregnant.   If you are breastfeeding.   If you have any special needs.  You may have contrast for this exam. To prepare:   Do not eat or drink for 2 hours before your exam. If you need to take medicine, you may take it with small sips of water. (We may ask you to take liquid medicine as well.)   The day before your exam, drink extra fluids at least six 8-ounce glasses (unless your doctor tells you to restrict your fluids).  Patients over 70 or patients with diabetes or kidney problems:   If you haven t had a blood test (creatinine test) within the last 30 days, go to your clinic or Diagnostic Imaging Department for this test.  If you have diabetes:   If your kidney function is normal, continue taking your metformin (Avandamet, Glucophage, Glucovance, Metaglip) on the day of your exam.   If your kidney function is abnormal, wait 48 hours before restarting this medicine.  You will have oral contrast for this exam:   You will drink the contrast at home. Get this from your clinic or Diagnostic Imaging Department. Please follow the directions given.  Please wear loose clothing, such as a sweat suit or jogging clothes. Avoid snaps, zippers and other metal. We may ask you to undress and put on a hospital gown.  If you have any questions, please call the Imaging Department where you will have your exam.            Apr 19, 2017 12:30 PM CDT   Masonic Lab Draw with  MASONIC LAB DRAW   [facility] Masonic Lab Draw ([facility])    [address] [place] [id number]   [phone]            Apr 19, 2017  1:00 PM CDT   Bone Marrow Biopsy with  BMT CECILIA #3, UU BONE MARROW BIOPSY   [facility] Blood and Marrow Transplant ([facility])    [address] [place] [address]  [address]   [phone]            Apr 26, 2017 11:30 AM CDT   BMT Anniversary Visit with Obed Chambers MD   [facility] Blood and Marrow Transplant ([facility])    [address] [place] [address]   [phone]            May 24, 2017  9:30 AM CDT   Masonic Lab Draw with  MASONIC LAB DRAW   [facility] Masonic Lab Draw ([facility])    [address] [address]   [phone]            May 24, 2017 10:00 AM CDT   BMT Anniversary Visit with Obed Chambers MD   [facility] Blood and Marrow Transplant ([facility])    [address] [place] [address]   [phone]            Aug 09, 2017 10:30 AM CDT   Masonic Lab Draw with  MASONIC LAB DRAW   [facility] Masonic Lab Draw ([facility])    [address] [place] [address]   [phone]            Aug 09, 2017 11:00 AM CDT   Return with Obed Chambers MD   [facility] Blood and Marrow Transplant ([facility])    [address] [place] [address]   [phone]              Future tests that were ordered for you today     Open Standing Orders        Priority Remaining Interval Expires Ordered    Red blood cell prepare order unit Routine 99/100 CONDITIONAL (SPECIFY) BLOOD  4/12/2017    Platelets prepare order unit Routine 99/100 CONDITIONAL (SPECIFY) BLOOD  4/12/2017          Open Future Orders        Priority Expected Expires Ordered    ABO/Rh type and screen Routine 4/17/2017 10/10/2017 4/13/2017    Basic metabolic panel Routine 4/17/2017 10/10/2017 4/13/2017    Comprehensive metabolic panel Routine 4/17/2017 10/10/2017 4/13/2017    Magnesium Routine 4/17/2017 10/10/2017 4/13/2017            Who to contact     If you have questions or need follow up information about today's clinic visit or your schedule please contact  [facility] BLOOD AND MARROW TRANSPLANT directly at [phone].  Normal or non-critical lab and imaging results will be communicated to you by navabihart, letter or phone within 4 business days after the clinic has received the results. If you do not hear from us within 7 days, please contact the clinic through navabihart or phone. If you have a critical or abnormal lab result, we will notify you by phone as soon as possible.  Submit refill requests through Introhive or call your pharmacy and they will forward the refill request to us. Please allow 3 business days for your refill to be completed.          Additional Information About Your Visit        navabiharCymphonix Information     Intro[employer]ive gives you secure access to your electronic health record. If you see a primary care provider, you can also send messages to your care team and make appointments. If you have questions, please call your primary care clinic.  If you do not have a primary care provider, please call [phone] and they will assist you.        Care EveryWhere ID     This is your Care EveryWhere ID. This could be used by other organizations to access your [facility] medical records  [id number]        Your Vitals Were     Pulse Temperature Pulse Oximetry BMI (Body Mass Index)          102 98  F (36.7  C) 96% 31.82 kg/m2         Blood Pressure from Last 3 Encounters:   04/13/17 100/68   04/10/17 101/59   04/10/17 103/69    Weight from Last 3 Encounters:   04/13/17 78.9 kg (174 lb)   04/10/17 81.6 kg (180 lb)   04/07/17 79.4 kg (175 lb 1.6 oz)              We Performed the Following     CBC with platelets differential     Comprehensive metabolic panel          Today's Medication Changes          These changes are accurate as of: 4/13/17 10:31 AM.  If you have any questions, ask your nurse or doctor.               Start taking these medicines.        Dose/Directions    HYDROcodone-acetaminophen 5-325 MG per tablet   Commonly known as:  NORCO   Used for:  Intractable  chronic migraine without aura and with status migrainosus        Dose:  1 tablet   Take 1 tablet by mouth every 6 hours as needed for moderate to severe pain   Quantity:  30 tablet   Refills:  0         Stop taking these medicines if you haven't already. Please contact your care team if you have questions.     oxyCODONE 5 MG IR tablet   Commonly known as:  ROXICODONE                Where to get your medicines      Some of these will need a paper prescription and others can be bought over the counter.  Ask your nurse if you have questions.     Bring a paper prescription for each of these medications     HYDROcodone-acetaminophen 5-325 MG per tablet                Recent Review Flowsheet Data     BMT Recent Results Latest Ref Rng & Units 4/2/2017 4/3/2017 4/4/2017 4/5/2017 4/7/2017 4/10/2017 4/13/2017    WBC 4.0 - 11.0 10e9/L 1.2(L) 2.3(L) 4.2 7.5 3.7(L) 2.3(L) 2.8(L)    Hemoglobin 11.7 - 15.7 g/dL 8.5(L) 8.5(L) 8.3(L) 8.4(L) 8.2(L) 7.8(L) 9.9(L)    Platelet Count 150 - 450 10e9/L 6(LL) 27(LL) 18(LL) 16(LL) 18(LL) 27(LL) 42(LL)    Neutrophils (Absolute) 1.6 - 8.3 10e9/L 0.9(L) 1.9 3.6 6.4 2.4 1.2(L) 1.7    Sodium 133 - 144 mmol/L 145(H) 145(H) 146(H) - 145(H) 142 140    Potassium 3.4 - 5.3 mmol/L 3.5 3.4 3.5 - 3.4 4.3 4.2    Chloride 94 - 109 mmol/L 108 109 108 - 108 108 104    Glucose 70 - 99 mg/dL 86 89 87 - 84 89 78    Urea Nitrogen 7 - 30 mg/dL 12 11 10 - 12 10 13    Creatinine 0.52 - 1.04 mg/dL 0.75 0.55 0.64 - 0.59 0.68 0.76    Calcium (Total) 8.5 - 10.1 mg/dL 7.8(L) 8.4(L) 8.2(L) - 8.5 8.2(L) 8.4(L)    Protein (Total) 6.8 - 8.8 g/dL - 6.2(L) 6.3(L) - - - 6.7(L)    Albumin 3.4 - 5.0 g/dL - 3.0(L) 3.1(L) - - - 3.2(L)    Alkaline Phosphatase 40 - 150 U/L - 143 141 - - - 151(H)    AST 0 - 45 U/L - 12 16 - - - 25    ALT 0 - 50 U/L - 20 20 - - - 22    MCV 78 - 100 fl 91 91 93 93 94 95 94               Primary Care Provider Office Phone # Fax #    Travis Carpenter [phone]       [facility]  MEDICINE [address] [place] [address]        Thank you!     Thank you for choosing [facility] BLOOD AND MARROW TRANSPLANT  for your care. Our goal is always to provide you with excellent care. Hearing back from our patients is one way we can continue to improve our services. Please take a few minutes to complete the written survey that you may receive in the mail after your visit with us. Thank you!             Your Updated Medication List - Protect others around you: Learn how to safely use, store and throw away your medicines at www.disposemymeds.org.          This list is accurate as of: 4/13/17 10:31 AM.  Always use your most recent med list.                   Brand Name Dispense Instructions for use    acyclovir 800 MG tablet    ZOVIRAX    150 tablet    Take 1 tablet (800 mg) by mouth 5 times daily       gabapentin 100 MG capsule    NEURONTIN    90 capsule    Take 2 capsules (200 mg) by mouth 3 times daily       HYDROcodone-acetaminophen 5-325 MG per tablet    NORCO    30 tablet    Take 1 tablet by mouth every 6 hours as needed for moderate to severe pain       levofloxacin 250 MG tablet    LEVAQUIN    30 tablet    Take 1 tablet (250 mg) by mouth daily       LORazepam 0.5 MG tablet    ATIVAN    60 tablet    Take 0.5 tablets (0.25 mg) by mouth 3 times daily (before meals)       losartan 25 MG tablet    COZAAR    30 tablet    Take 0.5 tablets (12.5 mg) by mouth At Bedtime       ondansetron 4 MG tablet    ZOFRAN    60 tablet    Take 1-2 tablets (4-8 mg) by mouth every 6 hours as needed for nausea or vomiting       pantoprazole 40 MG EC tablet    PROTONIX    30 tablet    Take 1 tablet (40 mg) by mouth daily       sulfamethoxazole-trimethoprim 800-160 MG per tablet   Start taking on:  4/24/2017    BACTRIM DS/SEPTRA DS    30 tablet    Take 1 tablet by mouth Every Mon, Tues two times daily       venlafaxine 150 MG Tb24 24 hr tablet    EFFEXOR-ER    30 each    Take 1 tablet (150 mg) by mouth At Bedtime        voriconazole 200 MG tablet    VFEND    60 tablet    Take 1 tablet (200 mg) by mouth every 12 hours

## 2017-04-13 NOTE — MR AVS SNAPSHOT
After Visit Summary   4/13/2017    Steph Agee    MRN: [id number]           Patient Information     Date Of Birth          1975        Visit Information        Provider Department      4/13/2017 10:30 AM UC 8 ATC; UC BMT INFUSION [facility] Blood and Marrow Transplant        Today's Diagnoses     Nodular lymphocyte predominant Hodgkin lymphoma, unspecified body region (H)    -  1          [facility] ([facility])  [address] [place] [address]  Phone: [phone]  Clinic Hours:   Monday-Friday:    8am to 4:30pm   Weekends and holidays:    8am to noon (in general)  If your fever is 100.5  or greater,   call the clinic.  After hours call the   hospital at [phone] or   [phone]. Ask for the BMT   fellow for pediatric or adult patients            Follow-ups after your visit        Your next 10 appointments already scheduled     Apr 17, 2017  7:30 AM CDT   Masonic Lab Draw with UC MASONIC LAB DRAW   [facility] Masonic Lab Draw ([facility])    [address] [place] [id number] [phone]            Apr 17, 2017  8:00 AM CDT   Return with UC BMT CECILIA #1   [facility] Blood and Marrow Transplant ([facility])    [address] [place] [id number] [phone]            Apr 17, 2017  8:30 AM CDT   Infusion 300 with UC BMT INFUSION, UC 6 ATC   [facility] Blood and Marrow Transplant ([facility])    [address] [place] [id number] [phone]            Apr 19, 2017 11:40 AM CDT   (Arrive by 11:25 AM)   CT CHEST/ABDOMEN/PELVIS W CONTRAST with UCCT2   [facility] Imaging [place] [facility] ([facility])    [address] [place] [address]  [address] [facility] [id number] [phone]           Please bring any scans or X-rays taken at other hospitals, if similar tests were done. Also bring a list of your medicines,  including vitamins, minerals and over-the-counter drugs. It is safest to leave personal items at home.  Be sure to tell your doctor:   If you have any allergies.   If there s any chance you are pregnant.   If you are breastfeeding.   If you have any special needs.  You may have contrast for this exam. To prepare:   Do not eat or drink for 2 hours before your exam. If you need to take medicine, you may take it with small sips of water. (We may ask you to take liquid medicine as well.)   The day before your exam, drink extra fluids at least six 8-ounce glasses (unless your doctor tells you to restrict your fluids).  Patients over 70 or patients with diabetes or kidney problems:   If you haven t had a blood test (creatinine test) within the last 30 days, go to your clinic or Diagnostic Imaging Department for this test.  If you have diabetes:   If your kidney function is normal, continue taking your metformin (Avandamet, Glucophage, Glucovance, Metaglip) on the day of your exam.   If your kidney function is abnormal, wait 48 hours before restarting this medicine.  You will have oral contrast for this exam:   You will drink the contrast at home. Get this from your clinic or Diagnostic Imaging Department. Please follow the directions given.  Please wear loose clothing, such as a sweat suit or jogging clothes. Avoid snaps, zippers and other metal. We may ask you to undress and put on a hospital gown.  If you have any questions, please call the Imaging Department where you will have your exam.            Apr 19, 2017 12:30 PM CDT   Masonic Lab Draw with  MASONIC LAB DRAW   [facility] Masonic Lab Draw ([facility])    [address] [place] [address]   [phone]            Apr 19, 2017  1:00 PM CDT   Bone Marrow Biopsy with  BMT CECILIA #3, UU BONE MARROW BIOPSY   [facility] Blood and Marrow Transplant ([facility])    [address] [place] [address]  [facility] [address]   [phone]            Apr 26, 2017 11:30 AM CDT   BMT Anniversary Visit with Obed Chambers MD   [facility] Blood and Marrow Transplant ([facility])    [address] [place] [address]  [address] [facility] [address]   [phone]            May 24, 2017  9:30 AM CDT   Masonic Lab Draw with  MASONIC LAB DRAW   [facility] Masonic Lab Draw ([facility])    [address] [facility]  [address] [facility] [address]   [phone]              Future tests that were ordered for you today     Open Standing Orders        Priority Remaining Interval Expires Ordered    Red blood cell prepare order unit Routine 99/100 CONDITIONAL (SPECIFY) BLOOD  4/12/2017    Platelets prepare order unit Routine 99/100 CONDITIONAL (SPECIFY) BLOOD  4/12/2017          Open Future Orders        Priority Expected Expires Ordered    ABO/Rh type and screen Routine 4/17/2017 10/10/2017 4/13/2017    Basic metabolic panel Routine 4/17/2017 10/10/2017 4/13/2017    Comprehensive metabolic panel Routine 4/17/2017 10/10/2017 4/13/2017    Magnesium Routine 4/17/2017 10/10/2017 4/13/2017            Who to contact     If you have questions or need follow up information about today's clinic visit or your schedule please contact [facility] BLOOD AND MARROW TRANSPLANT directly at [phone].  Normal or non-critical lab and imaging results will be communicated to you by Ascendant Dx[employer], letter or phone within 4 business days after the clinic has received the results. If you do not hear from us within 7 days, please contact the clinic through Ascendant Dxhart or phone. If you have a critical or abnormal lab result, we will notify you by phone as soon as possible.  Submit refill requests through [employer] or call your pharmacy and they will forward the refill request to us. Please allow 3 business days for your refill to be completed.          Additional Information About Your Visit        [facility]  Information     [employer] gives you secure access to your electronic health record. If you see a primary care provider, you can also send messages to your care team and make appointments. If you have questions, please call your primary care clinic.  If you do not have a primary care provider, please call [phone] and they will assist you.        Care EveryWhere ID     This is your Care EveryWhere ID. This could be used by other organizations to access your [facility] medical records  [id number]         Blood Pressure from Last 3 Encounters:   04/13/17 100/68   04/10/17 101/59   04/10/17 103/69    Weight from Last 3 Encounters:   04/13/17 78.9 kg (174 lb)   04/10/17 81.6 kg (180 lb)   04/07/17 79.4 kg (175 lb 1.6 oz)              We Performed the Following     Blood component     Platelets prepare order unit     Red blood cell prepare order unit          Today's Medication Changes          These changes are accurate as of: 4/13/17  3:54 PM.  If you have any questions, ask your nurse or doctor.               Start taking these medicines.        Dose/Directions    HYDROcodone-acetaminophen 5-325 MG per tablet   Commonly known as:  NORCO   Used for:  Intractable chronic migraine without aura and with status migrainosus        Dose:  1 tablet   Take 1 tablet by mouth every 6 hours as needed for moderate to severe pain   Quantity:  30 tablet   Refills:  0         Stop taking these medicines if you haven't already. Please contact your care team if you have questions.     oxyCODONE 5 MG IR tablet   Commonly known as:  ROXICODONE                Where to get your medicines      Some of these will need a paper prescription and others can be bought over the counter.  Ask your nurse if you have questions.     Bring a paper prescription for each of these medications     HYDROcodone-acetaminophen 5-325 MG per tablet                Recent Review Flowsheet Data     BMT Recent Results Latest Ref Rng & Units 4/2/2017 4/3/2017  4/4/2017 4/5/2017 4/7/2017 4/10/2017 4/13/2017    WBC 4.0 - 11.0 10e9/L 1.2(L) 2.3(L) 4.2 7.5 3.7(L) 2.3(L) 2.8(L)    Hemoglobin 11.7 - 15.7 g/dL 8.5(L) 8.5(L) 8.3(L) 8.4(L) 8.2(L) 7.8(L) 9.9(L)    Platelet Count 150 - 450 10e9/L 6(LL) 27(LL) 18(LL) 16(LL) 18(LL) 27(LL) 42(LL)    Neutrophils (Absolute) 1.6 - 8.3 10e9/L 0.9(L) 1.9 3.6 6.4 2.4 1.2(L) 1.7    Sodium 133 - 144 mmol/L 145(H) 145(H) 146(H) - 145(H) 142 140    Potassium 3.4 - 5.3 mmol/L 3.5 3.4 3.5 - 3.4 4.3 4.2    Chloride 94 - 109 mmol/L 108 109 108 - 108 108 104    Glucose 70 - 99 mg/dL 86 89 87 - 84 89 78    Urea Nitrogen 7 - 30 mg/dL 12 11 10 - 12 10 13    Creatinine 0.52 - 1.04 mg/dL 0.75 0.55 0.64 - 0.59 0.68 0.76    Calcium (Total) 8.5 - 10.1 mg/dL 7.8(L) 8.4(L) 8.2(L) - 8.5 8.2(L) 8.4(L)    Protein (Total) 6.8 - 8.8 g/dL - 6.2(L) 6.3(L) - - - 6.7(L)    Albumin 3.4 - 5.0 g/dL - 3.0(L) 3.1(L) - - - 3.2(L)    Alkaline Phosphatase 40 - 150 U/L - 143 141 - - - 151(H)    AST 0 - 45 U/L - 12 16 - - - 25    ALT 0 - 50 U/L - 20 20 - - - 22    MCV 78 - 100 fl 91 91 93 93 94 95 94               Primary Care Provider Office Phone # Fax #    Travis Carpenter [phone]       [facility] [address] [address]        Thank you!     Thank you for choosing [facility] BLOOD AND MARROW TRANSPLANT  for your care. Our goal is always to provide you with excellent care. Hearing back from our patients is one way we can continue to improve our services. Please take a few minutes to complete the written survey that you may receive in the mail after your visit with us. Thank you!             Your Updated Medication List - Protect others around you: Learn how to safely use, store and throw away your medicines at www.disposemymeds.org.          This list is accurate as of: 4/13/17  3:54 PM.  Always use your most recent med list.                   Brand Name Dispense Instructions for use    acyclovir 800 MG tablet    ZOVIRAX     150 tablet    Take 1 tablet (800 mg) by mouth 5 times daily       gabapentin 100 MG capsule    NEURONTIN    90 capsule    Take 2 capsules (200 mg) by mouth 3 times daily       HYDROcodone-acetaminophen 5-325 MG per tablet    NORCO    30 tablet    Take 1 tablet by mouth every 6 hours as needed for moderate to severe pain       levofloxacin 250 MG tablet    LEVAQUIN    30 tablet    Take 1 tablet (250 mg) by mouth daily       LORazepam 0.5 MG tablet    ATIVAN    60 tablet    Take 0.5 tablets (0.25 mg) by mouth 3 times daily (before meals)       losartan 25 MG tablet    COZAAR    30 tablet    Take 0.5 tablets (12.5 mg) by mouth At Bedtime       ondansetron 4 MG tablet    ZOFRAN    60 tablet    Take 1-2 tablets (4-8 mg) by mouth every 6 hours as needed for nausea or vomiting       pantoprazole 40 MG EC tablet    PROTONIX    30 tablet    Take 1 tablet (40 mg) by mouth daily       sulfamethoxazole-trimethoprim 800-160 MG per tablet   Start taking on:  4/24/2017    BACTRIM DS/SEPTRA DS    30 tablet    Take 1 tablet by mouth Every Mon, Tues two times daily       venlafaxine 150 MG Tb24 24 hr tablet    EFFEXOR-ER    30 each    Take 1 tablet (150 mg) by mouth At Bedtime       voriconazole 200 MG tablet    VFEND    60 tablet    Take 1 tablet (200 mg) by mouth every 12 hours

## 2017-04-13 NOTE — NURSING NOTE
Chief Complaint   Patient presents with     Blood Draw     Patient is here today for labs to be drawn via CVC by RN. Labs collected,vitals charted and patient checked into next appt.     RECHECK     Post BMT for HL here for labs and provider       Chayito Quiros CMA April 13, 2017 10:01 AM  Medications and allergies reviewed. Face to face time 5 minutes.

## 2017-04-13 NOTE — NURSING NOTE
Chief Complaint   Patient presents with     Blood Draw     Patient is here today for labs to be drawn via CVC by RN. Labs collected,vitals charted and patient checked into next appt.

## 2017-04-13 NOTE — PROGRESS NOTES
BMT Daily Progress Note     ID: Steph Agee is a 42 year old female with Hodgkins disease who is day + 22 s/p auto for Hodgkins disease.    HPI: Steph denied any acute events since she was last seen in the BMT clinic. Notes her nighttime migraine headaches have returned and she has been taking tylenol without adequate symptom management. Requested one time prescription of Vicodin to take QHS for symptom management. Continues to have mild nausea without vomiting. She has been taking compazine and ativan with relief. No acute bleeding events. No new constitutional symptoms. Eating adequate amounts of a regular diet. She will be traveling home for the weekend.    Review of Systems: 10 point ROS negative except as noted above.    Physical Exam:   Blood pressure 100/68, pulse 102, temperature 98  F (36.7  C), weight 78.9 kg (174 lb), SpO2 96 %.    Wt Readings from Last 5 Encounters:   04/13/17 78.9 kg (174 lb)   04/10/17 81.6 kg (180 lb)   04/07/17 79.4 kg (175 lb 1.6 oz)   04/05/17 80.9 kg (178 lb 4.8 oz)   04/05/17 80.9 kg (178 lb 4.8 oz)     General: NAD  Eyes: CALEB, sclera anicteric  Nose/Mouth/Throat: OP clear, buccal mucosa moist, no ulcerations.  Lungs: CTA bilaterally Cardiovascular: regular rhythm, no M/R/G  Abdominal/Rectal: +BS, soft, NT, ND, No HSM.   Lymphatics: no edema.   Skin: no rashes or petechiae.  Large bruising left posterior upper arm, new R anterior forearm ecchymosis.  Neuro: A&O.    Additional Findings: Barbour site NT, no drainage.     Labs:  Lab Results   Component Value Date    WBC 2.8 (L) 04/13/2017    ANEU 1.7 04/13/2017    HGB 9.9 (L) 04/13/2017    HCT 28.9 (L) 04/13/2017    PLT 42 (LL) 04/13/2017     04/10/2017    POTASSIUM 4.3 04/10/2017    CHLORIDE 108 04/10/2017    CO2 30 04/10/2017    GLC 89 04/10/2017    BUN 10 04/10/2017    CR 0.68 04/10/2017    MAG 2.0 03/27/2017    INR 1.01 03/20/2017       Assessment and Plan:   Steph Agee is a 42 year old female with Hodgkins  disease who is day + 22 s/p auto for Hodgkins disease.      1. BMT: Completed BEAM prep without issue. Cell dose=1.84 X10^6 CD34/kg from both PBSCT and bone marrow.   - GCSF done 4/4/17.   - Counts slowly recovering      2. HEME: Keep Hgb>8 and plts>10K. No pre-meds. Platelets up to 42K today. Hgb 9.9.  - Plan for possible PRBC on Monday (still requiring approx 1 x per week transfusions)  -Had been on Lovenox 40mg QD d/t hx of chronic IJ clot recently noted on 3/3 U/s. Now thrombocytopenic.  Per Dr. Gill ok to permanently d/c lovenox as chronic clot (last dose 3/22).       3. ID: Afebrile. No focal s/sx of infection.   - Cont vfend (hx of marijuana use), HD ACV (CMV+) and levaquin as prophy.   - Bactrim or appropriate PCP therapy to start d+28 (4/24).        4.  GI: Continues to take qAM zofran and q middle of the night ativan, but remainder of day no nausea  - Stools are regular.    - Protonix for GI prophy.  - alkP slightly elevated 4/13, likely d/t recent GCSF. LFT's WNL. Recheck CMP Monday.       5. FEN/Renal: Eating adequate amounts, no nutritional concerns at this time  - Cr and electrolytes WNL      6. CV: Cleared by cardiology d/t drop in EF. Cardiac MRI confirmed EF 47%. 3/29 Blood given at decreased rate  - Had been on Continue cozaar 12.5 daily (cardiomyopathy)- resumed 3/29 and bp not effected.     7. Psych: Hx of anxiety. Previously smoked daily marijuana and took ativan qHS. Ativan too sedating for daily use-per pt. Already on effexor, increase to 150mg daily (75).    8. Pain: Has been taking oxycodone and tylenol for headaches in the evenings (chronic), but states Vicodin works better and she doesn't want to have to take two separate medications. Given a 1 time prescription of [place] # 30 on 4/13.    9. Dispo: She will be traveling home for the weekend and returning for a clinic visit on Monday.      Plan:   - RTC on Monday for provider visit, labs, and possible PRBC's    MARIO Colón,  MARIE, LINWOOD-BC  Pager: [phone]

## 2017-04-15 RX ORDER — ACETAMINOPHEN 325 MG/1
650 TABLET ORAL ONCE
Status: CANCELLED
Start: 2017-04-15 | End: 2017-04-15

## 2017-04-15 RX ORDER — DIPHENHYDRAMINE HCL 25 MG
25 CAPSULE ORAL ONCE
Status: CANCELLED
Start: 2017-04-15 | End: 2017-04-15

## 2017-04-17 ENCOUNTER — ONCOLOGY VISIT (OUTPATIENT)
Dept: TRANSPLANT | Facility: CLINIC | Age: 42
End: 2017-04-17
Attending: INTERNAL MEDICINE
Payer: COMMERCIAL

## 2017-04-17 ENCOUNTER — APPOINTMENT (OUTPATIENT)
Dept: LAB | Facility: CLINIC | Age: 42
End: 2017-04-17
Attending: INTERNAL MEDICINE
Payer: COMMERCIAL

## 2017-04-17 VITALS
BODY MASS INDEX: 32.29 KG/M2 | WEIGHT: 176.6 LBS | DIASTOLIC BLOOD PRESSURE: 71 MMHG | RESPIRATION RATE: 15 BRPM | HEART RATE: 91 BPM | SYSTOLIC BLOOD PRESSURE: 101 MMHG | OXYGEN SATURATION: 99 % | TEMPERATURE: 97 F

## 2017-04-17 DIAGNOSIS — C81.10 NODULAR SCLEROSING HODGKIN'S LYMPHOMA, UNSPECIFIED BODY REGION (H): ICD-10-CM

## 2017-04-17 DIAGNOSIS — C81.00 NODULAR LYMPHOCYTE PREDOMINANT HODGKIN LYMPHOMA, UNSPECIFIED BODY REGION (H): Primary | ICD-10-CM

## 2017-04-17 LAB
ABO + RH BLD: NORMAL
ABO + RH BLD: NORMAL
ALBUMIN SERPL-MCNC: 3.2 G/DL (ref 3.4–5)
ALP SERPL-CCNC: 143 U/L (ref 40–150)
ALT SERPL W P-5'-P-CCNC: 22 U/L (ref 0–50)
ANION GAP SERPL CALCULATED.3IONS-SCNC: 6 MMOL/L (ref 3–14)
AST SERPL W P-5'-P-CCNC: 26 U/L (ref 0–45)
BASOPHILS # BLD AUTO: 0 10E9/L (ref 0–0.2)
BASOPHILS NFR BLD AUTO: 0.4 %
BILIRUB SERPL-MCNC: 0.2 MG/DL (ref 0.2–1.3)
BLD GP AB SCN SERPL QL: NORMAL
BLD PROD TYP BPU: NORMAL
BLD UNIT ID BPU: 0
BLOOD BANK CMNT PATIENT-IMP: NORMAL
BLOOD PRODUCT CODE: NORMAL
BPU ID: NORMAL
BUN SERPL-MCNC: 13 MG/DL (ref 7–30)
CALCIUM SERPL-MCNC: 8.3 MG/DL (ref 8.5–10.1)
CHLORIDE SERPL-SCNC: 106 MMOL/L (ref 94–109)
CO2 SERPL-SCNC: 30 MMOL/L (ref 20–32)
CREAT SERPL-MCNC: 0.7 MG/DL (ref 0.52–1.04)
DIFFERENTIAL METHOD BLD: ABNORMAL
EOSINOPHIL # BLD AUTO: 0 10E9/L (ref 0–0.7)
EOSINOPHIL NFR BLD AUTO: 0.4 %
ERYTHROCYTE [DISTWIDTH] IN BLOOD BY AUTOMATED COUNT: 15.1 % (ref 10–15)
GFR SERPL CREATININE-BSD FRML MDRD: ABNORMAL ML/MIN/1.7M2
GLUCOSE SERPL-MCNC: 124 MG/DL (ref 70–99)
HCT VFR BLD AUTO: 27.4 % (ref 35–47)
HGB BLD-MCNC: 9.1 G/DL (ref 11.7–15.7)
IMM GRANULOCYTES # BLD: 0 10E9/L (ref 0–0.4)
IMM GRANULOCYTES NFR BLD: 0 %
LYMPHOCYTES # BLD AUTO: 0.5 10E9/L (ref 0.8–5.3)
LYMPHOCYTES NFR BLD AUTO: 21.1 %
MAGNESIUM SERPL-MCNC: 2.1 MG/DL (ref 1.6–2.3)
MCH RBC QN AUTO: 31.8 PG (ref 26.5–33)
MCHC RBC AUTO-ENTMCNC: 33.2 G/DL (ref 31.5–36.5)
MCV RBC AUTO: 96 FL (ref 78–100)
MONOCYTES # BLD AUTO: 0.5 10E9/L (ref 0–1.3)
MONOCYTES NFR BLD AUTO: 19.7 %
NEUTROPHILS # BLD AUTO: 1.3 10E9/L (ref 1.6–8.3)
NEUTROPHILS NFR BLD AUTO: 58.4 %
NRBC # BLD AUTO: 0 10*3/UL
NRBC BLD AUTO-RTO: 0 /100
NUM BPU REQUESTED: 1
NUM BPU REQUESTED: 2
PLATELET # BLD AUTO: 57 10E9/L (ref 150–450)
POTASSIUM SERPL-SCNC: 3.8 MMOL/L (ref 3.4–5.3)
PROT SERPL-MCNC: 6.2 G/DL (ref 6.8–8.8)
RBC # BLD AUTO: 2.86 10E12/L (ref 3.8–5.2)
SODIUM SERPL-SCNC: 142 MMOL/L (ref 133–144)
SPECIMEN EXP DATE BLD: NORMAL
TRANSFUSION STATUS PATIENT QL: NORMAL
WBC # BLD AUTO: 2.3 10E9/L (ref 4–11)

## 2017-04-17 PROCEDURE — 25000125 ZZHC RX 250: Mod: ZF | Performed by: INTERNAL MEDICINE

## 2017-04-17 PROCEDURE — 83735 ASSAY OF MAGNESIUM: CPT | Performed by: NURSE PRACTITIONER

## 2017-04-17 PROCEDURE — 86901 BLOOD TYPING SEROLOGIC RH(D): CPT | Performed by: NURSE PRACTITIONER

## 2017-04-17 PROCEDURE — 36592 COLLECT BLOOD FROM PICC: CPT

## 2017-04-17 PROCEDURE — 40000268 ZZH STATISTIC NO CHARGES: Mod: ZF

## 2017-04-17 PROCEDURE — 86923 COMPATIBILITY TEST ELECTRIC: CPT | Performed by: NURSE PRACTITIONER

## 2017-04-17 PROCEDURE — 99212 OFFICE O/P EST SF 10 MIN: CPT | Mod: ZF

## 2017-04-17 PROCEDURE — 86900 BLOOD TYPING SEROLOGIC ABO: CPT | Performed by: NURSE PRACTITIONER

## 2017-04-17 PROCEDURE — 86850 RBC ANTIBODY SCREEN: CPT | Performed by: NURSE PRACTITIONER

## 2017-04-17 PROCEDURE — 80053 COMPREHEN METABOLIC PANEL: CPT | Performed by: NURSE PRACTITIONER

## 2017-04-17 PROCEDURE — 99211 OFF/OP EST MAY X REQ PHY/QHP: CPT

## 2017-04-17 PROCEDURE — 85025 COMPLETE CBC W/AUTO DIFF WBC: CPT | Performed by: INTERNAL MEDICINE

## 2017-04-17 RX ORDER — HEPARIN SODIUM,PORCINE 10 UNIT/ML
5 VIAL (ML) INTRAVENOUS ONCE
Status: COMPLETED | OUTPATIENT
Start: 2017-04-17 | End: 2017-04-17

## 2017-04-17 RX ADMIN — SODIUM CHLORIDE, PRESERVATIVE FREE 5 ML: 5 INJECTION INTRAVENOUS at 07:54

## 2017-04-17 NOTE — PROGRESS NOTES
"BMT Daily Progress Note     ID: Steph Agee is a 42 year old female with Hodgkins disease who is day + 26 s/p auto for Hodgkins disease.    HPI: Steph denied any acute events since she was last seen in the BMT clinic.  She got norco at her last visit for nighttime \"migraines.\"  We discussed her headaches a little more and they lack the usual migraine criteria (no nausea, no aura, no photo or phonophobia), so it is probably that these are simply headaches.  She feels that they occur more frequently if she isn't drinking enough water; therefore, she has increased her daytime water intake and notes that she hasn't had any nighttime headaches in the past few days.    She is still fatigued and inactive.  Rest is not refreshing.    She has some baseline nausea without vomiting.    She is worried that her white count has dropped.     Review of Systems: 10 point ROS negative except as noted above.    Physical Exam:   Blood pressure 101/71, pulse 91, temperature 97  F (36.1  C), temperature source Oral, resp. rate 15, weight 80.1 kg (176 lb 9.6 oz), SpO2 99 %.    Wt Readings from Last 5 Encounters:   04/17/17 80.1 kg (176 lb 9.6 oz)   04/13/17 78.9 kg (174 lb)   04/10/17 81.6 kg (180 lb)   04/07/17 79.4 kg (175 lb 1.6 oz)   04/05/17 80.9 kg (178 lb 4.8 oz)     General: NAD  Eyes: CALEB, sclera anicteric  Nose/Mouth/Throat: OP clear, buccal mucosa moist, no ulcerations.  Lungs: CTA bilaterally Cardiovascular: regular rhythm, no M/R/G  Abdominal/Rectal: +BS, soft, NT, ND, No HSM.   Lymphatics: no edema.   Skin: no rashes or petechiae.   Neuro: A&O.    Additional Findings: Barbour right chest covered with opaque dressing    Labs:  Lab Results   Component Value Date    WBC 2.3 (L) 04/17/2017    ANEU 1.3 (L) 04/17/2017    HGB 9.1 (L) 04/17/2017    HCT 27.4 (L) 04/17/2017    PLT 57 (L) 04/17/2017     04/17/2017    POTASSIUM 3.8 04/17/2017    CHLORIDE 106 04/17/2017    CO2 30 04/17/2017     (H) 04/17/2017    BUN 13 " 04/17/2017    CR 0.70 04/17/2017    MAG 2.1 04/17/2017    INR 1.01 03/20/2017       Assessment and Plan:   Steph Agee is a 42 year old female with Hodgkins disease who is day + 26 s/p auto for Hodgkins disease.      1. BMT: Completed BEAM prep without issue. Cell dose=1.84 X10^6 CD34/kg from both PBSCT and bone marrow.   - GCSF done 4/4/17.   - wbc down, but recovering plts, which is encouraging.    - day +28 bone marrow biopsy 4/19  - advised she continue wear mask and avoid public, busy areas, since her counts have been dropping.      2. HEME: Keep Hgb>8 and plts>10K. No pre-meds.  Has been needing ~ once weekly rbc's; none needed today.   -chronic IJ clot recently noted on 3/3 U/s; no lovenox required (chronic clot)      3. ID: Afebrile. No focal s/sx of infection.   - Cont vfend (hx of marijuana use), HD ACV (CMV+) and levaquin as prophy.   - Bactrim or appropriate PCP therapy to start d+28 (4/24).        4.  GI: Continues to take qAM zofran and q middle of the night ativan, but remainder of day no nausea  - Stools are regular.    - Protonix for GI prophy.      5. FEN/Renal: Eating adequate amounts, no nutritional concerns at this time  - Cr and electrolytes WNL; no need to check at every visit.  Not ordered for 4/19.       6. CV: Cleared by cardiology d/t drop in EF. Cardiac MRI confirmed EF 47%. 3/29 Blood given at decreased rate  - Had been on Continue cozaar 12.5 daily (cardiomyopathy)- resumed 3/29 and bp not effected.     7. Psych: Hx of anxiety. Previously smoked daily marijuana and took ativan qHS. Ativan too sedating for daily use-per pt. Already on effexor, increase to 150mg daily (75).    8. Pain: Has been taking oxycodone and tylenol for headaches in the evenings (chronic), but states Vicodin works better and she doesn't want to have to take two separate medications. Given a 1 time prescription of Norco # 30 on 4/13.  Discussed risk of rebound headaches with chronic opioid use for headaches;  patient will attempt to keep up with good hydration, as that has been helpful.     9.  Line:  Needs line out, but haven't scheduled this yet as wbc is dropping and it is better to remove line when she is not neutropenic.     Lluvia AKBAR Carrier  4/17/2017

## 2017-04-17 NOTE — NURSING NOTE
Chief Complaint   Patient presents with     Blood Draw     Labs Drawn      Labs drawn from central line. Caps changed and both lumen flushed.     Lauren Schoen, RN

## 2017-04-17 NOTE — MR AVS SNAPSHOT
After Visit Summary   4/17/2017    Steph Agee    MRN: [id number]           Patient Information     Date Of Birth          1975        Visit Information        Provider Department      4/17/2017 8:00 AM  BMT CECILIA #1 [facility] Blood and Marrow Transplant        Today's Diagnoses     Nodular sclerosing Hodgkin's lymphoma, unspecified body region (H)              [facility] Center ([facility])  [address] [place] [address]  Phone: [phone]  Clinic Hours:   Monday-Friday:    8am to 4:30pm   Weekends and holidays:    8am to noon (in general)  If your fever is 100.5  or greater,   call the clinic.  After hours call the   hospital at [phone] or   [phone]. Ask for the BMT   fellow for pediatric or adult patients            Follow-ups after your visit        Your next 10 appointments already scheduled     Apr 19, 2017 11:40 AM CDT   (Arrive by 11:25 AM)   CT CHEST/ABDOMEN/PELVIS W CONTRAST with UCCT2   [facility] CT ([facility])    [address] [place] [address] [phone]           Please bring any scans or X-rays taken at other hospitals, if similar tests were done. Also bring a list of your medicines, including vitamins, minerals and over-the-counter drugs. It is safest to leave personal items at home.  Be sure to tell your doctor:   If you have any allergies.   If there s any chance you are pregnant.   If you are breastfeeding.   If you have any special needs.  You may have contrast for this exam. To prepare:   Do not eat or drink for 2 hours before your exam. If you need to take medicine, you may take it with small sips of water. (We may ask you to take liquid medicine as well.)   The day before your exam, drink extra fluids at least six 8-ounce glasses (unless your doctor tells you to restrict your fluids).  Patients over 70 or patients with diabetes or kidney problems:   If you haven t had a  blood test (creatinine test) within the last 30 days, go to your clinic or Diagnostic Imaging Department for this test.  If you have diabetes:   If your kidney function is normal, continue taking your metformin (Avandamet, Glucophage, Glucovance, Metaglip) on the day of your exam.   If your kidney function is abnormal, wait 48 hours before restarting this medicine.  You will have oral contrast for this exam:   You will drink the contrast at home. Get this from your clinic or Diagnostic Imaging Department. Please follow the directions given.  Please wear loose clothing, such as a sweat suit or jogging clothes. Avoid snaps, zippers and other metal. We may ask you to undress and put on a hospital gown.  If you have any questions, please call the Imaging Department where you will have your exam.            Apr 19, 2017 12:30 PM CDT   [facility] Lab Draw with  [facility] LAB DRAW   [facility] Mason[facility] Lab Draw ([facility])    [address] [place] [address]   [phone]            Apr 19, 2017  1:00 PM CDT   Bone Marrow Biopsy with  BMT CECILIA #3, UU BONE MARROW BIOPSY   [facility] Blood and Marrow Transplant ([facility])    [address] [place] [id number]   [phone]            Apr 26, 2017 11:30 AM CDT   BMT Anniversary Visit with Obed Chambers MD   [facility] Blood and Marrow Transplant ([facility])    [address] [place] [address]   [phone]            May 24, 2017  9:30 AM CDT   [facility] Lab Draw with  MASONIC LAB DRAW   [facility] Mason[facility] Lab Draw ([facility])    [address] [place] [address]   [phone]            May 24, 2017 10:00 AM CDT   BMT Anniversary Visit with Obed Chambers MD   [facility] Blood and Marrow Transplant ([facility])    [address] [place] [address]  Floor  [facility] [address]   [phone]            Aug 09, 2017 10:30 AM CDT   [facility] Lab Draw with  MASONIC LAB DRAW   [facility] Masonic Lab Draw ([facility])    [address] [place] [address]   [phone]            Aug 09, 2017 11:00 AM CDT   Return with Obed Chambers MD   [facility] Blood and Marrow Transplant ([facility])    [address] [place] [address]   [phone]              Future tests that were ordered for you today     Open Standing Orders        Priority Remaining Interval Expires Ordered    Red blood cell prepare order unit Routine 99/100 CONDITIONAL (SPECIFY) BLOOD  4/15/2017    Platelets prepare order unit Routine 99/100 CONDITIONAL (SPECIFY) BLOOD  4/15/2017          Open Future Orders        Priority Expected Expires Ordered    CBC with platelets differential Routine 4/19/2017 10/14/2017 4/17/2017            Who to contact     If you have questions or need follow up information about today's clinic visit or your schedule please contact [facility] BLOOD AND MARROW TRANSPLANT directly at [phone].  Normal or non-critical lab and imaging results will be communicated to you by IMImobilehart, letter or phone within 4 business days after the clinic has received the results. If you do not hear from us within 7 days, please contact the clinic through [employer] or phone. If you have a critical or abnormal lab result, we will notify you by phone as soon as possible.  Submit refill requests through GeoGRAFI or call your pharmacy and they will forward the refill request to us. Please allow 3 business days for your refill to be completed.          Additional Information About Your Visit        IMImobilehart Information     GeoGRAFI gives you secure access to your electronic health record. If you see a primary care provider, you can also send messages to your care team and make appointments. If you have  questions, please call your primary care clinic.  If you do not have a primary care provider, please call [phone] and they will assist you.        Care EveryWhere ID     This is your Care EveryWhere ID. This could be used by other organizations to access your [facility] medical records  [id number]        Your Vitals Were     Pulse Temperature Respirations Pulse Oximetry BMI (Body Mass Index)       91 97  F (36.1  C) (Oral) 15 99% 32.29 kg/m2        Blood Pressure from Last 3 Encounters:   04/17/17 101/71   04/13/17 100/68   04/10/17 101/59    Weight from Last 3 Encounters:   04/17/17 80.1 kg (176 lb 9.6 oz)   04/13/17 78.9 kg (174 lb)   04/10/17 81.6 kg (180 lb)              We Performed the Following     ABO/Rh type and screen     Blood component     CBC with platelets differential - NOW     Comprehensive metabolic panel     Magnesium        Recent Review Flowsheet Data     BMT Recent Results Latest Ref Rng & Units 4/3/2017 4/4/2017 4/5/2017 4/7/2017 4/10/2017 4/13/2017 4/17/2017    WBC 4.0 - 11.0 10e9/L 2.3(L) 4.2 7.5 3.7(L) 2.3(L) 2.8(L) 2.3(L)    Hemoglobin 11.7 - 15.7 g/dL 8.5(L) 8.3(L) 8.4(L) 8.2(L) 7.8(L) 9.9(L) 9.1(L)    Platelet Count 150 - 450 10e9/L 27(LL) 18(LL) 16(LL) 18(LL) 27(LL) 42(LL) 57(L)    Neutrophils (Absolute) 1.6 - 8.3 10e9/L 1.9 3.6 6.4 2.4 1.2(L) 1.7 1.3(L)    Sodium 133 - 144 mmol/L 145(H) 146(H) - 145(H) 142 140 142    Potassium 3.4 - 5.3 mmol/L 3.4 3.5 - 3.4 4.3 4.2 3.8    Chloride 94 - 109 mmol/L 109 108 - 108 108 104 106    Glucose 70 - 99 mg/dL 89 87 - 84 89 78 124(H)    Urea Nitrogen 7 - 30 mg/dL 11 10 - 12 10 13 13    Creatinine 0.52 - 1.04 mg/dL 0.55 0.64 - 0.59 0.68 0.76 0.70    Calcium (Total) 8.5 - 10.1 mg/dL 8.4(L) 8.2(L) - 8.5 8.2(L) 8.4(L) 8.3(L)    Protein (Total) 6.8 - 8.8 g/dL 6.2(L) 6.3(L) - - - 6.7(L) 6.2(L)    Albumin 3.4 - 5.0 g/dL 3.0(L) 3.1(L) - - - 3.2(L) 3.2(L)    Alkaline Phosphatase 40 - 150 U/L 143 141 - - - 151(H) 143    AST 0 - 45 U/L 12 16 - - - 25 26     ALT 0 - 50 U/L 20 20 - - - 22 22    MCV 78 - 100 fl 91 93 93 94 95 94 96               Primary Care Provider Office Phone # Fax #    Travis Carpenter [phone]       [facility] [address] [place] [address]        Thank you!     Thank you for choosing [facility] BLOOD AND MARROW TRANSPLANT  for your care. Our goal is always to provide you with excellent care. Hearing back from our patients is one way we can continue to improve our services. Please take a few minutes to complete the written survey that you may receive in the mail after your visit with us. Thank you!             Your Updated Medication List - Protect others around you: Learn how to safely use, store and throw away your medicines at www.disposemymeds.org.          This list is accurate as of: 4/17/17  9:48 AM.  Always use your most recent med list.                   Brand Name Dispense Instructions for use    acyclovir 800 MG tablet    ZOVIRAX    150 tablet    Take 1 tablet (800 mg) by mouth 5 times daily       gabapentin 100 MG capsule    NEURONTIN    90 capsule    Take 2 capsules (200 mg) by mouth 3 times daily       HYDROcodone-acetaminophen 5-325 MG per tablet    NORCO    30 tablet    Take 1 tablet by mouth every 6 hours as needed for moderate to severe pain       levofloxacin 250 MG tablet    LEVAQUIN    30 tablet    Take 1 tablet (250 mg) by mouth daily       LORazepam 0.5 MG tablet    ATIVAN    60 tablet    Take 0.5 tablets (0.25 mg) by mouth 3 times daily (before meals)       losartan 25 MG tablet    COZAAR    30 tablet    Take 0.5 tablets (12.5 mg) by mouth At Bedtime       ondansetron 4 MG tablet    ZOFRAN    60 tablet    Take 1-2 tablets (4-8 mg) by mouth every 6 hours as needed for nausea or vomiting       pantoprazole 40 MG EC tablet    PROTONIX    30 tablet    Take 1 tablet (40 mg) by mouth daily       sulfamethoxazole-trimethoprim 800-160 MG per tablet   Start taking on:  4/24/2017    BACTRIM  DS/SEPTRA DS    30 tablet    Take 1 tablet by mouth Every Mon, Tues two times daily       venlafaxine 150 MG Tb24 24 hr tablet    EFFEXOR-ER    30 each    Take 1 tablet (150 mg) by mouth At Bedtime       voriconazole 200 MG tablet    VFEND    60 tablet    Take 1 tablet (200 mg) by mouth every 12 hours

## 2017-04-17 NOTE — MR AVS SNAPSHOT
After Visit Summary   4/17/2017    Steph Agee    MRN: [id number]           Patient Information     Date Of Birth          1975        Visit Information        Provider Department      4/17/2017 8:30 AM UC 6 ATC; UC BMT INFUSION [facility] Blood and Marrow Transplant        Today's Diagnoses     Nodular lymphocyte predominant Hodgkin lymphoma, unspecified body region (H)    -  1          [facility] ([facility])  [address] [place] [address]  Phone: [phone]  Clinic Hours:   Monday-Friday:    8am to 4:30pm   Weekends and holidays:    8am to noon (in general)  If your fever is 100.5  or greater,   call the clinic.  After hours call the   hospital at [phone] or   [phone]. Ask for the BMT   fellow for pediatric or adult patients            Follow-ups after your visit        Your next 10 appointments already scheduled     Apr 19, 2017 11:40 AM CDT   (Arrive by 11:25 AM)   CT CHEST/ABDOMEN/PELVIS W CONTRAST with UCCT2   [facility] Imaging [facility] CT ([facility])    [address] [place] [address] [phone]           Please bring any scans or X-rays taken at other hospitals, if similar tests were done. Also bring a list of your medicines, including vitamins, minerals and over-the-counter drugs. It is safest to leave personal items at home.  Be sure to tell your doctor:   If you have any allergies.   If there s any chance you are pregnant.   If you are breastfeeding.   If you have any special needs.  You may have contrast for this exam. To prepare:   Do not eat or drink for 2 hours before your exam. If you need to take medicine, you may take it with small sips of water. (We may ask you to take liquid medicine as well.)   The day before your exam, drink extra fluids at least six 8-ounce glasses (unless your doctor tells you to restrict your fluids).  Patients over 70 or patients with diabetes or kidney problems:    If you haven t had a blood test (creatinine test) within the last 30 days, go to your clinic or Diagnostic Imaging Department for this test.  If you have diabetes:   If your kidney function is normal, continue taking your metformin (Avandamet, Glucophage, Glucovance, Metaglip) on the day of your exam.   If your kidney function is abnormal, wait 48 hours before restarting this medicine.  You will have oral contrast for this exam:   You will drink the contrast at home. Get this from your clinic or Diagnostic Imaging Department. Please follow the directions given.  Please wear loose clothing, such as a sweat suit or jogging clothes. Avoid snaps, zippers and other metal. We may ask you to undress and put on a hospital gown.  If you have any questions, please call the Imaging Department where you will have your exam.            Apr 19, 2017 12:30 PM CDT   Masonic Lab Draw with  MASONIC LAB DRAW   [facility] Masonic Lab Draw ([facility])    [address] [place] [address]   [phone]            Apr 19, 2017  1:00 PM CDT   Bone Marrow Biopsy with  BMT CECILIA #3, UU BONE MARROW BIOPSY   [facility] Blood and Marrow Transplant ([facility])    [address] [place] [address]   [phone]            Apr 26, 2017 11:30 AM CDT   BMT Anniversary Visit with Obed Chambers MD   [facility] Blood and Marrow Transplant ([facility])    [address] [place] [address]   [phone]            May 24, 2017  9:30 AM CDT   Masonic Lab Draw with  MASONIC LAB DRAW   [facility] Masonic Lab Draw ([facility])    [address] [place] [address]   [phone]            May 24, 2017 10:00 AM CDT   BMT Anniversary Visit with Obed Chambers MD   [facility] Blood and Marrow Transplant ([facility])    [address] [place] [address]  [address]  [address] [facility] [address]   [phone]            Aug 09, 2017 10:30 AM CDT   Masonic Lab Draw with  MASONIC LAB DRAW   [facility] Masonic Lab Draw ([facility])    [address] [place] [address]   [phone]            Aug 09, 2017 11:00 AM CDT   Return with Obed Chambers MD   [facility] Blood and Marrow Transplant ([facility])    [address] [place] [address] [phone]              Future tests that were ordered for you today     Open Standing Orders        Priority Remaining Interval Expires Ordered    Red blood cell prepare order unit Routine 99/100 CONDITIONAL (SPECIFY) BLOOD  4/15/2017    Platelets prepare order unit Routine 99/100 CONDITIONAL (SPECIFY) BLOOD  4/15/2017            Who to contact     If you have questions or need follow up information about today's clinic visit or your schedule please contact [facility] BLOOD AND MARROW TRANSPLANT directly at [phone].  Normal or non-critical lab and imaging results will be communicated to you by [employer], letter or phone within 4 business days after the clinic has received the results. If you do not hear from us within 7 days, please contact the clinic through [employer] or phone. If you have a critical or abnormal lab result, we will notify you by phone as soon as possible.  Submit refill requests through [employer] or call your pharmacy and they will forward the refill request to us. Please allow 3 business days for your refill to be completed.          Additional Information About Your Visit        [employer] Information     [employer] gives you secure access to your electronic health record. If you see a primary care provider, you can also send messages to your care team and make appointments. If you have questions, please call your primary care clinic.  If you do not have a primary care provider, please call [phone] and they will  assist you.        Care EveryWhere ID     This is your Care EveryWhere ID. This could be used by other organizations to access your [place] medical records  [id number]         Blood Pressure from Last 3 Encounters:   04/17/17 101/71   04/13/17 100/68   04/10/17 101/59    Weight from Last 3 Encounters:   04/17/17 80.1 kg (176 lb 9.6 oz)   04/13/17 78.9 kg (174 lb)   04/10/17 81.6 kg (180 lb)              We Performed the Following     Blood component     Platelets prepare order unit     Red blood cell prepare order unit        Recent Review Flowsheet Data     BMT Recent Results Latest Ref Rng & Units 4/3/2017 4/4/2017 4/5/2017 4/7/2017 4/10/2017 4/13/2017 4/17/2017    WBC 4.0 - 11.0 10e9/L 2.3(L) 4.2 7.5 3.7(L) 2.3(L) 2.8(L) 2.3(L)    Hemoglobin 11.7 - 15.7 g/dL 8.5(L) 8.3(L) 8.4(L) 8.2(L) 7.8(L) 9.9(L) 9.1(L)    Platelet Count 150 - 450 10e9/L 27(LL) 18(LL) 16(LL) 18(LL) 27(LL) 42(LL) 57(L)    Neutrophils (Absolute) 1.6 - 8.3 10e9/L 1.9 3.6 6.4 2.4 1.2(L) 1.7 1.3(L)    Sodium 133 - 144 mmol/L 145(H) 146(H) - 145(H) 142 140 142    Potassium 3.4 - 5.3 mmol/L 3.4 3.5 - 3.4 4.3 4.2 3.8    Chloride 94 - 109 mmol/L 109 108 - 108 108 104 106    Glucose 70 - 99 mg/dL 89 87 - 84 89 78 124(H)    Urea Nitrogen 7 - 30 mg/dL 11 10 - 12 10 13 13    Creatinine 0.52 - 1.04 mg/dL 0.55 0.64 - 0.59 0.68 0.76 0.70    Calcium (Total) 8.5 - 10.1 mg/dL 8.4(L) 8.2(L) - 8.5 8.2(L) 8.4(L) 8.3(L)    Protein (Total) 6.8 - 8.8 g/dL 6.2(L) 6.3(L) - - - 6.7(L) 6.2(L)    Albumin 3.4 - 5.0 g/dL 3.0(L) 3.1(L) - - - 3.2(L) 3.2(L)    Alkaline Phosphatase 40 - 150 U/L 143 141 - - - 151(H) 143    AST 0 - 45 U/L 12 16 - - - 25 26    ALT 0 - 50 U/L 20 20 - - - 22 22    MCV 78 - 100 fl 91 93 93 94 95 94 96               Primary Care Provider Office Phone # Fax #    Travis Carpenter [phone]       [facility] [address] [place] [address]        Thank you!     Thank you for choosing [facility] BLOOD AND MARROW  TRANSPLANT  for your care. Our goal is always to provide you with excellent care. Hearing back from our patients is one way we can continue to improve our services. Please take a few minutes to complete the written survey that you may receive in the mail after your visit with us. Thank you!             Your Updated Medication List - Protect others around you: Learn how to safely use, store and throw away your medicines at www.disposemymeds.org.          This list is accurate as of: 4/17/17  9:30 AM.  Always use your most recent med list.                   Brand Name Dispense Instructions for use    acyclovir 800 MG tablet    ZOVIRAX    150 tablet    Take 1 tablet (800 mg) by mouth 5 times daily       gabapentin 100 MG capsule    NEURONTIN    90 capsule    Take 2 capsules (200 mg) by mouth 3 times daily       HYDROcodone-acetaminophen 5-325 MG per tablet    NORCO    30 tablet    Take 1 tablet by mouth every 6 hours as needed for moderate to severe pain       levofloxacin 250 MG tablet    LEVAQUIN    30 tablet    Take 1 tablet (250 mg) by mouth daily       LORazepam 0.5 MG tablet    ATIVAN    60 tablet    Take 0.5 tablets (0.25 mg) by mouth 3 times daily (before meals)       losartan 25 MG tablet    COZAAR    30 tablet    Take 0.5 tablets (12.5 mg) by mouth At Bedtime       ondansetron 4 MG tablet    ZOFRAN    60 tablet    Take 1-2 tablets (4-8 mg) by mouth every 6 hours as needed for nausea or vomiting       pantoprazole 40 MG EC tablet    PROTONIX    30 tablet    Take 1 tablet (40 mg) by mouth daily       sulfamethoxazole-trimethoprim 800-160 MG per tablet   Start taking on:  4/24/2017    BACTRIM DS/SEPTRA DS    30 tablet    Take 1 tablet by mouth Every Mon, Tues two times daily       venlafaxine 150 MG Tb24 24 hr tablet    EFFEXOR-ER    30 each    Take 1 tablet (150 mg) by mouth At Bedtime       voriconazole 200 MG tablet    VFEND    60 tablet    Take 1 tablet (200 mg) by mouth every 12 hours

## 2017-04-19 ENCOUNTER — OFFICE VISIT (OUTPATIENT)
Dept: TRANSPLANT | Facility: CLINIC | Age: 42
End: 2017-04-19
Attending: NURSE PRACTITIONER
Payer: COMMERCIAL

## 2017-04-19 VITALS
HEART RATE: 97 BPM | DIASTOLIC BLOOD PRESSURE: 72 MMHG | RESPIRATION RATE: 18 BRPM | SYSTOLIC BLOOD PRESSURE: 126 MMHG | OXYGEN SATURATION: 97 % | TEMPERATURE: 97.9 F

## 2017-04-19 DIAGNOSIS — C81.12 NODULAR SCLEROSIS HODGKIN LYMPHOMA OF INTRATHORACIC LYMPH NODES (H): Primary | ICD-10-CM

## 2017-04-19 LAB
ANION GAP SERPL CALCULATED.3IONS-SCNC: 6 MMOL/L (ref 3–14)
BASOPHILS # BLD AUTO: 0 10E9/L (ref 0–0.2)
BASOPHILS NFR BLD AUTO: 0.4 %
BUN SERPL-MCNC: 13 MG/DL (ref 7–30)
CALCIUM SERPL-MCNC: 8.1 MG/DL (ref 8.5–10.1)
CHLORIDE SERPL-SCNC: 106 MMOL/L (ref 94–109)
CO2 SERPL-SCNC: 29 MMOL/L (ref 20–32)
CREAT SERPL-MCNC: 0.76 MG/DL (ref 0.52–1.04)
DIFFERENTIAL METHOD BLD: ABNORMAL
EOSINOPHIL # BLD AUTO: 0 10E9/L (ref 0–0.7)
EOSINOPHIL NFR BLD AUTO: 1.2 %
ERYTHROCYTE [DISTWIDTH] IN BLOOD BY AUTOMATED COUNT: 16.1 % (ref 10–15)
GFR SERPL CREATININE-BSD FRML MDRD: 83 ML/MIN/1.7M2
GLUCOSE SERPL-MCNC: 75 MG/DL (ref 70–99)
HCT VFR BLD AUTO: 26.1 % (ref 35–47)
HGB BLD-MCNC: 8.8 G/DL (ref 11.7–15.7)
IMM GRANULOCYTES # BLD: 0 10E9/L (ref 0–0.4)
IMM GRANULOCYTES NFR BLD: 0 %
LDH SERPL L TO P-CCNC: 191 U/L (ref 81–234)
LYMPHOCYTES # BLD AUTO: 0.6 10E9/L (ref 0.8–5.3)
LYMPHOCYTES NFR BLD AUTO: 21.4 %
MCH RBC QN AUTO: 32.4 PG (ref 26.5–33)
MCHC RBC AUTO-ENTMCNC: 33.7 G/DL (ref 31.5–36.5)
MCV RBC AUTO: 96 FL (ref 78–100)
MONOCYTES # BLD AUTO: 0.5 10E9/L (ref 0–1.3)
MONOCYTES NFR BLD AUTO: 21 %
NEUTROPHILS # BLD AUTO: 1.4 10E9/L (ref 1.6–8.3)
NEUTROPHILS NFR BLD AUTO: 56 %
NRBC # BLD AUTO: 0 10*3/UL
NRBC BLD AUTO-RTO: 0 /100
PLATELET # BLD AUTO: 77 10E9/L (ref 150–450)
POTASSIUM SERPL-SCNC: 3.6 MMOL/L (ref 3.4–5.3)
RBC # BLD AUTO: 2.72 10E12/L (ref 3.8–5.2)
SODIUM SERPL-SCNC: 140 MMOL/L (ref 133–144)
WBC # BLD AUTO: 2.6 10E9/L (ref 4–11)

## 2017-04-19 PROCEDURE — 40000611 ZZHCL STATISTIC MORPHOLOGY W/INTERP HEMEPATH TC 85060: Performed by: NURSE PRACTITIONER

## 2017-04-19 PROCEDURE — 38221 DX BONE MARROW BIOPSIES: CPT | Mod: ZF

## 2017-04-19 PROCEDURE — 40000795 ZZHCL STATISTIC DNA PROCESS AND HOLD: Performed by: NURSE PRACTITIONER

## 2017-04-19 PROCEDURE — 83615 LACTATE (LD) (LDH) ENZYME: CPT | Performed by: NURSE PRACTITIONER

## 2017-04-19 PROCEDURE — 85025 COMPLETE CBC W/AUTO DIFF WBC: CPT | Performed by: NURSE PRACTITIONER

## 2017-04-19 PROCEDURE — 88264 CHROMOSOME ANALYSIS 20-25: CPT | Performed by: NURSE PRACTITIONER

## 2017-04-19 PROCEDURE — 40000424 ZZHCL STATISTIC BONE MARROW CORE PERF TC 38221: Performed by: NURSE PRACTITIONER

## 2017-04-19 PROCEDURE — 88305 TISSUE EXAM BY PATHOLOGIST: CPT | Performed by: NURSE PRACTITIONER

## 2017-04-19 PROCEDURE — 88275 CYTOGENETICS 100-300: CPT | Performed by: NURSE PRACTITIONER

## 2017-04-19 PROCEDURE — 88161 CYTOPATH SMEAR OTHER SOURCE: CPT | Performed by: NURSE PRACTITIONER

## 2017-04-19 PROCEDURE — 88280 CHROMOSOME KARYOTYPE STUDY: CPT | Performed by: NURSE PRACTITIONER

## 2017-04-19 PROCEDURE — 88237 TISSUE CULTURE BONE MARROW: CPT | Performed by: NURSE PRACTITIONER

## 2017-04-19 PROCEDURE — 88271 CYTOGENETICS DNA PROBE: CPT | Performed by: NURSE PRACTITIONER

## 2017-04-19 PROCEDURE — 80048 BASIC METABOLIC PNL TOTAL CA: CPT | Performed by: NURSE PRACTITIONER

## 2017-04-19 PROCEDURE — 00000161 ZZHCL STATISTIC H-SPHEME PROCESS B/S: Performed by: NURSE PRACTITIONER

## 2017-04-19 PROCEDURE — 40000951 ZZHCL STATISTIC BONE MARROW INTERP TC 85097: Performed by: NURSE PRACTITIONER

## 2017-04-19 PROCEDURE — 00000058 ZZHCL STATISTIC BONE MARROW ASP PERF TC 38220: Performed by: NURSE PRACTITIONER

## 2017-04-19 PROCEDURE — 88311 DECALCIFY TISSUE: CPT | Performed by: NURSE PRACTITIONER

## 2017-04-19 PROCEDURE — G0364 BONE MARROW ASPIRATE &BIOPSY: HCPCS | Mod: ZF

## 2017-04-19 PROCEDURE — 81267 CHIMERISM ANAL NO CELL SELEC: CPT | Performed by: NURSE PRACTITIONER

## 2017-04-19 PROCEDURE — 25000128 H RX IP 250 OP 636: Mod: ZF | Performed by: NURSE PRACTITIONER

## 2017-04-19 RX ORDER — LORAZEPAM 0.5 MG/1
0.25 TABLET ORAL
Qty: 60 TABLET | Refills: 0 | Status: SHIPPED | OUTPATIENT
Start: 2017-04-19 | End: 2017-05-10

## 2017-04-19 RX ORDER — VORICONAZOLE 200 MG/1
200 TABLET, FILM COATED ORAL EVERY 12 HOURS
Qty: 60 TABLET | Refills: 1 | Status: SHIPPED | OUTPATIENT
Start: 2017-04-19 | End: 2017-04-26 | Stop reason: ALTCHOICE

## 2017-04-19 RX ORDER — LEVOFLOXACIN 250 MG/1
250 TABLET, FILM COATED ORAL DAILY
Qty: 14 TABLET | Refills: 1 | Status: SHIPPED | OUTPATIENT
Start: 2017-04-19 | End: 2017-05-10

## 2017-04-19 RX ORDER — ACYCLOVIR 800 MG/1
800 TABLET ORAL
Qty: 150 TABLET | Refills: 1 | Status: SHIPPED | OUTPATIENT
Start: 2017-04-19

## 2017-04-19 RX ORDER — MORPHINE SULFATE 2 MG/ML
2 INJECTION, SOLUTION INTRAMUSCULAR; INTRAVENOUS ONCE
Status: COMPLETED | OUTPATIENT
Start: 2017-04-19 | End: 2017-04-19

## 2017-04-19 RX ORDER — VENLAFAXINE HYDROCHLORIDE 150 MG/1
150 TABLET, EXTENDED RELEASE ORAL AT BEDTIME
Qty: 30 EACH | Refills: 0 | Status: SHIPPED | OUTPATIENT
Start: 2017-04-19 | End: 2017-05-10

## 2017-04-19 RX ADMIN — MORPHINE SULFATE 2 MG: 2 INJECTION, SOLUTION INTRAMUSCULAR; INTRAVENOUS at 14:31

## 2017-04-19 ASSESSMENT — PAIN SCALES - GENERAL: PAINLEVEL: NO PAIN (0)

## 2017-04-19 NOTE — PROGRESS NOTES
BMT ONC Adult Bone Marrow Biopsy Procedure Note  April 19, 2017  /67  Pulse 98  Temp 97.9  F (36.6  C) (Oral)  Resp 18  SpO2 97%     Learning needs assessment complete within 12 months? YES    DIAGNOSIS: Hodgkins Disease      PROCEDURE: Unilateral Bone Marrow Biopsy and Unilateral Aspirate    LOCATION: [facility]    Patient s identification was positively verified by verbal identification and invasive procedure safety checklist was completed. Informed consent was obtained. Following the administration of 2 mg IV morphine as pre-medication, patient was placed in the prone position and prepped and draped in a sterile manner. Approximately 25 cc of 1% Lidocaine was used over the right posterior iliac spine. Following this a 3 mm incision was made. Trephine bone marrow core(s) was (were) obtained from the [facility]. Bone marrow aspirates were obtained from the [facility]. Aspirates were sent for morphology, immunophenotyping, cytogenetics and molecular diagnostics gene rearrangement. A total of approximately 20 ml of marrow was aspirated. Following this procedure a sterile dressing was applied to the bone marrow biopsy site(s). The patient was placed in the supine position to maintain pressure on the biopsy site. Post-procedure wound care instructions were given.     Complications: NO    Pre-procedural pain: 0 out of 10 on the numeric pain rating scale.     Procedural pain: 8 out of 10 on the numeric pain rating scale.     Post-procedural pain assessment: 0 out of 10 on the numeric pain rating scale.     Interventions: NO, states that despite pain being an 8 she found the procedure to be overall tolerable.     Length of procedure:20 minutes or less      Procedure performed by: Melodie Dominguez, MSN, APRN, ACNP-BC  Pager: [phone]

## 2017-04-19 NOTE — MR AVS SNAPSHOT
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  After hours call the   hospital at [phone] or   [phone]. Ask for the BMT   fellow for pediatric or adult patients            Follow-ups after your visit        Your next 10 appointments already scheduled     Apr 26, 2017 11:30 AM CDT   BMT Anniversary Visit with Obed Chambers MD   [facility] Blood and Marrow Transplant ([facility])    [address] [place] [address]   [phone]            May 24, 2017  9:30 AM CDT   Masonic Lab Draw with  MASONIC LAB DRAW   [facility] Masonic Lab Draw ([facility])    [address] [place] [address] [phone]            May 24, 2017 10:00 AM CDT   BMT Anniversary Visit with Obed Chambers MD   [facility] Blood and Marrow Transplant ([facility])    [address] [place] [address]   [phone]            Jul 05, 2017 11:30 AM CDT   BMT Anniversary Visit with Obed Chambers MD   [facility] Blood and Marrow Transplant ([facility])    [address] [place] [address]   [phone]            Aug 09, 2017 10:30 AM CDT   Masonic Lab Draw with  MASONIC LAB DRAW   [facility] Masonic Lab Draw ([facility])    [address] [facility]  2nd [facility] [address] [phone]            Aug 09, 2017 11:00 AM CDT   Return with Obed Chambers MD   [facility] Blood and Marrow Transplant ([facility])    [address] [id number] [phone]              Who to contact     If you have questions or need follow up information about today's clinic visit or your schedule please [facility] B[facility]OD [facility]PLANT directly at [phone].  Normal or non-critical lab and imaging results will be communicated to you by [employer], letter or phone within 4 business days after the clinic has received the results. If you do not hear from us within 7 days, please contact the clinic through [employer] or phone. If you have a critical or abnormal lab result, we will notify you by phone as soon as possible.  Submit refill requests through [employer] or call your pharmacy and they will forward the refill request to us. Please allow 3 business days for your refill to be completed.          Additional Information About Your Visit        MyChart Information     [employer] gives you secure access to your electronic health record. If you see a primary care provider, you can also send messages to your care team and make appointments. If you have questions, please call your primary care clinic.  If you do not have a primary care provider, please call [phone] and they will assist you.        Care EveryWhere ID     This is your Care EveryWhere ID. This could be used by other organizations to access your [place] medical records  [id number]        Your Vitals Were     Pulse Temperature Respirations Pulse Oximetry          98 97.9  F (36.6  C) (Oral) 18 97%         Blood Pressure from Last 3 Encounters:   04/19/17 111/67   04/17/17 101/71   04/13/17 100/68    Weight from Last 3 Encounters:   04/17/17 80.1 kg (176 lb 9.6 oz)   04/13/17 78.9 kg (174 lb)    04/10/17 81.6 kg (180 lb)              We Performed the Following     Basic metabolic panel     Bone Marrow Aspirate (Charge)     Bone Marrow Biopsy (Charge)     Bone marrow biopsy     CBC with platelets differential     CHROMOSOME BONE MARROW With Professional Interpretation     DNA Marker Post Bmt Engraftment Bone Marrow     FISH With Professional Interpretation     Lactate Dehydrogenase     Leukemia Lymphoma Evaluation          Today's Medication Changes          These changes are accurate as of: 4/19/17  2:56 PM.  If you have any questions, ask your nurse or doctor.               Stop taking these medicines if you haven't already. Please contact your care team if you have questions.     pantoprazole 40 MG EC tablet   Commonly known as:  PROTONIX                Where to get your medicines      These medications were sent to [place] [id number] [facility] [address]  [address] [place] [address] [address] [address]    Hours:  TRANSPLANT PHONE NUMBER [phone] Phone:  [phone]     acyclovir 800 MG tablet    levofloxacin 250 MG tablet    venlafaxine 150 MG Tb24 24 hr tablet    voriconazole 200 MG tablet         Some of these will need a paper prescription and others can be bought over the counter.  Ask your nurse if you have questions.     Bring a paper prescription for each of these medications     LORazepam 0.5 MG tablet                Recent Review Flowsheet Data     BMT Recent Results Latest Ref Rng & Units 4/4/2017 4/5/2017 4/7/2017 4/10/2017 4/13/2017 4/17/2017 4/19/2017    WBC 4.0 - 11.0 10e9/L 4.2 7.5 3.7(L) 2.3(L) 2.8(L) 2.3(L) 2.6(L)    Hemoglobin 11.7 - 15.7 g/dL 8.3(L) 8.4(L) 8.2(L) 7.8(L) 9.9(L) 9.1(L) 8.8(L)    Platelet Count 150 - 450 10e9/L 18(LL) 16(LL) 18(LL) 27(LL) 42(LL) 57(L) 77(L)    Neutrophils (Absolute) 1.6 - 8.3 10e9/L 3.6 6.4 2.4 1.2(L) 1.7 1.3(L) 1.4(L)    Sodium 133 - 144 mmol/L 146(H) - 145(H) 142 140 142 140    Potassium 3.4 - 5.3  mmol/L 3.5 - 3.4 4.3 4.2 3.8 3.6    Chloride 94 - 109 mmol/L 108 - 108 108 104 106 106    Glucose 70 - 99 mg/dL 87 - 84 89 78 124(H) 75    Urea Nitrogen 7 - 30 mg/dL 10 - 12 10 13 13 13    Creatinine 0.52 - 1.04 mg/dL 0.64 - 0.59 0.68 0.76 0.70 0.76    Calcium (Total) 8.5 - 10.1 mg/dL 8.2(L) - 8.5 8.2(L) 8.4(L) 8.3(L) 8.1(L)    Protein (Total) 6.8 - 8.8 g/dL 6.3(L) - - - 6.7(L) 6.2(L) -    Albumin 3.4 - 5.0 g/dL 3.1(L) - - - 3.2(L) 3.2(L) -    Alkaline Phosphatase 40 - 150 U/L 141 - - - 151(H) 143 -    AST 0 - 45 U/L 16 - - - 25 26 -    ALT 0 - 50 U/L 20 - - - 22 22 -    MCV 78 - 100 fl 93 93 94 95 94 96 96               Primary Care Provider Office Phone # Fax #    Travis Carpenter [phone]       [address]  [place] [address]        Thank you!     Thank you for choosing [facility] BLOOD AND MARROW TRANSPLANT  for your care. Our goal is always to provide you with excellent care. Hearing back from our patients is one way we can continue to improve our services. Please take a few minutes to complete the written survey that you may receive in the mail after your visit with us. Thank you!             Your Updated Medication List - Protect others around you: Learn how to safely use, store and throw away your medicines at www.disposemymeds.org.          This list is accurate as of: 4/19/17  2:56 PM.  Always use your most recent med list.                   Brand Name Dispense Instructions for use    acyclovir 800 MG tablet    ZOVIRAX    150 tablet    Take 1 tablet (800 mg) by mouth 5 times daily       gabapentin 100 MG capsule    NEURONTIN    90 capsule    Take 2 capsules (200 mg) by mouth 3 times daily       HYDROcodone-acetaminophen 5-325 MG per tablet    NORCO    30 tablet    Take 1 tablet by mouth every 6 hours as needed for moderate to severe pain       levofloxacin 250 MG tablet    LEVAQUIN    14 tablet    Take 1 tablet (250 mg) by mouth daily       LORazepam  0.5 MG tablet    ATIVAN    60 tablet    Take 0.5 tablets (0.25 mg) by mouth 3 times daily (before meals)       losartan 25 MG tablet    COZAAR    30 tablet    Take 0.5 tablets (12.5 mg) by mouth At Bedtime       ondansetron 4 MG tablet    ZOFRAN    60 tablet    Take 1-2 tablets (4-8 mg) by mouth every 6 hours as needed for nausea or vomiting       sulfamethoxazole-trimethoprim 800-160 MG per tablet   Start taking on:  4/24/2017    BACTRIM DS/SEPTRA DS    30 tablet    Take 1 tablet by mouth Every Mon, Tues two times daily       venlafaxine 150 MG Tb24 24 hr tablet    EFFEXOR-ER    30 each    Take 1 tablet (150 mg) by mouth At Bedtime       voriconazole 200 MG tablet    VFEND    60 tablet    Take 1 tablet (200 mg) by mouth every 12 hours

## 2017-04-19 NOTE — NURSING NOTE
Patient remained supine for 30 minutes post bone marrow biopsy.  Her vital signs were within normal limits and stable, dressing remained clean/dry/intact.  Patient denied pain/discomfort at biopsy site.  She was steady on her feet and discharged in the care of her family.

## 2017-04-19 NOTE — NURSING NOTE
.  Chief Complaint   Patient presents with     RECHECK     provider visit, labs, BM bx s/p bmt txp for Hodgkin's lymphoma     Vital signs taken, labs drawn from [facility], allergies/home medications reviewed.      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    Teaching Topic: bone marrow biopsy    Person(s) involved in teaching: Patient  Motivation Level:  High  Asks Questions: Yes  Eager to Learn: Yes  Cooperative: Yes  Receptive (willing/able to accept information): Yes  Any cultural factors/[employer] beliefs that may influence understanding or compliance? No    Patient demonstrates understanding of the following:  - Reason for the appointment, diagnosis and treatment plan: Yes  - Knowledge of proper use of medications and conditions for which they are ordered (with special attention to potential side effects or drug interactions): Yes  - Which situations necessitate calling provider and whom to contact: Yes    Teaching concerns addressed: after bpne marrow biopsy cares      Pain management techniques: Yes    Infection Control:  Patient demonstrates understanding of the following:  Bone marrow biopsy procedure site care taught Yes  Signs and symptoms of infection taught Yes      Instructional Materials Used/Given: Your Bone Marrow Biopsy    Time spent with patient:   10 minutes.

## 2017-04-20 LAB
COPATH REPORT: NORMAL
COPATH REPORT: NORMAL

## 2017-04-21 ENCOUNTER — TELEPHONE (OUTPATIENT)
Dept: NURSING | Facility: CLINIC | Age: 42
End: 2017-04-21

## 2017-04-26 ENCOUNTER — OFFICE VISIT (OUTPATIENT)
Dept: TRANSPLANT | Facility: CLINIC | Age: 42
End: 2017-04-26
Attending: INTERNAL MEDICINE
Payer: COMMERCIAL

## 2017-04-26 VITALS
WEIGHT: 174.5 LBS | DIASTOLIC BLOOD PRESSURE: 73 MMHG | SYSTOLIC BLOOD PRESSURE: 110 MMHG | BODY MASS INDEX: 31.91 KG/M2 | RESPIRATION RATE: 16 BRPM | OXYGEN SATURATION: 100 % | TEMPERATURE: 98 F | HEART RATE: 84 BPM

## 2017-04-26 DIAGNOSIS — C81.10 NODULAR SCLEROSING HODGKIN'S LYMPHOMA, UNSPECIFIED BODY REGION (H): Primary | ICD-10-CM

## 2017-04-26 LAB
ANION GAP SERPL CALCULATED.3IONS-SCNC: 7 MMOL/L (ref 3–14)
BASOPHILS # BLD AUTO: 0 10E9/L (ref 0–0.2)
BASOPHILS NFR BLD AUTO: 0.5 %
BUN SERPL-MCNC: 11 MG/DL (ref 7–30)
CALCIUM SERPL-MCNC: 8.5 MG/DL (ref 8.5–10.1)
CHLORIDE SERPL-SCNC: 109 MMOL/L (ref 94–109)
CO2 SERPL-SCNC: 24 MMOL/L (ref 20–32)
CREAT SERPL-MCNC: 0.79 MG/DL (ref 0.52–1.04)
DIFFERENTIAL METHOD BLD: ABNORMAL
EOSINOPHIL # BLD AUTO: 0.1 10E9/L (ref 0–0.7)
EOSINOPHIL NFR BLD AUTO: 3.7 %
ERYTHROCYTE [DISTWIDTH] IN BLOOD BY AUTOMATED COUNT: 19.9 % (ref 10–15)
GFR SERPL CREATININE-BSD FRML MDRD: 79 ML/MIN/1.7M2
GLUCOSE SERPL-MCNC: 84 MG/DL (ref 70–99)
HCT VFR BLD AUTO: 29.4 % (ref 35–47)
HGB BLD-MCNC: 9.9 G/DL (ref 11.7–15.7)
IMM GRANULOCYTES # BLD: 0 10E9/L (ref 0–0.4)
IMM GRANULOCYTES NFR BLD: 0.3 %
LYMPHOCYTES # BLD AUTO: 0.7 10E9/L (ref 0.8–5.3)
LYMPHOCYTES NFR BLD AUTO: 17.4 %
MCH RBC QN AUTO: 33.2 PG (ref 26.5–33)
MCHC RBC AUTO-ENTMCNC: 33.7 G/DL (ref 31.5–36.5)
MCV RBC AUTO: 99 FL (ref 78–100)
MONOCYTES # BLD AUTO: 0.6 10E9/L (ref 0–1.3)
MONOCYTES NFR BLD AUTO: 16.1 %
NEUTROPHILS # BLD AUTO: 2.4 10E9/L (ref 1.6–8.3)
NEUTROPHILS NFR BLD AUTO: 62 %
NRBC # BLD AUTO: 0 10*3/UL
NRBC BLD AUTO-RTO: 0 /100
PLATELET # BLD AUTO: 135 10E9/L (ref 150–450)
POTASSIUM SERPL-SCNC: 4.2 MMOL/L (ref 3.4–5.3)
RBC # BLD AUTO: 2.98 10E12/L (ref 3.8–5.2)
SODIUM SERPL-SCNC: 140 MMOL/L (ref 133–144)
WBC # BLD AUTO: 3.8 10E9/L (ref 4–11)

## 2017-04-26 PROCEDURE — 25000125 ZZHC RX 250: Mod: ZF | Performed by: PHYSICIAN ASSISTANT

## 2017-04-26 PROCEDURE — 85025 COMPLETE CBC W/AUTO DIFF WBC: CPT | Performed by: INTERNAL MEDICINE

## 2017-04-26 PROCEDURE — 80048 BASIC METABOLIC PNL TOTAL CA: CPT | Performed by: INTERNAL MEDICINE

## 2017-04-26 PROCEDURE — 36592 COLLECT BLOOD FROM PICC: CPT

## 2017-04-26 PROCEDURE — 99213 OFFICE O/P EST LOW 20 MIN: CPT | Mod: ZF

## 2017-04-26 RX ORDER — HEPARIN SODIUM,PORCINE 10 UNIT/ML
5 VIAL (ML) INTRAVENOUS
Status: CANCELLED | OUTPATIENT
Start: 2017-04-26

## 2017-04-26 RX ORDER — HEPARIN SODIUM,PORCINE 10 UNIT/ML
5 VIAL (ML) INTRAVENOUS
Status: DISCONTINUED | OUTPATIENT
Start: 2017-04-26 | End: 2017-04-26 | Stop reason: HOSPADM

## 2017-04-26 RX ORDER — FLUCONAZOLE 100 MG/1
100 TABLET ORAL DAILY
Qty: 60 TABLET | Refills: 5 | Status: SHIPPED | OUTPATIENT
Start: 2017-04-26

## 2017-04-26 RX ADMIN — SODIUM CHLORIDE, PRESERVATIVE FREE 5 ML: 5 INJECTION INTRAVENOUS at 11:40

## 2017-04-26 RX ADMIN — SODIUM CHLORIDE, PRESERVATIVE FREE 5 ML: 5 INJECTION INTRAVENOUS at 11:39

## 2017-04-26 ASSESSMENT — PAIN SCALES - GENERAL: PAINLEVEL: NO PAIN (0)

## 2017-04-26 NOTE — PROGRESS NOTES
"BMT Daily Progress Note     ID: Steph Agee is a 42 year old female with Hodgkins disease who is day + 35 s/p auto for Hodgkins disease.    HPI: Steph denied any acute events since she was last seen in the BMT clinic.  She got norco at her last visit for nighttime \"migraines.\"  We discussed her headaches a little more and they lack the usual migraine criteria (no nausea, no aura, no photo or phonophobia), so it is probably that these are simply headaches.  She feels that they occur more frequently if she isn't drinking enough water; therefore, she has increased her daytime water intake and notes that she hasn't had any nighttime headaches in the past few days.    She is still fatigued and inactive.  Rest is not refreshing.    She has some baseline nausea without vomiting.    She is worried that her white count has dropped.     Review of Systems: 10 point ROS negative except as noted above.    Physical Exam:   Blood pressure 110/73, pulse 84, temperature 98  F (36.7  C), temperature source Oral, resp. rate 16, weight 79.2 kg (174 lb 8 oz), SpO2 100 %.    Wt Readings from Last 5 Encounters:   04/26/17 79.2 kg (174 lb 8 oz)   04/17/17 80.1 kg (176 lb 9.6 oz)   04/13/17 78.9 kg (174 lb)   04/10/17 81.6 kg (180 lb)   04/07/17 79.4 kg (175 lb 1.6 oz)     General: NAD  Eyes: CALEB, sclera anicteric  Nose/Mouth/Throat: OP clear, buccal mucosa moist, no ulcerations.  Lungs: CTA bilaterally Cardiovascular: regular rhythm, no M/R/G  Abdominal/Rectal: +BS, soft, NT, ND, No HSM.   Lymphatics: no edema.   Skin: no rashes or petechiae.   Neuro: A&O.    Additional Findings: Barbour right chest covered with opaque dressing    Labs:  Lab Results   Component Value Date    WBC 2.6 (L) 04/19/2017    ANEU 1.4 (L) 04/19/2017    HGB 8.8 (L) 04/19/2017    HCT 26.1 (L) 04/19/2017    PLT 77 (L) 04/19/2017     04/19/2017    POTASSIUM 3.6 04/19/2017    CHLORIDE 106 04/19/2017    CO2 29 04/19/2017    GLC 75 04/19/2017    BUN 13 " 04/19/2017    CR 0.76 04/19/2017    MAG 2.1 04/17/2017    INR 1.01 03/20/2017       Assessment and Plan:   Steph Agee is a 42 year old female with Hodgkins disease who is day + 26 s/p auto for Hodgkins disease.      1. BMT: Completed BEAM prep without issue. Cell dose=1.84 X10^6 CD34/kg from both PBSCT and bone marrow.   - GCSF done 4/4/17.   - wbc down, but recovering plts, which is encouraging.    - day +28 bone marrow biopsy 4/19 shows no evidence of HD.  - advised she continue wear mask and avoid public, busy areas, since her counts have been dropping.    Day 28 CAP CT shows no active disease and no change in hazy edged lesion invading manubrium. (This was PET- prior to transplant.      2. HEME: Keep Hgb>8 and plts>10K. No pre-meds.  Has been needing ~ once weekly rbc's; none needed today.   -chronic IJ clot recently noted on 3/3 U/s; no lovenox required (chronic clot)      3. ID: Afebrile. No focal s/sx of infection.   - Cont vfend (hx of marijuana use), HD ACV (CMV+) and levaquin as prophy.   - Bactrim or appropriate PCP therapy to start d+28 (4/24).        4.  GI: Continues to take qAM zofran and q middle of the night ativan, but remainder of day no nausea  - Stools are regular.    - Protonix for GI prophy.      5. FEN/Renal: Eating adequate amounts, no nutritional concerns at this time  - Cr and electrolytes WNL; no need to check at every visit.  Not ordered for 4/19.       6. CV: Cleared by cardiology d/t drop in EF. Cardiac MRI confirmed EF 47%. 3/29 Blood given at decreased rate  - Had been on Continue cozaar 12.5 daily (cardiomyopathy)- resumed 3/29 and bp not effected.     7. Psych: Hx of anxiety. Previously smoked daily marijuana and took ativan qHS. Ativan too sedating for daily use-per pt. Already on effexor, increase to 150mg daily (75).    8. Pain: Has been taking oxycodone and tylenol for headaches in the evenings (chronic), but states Vicodin works better and she doesn't want to have to take  two separate medications. Given a 1 time prescription of Norco # 30 on 4/13.  Discussed risk of rebound headaches with chronic opioid use for headaches; patient will attempt to keep up with good hydration, as that has been helpful.     9.  Line: Schedule Barbour removal for tomorrow.    RTC Reyes two weeks.    Obed Chambers M.D.

## 2017-04-26 NOTE — NURSING NOTE
BMT Heme Malignancy Rooming Note    Steph Agee - 4/26/2017 11:24 AM     Chief Complaint   Patient presents with     RECHECK     provider appt, med review, hodgkins        /73  Pulse 84  Temp 98  F (36.7  C) (Oral)  Resp 16  Wt 79.2 kg (174 lb 8 oz)  SpO2 100%  BMI 31.91 kg/m2     Medications reviewed: Yes    Labs drawn: No    Dressing changed: No, patient's boyfriend changed central line dressing at home yesterday (4/25).     Medications given: No    Staff time:10    Additional information if applicable: Patient here for day 28 BAN review.  No labs pre-ordered for today's appt.     MONI CEDENO RN

## 2017-04-26 NOTE — NURSING NOTE
Labs drawn from central line after rooming process complete and orders obtained.  Central line flushed with heparin per protocol.

## 2017-04-26 NOTE — PATIENT INSTRUCTIONS
5/10 1030am labs and Dr.Slungaard Villaseñor left msg w/IR (line removal)    Called IR again today @217pm and left ms to contact pt(scheduled tomorrow 11am at [facility])pt is notified.

## 2017-05-08 LAB — COPATH REPORT: NORMAL

## 2017-05-10 ENCOUNTER — ONCOLOGY VISIT (OUTPATIENT)
Dept: TRANSPLANT | Facility: CLINIC | Age: 42
End: 2017-05-10
Attending: INTERNAL MEDICINE
Payer: COMMERCIAL

## 2017-05-10 ENCOUNTER — APPOINTMENT (OUTPATIENT)
Dept: LAB | Facility: CLINIC | Age: 42
End: 2017-05-10
Attending: INTERNAL MEDICINE
Payer: COMMERCIAL

## 2017-05-10 VITALS
TEMPERATURE: 97.8 F | DIASTOLIC BLOOD PRESSURE: 68 MMHG | BODY MASS INDEX: 30.71 KG/M2 | HEIGHT: 62 IN | RESPIRATION RATE: 18 BRPM | HEART RATE: 81 BPM | WEIGHT: 166.9 LBS | OXYGEN SATURATION: 97 % | SYSTOLIC BLOOD PRESSURE: 102 MMHG

## 2017-05-10 DIAGNOSIS — C81.10 NODULAR SCLEROSING HODGKIN'S LYMPHOMA, UNSPECIFIED BODY REGION (H): ICD-10-CM

## 2017-05-10 DIAGNOSIS — C81.12 NODULAR SCLEROSIS HODGKIN LYMPHOMA OF INTRATHORACIC LYMPH NODES (H): ICD-10-CM

## 2017-05-10 LAB
ALBUMIN SERPL-MCNC: 3.5 G/DL (ref 3.4–5)
ALP SERPL-CCNC: 139 U/L (ref 40–150)
ALT SERPL W P-5'-P-CCNC: 22 U/L (ref 0–50)
ANION GAP SERPL CALCULATED.3IONS-SCNC: 9 MMOL/L (ref 3–14)
AST SERPL W P-5'-P-CCNC: 23 U/L (ref 0–45)
BASOPHILS # BLD AUTO: 0 10E9/L (ref 0–0.2)
BASOPHILS NFR BLD AUTO: 0.5 %
BILIRUB SERPL-MCNC: 0.3 MG/DL (ref 0.2–1.3)
BUN SERPL-MCNC: 11 MG/DL (ref 7–30)
CALCIUM SERPL-MCNC: 8.8 MG/DL (ref 8.5–10.1)
CHLORIDE SERPL-SCNC: 105 MMOL/L (ref 94–109)
CO2 SERPL-SCNC: 26 MMOL/L (ref 20–32)
COPATH REPORT: NORMAL
CREAT SERPL-MCNC: 0.8 MG/DL (ref 0.52–1.04)
DIFFERENTIAL METHOD BLD: ABNORMAL
EOSINOPHIL # BLD AUTO: 0.2 10E9/L (ref 0–0.7)
EOSINOPHIL NFR BLD AUTO: 4.3 %
ERYTHROCYTE [DISTWIDTH] IN BLOOD BY AUTOMATED COUNT: 20.7 % (ref 10–15)
GFR SERPL CREATININE-BSD FRML MDRD: 77 ML/MIN/1.7M2
GLUCOSE SERPL-MCNC: 82 MG/DL (ref 70–99)
HCT VFR BLD AUTO: 29.9 % (ref 35–47)
HGB BLD-MCNC: 10.2 G/DL (ref 11.7–15.7)
IMM GRANULOCYTES # BLD: 0 10E9/L (ref 0–0.4)
IMM GRANULOCYTES NFR BLD: 0.2 %
LYMPHOCYTES # BLD AUTO: 0.6 10E9/L (ref 0.8–5.3)
LYMPHOCYTES NFR BLD AUTO: 15.4 %
MCH RBC QN AUTO: 34.2 PG (ref 26.5–33)
MCHC RBC AUTO-ENTMCNC: 34.1 G/DL (ref 31.5–36.5)
MCV RBC AUTO: 100 FL (ref 78–100)
MONOCYTES # BLD AUTO: 0.6 10E9/L (ref 0–1.3)
MONOCYTES NFR BLD AUTO: 14.7 %
NEUTROPHILS # BLD AUTO: 2.7 10E9/L (ref 1.6–8.3)
NEUTROPHILS NFR BLD AUTO: 64.9 %
NRBC # BLD AUTO: 0 10*3/UL
NRBC BLD AUTO-RTO: 0 /100
PLATELET # BLD AUTO: 169 10E9/L (ref 150–450)
POTASSIUM SERPL-SCNC: 4 MMOL/L (ref 3.4–5.3)
PROT SERPL-MCNC: 7 G/DL (ref 6.8–8.8)
RBC # BLD AUTO: 2.98 10E12/L (ref 3.8–5.2)
SODIUM SERPL-SCNC: 140 MMOL/L (ref 133–144)
WBC # BLD AUTO: 4.2 10E9/L (ref 4–11)

## 2017-05-10 PROCEDURE — 25000128 H RX IP 250 OP 636: Mod: ZF | Performed by: INTERNAL MEDICINE

## 2017-05-10 PROCEDURE — 85025 COMPLETE CBC W/AUTO DIFF WBC: CPT | Performed by: INTERNAL MEDICINE

## 2017-05-10 PROCEDURE — 99212 OFFICE O/P EST SF 10 MIN: CPT | Mod: ZF

## 2017-05-10 PROCEDURE — 36591 DRAW BLOOD OFF VENOUS DEVICE: CPT

## 2017-05-10 PROCEDURE — 80053 COMPREHEN METABOLIC PANEL: CPT | Performed by: INTERNAL MEDICINE

## 2017-05-10 RX ORDER — HEPARIN SODIUM,PORCINE 10 UNIT/ML
5 VIAL (ML) INTRAVENOUS
Status: DISCONTINUED | OUTPATIENT
Start: 2017-05-10 | End: 2017-05-10 | Stop reason: HOSPADM

## 2017-05-10 RX ORDER — VENLAFAXINE HYDROCHLORIDE 150 MG/1
150 TABLET, EXTENDED RELEASE ORAL AT BEDTIME
Qty: 30 EACH | Refills: 5 | Status: SHIPPED | OUTPATIENT
Start: 2017-05-10

## 2017-05-10 RX ORDER — HEPARIN SODIUM (PORCINE) LOCK FLUSH IV SOLN 100 UNIT/ML 100 UNIT/ML
5 SOLUTION INTRAVENOUS EVERY 8 HOURS
Status: DISCONTINUED | OUTPATIENT
Start: 2017-05-10 | End: 2017-05-10 | Stop reason: HOSPADM

## 2017-05-10 RX ORDER — LORAZEPAM 0.5 MG/1
0.25 TABLET ORAL 3 TIMES DAILY PRN
Qty: 60 TABLET | Refills: 0 | COMMUNITY
Start: 2017-05-10

## 2017-05-10 RX ADMIN — SODIUM CHLORIDE, PRESERVATIVE FREE 5 ML: 5 INJECTION INTRAVENOUS at 11:06

## 2017-05-10 ASSESSMENT — PAIN SCALES - GENERAL: PAINLEVEL: MODERATE PAIN (5)

## 2017-05-10 NOTE — MR AVS SNAPSHOT
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  After hours call the   hospital at [phone] or   [phone]. Ask for the BMT   fellow on-call            Follow-ups after your visit        Your next 10 appointments already scheduled     May 24, 2017  9:30 AM CDT   Masonic Lab Draw with  MASONIC LAB DRAW   [facility] Draw [place]    [address] [place] [id number] [phone]            May 24, 2017 10:00 AM CDT   BMT Anniversary Visit with Obed Chambers MD   [facility] Blood and Marrow Transplant [place]    [address] [place] [id number] [phone]            Jun 30, 2017 12:00 PM CDT   PET ONCOLOGY WHOLE BODY with UUPET1   [facility] PET CT ([facility])    [address] [facility] [id number] [phone]           Tell your doctor:   If there is any chance you may be pregnant or if you are breastfeeding.   If you have problems lying in small spaces (claustrophobia). If you do, your doctor may give you medicine to help you relax. If you have diabetes:   Have your exam early in the morning. Your blood glucose will go up as the day  goes by.   Your glucose level must be 180 or less at the start of the exam. Please take any medicines you need to ensure this blood glucose level. 24 hours before your scan: Don t do any heavy exercise. (No jogging, aerobics or other workouts.) Exercise will make your pictures less accurate. 6 hours before your scan:   Stop all food and liquids (except water).   Do not chew gum or suck on mints.   If you need to take medicine with food, you may take it with a few crackers.  Please call your Imaging Department at your exam site with any questions.            Jun 30, 2017  2:30 PM CDT   Masonic Lab Draw with  MASONIC LAB DRAW   [facility] Masonic Lab Draw ([facility])    [address] [id number] [phone]            Jul 05, 2017 11:30 AM CDT   BMT Anniversary Visit with Obed Chambers MD   [facility] Blood and Marrow Transplant ([facility])    [address] [place] [id number] [phone]            Aug 09, 2017 10:30 AM CDT   Masonic Lab Draw with  MASONIC LAB DRAW   [facility] Masonic Lab Draw ([facility])    [address] [id number] [phone]            Aug 09, 2017 11:00 AM CDT   Return with Obed Chambers MD   [facility] Blood and Marrow Transplant ([facility])    [address] [place] [id number] [phone]              Future tests that were ordered for you today     Open Future Orders        Priority Expected Expires Ordered    CT Soft tissue neck w contrast* Routine 5/15/2017 5/10/2018 5/10/2017            Who to contact     If you have questions or need follow up information about today's clinic visit or your schedule please contact [facility] BLOOD AND MARROW TRANSPLANT directly at [phone].  Normal or non-critical lab and imaging results will be communicated to you by  "MyChart, letter or phone within 4 business days after the clinic has received the results. If you do not hear from us within 7 days, please contact the clinic through [employer] or phone. If you have a critical or abnormal lab result, we will notify you by phone as soon as possible.  Submit refill requests through [employer] or call your pharmacy and they will forward the refill request to us. Please allow 3 business days for your refill to be completed.          Additional Information About Your Visit        [employer] Information     Block[employer] gives you secure access to your electronic health record. If you see a primary care provider, you can also send messages to your care team and make appointments. If you have questions, please call your primary care clinic.  If you do not have a primary care provider, please call [phone] and they will assist you.        Care EveryWhere ID     This is your Care EveryWhere ID. This could be used by other organizations to access your [place] medical records  [id number]        Your Vitals Were     Pulse Temperature Respirations Height Pulse Oximetry BMI (Body Mass Index)    81 97.8  F (36.6  C) (Oral) 18 1.575 m (5' 2.01\") 97% 30.52 kg/m2       Blood Pressure from Last 3 Encounters:   05/10/17 102/68   04/26/17 110/73   04/19/17 126/72    Weight from Last 3 Encounters:   05/10/17 75.7 kg (166 lb 14.4 oz)   04/26/17 79.2 kg (174 lb 8 oz)   04/17/17 80.1 kg (176 lb 9.6 oz)              We Performed the Following     CBC with platelets differential     Comprehensive metabolic panel          Today's Medication Changes          These changes are accurate as of: 5/10/17 11:54 AM.  If you have any questions, ask your nurse or doctor.               These medicines have changed or have updated prescriptions.        Dose/Directions    fluconazole 100 MG tablet   Commonly known as:  DIFLUCAN   This may have changed:  when to take this   Used for:  Nodular sclerosing Hodgkin's lymphoma, " unspecified body region (H)        Dose:  100 mg   Take 1 tablet (100 mg) by mouth daily   Quantity:  60 tablet   Refills:  5         Stop taking these medicines if you haven't already. Please contact your care team if you have questions.     levofloxacin 250 MG tablet   Commonly known as:  LEVAQUIN   Stopped by:  Obed Chambers MD                Where to get your medicines      These medications were sent to [facility] [address] [facility] [address] [place] [address] [address] [place] [address]    Hours:  TRANSPLANT PHONE NUMBER [phone] Phone:  [phone]     venlafaxine 150 MG Tb24 24 hr tablet                Recent Review Flowsheet Data     BMT Recent Results Latest Ref Rng & Units 4/7/2017 4/10/2017 4/13/2017 4/17/2017 4/19/2017 4/26/2017 5/10/2017    WBC 4.0 - 11.0 10e9/L 3.7(L) 2.3(L) 2.8(L) 2.3(L) 2.6(L) 3.8(L) 4.2    Hemoglobin 11.7 - 15.7 g/dL 8.2(L) 7.8(L) 9.9(L) 9.1(L) 8.8(L) 9.9(L) 10.2(L)    Platelet Count 150 - 450 10e9/L 18(LL) 27(LL) 42(LL) 57(L) 77(L) 135(L) 169    Neutrophils (Absolute) 1.6 - 8.3 10e9/L 2.4 1.2(L) 1.7 1.3(L) 1.4(L) 2.4 2.7    Sodium 133 - 144 mmol/L 145(H) 142 140 142 140 140 140    Potassium 3.4 - 5.3 mmol/L 3.4 4.3 4.2 3.8 3.6 4.2 4.0    Chloride 94 - 109 mmol/L 108 108 104 106 106 109 105    Glucose 70 - 99 mg/dL 84 89 78 124(H) 75 84 82    Urea Nitrogen 7 - 30 mg/dL 12 10 13 13 13 11 11    Creatinine 0.52 - 1.04 mg/dL 0.59 0.68 0.76 0.70 0.76 0.79 0.80    Calcium (Total) 8.5 - 10.1 mg/dL 8.5 8.2(L) 8.4(L) 8.3(L) 8.1(L) 8.5 8.8    Protein (Total) 6.8 - 8.8 g/dL - - 6.7(L) 6.2(L) - - 7.0    Albumin 3.4 - 5.0 g/dL - - 3.2(L) 3.2(L) - - 3.5    Alkaline Phosphatase 40 - 150 U/L - - 151(H) 143 - - 139    AST 0 - 45 U/L - - 25 26 - - 23    ALT 0 - 50 U/L - - 22 22 - - 22    MCV 78 - 100 fl 94 95 94 96 96 99 100               Primary Care Provider Office Phone # Fax #    Travis Carpenter [phone]       [facility] [place] [facility]  [facility] [address]  [place] [address]        Thank you!     Thank you for choosing [facility] BLOOD [facility] MARROW TRANSPLANT  for your care. Our goal is always to provide you with excellent care. Hearing back from our patients is one way we can continue to improve our services. Please take a few minutes to complete the written survey that you may receive in the mail after your visit with us. Thank you!             Your Updated Medication List - Protect others around you: Learn how to safely use, store and throw away your medicines at www.disposemymeds.org.          This list is accurate as of: 5/10/17 11:54 AM.  Always use your most recent med list.                   Brand Name Dispense Instructions for use    acyclovir 800 MG tablet    ZOVIRAX    150 tablet    Take 1 tablet (800 mg) by mouth 5 times daily       fluconazole 100 MG tablet    DIFLUCAN    60 tablet    Take 1 tablet (100 mg) by mouth daily       gabapentin 100 MG capsule    NEURONTIN    90 capsule    Take 2 capsules (200 mg) by mouth 3 times daily       HYDROcodone-acetaminophen 5-325 MG per tablet    NORCO    30 tablet    Take 1 tablet by mouth every 6 hours as needed for moderate to severe pain       LORazepam 0.5 MG tablet    ATIVAN    60 tablet    Take 0.5 tablets (0.25 mg) by mouth 3 times daily as needed       losartan 25 MG tablet    COZAAR    30 tablet    Take 0.5 tablets (12.5 mg) by mouth At Bedtime       ondansetron 4 MG tablet    ZOFRAN    60 tablet    Take 1-2 tablets (4-8 mg) by mouth every 6 hours as needed for nausea or vomiting       sulfamethoxazole-trimethoprim 800-160 MG per tablet    BACTRIM DS/SEPTRA DS    30 tablet    Take 1 tablet by mouth Every Mon, Tues two times daily       venlafaxine 150 MG Tb24 24 hr tablet    EFFEXOR-ER    30 each    Take 1 tablet (150 mg) by mouth At Bedtime

## 2017-05-10 NOTE — NURSING NOTE
Chief Complaint   Patient presents with     Port Draw     Right port accessed with gripper needle, labs drawn and sent, flushed with saline and heparin, vitals completed, deaccessed per pt request, checked into next appointment.   Martha Veronica,RN

## 2017-05-10 NOTE — NURSING NOTE
"Oncology Rooming Note    May 10, 2017 11:26 AM   Steph Agee is a 42 year old female who presents for:    Chief Complaint   Patient presents with     Port Draw     Right port accessed with gripper needle, labs drawn and sent, flushed with saline and heparin, vitals completed, deaccessed per pt request, checked into next appointment.     RECHECK     Hodgkin lymphoma     Initial Vitals: /68 (BP Location: Right arm)  Pulse 81  Temp 97.8  F (36.6  C) (Oral)  Resp 18  Ht 1.575 m (5' 2.01\")  Wt 75.7 kg (166 lb 14.4 oz)  SpO2 97%  BMI 30.52 kg/m2 Estimated body mass index is 30.52 kg/(m^2) as calculated from the following:    Height as of this encounter: 1.575 m (5' 2.01\").    Weight as of this encounter: 75.7 kg (166 lb 14.4 oz). Body surface area is 1.82 meters squared.  Moderate Pain (5) Comment: Data Unavailable   No LMP recorded. Patient is not currently having periods (Reason: UNKNOWN).  Allergies reviewed: Yes  Medications reviewed: Yes    Medications: Medication refills not needed today.  Pharmacy name entered into EPIC: Data Unavailable    Clinical concerns: No new concerns Provider was notified.    7 minutes for nursing intake (face to face time)     Gila Henry MA              "

## 2017-05-10 NOTE — PROGRESS NOTES
"BMT Daily Progress Note     ID: Steph Agee is a 42 year old female with Hodgkins disease who is day + 59 s/p auto for Hodgkins disease.    HPI: Steph denied any acute events since she was last seen in the BMT clinic.  She got norco at her last visit for nighttime \"migraines.\"  We discussed her headaches a little more and they lack the usual migraine criteria (no nausea, no aura, no photo or phonophobia), so it is probably that these are simply headaches.  She feels that they occur more frequently if she isn't drinking enough water; therefore, she has increased her daytime water intake and notes that she hasn't had any nighttime headaches in the past few days.    She is still fatigued and inactive.  Rest is not refreshing.    She has some baseline nausea without vomiting.    She is worried that her white count has dropped.     Review of Systems: 10 point ROS negative except as noted above.    Physical Exam:   Blood pressure 102/68, pulse 81, temperature 97.8  F (36.6  C), temperature source Oral, resp. rate 18, height 1.575 m (5' 2.01\"), weight 75.7 kg (166 lb 14.4 oz), SpO2 97 %.    Wt Readings from Last 5 Encounters:   05/10/17 75.7 kg (166 lb 14.4 oz)   04/26/17 79.2 kg (174 lb 8 oz)   04/17/17 80.1 kg (176 lb 9.6 oz)   04/13/17 78.9 kg (174 lb)   04/10/17 81.6 kg (180 lb)     General: NAD  Eyes: CALEB, sclera anicteric  Nose/Mouth/Throat: OP clear, buccal mucosa moist, no ulcerations.  Lungs: CTA bilaterally Cardiovascular: regular rhythm, no M/R/G  Abdominal/Rectal: +BS, soft, NT, ND, No HSM.   Lymphatics: no edema.   Skin: no rashes or petechiae.   Neuro: A&O.    Additional Findings: Barbour right chest covered with opaque dressing    Labs:  Lab Results   Component Value Date    WBC 4.2 05/10/2017    ANEU 2.7 05/10/2017    HGB 10.2 (L) 05/10/2017    HCT 29.9 (L) 05/10/2017     05/10/2017     05/10/2017    POTASSIUM 4.0 05/10/2017    CHLORIDE 105 05/10/2017    CO2 26 05/10/2017    GLC 82 " 05/10/2017    BUN 11 05/10/2017    CR 0.80 05/10/2017    MAG 2.1 04/17/2017    INR 1.01 03/20/2017       Assessment and Plan:   Steph Agee is a 42 year old female with Hodgkins disease who is day + 59 s/p auto for Hodgkins disease.      1. BMT: Completed BEAM prep without issue. Cell dose=1.84 X10^6 CD34/kg from both PBSCT and bone marrow.   - GCSF done 4/4/17.   - wbc down, but recovering plts, which is encouraging.    - day +28 bone marrow biopsy 4/19 shows no evidence of HD.  - advised she continue wear mask and avoid public, busy areas, since her counts have been dropping.    Day 28 CAP CT shows no active disease and no change in hazy edged lesion invading manubrium. (This was PET- prior to transplant. Unfortunately, a neck CT with contrastv was not ordered. Tonya will contact Dr Dukes to have this [id number] in [place].      2. HEME: Keep Hgb>8 and plts>10K. No pre-meds.  Has been needing ~ once weekly rbc's; none needed today.   -chronic IJ clot recently noted on 3/3 U/s; no lovenox required (chronic clot)      3. ID: Afebrile. No focal s/sx of infection.   - Cont vfend (hx of marijuana use), HD ACV (CMV+) and levaquin as prophy.   - Bactrim or appropriate PCP therapy to start d+28 (4/24).        4.  GI: Continues to take qAM zofran and q middle of the night ativan, but remainder of day no nausea  - Stools are regular.    - Protonix for GI prophy.      5. FEN/Renal: Eating adequate amounts, no nutritional concerns at this time  - Cr and electrolytes WNL; no need to check at every visit.  Not ordered for 4/19.       6. CV: Cleared by cardiology d/t drop in EF. Cardiac MRI confirmed EF 47%. 3/29 Blood given at decreased rate  - Had been on Continue cozaar 12.5 daily (cardiomyopathy)- resumed 3/29 and bp not effected.     7. Psych: Hx of anxiety. Previously smoked daily marijuana and took ativan qHS. Ativan too sedating for daily use-per pt. Already on effexor, increase to 150mg daily (75).    8.  Pain: Has been taking oxycodone and tylenol for headaches in the evenings (chronic), but states Vicodin works better and she doesn't want to have to take two separate medications. Given a 1 time prescription of Norco # 30 on 4/13.  Discussed risk of rebound headaches with chronic opioid use for headaches; patient will attempt to keep up with good hydration, as that has been helpful.     I recommend that brentuximab maintenance start now q 3 weeks for a year.    RTC day `100.    RTC Reyes two weeks.    Obed Chambers M.D.

## 2017-05-11 LAB — COPATH REPORT: NORMAL

## 2017-05-15 DIAGNOSIS — C81.12 NODULAR SCLEROSIS HODGKIN LYMPHOMA OF INTRATHORACIC LYMPH NODES (H): ICD-10-CM

## 2017-05-15 RX ORDER — LEVOFLOXACIN 250 MG/1
250 TABLET, FILM COATED ORAL DAILY
Qty: 30 TABLET | Refills: 5 | Status: SHIPPED | OUTPATIENT
Start: 2017-05-15

## 2017-06-30 ENCOUNTER — HOSPITAL ENCOUNTER (OUTPATIENT)
Dept: PET IMAGING | Facility: CLINIC | Age: 42
Discharge: HOME | End: 2017-06-30
Attending: NURSE PRACTITIONER | Admitting: NURSE PRACTITIONER
Payer: COMMERCIAL

## 2017-06-30 DIAGNOSIS — C81.12 NODULAR SCLEROSIS HODGKIN LYMPHOMA OF INTRATHORACIC LYMPH NODES (H): ICD-10-CM

## 2017-06-30 DIAGNOSIS — C81.12 NODULAR SCLEROSIS HODGKIN LYMPHOMA OF INTRATHORACIC LYMPH NODES (H): Primary | ICD-10-CM

## 2017-06-30 LAB
ALBUMIN SERPL-MCNC: 3.5 G/DL (ref 3.4–5)
ALP SERPL-CCNC: 111 U/L (ref 40–150)
ALT SERPL W P-5'-P-CCNC: 21 U/L (ref 0–50)
ANION GAP SERPL CALCULATED.3IONS-SCNC: 8 MMOL/L (ref 3–14)
AST SERPL W P-5'-P-CCNC: 18 U/L (ref 0–45)
BASOPHILS # BLD AUTO: 0 10E9/L (ref 0–0.2)
BASOPHILS NFR BLD AUTO: 0.4 %
BILIRUB SERPL-MCNC: 0.2 MG/DL (ref 0.2–1.3)
BUN SERPL-MCNC: 13 MG/DL (ref 7–30)
CALCIUM SERPL-MCNC: 8.6 MG/DL (ref 8.5–10.1)
CHLORIDE SERPL-SCNC: 101 MMOL/L (ref 94–109)
CO2 SERPL-SCNC: 26 MMOL/L (ref 20–32)
CREAT SERPL-MCNC: 0.67 MG/DL (ref 0.52–1.04)
DIFFERENTIAL METHOD BLD: ABNORMAL
EOSINOPHIL # BLD AUTO: 0.1 10E9/L (ref 0–0.7)
EOSINOPHIL NFR BLD AUTO: 1.7 %
ERYTHROCYTE [DISTWIDTH] IN BLOOD BY AUTOMATED COUNT: 13.1 % (ref 10–15)
GFR SERPL CREATININE-BSD FRML MDRD: ABNORMAL ML/MIN/1.7M2
GLUCOSE BLDC GLUCOMTR-MCNC: 88 MG/DL (ref 70–99)
GLUCOSE SERPL-MCNC: 79 MG/DL (ref 70–99)
HCT VFR BLD AUTO: 31 % (ref 35–47)
HGB BLD-MCNC: 10.8 G/DL (ref 11.7–15.7)
IMM GRANULOCYTES # BLD: 0 10E9/L (ref 0–0.4)
IMM GRANULOCYTES NFR BLD: 0.4 %
LDH SERPL L TO P-CCNC: 149 U/L (ref 81–234)
LYMPHOCYTES # BLD AUTO: 1.3 10E9/L (ref 0.8–5.3)
LYMPHOCYTES NFR BLD AUTO: 26.4 %
MCH RBC QN AUTO: 37.1 PG (ref 26.5–33)
MCHC RBC AUTO-ENTMCNC: 34.8 G/DL (ref 31.5–36.5)
MCV RBC AUTO: 107 FL (ref 78–100)
MONOCYTES # BLD AUTO: 0.4 10E9/L (ref 0–1.3)
MONOCYTES NFR BLD AUTO: 8.3 %
NEUTROPHILS # BLD AUTO: 3 10E9/L (ref 1.6–8.3)
NEUTROPHILS NFR BLD AUTO: 62.8 %
NRBC # BLD AUTO: 0 10*3/UL
NRBC BLD AUTO-RTO: 0 /100
PLATELET # BLD AUTO: 132 10E9/L (ref 150–450)
POTASSIUM SERPL-SCNC: 3.8 MMOL/L (ref 3.4–5.3)
PROT SERPL-MCNC: 6.7 G/DL (ref 6.8–8.8)
RBC # BLD AUTO: 2.91 10E12/L (ref 3.8–5.2)
SODIUM SERPL-SCNC: 135 MMOL/L (ref 133–144)
WBC # BLD AUTO: 4.8 10E9/L (ref 4–11)

## 2017-06-30 PROCEDURE — 71260 CT THORAX DX C+: CPT

## 2017-06-30 PROCEDURE — 82784 ASSAY IGA/IGD/IGG/IGM EACH: CPT | Performed by: NURSE PRACTITIONER

## 2017-06-30 PROCEDURE — 34300033 ZZH RX 343: Performed by: NURSE PRACTITIONER

## 2017-06-30 PROCEDURE — A9552 F18 FDG: HCPCS | Performed by: NURSE PRACTITIONER

## 2017-06-30 PROCEDURE — 85025 COMPLETE CBC W/AUTO DIFF WBC: CPT | Performed by: NURSE PRACTITIONER

## 2017-06-30 PROCEDURE — 25000128 H RX IP 250 OP 636: Performed by: NURSE PRACTITIONER

## 2017-06-30 PROCEDURE — 36415 COLL VENOUS BLD VENIPUNCTURE: CPT | Performed by: NURSE PRACTITIONER

## 2017-06-30 PROCEDURE — 80053 COMPREHEN METABOLIC PANEL: CPT | Performed by: NURSE PRACTITIONER

## 2017-06-30 PROCEDURE — 83615 LACTATE (LD) (LDH) ENZYME: CPT | Performed by: NURSE PRACTITIONER

## 2017-06-30 PROCEDURE — 82962 GLUCOSE BLOOD TEST: CPT

## 2017-06-30 PROCEDURE — 74177 CT ABD & PELVIS W/CONTRAST: CPT

## 2017-06-30 PROCEDURE — 25000128 H RX IP 250 OP 636: Performed by: INTERNAL MEDICINE

## 2017-06-30 RX ORDER — IOPAMIDOL 755 MG/ML
60-135 INJECTION, SOLUTION INTRAVASCULAR ONCE
Status: COMPLETED | OUTPATIENT
Start: 2017-06-30 | End: 2017-06-30

## 2017-06-30 RX ORDER — HEPARIN SODIUM (PORCINE) LOCK FLUSH IV SOLN 100 UNIT/ML 100 UNIT/ML
5 SOLUTION INTRAVENOUS EVERY 8 HOURS
Status: DISCONTINUED | OUTPATIENT
Start: 2017-06-30 | End: 2017-07-08 | Stop reason: HOSPADM

## 2017-06-30 RX ADMIN — IOPAMIDOL 99 ML: 755 INJECTION, SOLUTION INTRAVENOUS at 13:00

## 2017-06-30 RX ADMIN — FLUDEOXYGLUCOSE F-18 10.25 MCI.: 500 INJECTION, SOLUTION INTRAVENOUS at 12:08

## 2017-06-30 RX ADMIN — SODIUM CHLORIDE, PRESERVATIVE FREE 5 ML: 5 INJECTION INTRAVENOUS at 14:16

## 2017-07-03 LAB
IGA SERPL-MCNC: 214 MG/DL (ref 70–380)
IGG SERPL-MCNC: 674 MG/DL (ref 695–1620)
IGM SERPL-MCNC: 27 MG/DL (ref 60–265)

## 2017-07-05 ENCOUNTER — OFFICE VISIT (OUTPATIENT)
Dept: TRANSPLANT | Facility: CLINIC | Age: 42
End: 2017-07-05
Attending: INTERNAL MEDICINE
Payer: COMMERCIAL

## 2017-07-05 VITALS
OXYGEN SATURATION: 98 % | RESPIRATION RATE: 16 BRPM | SYSTOLIC BLOOD PRESSURE: 101 MMHG | WEIGHT: 166.9 LBS | DIASTOLIC BLOOD PRESSURE: 56 MMHG | HEART RATE: 87 BPM | BODY MASS INDEX: 30.52 KG/M2 | TEMPERATURE: 98 F

## 2017-07-05 DIAGNOSIS — C81.12 NODULAR SCLEROSIS HODGKIN LYMPHOMA OF INTRATHORACIC LYMPH NODES (H): ICD-10-CM

## 2017-07-05 PROCEDURE — 99212 OFFICE O/P EST SF 10 MIN: CPT | Mod: ZF

## 2017-07-05 RX ORDER — SULFAMETHOXAZOLE/TRIMETHOPRIM 800-160 MG
TABLET ORAL
Qty: 18 TABLET | Refills: 11 | Status: SHIPPED | OUTPATIENT
Start: 2017-07-05

## 2017-07-05 NOTE — LETTER
7/5/2017      RE: Steph Agee  [address] [facility] [id number]       I saw Steph Agee today 100 days status post autologous peripheral stem cell transplant for Hodgkin's disease.    Tonya has high risk Hodgkin's disease and underwent transplantation approximately 100 days ago.She states that she has not started Julio toxin Mab maintenance yet. She has started to resume more activities of daily life, including working part-time. No fevers chills nausea vomiting diarrhea cough hemoptysis shortness of breath hematuria or dysuria to report. The remainder of the 12 point review of systems is negative except for some fatigue.    On physical examination she looks healthy her vital signs were remarkable. She had no palpable supraclavicular clavicular cervical axillary inguinal adenopathy. Lungs were clear to auscultation. Cardiac exam without S3 rub or murmur. Abdomen nondistended, active bowel sounds, no organomegaly. There was trace pedal edema and she had no skin rash.cryptic    Restaging labs included a PET/CT which showed no evidence of active disease. Her white count was 4800, hemoglobin 10.8, platelets 132,000. Her electrolyte panel and liver function tests were within normal limits, as was the LDH. Her IgG was 674.    Assessment and plan: 100 days status post autologous peripheral stem cell transplant for high risk Hodgkin's disease with no evidence of active disease. It is important that she start her Brentuximab maintenance as soon as is feasible. This should be continued for 48 weeks given on a q. 3 week cycle.  In reviewing her meds, she can discontinue her prophylactic Levaquin, fluconazole, and acyclovir. She stated that she needed a renewal on her Bactrim which I performed at her local pharmacy in [place]. Disposition: She will return for her 6 month post transplant visit.    Obed Chambers M.D.

## 2017-07-05 NOTE — PROGRESS NOTES
I saw Steph Agee today 100 days status post autologous peripheral stem cell transplant for Hodgkin's disease.    Tonya has high risk Hodgkin's disease and underwent transplantation approximately 100 days ago.She states that she has not started Julio toxin Mab maintenance yet. She has started to resume more activities of daily life, including working part-time. No fevers chills nausea vomiting diarrhea cough hemoptysis shortness of breath hematuria or dysuria to report. The remainder of the 12 point review of systems is negative except for some fatigue.    On physical examination she looks healthy her vital signs were remarkable. She had no palpable supraclavicular clavicular cervical axillary inguinal adenopathy. Lungs were clear to auscultation. Cardiac exam without S3 rub or murmur. Abdomen nondistended, active bowel sounds, no organomegaly. There was trace pedal edema and she had no skin rash.cryptic    Restaging labs included a PET/CT which showed no evidence of active disease. Her white count was 4800, hemoglobin 10.8, platelets 132,000. Her electrolyte panel and liver function tests were within normal limits, as was the LDH. Her IgG was 674.    Assessment and plan: 100 days status post autologous peripheral stem cell transplant for high risk Hodgkin's disease with no evidence of active disease. It is important that she start her Brentuximab maintenance as soon as is feasible. This should be continued for 48 weeks given on a q. 3 week cycle.  In reviewing her meds, she can discontinue her prophylactic Levaquin, fluconazole, and acyclovir. She stated that she needed a renewal on her Bactrim which I performed at her local pharmacy in [place]. Disposition: She will return for her 6 month post transplant visit.    Obed Chambers M.D.

## 2017-07-05 NOTE — MR AVS SNAPSHOT
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  After hours call the   hospital at [phone] or   [phone]. Ask for the BMT   fellow on-call            Follow-ups after your visit        Your next 10 appointments already scheduled     Aug 09, 2017 10:30 AM CDT   [facility] Draw with  [facility]RAW   [facility] Draw ([facility])    [address] [place] [address] [phone]            Aug 09, 2017 11:00 AM CDT   Return with Obed Chambers MD   [facility] Blood and Marrow Transplant ([facility])    [address] [place] [address] [phone]              Who to contact     If you have questions or need follow up information about today's clinic visit or your schedule please contact [facility] BLOOD AND MARROW TRANSPLANT directly at [phone].  Normal or non-critical lab and imaging results will be communicated to you by MyChart, letter or phone within 4 business days after the clinic has received the results. If you do not hear from us within 7 days, please contact the clinic through MyChart or phone. If you have a critical or abnormal lab result, we will notify you by phone as soon as possible.  Submit refill requests through [employer] or call your pharmacy and they will forward  the refill request to us. Please allow 3 business days for your refill to be completed.          Additional Information About Your Visit        [employer] Information     [employer] gives you secure access to your electronic health record. If you see a primary care provider, you can also send messages to your care team and make appointments. If you have questions, please call your primary care clinic.  If you do not have a primary care provider, please call [phone] and they will assist you.        Care EveryWhere ID     This is your Care EveryWhere ID. This could be used by other organizations to access your [place] medical records  [id number]        Your Vitals Were     Pulse Temperature Respirations Pulse Oximetry BMI (Body Mass Index)       87 98  F (36.7  C) (Oral) 16 98% 30.52 kg/m2        Blood Pressure from Last 3 Encounters:   07/05/17 101/56   05/10/17 102/68   04/26/17 110/73    Weight from Last 3 Encounters:   07/05/17 75.7 kg (166 lb 14.4 oz)   05/10/17 75.7 kg (166 lb 14.4 oz)   04/26/17 79.2 kg (174 lb 8 oz)              Today, you had the following     No orders found for display         Today's Medication Changes          These changes are accurate as of: 7/5/17 12:08 PM.  If you have any questions, ask your nurse or doctor.               These medicines have changed or have updated prescriptions.        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   Quantity:  18 tablet   Refills:  11            Where to get your medicines      These medications were sent to [id number] - [place], [place] - [address] AT [address]  [address] [place] [address]    Hours:  24-hours Phone:  [phone]     sulfamethoxazole-trimethoprim 800-160 MG per tablet                Recent Review Flowsheet Data     BMT Recent Results Latest Ref Rng & Units 4/13/2017 4/17/2017 4/19/2017 4/26/2017 5/10/2017 6/30/2017 6/30/2017    WBC 4.0 - 11.0 10e9/L 2.8(L) 2.3(L) 2.6(L) 3.8(L) 4.2 - 4.8    Hemoglobin 11.7 - 15.7 g/dL 9.9(L) 9.1(L) 8.8(L) 9.9(L) 10.2(L) - 10.8(L)    Platelet Count 150 - 450 10e9/L 42(LL) 57(L) 77(L) 135(L) 169 - 132(L)    Neutrophils (Absolute) 1.6 - 8.3 10e9/L 1.7 1.3(L) 1.4(L) 2.4 2.7 - 3.0    Sodium 133 - 144 mmol/L 140 142 140 140 140 - 135    Potassium 3.4 - 5.3 mmol/L 4.2 3.8 3.6 4.2 4.0 - 3.8    Chloride 94 - 109 mmol/L 104 106 106 109 105 - 101    Glucose 70 - 99 mg/dL 78 124(H) 75 84 82 88 79    Urea Nitrogen 7 - 30 mg/dL 13 13 13 11 11 - 13    Creatinine 0.52 - 1.04 mg/dL 0.76 0.70 0.76 0.79 0.80 - 0.67    Calcium (Total) 8.5 - 10.1 mg/dL 8.4(L) 8.3(L) 8.1(L) 8.5 8.8 - 8.6    Protein (Total) 6.8 - 8.8 g/dL 6.7(L) 6.2(L) - - 7.0 - 6.7(L)    Albumin 3.4 - 5.0 g/dL 3.2(L) 3.2(L) - - 3.5 - 3.5    Alkaline Phosphatase 40 - 150 U/L 151(H) 143 - - 139 - 111    AST 0 - 45 U/L 25 26 - - 23 - 18    ALT 0 - 50 U/L 22 22 - - 22 - 21    MCV 78 - 100 fl 94 96 96 99 100 - 107(H)               Primary Care Provider Office Phone # Fax #    Travis Carpenter [phone]       [facility] [address] [place] [address]        Equal Access to Services     ALEXA DUQUE : Hadii austin sweet Soboo, waaxda luqadaha, qaybta kaalmada adeegyachalo, pérez victoria . [facility] [phone].    ATENCIÓN: Si habla español, tiene a banks disposición servicios gratuitos de asistencia lingüística. Adonis al [phone].    We comply with applicable federal civil rights laws and [place] laws. We do not discriminate on the basis of  race, color, national origin, age, disability sex, sexual orientation or gender identity.            Thank you!     Thank you for choosing [facility] BLOOD AND MARROW TRANSPLANT  for your care. Our goal is always to provide you with excellent care. Hearing back from our patients is one way we can continue to improve our services. Please take a few minutes to complete the written survey that you may receive in the mail after your visit with us. Thank you!             Your Updated Medication List - Protect others around you: Learn how to safely use, store and throw away your medicines at www.disposemymeds.org.          This list is accurate as of: 7/5/17 12:08 PM.  Always use your most recent med list.                   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)       fluconazole 100 MG tablet    DIFLUCAN    60 tablet    Take 1 tablet (100 mg) by mouth daily    Nodular sclerosing Hodgkin's lymphoma, unspecified body region (H)       gabapentin 100 MG capsule    NEURONTIN    90 capsule    Take 2 capsules (200 mg) by mouth 3 times daily    Personal history of diseases of blood and blood-forming organs       levofloxacin 250 MG tablet    LEVAQUIN    30 tablet    Take 1 tablet (250 mg) by mouth daily    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)       LORazepam 0.5 MG tablet    ATIVAN    60 tablet    Take 0.5 tablets (0.25 mg) by mouth 3 times daily as needed    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

## 2017-07-05 NOTE — NURSING NOTE
"Oncology Rooming Note    July 5, 2017 11:36 AM   Steph Agee is a 42 year old female who presents for:    Chief Complaint   Patient presents with     RECHECK     day 100 review- HODGKINS     Initial Vitals: /56  Pulse 87  Temp 98  F (36.7  C) (Oral)  Resp 16  Wt 75.7 kg (166 lb 14.4 oz)  SpO2 98%  BMI 30.52 kg/m2 Estimated body mass index is 30.52 kg/(m^2) as calculated from the following:    Height as of 5/10/17: 1.575 m (5' 2.01\").    Weight as of this encounter: 75.7 kg (166 lb 14.4 oz). Body surface area is 1.82 meters squared.  Data Unavailable Comment: Data Unavailable   No LMP recorded. Patient is not currently having periods (Reason: UNKNOWN).  Allergies reviewed: Yes  Medications reviewed: Yes    Medications: MEDICATION REFILL NEEDED TODAY  Pharmacy name entered into [email or web address]: [id number] - [place], [place] - [address] AT [facility] [address]    Clinical concerns: Bactrim refill needed to continue.     7 minutes for nursing intake (face to face time)     MACIE ANDUJAR CMA              "

## 2017-07-11 ENCOUNTER — CARE COORDINATION (OUTPATIENT)
Dept: TRANSPLANT | Facility: CLINIC | Age: 42
End: 2017-07-11

## 2017-07-11 DIAGNOSIS — C81.12 NODULAR SCLEROSIS HODGKIN LYMPHOMA OF INTRATHORACIC LYMPH NODES (H): ICD-10-CM

## 2017-07-11 DIAGNOSIS — Z52.011 AUTOLOGOUS DONOR, STEM CELLS: Primary | ICD-10-CM

## 2017-07-11 DIAGNOSIS — C81.40: ICD-10-CM

## 2017-09-19 ENCOUNTER — RESEARCH ENCOUNTER (OUTPATIENT)
Dept: TRANSPLANT | Facility: CLINIC | Age: 42
End: 2017-09-19

## 2017-09-19 VITALS
SYSTOLIC BLOOD PRESSURE: 99 MMHG | TEMPERATURE: 97.3 F | HEART RATE: 74 BPM | WEIGHT: 163.7 LBS | DIASTOLIC BLOOD PRESSURE: 67 MMHG | OXYGEN SATURATION: 97 % | RESPIRATION RATE: 18 BRPM | BODY MASS INDEX: 29.93 KG/M2

## 2017-09-19 DIAGNOSIS — C81.12 NODULAR SCLEROSIS HODGKIN LYMPHOMA OF INTRATHORACIC LYMPH NODES (H): Primary | ICD-10-CM

## 2017-09-19 LAB
ALBUMIN SERPL-MCNC: 3.4 G/DL (ref 3.4–5)
ALP SERPL-CCNC: 136 U/L (ref 40–150)
ALT SERPL W P-5'-P-CCNC: 27 U/L (ref 0–50)
ANION GAP SERPL CALCULATED.3IONS-SCNC: 6 MMOL/L (ref 3–14)
AST SERPL W P-5'-P-CCNC: 22 U/L (ref 0–45)
BASOPHILS # BLD AUTO: 0 10E9/L (ref 0–0.2)
BASOPHILS NFR BLD AUTO: 1.2 %
BILIRUB SERPL-MCNC: 0.2 MG/DL (ref 0.2–1.3)
BUN SERPL-MCNC: 10 MG/DL (ref 7–30)
CALCIUM SERPL-MCNC: 8.4 MG/DL (ref 8.5–10.1)
CHLORIDE SERPL-SCNC: 108 MMOL/L (ref 94–109)
CO2 SERPL-SCNC: 29 MMOL/L (ref 20–32)
CREAT SERPL-MCNC: 0.7 MG/DL (ref 0.52–1.04)
DIFFERENTIAL METHOD BLD: ABNORMAL
EOSINOPHIL # BLD AUTO: 0.5 10E9/L (ref 0–0.7)
EOSINOPHIL NFR BLD AUTO: 20 %
ERYTHROCYTE [DISTWIDTH] IN BLOOD BY AUTOMATED COUNT: 14.8 % (ref 10–15)
GFR SERPL CREATININE-BSD FRML MDRD: >90 ML/MIN/1.7M2
GLUCOSE SERPL-MCNC: 73 MG/DL (ref 70–99)
HCT VFR BLD AUTO: 29 % (ref 35–47)
HGB BLD-MCNC: 9.9 G/DL (ref 11.7–15.7)
IMM GRANULOCYTES # BLD: 0 10E9/L (ref 0–0.4)
IMM GRANULOCYTES NFR BLD: 0 %
LDH SERPL L TO P-CCNC: 204 U/L (ref 81–234)
LYMPHOCYTES # BLD AUTO: 0.8 10E9/L (ref 0.8–5.3)
LYMPHOCYTES NFR BLD AUTO: 30.4 %
MCH RBC QN AUTO: 36.3 PG (ref 26.5–33)
MCHC RBC AUTO-ENTMCNC: 34.1 G/DL (ref 31.5–36.5)
MCV RBC AUTO: 106 FL (ref 78–100)
MONOCYTES # BLD AUTO: 0.4 10E9/L (ref 0–1.3)
MONOCYTES NFR BLD AUTO: 13.8 %
NEUTROPHILS # BLD AUTO: 0.9 10E9/L (ref 1.6–8.3)
NEUTROPHILS NFR BLD AUTO: 34.6 %
NRBC # BLD AUTO: 0 10*3/UL
NRBC BLD AUTO-RTO: 0 /100
PLATELET # BLD AUTO: 141 10E9/L (ref 150–450)
POTASSIUM SERPL-SCNC: 3.6 MMOL/L (ref 3.4–5.3)
PROT SERPL-MCNC: 6.9 G/DL (ref 6.8–8.8)
RBC # BLD AUTO: 2.73 10E12/L (ref 3.8–5.2)
SODIUM SERPL-SCNC: 142 MMOL/L (ref 133–144)
T4 FREE SERPL-MCNC: 0.81 NG/DL (ref 0.76–1.46)
TSH SERPL DL<=0.005 MIU/L-ACNC: 2.19 MU/L (ref 0.4–4)
WBC # BLD AUTO: 2.6 10E9/L (ref 4–11)

## 2017-09-19 PROCEDURE — 84443 ASSAY THYROID STIM HORMONE: CPT | Performed by: NURSE PRACTITIONER

## 2017-09-19 PROCEDURE — 84439 ASSAY OF FREE THYROXINE: CPT | Performed by: NURSE PRACTITIONER

## 2017-09-19 PROCEDURE — 82785 ASSAY OF IGE: CPT | Performed by: NURSE PRACTITIONER

## 2017-09-19 PROCEDURE — 83615 LACTATE (LD) (LDH) ENZYME: CPT | Performed by: NURSE PRACTITIONER

## 2017-09-19 PROCEDURE — 85025 COMPLETE CBC W/AUTO DIFF WBC: CPT | Performed by: NURSE PRACTITIONER

## 2017-09-19 PROCEDURE — 82784 ASSAY IGA/IGD/IGG/IGM EACH: CPT | Performed by: NURSE PRACTITIONER

## 2017-09-19 PROCEDURE — 80053 COMPREHEN METABOLIC PANEL: CPT | Performed by: NURSE PRACTITIONER

## 2017-09-19 PROCEDURE — 25000128 H RX IP 250 OP 636: Performed by: INTERNAL MEDICINE

## 2017-09-19 RX ORDER — HEPARIN SODIUM (PORCINE) LOCK FLUSH IV SOLN 100 UNIT/ML 100 UNIT/ML
5 SOLUTION INTRAVENOUS EVERY 8 HOURS PRN
Status: DISCONTINUED | OUTPATIENT
Start: 2017-09-19 | End: 2017-09-27 | Stop reason: HOSPADM

## 2017-09-19 RX ADMIN — SODIUM CHLORIDE, PRESERVATIVE FREE 5 ML: 5 INJECTION INTRAVENOUS at 13:42

## 2017-09-19 ASSESSMENT — PAIN SCALES - GENERAL: PAINLEVEL: NO PAIN (0)

## 2017-09-19 NOTE — PROGRESS NOTES
IRB #: [id number]  PI Phone/Pager: Koko Escamilla/[phone]  Coordinator Phone/Pager: Rom Almaraz, [phone]  Anticipated Dates of Participation: 3/22/17 - 3/22/19    Does the research protocol require services be billed to patients/insurance? no    Informed Consent     Rom Almaraz met with patient Steph Agee to present revised consent for study MT [id number]: Microbial, Graft and Host Interactions in HCT. The patient was provided with a copy of the IRB-approved consent form, and all questions were answered before the patient agreed to participate by signing the informed consent document.  A copy of the signed form was provided to the patient. Patient consented to inpatient and outpatient arm of study.    Date:                  9/19/17                     Consent Version Date:  6/9/17  Consent Obtained by:  Rom Almaraz  HIPAA:  Yes  HIPAA Authorization Signed Date:  9/19/17

## 2017-09-19 NOTE — NURSING NOTE
Chief Complaint   Patient presents with     Port Draw     Labs drawn via port, power port accessed for CT scan. Line flushed and hep locked.     Rachele Painter RN

## 2017-09-20 LAB
IGA SERPL-MCNC: 235 MG/DL (ref 70–380)
IGG SERPL-MCNC: 799 MG/DL (ref 695–1620)
IGM SERPL-MCNC: 28 MG/DL (ref 60–265)

## 2017-09-22 LAB — IGE SERPL-ACNC: <2 KIU/L (ref 0–114)

## 2017-09-27 ENCOUNTER — OFFICE VISIT (OUTPATIENT)
Dept: TRANSPLANT | Facility: CLINIC | Age: 42
End: 2017-09-27
Attending: INTERNAL MEDICINE
Payer: COMMERCIAL

## 2017-09-27 VITALS
DIASTOLIC BLOOD PRESSURE: 71 MMHG | RESPIRATION RATE: 16 BRPM | BODY MASS INDEX: 30.17 KG/M2 | WEIGHT: 165 LBS | TEMPERATURE: 98 F | SYSTOLIC BLOOD PRESSURE: 122 MMHG | HEART RATE: 80 BPM | OXYGEN SATURATION: 99 %

## 2017-09-27 DIAGNOSIS — C81.12 NODULAR SCLEROSIS HODGKIN LYMPHOMA OF INTRATHORACIC LYMPH NODES (H): Primary | ICD-10-CM

## 2017-09-27 PROCEDURE — 99213 OFFICE O/P EST LOW 20 MIN: CPT | Mod: ZF

## 2017-09-27 ASSESSMENT — PAIN SCALES - GENERAL: PAINLEVEL: NO PAIN (0)

## 2017-09-27 NOTE — MR AVS SNAPSHOT
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  After hours call the   hospital at [phone] or   [phone]. Ask for the BMT   fellow on-call            Follow-ups after your visit        Who to contact     If you have questions or need follow up information about today's clinic visit or your schedule please contact [facility] BLOOD AND MARROW TRANSPLANT directly at [phone].  Normal or non-critical lab and imaging results will be communicated to you by [employer], letter or phone within 4 business days after the clinic has received the results. If you do not hear from us within 7 days, please contact the clinic through [employer] or phone. If you have a critical or abnormal lab result, we will notify you by phone as soon as possible.  Submit refill requests through [employer] or call your pharmacy and they will forward the refill request to us. Please allow 3 business days for your refill to be completed.          Additional Information About Your Visit        [employer] Information     [employer] gives you secure access to your electronic health record. If you see a primary care provider, you can also send messages to your care team and make appointments. If you have questions, please call your primary care clinic.  If you do not have a primary care provider, please call [phone] and they will assist  you.        Care EveryWhere ID     This is your Care EveryWhere ID. This could be used by other organizations to access your [place] medical records  [id number]        Your Vitals Were     Pulse Temperature Respirations Pulse Oximetry BMI (Body Mass Index)       80 98  F (36.7  C) (Oral) 16 99% 30.17 kg/m2        Blood Pressure from Last 3 Encounters:   09/27/17 122/71   09/19/17 99/67   07/05/17 101/56    Weight from Last 3 Encounters:   09/27/17 74.8 kg (165 lb)   09/19/17 74.3 kg (163 lb 11.2 oz)   07/05/17 75.7 kg (166 lb 14.4 oz)              Today, you had the following     No orders found for display       Recent Review Flowsheet Data     BMT Recent Results Latest Ref Rng & Units 4/19/2017 4/26/2017 5/10/2017 6/30/2017 6/30/2017 9/19/2017 9/19/2017    WBC 4.0 - 11.0 10e9/L 2.6(L) 3.8(L) 4.2 - 4.8 - 2.6(L)    Hemoglobin 11.7 - 15.7 g/dL 8.8(L) 9.9(L) 10.2(L) - 10.8(L) - 9.9(L)    Platelet Count 150 - 450 10e9/L 77(L) 135(L) 169 - 132(L) - 141(L)    Neutrophils (Absolute) 1.6 - 8.3 10e9/L 1.4(L) 2.4 2.7 - 3.0 - 0.9(L)    INR 0.86 - 1.14 - - - - - - -    Sodium 133 - 144 mmol/L 140 140 140 - 135 - 142    Potassium 3.4 - 5.3 mmol/L 3.6 4.2 4.0 - 3.8 - 3.6    Chloride 94 - 109 mmol/L 106 109 105 - 101 - 108    Glucose 70 - 99 mg/dL 75 84 82 88 79 - 73    Urea Nitrogen 7 - 30 mg/dL 13 11 11 - 13 - 10    Creatinine 0.52 - 1.04 mg/dL 0.76 0.79 0.80 - 0.67 0.70 0.7    Calcium (Total) 8.5 - 10.1 mg/dL 8.1(L) 8.5 8.8 - 8.6 - 8.4(L)    Protein (Total) 6.8 - 8.8 g/dL - - 7.0 - 6.7(L) - 6.9    Albumin 3.4 - 5.0 g/dL - - 3.5 - 3.5 - 3.4    Bilirubin (Direct) 0.0 - 0.2 mg/dL - - - - - - -    Alkaline Phosphatase 40 - 150 U/L - - 139 - 111 - 136    AST 0 - 45 U/L - - 23 - 18 - 22    ALT 0 - 50 U/L - - 22 - 21 - 27    MCV 78 - 100 fl 96 99 100 - 107(H) - 106(H)               Primary Care Provider Office Phone # Fax #    Travis Carpenter [phone]       [facility] [address]  [place] [address]        Equal Access to Services     [facility] : Hadii aad karina hadlatasha Rosario, watatyda luqadaha, qaybta kaalmada elijahchapochalo, pérez singhchinoapple pham. So [facility] [phone].    ATENCIÓN: Si habla español, tiene a banks disposición servicios gratuitos de asistencia lingüística. Mariajoseame al [phone].    We comply with applicable federal civil rights laws and [place] laws. We do not discriminate on the basis of race, color, national origin, age, disability, sex, sexual orientation, or gender identity.            Thank you!     Thank you for choosing [facility] BLOOD AND MARROW TRANSPLANT  for your care. Our goal is always to provide you with excellent care. Hearing back from our patients is one way we can continue to improve our services. Please take a few minutes to complete the written survey that you may receive in the mail after your visit with us. Thank you!             Your Updated Medication List - Protect others around you: Learn how to safely use, store and throw away your medicines at www.disposemymeds.org.          This list is accurate as of: 9/27/17 11:59 PM.  Always use your most recent med list.                   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)       fluconazole 100 MG tablet    DIFLUCAN    60 tablet    Take 1 tablet (100 mg) by mouth daily    Nodular sclerosing Hodgkin's lymphoma, unspecified body region (H)       gabapentin 100 MG capsule    NEURONTIN    90 capsule    Take 2 capsules (200 mg) by mouth 3 times daily    Personal history of diseases of blood and blood-forming organs       levofloxacin 250 MG tablet    LEVAQUIN    30 tablet    Take 1 tablet (250 mg) by mouth daily    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)       LORazepam 0.5 MG tablet     ATIVAN    60 tablet    Take 0.5 tablets (0.25 mg) by mouth 3 times daily as needed    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

## 2017-09-27 NOTE — LETTER
9/27/2017      RE: Steph Agee  [address] [facility] [address]       HISTORY OF PRESENT ILLNESS:  I saw Steph Pacheco today, 6 months status post autologous peripheral blood stem cell transplantation for relapsed Hodgkin's disease.  She continues to receive maintenance brentuximab every 3 or 4 weeks depending on her count recovery.  She says she is tolerating brentuximab well without worsening of her peripheral neuropathy.  She has no fevers, chills, nausea, vomiting, diarrhea, cough, hemoptysis, shortness of breath, hematuria, dysuria, bruising or bleeding.  She does have some numbness in her hands and feet, but this is stable.  The remainder of a 10-point review of systems is negative.      PHYSICAL EXAMINATION:   GENERAL:  She looked healthy.   VITAL SIGNS:  Unremarkable.    HEENT:  Sclerae were anicteric and non-injected.  Buccal mucosa was intact.   LYMPH NODES:  No palpable supraclavicular, cervical, axillary or inguinal adenopathy.   LUNGS:  Clear to auscultation.   CARDIAC:  Without S3, rub or murmur.   ABDOMEN:  Nondistended, active bowel sounds, no organomegaly.   EXTREMITIES:  Trace pedal edema.   SKIN:  No skin rash.       LABORATORY DATA:  Labs included a restaging CT scan of the chest, abdomen, pelvis and neck, none of which showed any evidence of active disease or lymphadenopathy.  Her white count was 2,600, hemoglobin 9.9 and platelets 141,000.  Her electrolyte panel was normal, as were her liver function tests, thyroid function tests and LDH.       ASSESSMENT AND PLAN:     1.  Six months status post autologous peripheral blood stem cell transplantation for Hodgkin's disease, currently in CR.    2.  Health care maintenance.  She has received her influenza vaccination locally.   3.  Disposition.  I will plan on seeing her again for her 1-year restaging.         Obed Chambers MD

## 2017-09-27 NOTE — PROGRESS NOTES
HISTORY OF PRESENT ILLNESS:  I saw Steph Pacheco today, 6 months status post autologous peripheral blood stem cell transplantation for relapsed Hodgkin's disease.  She continues to receive maintenance brentuximab every 3 or 4 weeks depending on her count recovery.  She says she is tolerating brentuximab well without worsening of her peripheral neuropathy.  She has no fevers, chills, nausea, vomiting, diarrhea, cough, hemoptysis, shortness of breath, hematuria, dysuria, bruising or bleeding.  She does have some numbness in her hands and feet, but this is stable.  The remainder of a 10-point review of systems is negative.      PHYSICAL EXAMINATION:   GENERAL:  She looked healthy.   VITAL SIGNS:  Unremarkable.    HEENT:  Sclerae were anicteric and non-injected.  Buccal mucosa was intact.   LYMPH NODES:  No palpable supraclavicular, cervical, axillary or inguinal adenopathy.   LUNGS:  Clear to auscultation.   CARDIAC:  Without S3, rub or murmur.   ABDOMEN:  Nondistended, active bowel sounds, no organomegaly.   EXTREMITIES:  Trace pedal edema.   SKIN:  No skin rash.       LABORATORY DATA:  Labs included a restaging CT scan of the chest, abdomen, pelvis and neck, none of which showed any evidence of active disease or lymphadenopathy.  Her white count was 2,600, hemoglobin 9.9 and platelets 141,000.  Her electrolyte panel was normal, as were her liver function tests, thyroid function tests and LDH.       ASSESSMENT AND PLAN:     1.  Six months status post autologous peripheral blood stem cell transplantation for Hodgkin's disease, currently in CR.    2.  Health care maintenance.  She has received her influenza vaccination locally.   3.  Disposition.  I will plan on seeing her again for her 1-year restaging.

## 2017-10-19 ENCOUNTER — TRANSFERRED RECORDS (OUTPATIENT)
Dept: HEALTH INFORMATION MANAGEMENT | Facility: CLINIC | Age: 42
End: 2017-10-19

## 2018-01-02 DIAGNOSIS — C81.12 NODULAR SCLEROSIS HODGKIN LYMPHOMA OF INTRATHORACIC LYMPH NODES (H): Primary | ICD-10-CM

## 2018-01-07 ENCOUNTER — HEALTH MAINTENANCE LETTER (OUTPATIENT)
Age: 43
End: 2018-01-07

## 2018-01-11 ENCOUNTER — TRANSFERRED RECORDS (OUTPATIENT)
Dept: HEALTH INFORMATION MANAGEMENT | Facility: CLINIC | Age: 43
End: 2018-01-11

## 2018-02-08 ENCOUNTER — TRANSFERRED RECORDS (OUTPATIENT)
Dept: HEALTH INFORMATION MANAGEMENT | Facility: CLINIC | Age: 43
End: 2018-02-08

## 2018-03-15 ENCOUNTER — TRANSFERRED RECORDS (OUTPATIENT)
Dept: HEALTH INFORMATION MANAGEMENT | Facility: CLINIC | Age: 43
End: 2018-03-15

## 2018-12-05 ENCOUNTER — TRANSFERRED RECORDS (OUTPATIENT)
Dept: HEALTH INFORMATION MANAGEMENT | Facility: CLINIC | Age: 43
End: 2018-12-05

## 2020-03-10 ENCOUNTER — HEALTH MAINTENANCE LETTER (OUTPATIENT)
Age: 45
End: 2020-03-10

## 2020-12-27 ENCOUNTER — HEALTH MAINTENANCE LETTER (OUTPATIENT)
Age: 45
End: 2020-12-27

## 2021-03-06 ENCOUNTER — HEALTH MAINTENANCE LETTER (OUTPATIENT)
Age: 46
End: 2021-03-06

## 2021-04-24 ENCOUNTER — HEALTH MAINTENANCE LETTER (OUTPATIENT)
Age: 46
End: 2021-04-24

## 2021-10-09 ENCOUNTER — HEALTH MAINTENANCE LETTER (OUTPATIENT)
Age: 46
End: 2021-10-09

## 2022-03-20 ENCOUNTER — HEALTH MAINTENANCE LETTER (OUTPATIENT)
Age: 47
End: 2022-03-20

## 2022-05-16 ENCOUNTER — HEALTH MAINTENANCE LETTER (OUTPATIENT)
Age: 47
End: 2022-05-16

## 2022-09-01 NOTE — TELEPHONE ENCOUNTER
"Call Type: Triage Call    Presenting Problem: \"I lost the cap off my central line earlier when  I was flushing the heparin. Can I go to an ER here in [place] to get it  replaced?\" I advised she should be able to get one there.  Triage Note:  Guideline Title: Information Only Call; No Symptom Triage (Adult)  Recommended Disposition: Call Provider When Office is Open  Original Inclination: Wanted to speak with a nurse  Override Disposition:  Intended Action: Follow advice given  Physician Contacted: No  Requesting information and provider is best resource; no triage required. ?  YES  Requesting regular office appointment ? NO  Sign(s) or symptom(s) associated with a diagnosed condition or with a new illness  ? NO  Requesting information about provider, services or community resources ? NO  Call back to complete assessment/clarification of information from prior caller to  complete triage ? NO  Physician Instructions:  Care Advice:  " Pt called back. Hadn't listened to message. Pt was given Dr. Box's instructions to reduce Losartan. Encouraged to monitor blood pressure. Instructed to call office if she notices that BP still stays low or starts to creep up. Pt verbalized understanding and agreement with plan of care.

## 2022-09-11 ENCOUNTER — HEALTH MAINTENANCE LETTER (OUTPATIENT)
Age: 47
End: 2022-09-11

## 2023-04-13 NOTE — Clinical Note
[facility]  Consult Note - Hospitalist Service  Date of Admission:  4/12/2023  Consult Requested by: Dr. Becker  Reason for Consult: Abnormal Zio patch    Assessment & Plan   Brandy Portillo is a 21 year old female admitted on 4/12/2023. She presents to the hospital for induction of pregnancy.    Principal Problem:    Second degree heart block    Assessment: Reviewed Zio patch results.  Patient had a single episode lasting 2 seconds of second-degree heart block.  No other issues identified.  Patient's history and exam are reassuring.  I also discussed case and reviewed EKG and imaging with our cardiology team.    Plan: Electrophysiology consult placed earlier this morning.  I think the ZIO findings are likely incidental and benign.  I would suggest continuing her on cardiac monitoring during labor.  Cardiology was in agreement as well.    Active Problems:     Encounter for induction of labor    Assessment: Discussed with OB.      Plan: Plan per OB.           Clinically Significant Risk Factors Present on Admission          # Hypocalcemia: Lowest Ca = 7.7 mg/dL in last 2 days, will monitor and replace as appropriate     # Hypoalbuminemia: Lowest albumin = 3.3 g/dL at 4/13/2023  4:46 AM, will monitor as appropriate                  Ubaldo Buck MD  Hospitalist Service  Securely message with [employer] (more info)  Text page via [facility] Paging/Directory   ______________________________________________________________________    Chief Complaint       History is obtained from the patient    History of Present Illness   Brandy Portillo is a 21 year old female who presents for induction of pregnancy at 37 weeks by 8-week ultrasound.  Patient's pregnancy complicated by chronic hypertension with superimposed preeclampsia that has now developed severe features.  Patient was found in clinic to be acutely hypertensive with systolic blood pressure as high as 175/110 and also headache.  Patient was  Ready for throat culture, scheduling.   "monitored overnight and was set to be induced today.    She reports symptoms of \"heart racing\" for several years.  Not sure if this started in middle school school.  She has not had any change in symptoms or progression of symptoms other than perhaps a little bit more frequent episodes during the end of pregnancy.    She denies any chest pain.  No anginal equivalents.  No syncopal spells.      Past Medical History    Past Medical History:   Diagnosis Date     Chronic hypertension affecting pregnancy      Rash and other nonspecific skin eruption     7/26/2008,Erythema chronicum migrans rash, treating with doxycycline.       Past Surgical History   Past Surgical History:   Procedure Laterality Date     OTHER SURGICAL HISTORY      12674.0,PAST SURGICAL HISTORY,07/26/08 Erythema chronicum migrans rash, treating with doxycycline.       Medications   Medications Prior to Admission   Medication Sig Dispense Refill Last Dose     famotidine (PEPCID) 20 MG tablet Take 1 tablet (20 mg) by mouth 2 times daily as needed (reflux) 60 tablet 3 4/11/2023     hydrOXYzine (VISTARIL) 25 MG capsule Take 1 capsule (25 mg) by mouth daily as needed for other (sleep) 30 capsule 1 More than a month     magnesium 250 MG tablet Take 1 tablet (250 mg) by mouth daily 30 tablet 1 More than a month     Prenatal Vit-Fe Fumarate-FA (PRENATAL VITAMIN) 27-0.8 MG TABS    4/11/2023          Review of Systems    See HPI for pertinent positives and negatives.      Physical Exam   Vital Signs: Temp: 99  F (37.2  C) Temp src: Temporal BP: 136/81 Pulse: 102   Resp: 16 SpO2: 97 % O2 Device: None (Room air)    Weight: 188 lbs 0 oz    Constitutional: awake, alert, cooperative, no apparent distress, and appears stated age  Respiratory: Clear toascultation bilaterally.  No increased respiratory effort.   Cardiovascular: Regular rate and rhythm.  No murmurs rubs or gallops heard.   GI: Abdomen Soft, gravid.  Neuropsychiatric: General: normal, calm and normal eye " contact  Orientation: oriented to self, place, time and situation  Memory and insight: memory for past and recent events intact and thought process normal    Medical Decision Making       45 MINUTES SPENT BY ME on the date of service doing chart review, history, exam, documentation & further activities per the note.      Data     I have personally reviewed the following data over the past 24 hrs:    13.5 (H)  \   13.4   / 215     134 (L) 101 9.4 /  78   3.9 22 0.78 \       ALT: 27 AST: 26 AP: 208 (H) TBILI: 0.2   ALB: 3.3 (L) TOT PROTEIN: 6.0 (L) LIPASE: N/A       Imaging results reviewed over the past 24 hrs:   Recent Results (from the past 24 hour(s))   US Fetal Biophys Prof w/o Non Stress Test    Narrative    US OB FETAL BIOPHY PROFILE W/O NST SINGLE W/LTD    History: Chronic hypertension    Fetal Movement:  Score 2: At least 3 discrete body/limb movements in 30 minutes  Score 0: Up to 2 episodes of limb/body movements in 30 minutes                    FM = 2    Fetal Breathing movements:  Score 2: At least one episode, at least 30 seconds duration in 30  minutes of observation.  Score 0: Absent or no episodes of greater than 30 seconds    duration in 30 minutes observation.                    FBM = 0    Fetal Tone:  Score 2: At least one episode of active extension with return to     flexion of fetal limbs or trunk, opening and closing of     hands considered normal tone.  Score 0: Absent or no episodes in 30 minutes of observation.                    FT = 2    Amniotic Fluid Volume:  Score 2: At least one pocket of amniotic fluid measuring at least    2 cm in two perpendicular planes.  Score 0: Either no amniotic fluid or a pocket less than 2 cm in    two perpendicular planes.                    AF = 2                        TOTAL = 6/8    GHASSAN: 11 cm  HRT Rate: 134 bpm  Placenta Location: Posterior  Placenta ndGndrndanddndend:nd nd2nd Position: Cephalic      Impression    IMPRESSION:    Biophysical profile score 6 out of 8  without visualized practice fetal  breathing.    FRANKLIN BURNHAM MD         SYSTEM ID:  [id number]   Echocardiogram Limited   Result Value    LVEF  55-60%    Narrative    [id number]  [id number]  [id number]  [id number]^STEPHANI^DENISE^BENSON     [facility]  [address] [place] [address]     Name: MONA GÓMEZ  MRN: [id number]  : 2001  Study Date: 2023 07:04 AM  Age: 21 yrs  Gender: Female  Patient Location: [facility]  Reason For Study: 2nd degree AV Block  Ordering Physician: DENISE STYLES  Performed By: Jeannie Joseph     BSA: 1.9 m2  Height: 63 in  Weight: 188 lb  HR: 92  BP: 124/61 mmHg  ______________________________________________________________________________  Procedure  Limited Portable Echo Adult.  ______________________________________________________________________________  Interpretation Summary  Global and regional left ventricular function is normal with an EF of 55-60%.  The right ventricle is normal size.Global right ventricular function is  normal.  No significant valvular abnormalities present.  No pericardial effusion is present.  There is no prior study for direct comparison.  ______________________________________________________________________________  Left Ventricle  Left ventricular wall thickness is normal. Left ventricular size is normal.  Global and regional left ventricular function is normal with an EF of 55-60%.  Left ventricular diastolic function is normal. No regional wall motion  abnormalities are seen.     Right Ventricle  The right ventricle is normal size. Global right ventricular function is  normal.     Mitral Valve  The mitral valve is normal. Trace mitral insufficiency is present.     Aortic Valve  The valve leaflets are not well visualized. On Doppler interrogation, there is  no significant stenosis or regurgitation.     Tricuspid Valve  The tricuspid valve is normal. Trace tricuspid insufficiency is present. The  peak velocity of  the tricuspid regurgitant jet is not obtainable. Pulmonary  artery systolic pressure cannot be assessed.     Pulmonic Valve  The valve leaflets are not well visualized.     Vessels  The thoracic aorta is normal. The inferior vena cava cannot be assessed.     Pericardium  No pericardial effusion is present.     Miscellaneous  No significant valvular abnormalities present.     Compared to Previous Study  There is no prior study for direct comparison.     ______________________________________________________________________________  MMode/2D Measurements & Calculations  IVSd: 0.76 cm  LVIDd: 4.5 cm  LVIDs: 2.7 cm  LVPWd: 0.93 cm  FS: 40.4 %     LV mass(C)d: 121.9 grams  LV mass(C)dI: 64.7 grams/m2  asc Aorta Diam: 2.9 cm  RWT: 0.41  TAPSE: 1.8 cm     ______________________________________________________________________________  Report approved by: Justice Mendez 04/13/2023 08:40 AM

## 2023-05-06 ENCOUNTER — HEALTH MAINTENANCE LETTER (OUTPATIENT)
Age: 48
End: 2023-05-06

## 2023-12-16 ENCOUNTER — HEALTH MAINTENANCE LETTER (OUTPATIENT)
Age: 48
End: 2023-12-16

## 2024-02-26 NOTE — MR AVS SNAPSHOT
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  After hours call the   hospital at [phone] or   [phone]. Ask for the BMT   fellow for pediatric or adult patients            Follow-ups after your visit        Your next 10 appointments already scheduled     Feb 13, 2017  9:45 AM CST   BMT Injection with  Bmt Nurse 1   [facility] Blood and Marrow Transplant ([facility])    [address] [place] [address]  2nd [facility] [id number] [phone]            Feb 14, 2017 11:30 AM CST   [facility] Draw with  [facility] LAB DRAW   [facility] Lab Draw ([facility])    [address] [place] [address]  2nd [facility] [address]   [phone]            Feb 14, 2017 12:00 PM CST   BMT Injection with  Bmt Nurse 1   [facility] Blood and Marrow Transplant ([facility])    [address] [place] [address]  2nd [facility] [address]   [phone]            Feb 14, 2017   Procedure with Michael Sy PA-C   [facility] ([facility])    [address] [place] [address]  5th Floor  [facility] [id number] [phone]           Located in the Clinics and Surgery Center at [address] [place] [address].   parking is very convenient and highly  recommended.  is a $6 flat rate fee; no tips accepted.  Both  and self parkers should enter the main arrival plaza from [facility]; parking attendants will direct you based on your parking preference.            Feb 14, 2017  2:30 PM CST   Return with  BMT CECILIA #4   [facility] Blood and Marrow Transplant ([facility])    [address] [address]   [phone]            Feb 15, 2017  8:00 AM CST   Return with  BMT CECILIA #1   [facility] Blood and Marrow Transplant ([facility])    [address] [address]   [phone]            Feb 15, 2017  9:00 AM CST   (Arrive by 8:45 AM)   MNC Collection with  APHERESIS RN7, UC 37 ATC   [facility] ([facility])    [address] [address]   [phone]            Feb 16, 2017  8:00 AM CST   Return with  BMT CECILIA #1   [facility] Blood and Marrow Transplant ([facility])    [address] [address]   [phone]            Feb 16, 2017  9:00 AM CST   (Arrive by 8:45 AM)   MNC Collection with  APHERESIS RN6   [facility] ([facility])    [address] [place] [address]   [phone]              Who to contact     If you have questions or need follow up information about today's clinic visit or your schedule please contact [facility] BLOOD AND MARROW TRANSPLANT directly at [phone].  Normal or non-critical lab and imaging results will be communicated to you by MyChart, letter or phone within 4 business days after the clinic has received the results. If you do not hear from us within 7 days, please contact the clinic through MyChart or phone. If you have a critical or abnormal lab result, we will notify you by phone as  soon as possible.  Submit refill requests through [employer]ag or call your pharmacy and they will forward the refill request to us. Please allow 3 business days for your refill to be completed.          Additional Information About Your Visit        [employer] [employer] Information     [employer] gives you secure access to your electronic health record. If you see a primary care provider, you can also send messages to your care team and make appointments. If you have questions, please call your primary care clinic.  If you do not have a primary care provider, please call [phone] and they will assist you.        Care EveryWhere ID     This is your Care EveryWhere ID. This could be used by other organizations to access your [facility] medical records  [id number]        Your Vitals Were     Pulse Temperature Respirations Pulse Oximetry BMI (Body Mass Index)       64 98.4  F (36.9  C) (Oral) 18 99% 31.05 kg/m2        Blood Pressure from Last 3 Encounters:   02/12/17 102/68   02/11/17 102/47   02/08/17 108/66    Weight from Last 3 Encounters:   02/12/17 82.1 kg (180 lb 14.4 oz)   02/11/17 82.8 kg (182 lb 8 oz)   02/08/17 82.2 kg (181 lb 3.2 oz)              Today, you had the following     No orders found for display       Recent Review Flowsheet Data     BMT Recent Results Latest Ref Rng & Units 10/5/2016 10/6/2016 10/7/2016 10/13/2016 2/1/2017 2/2/2017 2/3/2017    WBC 4.0 - 11.0 10e9/L 2.9(L) - 3.3(L) - 1.5(L) - 2.3(L)    Hemoglobin 11.7 - 15.7 g/dL 7.2(L) 7.3(L) 8.2(L) - 11.2(L) - 11.2(L)    Platelet Count 150 - 450 10e9/L 161 - 239 - 219 - 232    Neutrophils (Absolute) 1.6 - 8.3 10e9/L 2.0 - 2.1 - 0.7(L) - 1.1(L)    INR 0.86 - 1.14 0.99 - - Canceled: Specimen improperly labeled. Laboratory value report is not on this  patient.  NOTIFIED MARQUITA INGRAM 10/13/16 RJ  CORRECTED ON 10/13 AT 1450: PREVIOUSLY REPORTED AS 1.17  0.87 - -    Sodium 133 - 144 mmol/L 143 - - - 142 - -    Potassium 3.4 - 5.3 mmol/L 4.3 - - - 4.2 - -    Chloride  94 - 109 mmol/L 110(H) - - - 108 - -    Glucose 70 - 99 mg/dL 78 98 - - 79 79 -    Urea Nitrogen 7 - 30 mg/dL 9 - - - 8 - -    Creatinine 0.52 - 1.04 mg/dL 0.69 - - - 0.66 - -    Calcium (Total) 8.5 - 10.1 mg/dL 8.2(L) - - - 8.4(L) - -    Protein (Total) 6.8 - 8.8 g/dL 6.4(L) - - - 7.0 - -    Albumin 3.4 - 5.0 g/dL 3.1(L) - - - 3.2(L) - -    Alkaline Phosphatase 40 - 150 U/L 138 - - - 138 - -    AST 0 - 45 U/L 23 - - - 31 - -    ALT 0 - 50 U/L 30 - - - 38 - -    MCV 78 - 100 fl 99 - 102(H) - 101(H) - 100               Primary Care Provider    Physician No Ref-Primary       No address on file        Thank you!     Thank you for choosing [facility] BLOOD AND MARROW TRANSPLANT  for your care. Our goal is always to provide you with excellent care. Hearing back from our patients is one way we can continue to improve our services. Please take a few minutes to complete the written survey that you may receive in the mail after your visit with us. Thank you!             Your Updated Medication List - Protect others around you: Learn how to safely use, store and throw away your medicines at www.disposemymeds.org.          This list is accurate as of: 2/12/17 11:51 AM.  Always use your most recent med list.                   Brand Name Dispense Instructions for use    gabapentin 100 MG capsule    NEURONTIN    90 capsule    Take 2 capsules (200 mg) by mouth 3 times daily       lisinopril 2.5 MG tablet    PRINIVIL/Zestril    30 tablet    Take 1 tablet (2.5 mg) by mouth daily       LORAZEPAM PO      Take 0.25 mg by mouth every 4 hours as needed for anxiety       venlafaxine 75 MG 24 hr capsule    EFFEXOR-XR    30 capsule    Take 1 capsule (75 mg) by mouth daily          Detail Level: Zone The patient was educated about the following:\\nIt is normal to have skin reactions during radiation therapy treatment. \\nPatients may develop redness at the treatment site similar to a sunburn.\\nTopical creams are provided that will help reduce side effects and limit irritation. Over 60 plan:\\nWe discussed several treatment regimens ranging from 4-6 weeks with the patient. The patient understands that any treatment regimen has possible side effects.  The optimal cosmetic and clinical results tend to be achieved using a slower, more protracted regimen that uses lower daily radiation doses.  A faster regimen can be used and may be equally effective in eradication the cancer, however does carry a higher risk of side effects. We extensively discussed EBT vs Mohs surgery. At this time all questions appear to be answered to the patient’s satisfaction. Thank you for allowing the radiation team to participate in the care of this patient.  He is on a tight schedule and we discussed planning a moderate regimen but may need to hypofractionate further based on his travel plans.  We will attempt to obtain authorization and begin planning within the next week.

## 2024-06-25 NOTE — PLAN OF CARE
Problem: Goal Outcome Summary  Goal: Goal Outcome Summary  OT 5C: Evaluation completed and treatment initiated. Demonstrating IND with functional transfers and ambulation in room. Refusing out of room activity this day due to reported pain and swelling in face, RN aware. With stand by assist and vc, completed 7 B LE standing exercises X15 reps and 9 B UE standing exercises X15 reps, VSS. Educated on donning N95 mask and encouraged ambulating in hallway daily, verbalized understanding.      Recommend discharge home with assist and OP Cancer Rehab to increase strength and activity tolerance       Yes

## 2025-03-01 NOTE — MR AVS SNAPSHOT
After Visit Summary   2/20/2017    Steph Agee    MRN: [id number]           Patient Information     Date Of Birth          1975        Visit Information        Provider Department      2/20/2017 2:00 PM  BMT DOM [facility] Blood and Marrow Transplant        Today's Diagnoses     Nodular sclerosing Hodgkin's lymphoma, unspecified body region (H)    -  1          [facility] [place] [place]  [address] [place] [id number]  Phone: [phone]  Clinic Hours:   Monday-Friday:    8am to 4:30pm   Weekends and holidays:    8am to noon (in general)  If your fever is 100.5  or greater,   call the clinic.  After hours call the   hospital at [phone] or   [phone]. Ask for the BMT   fellow for pediatric or adult patients            Follow-ups after your visit        Your next 10 appointments already scheduled     Aug 09, 2017 10:30 AM CDT   [facility] with  [facility]   [facility] ([facility])    [address] [place] [id number] [phone]            Aug 09, 2017 11:00 AM CDT   Return with Obed Chambers MD   [facility] Blood and Marrow Transplant ([facility])    [address] [place] [id number] [phone]              Future tests that were ordered for you today     Open Future Orders        Priority Expected Expires Ordered    Bone marrow biopsy Routine 2/27/2017 8/19/2017 2/20/2017            Who to contact     If you have questions or need follow up information about today's clinic visit or your schedule please contact [facility]ARR[facility]ANSPL[facility] directly at [phone].  Normal or non-critical lab and imaging results will be communicated to you by MyChart, letter or phone within 4 business days after the clinic has received the results. If you do not hear from us within 7 days, please contact the clinic through MyChart or phone.  If you have a critical or abnormal lab result, we will notify you by phone as soon as possible.  Submit refill requests through [employer] or call your pharmacy and they will forward the refill request to us. Please allow 3 business days for your refill to be completed.          Additional Information About Your Visit        [employer] Information     [employer] gives you secure access to your electronic health record. If you see a primary care provider, you can also send messages to your care team and make appointments. If you have questions, please call your primary care clinic.  If you do not have a primary care provider, please call [phone] and they will assist you.        Care EveryWhere ID     This is your Care EveryWhere ID. This could be used by other organizations to access your [facility] medical records  [id number]        Your Vitals Were     Pulse Temperature Respirations Pulse Oximetry BMI (Body Mass Index)       130 96.8  F (36  C) 16 93% 30.95 kg/m2        Blood Pressure from Last 3 Encounters:   02/20/17 (!) 99/34   02/19/17 103/60   02/18/17 108/59    Weight from Last 3 Encounters:   02/20/17 81.8 kg (180 lb 6.4 oz)   02/19/17 81.2 kg (179 lb 0.2 oz)   02/18/17 81.2 kg (179 lb 0.2 oz)              We Performed the Following     CBC with platelets differential     Comprehensive metabolic panel     Group B strep PCR     Influenza A and B and RSV PCR        Recent Review Flowsheet Data     BMT Recent Results Latest Ref Rng & Units 2/3/2017 2/14/2017 2/15/2017 2/16/2017 2/17/2017 2/18/2017 2/19/2017    WBC 4.0 - 11.0 10e9/L 2.3(L) 22.6(H) 20.4(H) 40.8(H) 45.6(H) 45.8(H) 38.6(H)    Hemoglobin 11.7 - 15.7 g/dL 11.2(L) 11.2(L) 11.4(L) 10.8(L) 10.5(L) 9.6(L) 9.5(L)    Platelet Count 150 - 450 10e9/L 232 178 181 156 132(L) 106(L) 81(L)    Neutrophils (Absolute) 1.6 - 8.3 10e9/L 1.1(L) - 18.4(H) 36.5(H) 41.6(H) 41.6(H) 35.2(H)    INR 0.86 - 1.14 - 0.94 - - - - -    Sodium 133 - 144 mmol/L - - 142 141 - 143 144     Potassium 3.4 - 5.3 mmol/L - - 4.1 3.9 - 3.6 3.4    Chloride 94 - 109 mmol/L - - 108 107 - 106 106    Glucose 70 - 99 mg/dL - - 91 92 - 82 93    Urea Nitrogen 7 - 30 mg/dL - - 16 15 - 14 10    Creatinine 0.52 - 1.04 mg/dL - - 0.66 0.61 - 0.61 0.61    Calcium (Total) 8.5 - 10.1 mg/dL - - 8.2(L) 8.7 - 7.8(L) 8.0(L)    Protein (Total) 6.8 - 8.8 g/dL - - - - - - -    Albumin 3.4 - 5.0 g/dL - - - - - - -    Alkaline Phosphatase 40 - 150 U/L - - - - - - -    AST 0 - 45 U/L - - - - - - -    ALT 0 - 50 U/L - - - - - - -    MCV 78 - 100 fl 100 102(H) 102(H) 100 101(H) 102(H) 100               Primary Care Provider    Physician No Ref-Primary       No address on file        Thank you!     Thank you for choosing [facility]ARR[facility]ANS[facility]  for your care. Our goal is always to provide you with excellent care. Hearing back from our patients is one way we can continue to improve our services. Please take a few minutes to complete the written survey that you may receive in the mail after your visit with us. Thank you!             Your Updated Medication List - Protect others around you: Learn how to safely use, store and throw away your medicines at www.disposemymeds.org.          This list is accurate as of: 2/20/17  3:35 PM.  Always use your most recent med list.                   Brand Name Dispense Instructions for use    gabapentin 100 MG capsule    NEURONTIN    90 capsule    Take 2 capsules (200 mg) by mouth 3 times daily       LORAZEPAM PO      Take 0.25 mg by mouth every 4 hours as needed for anxiety       losartan 25 MG tablet    COZAAR    45 tablet    Take 0.5 tablets (12.5 mg) by mouth daily       venlafaxine 75 MG 24 hr capsule    EFFEXOR-XR    30 capsule    Take 1 capsule (75 mg) by mouth daily          No
